# Patient Record
Sex: FEMALE | Race: BLACK OR AFRICAN AMERICAN | NOT HISPANIC OR LATINO | Employment: OTHER | URBAN - METROPOLITAN AREA
[De-identification: names, ages, dates, MRNs, and addresses within clinical notes are randomized per-mention and may not be internally consistent; named-entity substitution may affect disease eponyms.]

---

## 2017-03-15 ENCOUNTER — PATIENT MESSAGE (OUTPATIENT)
Dept: INTERNAL MEDICINE | Facility: CLINIC | Age: 67
End: 2017-03-15

## 2017-03-15 DIAGNOSIS — N39.0 URINARY TRACT INFECTION WITHOUT HEMATURIA, SITE UNSPECIFIED: ICD-10-CM

## 2017-03-15 RX ORDER — CIPROFLOXACIN 500 MG/1
500 TABLET ORAL 2 TIMES DAILY
Qty: 10 TABLET | Refills: 0 | Status: SHIPPED | OUTPATIENT
Start: 2017-03-15 | End: 2017-03-20

## 2017-07-18 ENCOUNTER — OFFICE VISIT (OUTPATIENT)
Dept: INTERNAL MEDICINE | Facility: CLINIC | Age: 67
End: 2017-07-18
Attending: INTERNAL MEDICINE
Payer: COMMERCIAL

## 2017-07-18 VITALS
OXYGEN SATURATION: 99 % | HEART RATE: 96 BPM | SYSTOLIC BLOOD PRESSURE: 143 MMHG | WEIGHT: 203 LBS | DIASTOLIC BLOOD PRESSURE: 78 MMHG | BODY MASS INDEX: 31.86 KG/M2 | HEIGHT: 67 IN

## 2017-07-18 DIAGNOSIS — Z13.39 SCREENING FOR ALCOHOLISM: ICD-10-CM

## 2017-07-18 DIAGNOSIS — Z13.31 SCREENING FOR DEPRESSION: ICD-10-CM

## 2017-07-18 DIAGNOSIS — N39.0 URINARY TRACT INFECTION WITHOUT HEMATURIA, SITE UNSPECIFIED: Primary | ICD-10-CM

## 2017-07-18 DIAGNOSIS — Z00.00 ROUTINE ADULT HEALTH MAINTENANCE: ICD-10-CM

## 2017-07-18 DIAGNOSIS — I10 ESSENTIAL HYPERTENSION: Primary | ICD-10-CM

## 2017-07-18 DIAGNOSIS — M17.11 PRIMARY OSTEOARTHRITIS OF RIGHT KNEE: ICD-10-CM

## 2017-07-18 PROCEDURE — G0442 ANNUAL ALCOHOL SCREEN 15 MIN: HCPCS | Mod: S$GLB,,, | Performed by: INTERNAL MEDICINE

## 2017-07-18 PROCEDURE — G0444 DEPRESSION SCREEN ANNUAL: HCPCS | Mod: S$GLB,,, | Performed by: INTERNAL MEDICINE

## 2017-07-18 PROCEDURE — 99396 PREV VISIT EST AGE 40-64: CPT | Mod: 25,S$GLB,, | Performed by: INTERNAL MEDICINE

## 2017-07-18 RX ORDER — DEXTROMETHORPHAN HYDROBROMIDE, GUAIFENESIN 5; 100 MG/5ML; MG/5ML
650 LIQUID ORAL 3 TIMES DAILY PRN
Refills: 0
Start: 2017-07-18

## 2017-07-18 RX ORDER — INDOMETHACIN 25 MG/1
25 CAPSULE ORAL
Qty: 90 CAPSULE | Refills: 2 | Status: SHIPPED | OUTPATIENT
Start: 2017-07-18 | End: 2018-07-10

## 2017-07-18 RX ORDER — SULFAMETHOXAZOLE AND TRIMETHOPRIM 800; 160 MG/1; MG/1
1 TABLET ORAL 2 TIMES DAILY
Qty: 10 TABLET | Refills: 0 | Status: SHIPPED | OUTPATIENT
Start: 2017-07-18 | End: 2018-07-10

## 2017-07-18 NOTE — PROGRESS NOTES
Subjective:       Patient ID: Trista Gr is a 66 y.o. female.    Chief Complaint: Follow-up (6 month) and Ankle Pain (backs of both ankles)    Wants to try Indocin for arthritis. Has a friend that takes it for gout and it works great.  May see Dr. Purvis for knee injection.  Her sister gave her a rolling walker 4 days ago.      Review of Systems   Constitutional: Negative.    Respiratory: Negative for shortness of breath.    Cardiovascular: Negative for chest pain.   Musculoskeletal: Positive for arthralgias.       Objective:      Physical Exam   Constitutional: She appears well-developed and well-nourished.   HENT:   Head: Normocephalic.   Eyes: Pupils are equal, round, and reactive to light.   Cardiovascular: Normal rate, regular rhythm and normal heart sounds.  Exam reveals no friction rub.    Pulmonary/Chest: Effort normal.   Musculoskeletal:        Right knee: She exhibits decreased range of motion and LCL laxity.   Neurological: She is alert.       Assessment:       1. Essential hypertension    2. Primary osteoarthritis of right knee    3. Routine adult health maintenance        Plan:       Per orders and D/C instructions.  Continue meds/diet for HTN, which is stable.  OK to try Indocin for arthritis.  She defers PT, c-scope, and labs until she has [employer] in 8/17.       Screening: The patient was screened for depression with the PHQ2 questionnaire and possible health consequences were discussed with the patient, who understands (15 minutes spent). The patient was screened for the misuse of alcohol, by asking the number of drinks per average week, and if pt has had more than 4 drinks (more than 3 for women and elderly) in 1 day within the past year. The health and legal consequences of misuse were discussed (15 minutes spent). The patient was screened for obesity (BMI>30), If the current BMI > 30, then the possible consequences of obesity, as well as the benefits of diet, exercise,  and weight loss were discussed (15 minutes spent).

## 2017-10-30 RX ORDER — MELOXICAM 15 MG/1
1 TABLET ORAL DAILY
Refills: 0 | COMMUNITY
Start: 2017-09-28 | End: 2018-01-08

## 2018-01-08 ENCOUNTER — OFFICE VISIT (OUTPATIENT)
Dept: INTERNAL MEDICINE | Facility: CLINIC | Age: 68
End: 2018-01-08
Attending: INTERNAL MEDICINE
Payer: MEDICARE

## 2018-01-08 VITALS
BODY MASS INDEX: 31.71 KG/M2 | HEART RATE: 89 BPM | HEIGHT: 67 IN | DIASTOLIC BLOOD PRESSURE: 86 MMHG | SYSTOLIC BLOOD PRESSURE: 132 MMHG | OXYGEN SATURATION: 97 % | WEIGHT: 202 LBS

## 2018-01-08 DIAGNOSIS — R35.0 URINARY FREQUENCY: ICD-10-CM

## 2018-01-08 DIAGNOSIS — I10 ESSENTIAL HYPERTENSION: ICD-10-CM

## 2018-01-08 DIAGNOSIS — Z00.00 ROUTINE HISTORY AND PHYSICAL EXAMINATION OF ADULT: ICD-10-CM

## 2018-01-08 DIAGNOSIS — R93.7 ABNORMAL BONE DENSITY SCREENING: ICD-10-CM

## 2018-01-08 DIAGNOSIS — M54.5 CHRONIC LOW BACK PAIN, UNSPECIFIED BACK PAIN LATERALITY, WITH SCIATICA PRESENCE UNSPECIFIED: Primary | ICD-10-CM

## 2018-01-08 DIAGNOSIS — G89.29 CHRONIC LOW BACK PAIN, UNSPECIFIED BACK PAIN LATERALITY, WITH SCIATICA PRESENCE UNSPECIFIED: Primary | ICD-10-CM

## 2018-01-08 DIAGNOSIS — Z13.89 SCREENING FOR OBESITY: ICD-10-CM

## 2018-01-08 DIAGNOSIS — Z13.39 SCREENING FOR ALCOHOLISM: ICD-10-CM

## 2018-01-08 DIAGNOSIS — Z12.39 BREAST CANCER SCREENING: ICD-10-CM

## 2018-01-08 DIAGNOSIS — Z13.31 SCREENING FOR DEPRESSION: ICD-10-CM

## 2018-01-08 DIAGNOSIS — M17.11 PRIMARY OSTEOARTHRITIS OF RIGHT KNEE: ICD-10-CM

## 2018-01-08 DIAGNOSIS — R42 DIZZY SPELLS: ICD-10-CM

## 2018-01-08 PROCEDURE — 99214 OFFICE O/P EST MOD 30 MIN: CPT | Mod: 25,S$GLB,, | Performed by: INTERNAL MEDICINE

## 2018-01-08 PROCEDURE — G0442 ANNUAL ALCOHOL SCREEN 15 MIN: HCPCS | Mod: 59,S$GLB,, | Performed by: INTERNAL MEDICINE

## 2018-01-08 PROCEDURE — G0444 DEPRESSION SCREEN ANNUAL: HCPCS | Mod: 59,S$GLB,, | Performed by: INTERNAL MEDICINE

## 2018-01-08 RX ORDER — DICLOFENAC SODIUM 10 MG/G
4 GEL TOPICAL 4 TIMES DAILY PRN
Qty: 1000 G | Refills: 1 | Status: SHIPPED | OUTPATIENT
Start: 2018-01-08 | End: 2019-01-08

## 2018-01-08 RX ORDER — MELOXICAM 7.5 MG/1
7.5 TABLET ORAL 2 TIMES DAILY PRN
Qty: 60 TABLET | Refills: 3 | Status: SHIPPED | OUTPATIENT
Start: 2018-01-08 | End: 2019-01-08

## 2018-01-08 RX ORDER — TRAMADOL HYDROCHLORIDE 50 MG/1
50 TABLET ORAL DAILY PRN
Qty: 30 TABLET | Refills: 1 | Status: SHIPPED | OUTPATIENT
Start: 2018-01-08 | End: 2018-12-26 | Stop reason: SDUPTHER

## 2018-01-08 NOTE — PROGRESS NOTES
Subjective:       Patient ID: Trista Gr is a 67 y.o. female.    Chief Complaint: Follow-up (6 month); Dizziness (thinks its the anti inflam meds); Urinary Frequency; Pain (knee, back, right hip / seeing  Dr. Kwan); and Headache    NSAIDS make her dizzy. Wants to try Mobic 7.5 mg BID prn.      Dizziness:   Chronicity:  Recurrent  Onset:  More than 1 month agono chest pain.  Urinary Frequency    This is a chronic problem. The current episode started more than 1 month ago. The problem has been unchanged. Associated symptoms include frequency. Her past medical history is significant for hypertension.   Pain   This is a chronic problem. The current episode started more than 1 year ago. Associated symptoms include arthralgias. Pertinent negatives include no chest pain.   Hypertension   This is a chronic problem. The current episode started more than 1 year ago. The problem is controlled. Pertinent negatives include no chest pain or shortness of breath.     Review of Systems   Constitutional: Negative.    Respiratory: Negative for shortness of breath.    Cardiovascular: Negative for chest pain.   Genitourinary: Positive for frequency.   Musculoskeletal: Positive for arthralgias, back pain and gait problem.   Psychiatric/Behavioral: Negative for dysphoric mood.       Objective:      Physical Exam   Constitutional: She appears well-developed and well-nourished.   HENT:   Head: Normocephalic.   Eyes: Pupils are equal, round, and reactive to light.   Cardiovascular: Normal rate, regular rhythm and normal heart sounds.  Exam reveals no friction rub.    Pulmonary/Chest: Effort normal.   Musculoskeletal:        Right knee: She exhibits decreased range of motion and LCL laxity.   Neurological: She is alert.       Assessment:       1. Chronic low back pain, unspecified back pain laterality, with sciatica presence unspecified    2. Essential hypertension    3. Routine history and physical examination of adult    4.  Abnormal bone density screening    5. Breast cancer screening    6. Screening for depression    7. Screening for alcoholism    8. Screening for obesity    9. Urinary frequency    10. Dizzy spells    11. Primary osteoarthritis of right knee        Plan:       Per orders and D/C instructions.  Continue meds/diet for HTN, which is stable.  Starting PT through Ortho for Back and knee pain.  Try Myrbetriq for urinary frequency.  Check labs.    Screening: The patient was screened for depression with the PHQ2 questionnaire and possible health consequences were discussed with the patient, who understands (15 minutes spent). The patient was screened for the misuse of alcohol, by asking the number of drinks per average week, and if pt has had more than 4 drinks (more than 3 for women and elderly) in 1 day within the past year. The health and legal consequences of misuse were discussed (15 minutes spent). The patient was screened for obesity (BMI>30), If the current BMI > 30, then the possible consequences of obesity, as well as the benefits of diet, exercise, and weight loss were discussed (15 minutes spent).

## 2018-01-09 RX ORDER — OXYBUTYNIN CHLORIDE 10 MG/1
10 TABLET, EXTENDED RELEASE ORAL DAILY
Qty: 30 TABLET | Refills: 11 | Status: SHIPPED | OUTPATIENT
Start: 2018-01-09 | End: 2020-10-13

## 2018-02-22 PROBLEM — M85.859 OSTEOPENIA OF THIGH: Status: ACTIVE | Noted: 2018-02-22

## 2018-04-24 DIAGNOSIS — J11.1 FLU: Primary | ICD-10-CM

## 2018-04-24 RX ORDER — OSELTAMIVIR PHOSPHATE 75 MG/1
75 CAPSULE ORAL 2 TIMES DAILY
Qty: 10 CAPSULE | Refills: 0 | Status: SHIPPED | OUTPATIENT
Start: 2018-04-24 | End: 2018-04-29

## 2018-04-30 ENCOUNTER — OFFICE VISIT (OUTPATIENT)
Dept: URGENT CARE | Facility: CLINIC | Age: 68
End: 2018-04-30
Payer: MEDICARE

## 2018-04-30 VITALS
DIASTOLIC BLOOD PRESSURE: 73 MMHG | WEIGHT: 202 LBS | TEMPERATURE: 98 F | SYSTOLIC BLOOD PRESSURE: 142 MMHG | HEIGHT: 67 IN | BODY MASS INDEX: 31.71 KG/M2 | RESPIRATION RATE: 18 BRPM | HEART RATE: 92 BPM | OXYGEN SATURATION: 99 %

## 2018-04-30 DIAGNOSIS — J01.00 ACUTE NON-RECURRENT MAXILLARY SINUSITIS: ICD-10-CM

## 2018-04-30 DIAGNOSIS — R42 DIZZINESS: ICD-10-CM

## 2018-04-30 DIAGNOSIS — H65.03 BILATERAL ACUTE SEROUS OTITIS MEDIA, RECURRENCE NOT SPECIFIED: ICD-10-CM

## 2018-04-30 DIAGNOSIS — J02.9 SORE THROAT: Primary | ICD-10-CM

## 2018-04-30 PROCEDURE — 96372 THER/PROPH/DIAG INJ SC/IM: CPT | Mod: S$GLB,,, | Performed by: EMERGENCY MEDICINE

## 2018-04-30 PROCEDURE — 99214 OFFICE O/P EST MOD 30 MIN: CPT | Mod: 25,S$GLB,, | Performed by: EMERGENCY MEDICINE

## 2018-04-30 PROCEDURE — 3077F SYST BP >= 140 MM HG: CPT | Mod: CPTII,S$GLB,, | Performed by: EMERGENCY MEDICINE

## 2018-04-30 PROCEDURE — 3078F DIAST BP <80 MM HG: CPT | Mod: CPTII,S$GLB,, | Performed by: EMERGENCY MEDICINE

## 2018-04-30 RX ORDER — TIZANIDINE 2 MG/1
TABLET ORAL
COMMUNITY
Start: 2018-02-15 | End: 2020-10-13

## 2018-04-30 RX ORDER — TRAMADOL HYDROCHLORIDE 50 MG/1
TABLET ORAL
COMMUNITY
Start: 2018-04-21 | End: 2018-09-27 | Stop reason: SDUPTHER

## 2018-04-30 RX ORDER — MECLIZINE HYDROCHLORIDE 25 MG/1
25 TABLET ORAL 3 TIMES DAILY PRN
Qty: 21 TABLET | Refills: 0 | Status: SHIPPED | OUTPATIENT
Start: 2018-04-30 | End: 2018-10-07

## 2018-04-30 RX ORDER — DICLOFENAC SODIUM 75 MG/1
TABLET, DELAYED RELEASE ORAL
COMMUNITY
Start: 2018-03-23 | End: 2018-07-10

## 2018-04-30 RX ORDER — FLUTICASONE PROPIONATE 50 MCG
1 SPRAY, SUSPENSION (ML) NASAL DAILY
Qty: 1 BOTTLE | Refills: 2 | Status: SHIPPED | OUTPATIENT
Start: 2018-04-30 | End: 2018-11-26 | Stop reason: SDUPTHER

## 2018-04-30 RX ORDER — CEFDINIR 300 MG/1
300 CAPSULE ORAL EVERY 12 HOURS
Qty: 14 CAPSULE | Refills: 0 | Status: SHIPPED | OUTPATIENT
Start: 2018-04-30 | End: 2018-05-07

## 2018-04-30 RX ORDER — BETAMETHASONE SODIUM PHOSPHATE AND BETAMETHASONE ACETATE 3; 3 MG/ML; MG/ML
6 INJECTION, SUSPENSION INTRA-ARTICULAR; INTRALESIONAL; INTRAMUSCULAR; SOFT TISSUE
Status: COMPLETED | OUTPATIENT
Start: 2018-04-30 | End: 2018-04-30

## 2018-04-30 RX ORDER — CETIRIZINE HYDROCHLORIDE 10 MG/1
10 TABLET ORAL DAILY
Qty: 30 TABLET | Refills: 2 | Status: SHIPPED | OUTPATIENT
Start: 2018-04-30 | End: 2019-01-08

## 2018-04-30 RX ADMIN — BETAMETHASONE SODIUM PHOSPHATE AND BETAMETHASONE ACETATE 6 MG: 3; 3 INJECTION, SUSPENSION INTRA-ARTICULAR; INTRALESIONAL; INTRAMUSCULAR; SOFT TISSUE at 07:04

## 2018-04-30 NOTE — PROGRESS NOTES
"Subjective:       Patient ID: Trista Gr is a 67 y.o. female.    Vitals:    04/30/18 1829   BP: (!) 142/73   Pulse: 92   Resp: 18   Temp: 98.1 °F (36.7 °C)   TempSrc: Oral   SpO2: 99%   Weight: 91.6 kg (202 lb)   Height: 5' 7" (1.702 m)       Chief Complaint: URI    Pt c/o having sinus congestion, ear pain, dizziness, and lightheadedness for a couple of weeks. REPORTS FLUID IN THE EAR AND WAX AS WELL, SINUS PRRESSURE AND CONGESTION, SORE THROAT, TMJ ON THE LEFT. SHE STATES NO FEVERS, NO CHILLS. SHE REPORT SHE OCCASIONALLY TAKE ANTIHISTAMINES BUT NOT REGULARLY BECAUSE OF HTN, AND SHE IS RUNNING OUT OF FLONASE SO REQUESTING REFILL ON THAT RX.      URI    This is a recurrent problem. The current episode started 1 to 4 weeks ago. The problem has been unchanged. There has been no fever. The fever has been present for less than 1 day. Associated symptoms include congestion, ear pain and headaches. Pertinent negatives include no abdominal pain, chest pain, coughing, nausea, sore throat or wheezing. Treatments tried: tamiflu, flonase, goodies,  The treatment provided mild relief.     Review of Systems   Constitution: Negative for chills, fever and malaise/fatigue.   HENT: Positive for congestion and ear pain. Negative for hoarse voice and sore throat.         Both ears feel full   Eyes: Negative for discharge and redness.   Cardiovascular: Negative for chest pain, dyspnea on exertion and leg swelling.   Respiratory: Negative for cough, shortness of breath, sputum production and wheezing.    Musculoskeletal: Negative for myalgias.   Gastrointestinal: Negative for abdominal pain and nausea.   Neurological: Positive for dizziness, headaches and light-headedness.       Objective:      Physical Exam   Constitutional: She is oriented to person, place, and time. She appears well-developed and well-nourished. She is cooperative.  Non-toxic appearance. She does not appear ill. No distress.   IN WHEELCHAIR CHRONICALLY, " PLEASANT COOPERATIVE, WITH  WHO IS HELPFUL   HENT:   Head: Normocephalic and atraumatic.   Right Ear: Hearing, tympanic membrane, external ear and ear canal normal.   Left Ear: Hearing, tympanic membrane, external ear and ear canal normal.   Nose: No mucosal edema, rhinorrhea or nasal deformity. No epistaxis. Right sinus exhibits no maxillary sinus tenderness and no frontal sinus tenderness. Left sinus exhibits no maxillary sinus tenderness and no frontal sinus tenderness.   Mouth/Throat: Uvula is midline and mucous membranes are normal. No trismus in the jaw. Normal dentition. No uvula swelling. No posterior oropharyngeal erythema.   TOP DENTURES  MILD POSTERIOR OP ERYTHEMA  MILD LEFT TMJ  TTP MAXILLARY SINUSES  BOGGY NASAL MUCOSA  CERUMINOSIS LEFT EAC, REMOVED WITH CURRETTE AND FLUID AT THE LEFT TM  RIGHT TM CLEAR  TENDER LEFT ANTERIOR CERVICAL LA   Eyes: Conjunctivae and lids are normal. No scleral icterus.   Sclera clear bilat   Neck: Trachea normal, full passive range of motion without pain and phonation normal. Neck supple.   Cardiovascular: Normal rate, regular rhythm, normal heart sounds, intact distal pulses and normal pulses.    Pulmonary/Chest: Effort normal and breath sounds normal. No respiratory distress.   Abdominal: Soft. Normal appearance and bowel sounds are normal. There is no tenderness.   Musculoskeletal: Normal range of motion. She exhibits no edema or deformity.   Neurological: She is alert and oriented to person, place, and time. She exhibits normal muscle tone. Coordination normal.   NORMAL FINGER TO NOSE  NO DYSDIADOKINESIA AND FLAT HAND TO THIGH RAPID ALTERNATING MOVEMENT  NO NYSTAGMUS   Skin: Skin is warm, dry and intact. She is not diaphoretic. No pallor.   Psychiatric: She has a normal mood and affect. Her speech is normal and behavior is normal. Cognition and memory are normal.   Nursing note and vitals reviewed.      Assessment:       1. Sore throat    2. Acute non-recurrent  maxillary sinusitis    3. Dizziness    4. Bilateral acute serous otitis media, recurrence not specified        Plan:       Trista was seen today for uri.    Diagnoses and all orders for this visit:    Sore throat    Acute non-recurrent maxillary sinusitis    Dizziness    Bilateral acute serous otitis media, recurrence not specified    Other orders  -     betamethasone acetate-betamethasone sodium phosphate injection 6 mg; Inject 1 mL (6 mg total) into the muscle one time.  -     meclizine (ANTIVERT) 25 mg tablet; Take 1 tablet (25 mg total) by mouth 3 (three) times daily as needed for Dizziness (vertigo).  -     cefdinir (OMNICEF) 300 MG capsule; Take 1 capsule (300 mg total) by mouth every 12 (twelve) hours.  -     cetirizine (ZYRTEC) 10 MG tablet; Take 1 tablet (10 mg total) by mouth once daily.  -     fluticasone (FLONASE) 50 mcg/actuation nasal spray; 1 spray (50 mcg total) by Each Nare route once daily.          Patient Instructions       REST AND HYDRATE WITH PLENTY OF FLUIDS    1 CC CELESTONE GIVEN IN CLINIC  CEFDINIR RX (NOT A PENICILLIN)  FLONASE RX  ZYRTEC (CEFTIRIZINE RX)  MECLIZINE RX FOR DIZZINESS    SEE SINUSITIS, SORE THROAT, FLUID IN THE MIDDLE EAR, AND DIZZINESS SHEET    BE SURE TO FOLLOW UP WITH YOUR PRIMARY CARE MD AND TO GO TO THE ER IF WORSE IN ANY WAY.    Sinusitis (Antibiotic Treatment)    The sinuses are air-filled spaces within the bones of the face. They connect to the inside of the nose. Sinusitis is an inflammation of the tissue lining the sinus cavity. Sinus inflammation can occur during a cold. It can also be due to allergies to pollens and other particles in the air. Sinusitis can cause symptoms of sinus congestion and fullness. A sinus infection causes fever, headache and facial pain. There is often green or yellow drainage from the nose or into the back of the throat (post-nasal drip). You have been given antibiotics to treat this condition.  Home care:  · Take the full course of  antibiotics as instructed. Do not stop taking them, even if you feel better.  · Drink plenty of water, hot tea, and other liquids. This may help thin mucus. It also may promote sinus drainage.  · Heat may help soothe painful areas of the face. Use a towel soaked in hot water. Or,  the shower and direct the hot spray onto your face. Using a vaporizer along with a menthol rub at night may also help.   · An expectorant containing guaifenesin may help thin the mucus and promote drainage from the sinuses.  · Over-the-counter decongestants may be used unless a similar medicine was prescribed. Nasal sprays work the fastest. Use one that contains phenylephrine or oxymetazoline. First blow the nose gently. Then use the spray. Do not use these medicines more often than directed on the label or symptoms may get worse. You may also use tablets containing pseudoephedrine. Avoid products that combine ingredients, because side effects may be increased. Read labels. You can also ask the pharmacist for help. (NOTE: Persons with high blood pressure should not use decongestants. They can raise blood pressure.)  · Over-the-counter antihistamines may help if allergies contributed to your sinusitis.    · Do not use nasal rinses or irrigation during an acute sinus infection, unless told to by your health care provider. Rinsing may spread the infection to other sinuses.  · Use acetaminophen or ibuprofen to control pain, unless another pain medicine was prescribed. (If you have chronic liver or kidney disease or ever had a stomach ulcer, talk with your doctor before using these medicines. Aspirin should never be used in anyone under 18 years of age who is ill with a fever. It may cause severe liver damage.)  · Don't smoke. This can worsen symptoms.  Follow-up care  Follow up with your healthcare provider or our staff if you are not improving within the next week.  When to seek medical advice  Call your healthcare provider if any of  these occur:  · Facial pain or headache becoming more severe  · Stiff neck  · Unusual drowsiness or confusion  · Swelling of the forehead or eyelids  · Vision problems, including blurred or double vision  · Fever of 100.4ºF (38ºC) or higher, or as directed by your healthcare provider  · Seizure  · Breathing problems  · Symptoms not resolving within 10 days  Date Last Reviewed: 4/13/2015  © 0047-3130 [employer]. [address] [place] [address], [place] [address]. All rights reserved. This information is not intended as a substitute for professional medical care. Always follow your healthcare professional's instructions.        When You Have a Sore Throat    A sore throat can be painful. There are many reasons why you may have a sore throat. Your healthcare provider will work with you to find the cause of your sore throat. He or she will also find the best treatment for you.  What causes a sore throat?  Sore throats can be caused or worsened by:  · Cold or flu viruses  · Bacteria  · Irritants such as tobacco smoke or air pollution  · Acid reflux  A healthy throat  The tonsils are on the sides of the throat near the base of the tongue. They collect viruses and bacteria and help fight infection. The throat (pharynx) is the passage for air. Mucus from the nasal cavity also moves down the passage.  An inflamed throat  The tonsils and pharynx can become inflamed due to a cold or flu virus. Postnasal drip (excess mucus draining from the nasal cavity) can irritate the throat. It can also make the throat or tonsils more likely to be infected by bacteria. Severe, untreated tonsillitis in children or adults can cause a pocket of pus (abscess) to form near the tonsil.  Your evaluation  A medical evaluation can help find the cause of your sore throat. It can also help your healthcare provider choose the best treatment for you. The evaluation may include a health history, physical exam, and diagnostic tests.  Health  history  Your healthcare provider may ask you the following:  · How long has the sore throat lasted and how have you been treating it?  · Do you have any other symptoms, such as body aches, fever, or cough?  · Does your sore throat recur? If so, how often? How many days of school or work have you missed because of a sore throat?  · Do you have trouble eating or swallowing?  · Have you been told that you snore or have other sleep problems?  · Do you have bad breath?  · Do you cough up bad-tasting mucus?  Physical exam  During the exam, your healthcare provider checks your ears, nose, and throat for problems. He or she also checks for swelling in the neck, and may listen to your chest.  Possible tests  Other tests your healthcare provider may perform include:  · A throat swab to check for bacteria such as streptococcus (the bacteria that causes strep throat)  · A blood test to check for mononucleosis (a viral infection)  · A chest X-ray to rule out pneumonia, especially if you have a cough  Treating a sore throat  Treatment depends on many factors. What is the likely cause? Is the problem recent? Does it keep coming back? In many cases, the best thing to do is to treat the symptoms, rest, and let the problem heal itself. Antibiotics may help clear up some bacterial infections. For cases of severe or recurring tonsillitis, the tonsils may need to be removed.  Relieving your symptoms  · Dont smoke, and avoid secondhand smoke.  · For children, try throat sprays or Popsicles. Adults and older children may try lozenges.  · Drink warm liquids to soothe the throat and help thin mucus. Avoid alcohol, spicy foods, and acidic drinks such as orange juice. These can irritate the throat.  · Gargle with warm saltwater (1 teaspoon of salt to 8 ounces of warm water).  · Use a humidifier to keep air moist and relieve throat dryness.  · Try over-the-counter pain relievers such as acetaminophen or ibuprofen. Use as directed, and dont  "exceed the recommended dose. Dont give aspirin to children.   Are antibiotics needed?  If your sore throat is due to a bacterial infection, antibiotics may speed healing and prevent complications. Although group A streptococcus ("strep throat" or GAS) is the major treatable infection for a sore throat, GAS causes only 5% to 15% of sore throats in adults who seek medical care. Most sore throats are caused by cold or flu viruses. And antibiotics dont treat viral illness. In fact, using antibiotics when theyre not needed may produce bacteria that are harder to kill. Your healthcare provider will prescribe antibiotics only if he or she thinks they are likely to help.  If antibiotics are prescribed  Take the medicine exactly as directed. Be sure to finish your prescription even if youre feeling better. And be sure to ask your healthcare provider or pharmacist what side effects are common and what to do about them.  Is surgery needed?  In some cases, tonsils need to be removed. This is often done as outpatient (same-day) surgery. Your healthcare provider may advise removing the tonsils in cases of:  · Several severe bouts of tonsillitis in a year. Severe episodes include those that lead to missed days of school or work, or that need to be treated with antibiotics.  · Tonsillitis that causes breathing problems during sleep  · Tonsillitis caused by food particles collecting in pouches in the tonsils (cryptic tonsillitis)  Call your healthcare provider if any of the following occur:  · Symptoms worsen, or new symptoms develop.  · Swollen tonsils make breathing difficult.  · The pain is severe enough to keep you from drinking liquids.  · A skin rash, hives, or wheezing develops. Any of these could signal an allergic reaction to antibiotics.  · Symptoms dont improve within a week.  · Symptoms dont improve within 2 to 3 days of starting antibiotics.   Date Last Reviewed: 10/1/2016  © 7725-3579 [employer] " [address] [place] [address]. All rights reserved. This information is not intended as a substitute for professional medical care. Always follow your healthcare professional's instructions.        Dizziness (Uncertain Cause)  Dizziness is a common symptom. It may be described as lightheadedness, spinning, or feeling like you are going to faint. Dizziness can have many causes.  Be sure to tell the healthcare provider about:  · All medicines you take, including prescription, over-the-counter, herbs, and supplements  · Any other symptoms you have  · Any health problems you are being treated for  · Anything that causes the dizziness to get worse or better  Today's exam did not show an exact cause for your dizziness. Other tests may be needed. Follow up with your healthcare provider.  Home care  · Dizziness that occurs with sudden standing may be a sign of mild dehydration. Drink extra fluids for the next few days.  · If you recently started a new medicine, stopped a medicine, or had the dose of a current medicine changed, talk with the prescribing healthcare provider. Your medicine plan may need adjustment.  · If dizziness lasts more than a few seconds, sit or lie down until it passes. This may help prevent injury in case you pass out.  · Do not drive or use power tools or dangerous equipment until you have had no dizziness for at least 48 hours.  Follow-up care  Follow up with your healthcare provider for further evaluation within the next 7 days or as advised.  When to seek medical advice  Call your healthcare provider for any of the following:  · Worsening of symptoms or new symptoms  · Passing out or seizure  · Repeated vomiting  · Headache  · Palpitations (the sense that your heart is fluttering or beating fast or hard)  · Shortness of breath  · Blood in vomit or stool (black or red color)  · Weakness of an arm or leg or one side of the face  · Vision or hearing changes  · Trouble walking or  speaking  · Chest, arm, neck, back, or jaw pain  Date Last Reviewed: 8/23/2015  © 8835-9005 [employer]. [address] [place] [address], [place] [address]. All rights reserved. This information is not intended as a substitute for professional medical care. Always follow your healthcare professional's instructions.        Earache, No Infection (Adult)  Earaches can happen without an infection. This occurs when air and fluid build up behind the eardrum causing a feeling of fullness and discomfort and reduced hearing. This is called otitis media with effusion (OME) or serous otitis media. It means there is fluid in the middle ear. It is not the same as acute otitis media, which is typically from infection.  OME can happen when you have a cold if congestion blocks the passage that drains the middle ear. This passage is called the eustachian tube. OME may also occur with nasal allergies or after a bacterial middle ear infection.    The pain or discomfort may come and go. You may hear clicking or popping sounds when you chew or swallow. You may feel that your balance is off. Or you may hear ringing in the ear.  It often takes from several weeks up to 3 months for the fluid to clear on its own. Oral pain relievers and ear drops help if there is pain. Decongestants and antihistamines sometimes help. Antibiotics don't help since there is no infection. Your doctor may prescribe a nasal spray to help reduce swelling in the nose and eustachian tube. This can allow the ear to drain.  If your OME doesn't improve after 3 months, surgery may be used to drain the fluid and insert a small tube in the eardrum to allow continued drainage.  Because the middle ear fluid can become infected, it is important to watch for signs of an ear infection which may develop later. These signs include increased ear pain, fever, or drainage from the ear.  Home care  The following guidelines will help you care for yourself at home:  · You may use  over-the-counter medicine as directed to control pain, unless another medicine was prescribed. If you have chronic liver or kidney disease or ever had a stomach ulcer or GI bleeding, talk with your doctor before using these medicines. Aspirin should never be used in anyone under 18 years of age who is ill with a fever. It may cause severe liver damage.  · You may use over-the-counter decongestants such as phenylephrine or pseudoephedrine. But they are not always helpful. Don't use nasal spray decongestants more than 3 days. Longer use can make congestion worse. Prescription nasal sprays from your doctor don't typically have those restrictions.  · Antihistamines may help if you are also having allergy symptoms.  · You may use medicines such as guaifenesin to thin mucus and promote drainage.  Follow-up care  Follow up with your healthcare provider or as advised if you are not feeling better after 3 days.  When to seek medical advice  Call your healthcare provider right away if any of the following occur:  · Your ear pain gets worse or does not start to improve   · Fever of 100.4°F (38°C) or higher, or as directed by your healthcare provider  · Fluid or blood draining from the ear  · Headache or sinus pain  · Stiff neck  · Unusual drowsiness or confusion  Date Last Reviewed: 10/1/2016  © 9379-1713 [employer]. [address] [place] [address], [place] [address]. All rights reserved. This information is not intended as a substitute for professional medical care. Always follow your healthcare professional's instructions.

## 2018-05-01 NOTE — PATIENT INSTRUCTIONS
REST AND HYDRATE WITH PLENTY OF FLUIDS    1 CC CELESTONE GIVEN IN CLINIC  CEFDINIR RX (NOT A PENICILLIN)  FLONASE RX  ZYRTEC (CEFTIRIZINE RX)  MECLIZINE RX FOR DIZZINESS    SEE SINUSITIS, SORE THROAT, FLUID IN THE MIDDLE EAR, AND DIZZINESS SHEET    BE SURE TO FOLLOW UP WITH YOUR PRIMARY CARE MD AND TO GO TO THE ER IF WORSE IN ANY WAY.    Sinusitis (Antibiotic Treatment)    The sinuses are air-filled spaces within the bones of the face. They connect to the inside of the nose. Sinusitis is an inflammation of the tissue lining the sinus cavity. Sinus inflammation can occur during a cold. It can also be due to allergies to pollens and other particles in the air. Sinusitis can cause symptoms of sinus congestion and fullness. A sinus infection causes fever, headache and facial pain. There is often green or yellow drainage from the nose or into the back of the throat (post-nasal drip). You have been given antibiotics to treat this condition.  Home care:  · Take the full course of antibiotics as instructed. Do not stop taking them, even if you feel better.  · Drink plenty of water, hot tea, and other liquids. This may help thin mucus. It also may promote sinus drainage.  · Heat may help soothe painful areas of the face. Use a towel soaked in hot water. Or,  the shower and direct the hot spray onto your face. Using a vaporizer along with a menthol rub at night may also help.   · An expectorant containing guaifenesin may help thin the mucus and promote drainage from the sinuses.  · Over-the-counter decongestants may be used unless a similar medicine was prescribed. Nasal sprays work the fastest. Use one that contains phenylephrine or oxymetazoline. First blow the nose gently. Then use the spray. Do not use these medicines more often than directed on the label or symptoms may get worse. You may also use tablets containing pseudoephedrine. Avoid products that combine ingredients, because side effects may be increased.  Read labels. You can also ask the pharmacist for help. (NOTE: Persons with high blood pressure should not use decongestants. They can raise blood pressure.)  · Over-the-counter antihistamines may help if allergies contributed to your sinusitis.    · Do not use nasal rinses or irrigation during an acute sinus infection, unless told to by your health care provider. Rinsing may spread the infection to other sinuses.  · Use acetaminophen or ibuprofen to control pain, unless another pain medicine was prescribed. (If you have chronic liver or kidney disease or ever had a stomach ulcer, talk with your doctor before using these medicines. Aspirin should never be used in anyone under 18 years of age who is ill with a fever. It may cause severe liver damage.)  · Don't smoke. This can worsen symptoms.  Follow-up care  Follow up with your healthcare provider or our staff if you are not improving within the next week.  When to seek medical advice  Call your healthcare provider if any of these occur:  · Facial pain or headache becoming more severe  · Stiff neck  · Unusual drowsiness or confusion  · Swelling of the forehead or eyelids  · Vision problems, including blurred or double vision  · Fever of 100.4ºF (38ºC) or higher, or as directed by your healthcare provider  · Seizure  · Breathing problems  · Symptoms not resolving within 10 days  Date Last Reviewed: 4/13/2015  © 4904-8795 [employer]. [address] [place] [address], [place] [id number]. All rights reserved. This information is not intended as a substitute for professional medical care. Always follow your healthcare professional's instructions.        When You Have a Sore Throat    A sore throat can be painful. There are many reasons why you may have a sore throat. Your healthcare provider will work with you to find the cause of your sore throat. He or she will also find the best treatment for you.  What causes a sore throat?  Sore throats can be caused or worsened  by:  · Cold or flu viruses  · Bacteria  · Irritants such as tobacco smoke or air pollution  · Acid reflux  A healthy throat  The tonsils are on the sides of the throat near the base of the tongue. They collect viruses and bacteria and help fight infection. The throat (pharynx) is the passage for air. Mucus from the nasal cavity also moves down the passage.  An inflamed throat  The tonsils and pharynx can become inflamed due to a cold or flu virus. Postnasal drip (excess mucus draining from the nasal cavity) can irritate the throat. It can also make the throat or tonsils more likely to be infected by bacteria. Severe, untreated tonsillitis in children or adults can cause a pocket of pus (abscess) to form near the tonsil.  Your evaluation  A medical evaluation can help find the cause of your sore throat. It can also help your healthcare provider choose the best treatment for you. The evaluation may include a health history, physical exam, and diagnostic tests.  Health history  Your healthcare provider may ask you the following:  · How long has the sore throat lasted and how have you been treating it?  · Do you have any other symptoms, such as body aches, fever, or cough?  · Does your sore throat recur? If so, how often? How many days of school or work have you missed because of a sore throat?  · Do you have trouble eating or swallowing?  · Have you been told that you snore or have other sleep problems?  · Do you have bad breath?  · Do you cough up bad-tasting mucus?  Physical exam  During the exam, your healthcare provider checks your ears, nose, and throat for problems. He or she also checks for swelling in the neck, and may listen to your chest.  Possible tests  Other tests your healthcare provider may perform include:  · A throat swab to check for bacteria such as streptococcus (the bacteria that causes strep throat)  · A blood test to check for mononucleosis (a viral infection)  · A chest X-ray to rule out  "pneumonia, especially if you have a cough  Treating a sore throat  Treatment depends on many factors. What is the likely cause? Is the problem recent? Does it keep coming back? In many cases, the best thing to do is to treat the symptoms, rest, and let the problem heal itself. Antibiotics may help clear up some bacterial infections. For cases of severe or recurring tonsillitis, the tonsils may need to be removed.  Relieving your symptoms  · Dont smoke, and avoid secondhand smoke.  · For children, try throat sprays or Popsicles. Adults and older children may try lozenges.  · Drink warm liquids to soothe the throat and help thin mucus. Avoid alcohol, spicy foods, and acidic drinks such as orange juice. These can irritate the throat.  · Gargle with warm saltwater (1 teaspoon of salt to 8 ounces of warm water).  · Use a humidifier to keep air moist and relieve throat dryness.  · Try over-the-counter pain relievers such as acetaminophen or ibuprofen. Use as directed, and dont exceed the recommended dose. Dont give aspirin to children.   Are antibiotics needed?  If your sore throat is due to a bacterial infection, antibiotics may speed healing and prevent complications. Although group A streptococcus ("strep throat" or GAS) is the major treatable infection for a sore throat, GAS causes only 5% to 15% of sore throats in adults who seek medical care. Most sore throats are caused by cold or flu viruses. And antibiotics dont treat viral illness. In fact, using antibiotics when theyre not needed may produce bacteria that are harder to kill. Your healthcare provider will prescribe antibiotics only if he or she thinks they are likely to help.  If antibiotics are prescribed  Take the medicine exactly as directed. Be sure to finish your prescription even if youre feeling better. And be sure to ask your healthcare provider or pharmacist what side effects are common and what to do about them.  Is surgery needed?  In some " cases, tonsils need to be removed. This is often done as outpatient (same-day) surgery. Your healthcare provider may advise removing the tonsils in cases of:  · Several severe bouts of tonsillitis in a year. Severe episodes include those that lead to missed days of school or work, or that need to be treated with antibiotics.  · Tonsillitis that causes breathing problems during sleep  · Tonsillitis caused by food particles collecting in pouches in the tonsils (cryptic tonsillitis)  Call your healthcare provider if any of the following occur:  · Symptoms worsen, or new symptoms develop.  · Swollen tonsils make breathing difficult.  · The pain is severe enough to keep you from drinking liquids.  · A skin rash, hives, or wheezing develops. Any of these could signal an allergic reaction to antibiotics.  · Symptoms dont improve within a week.  · Symptoms dont improve within 2 to 3 days of starting antibiotics.   Date Last Reviewed: 10/1/2016  © 4444-1140 [employer]. [address] [place] [address]. All rights reserved. This information is not intended as a substitute for professional medical care. Always follow your healthcare professional's instructions.        Dizziness (Uncertain Cause)  Dizziness is a common symptom. It may be described as lightheadedness, spinning, or feeling like you are going to faint. Dizziness can have many causes.  Be sure to tell the healthcare provider about:  · All medicines you take, including prescription, over-the-counter, herbs, and supplements  · Any other symptoms you have  · Any health problems you are being treated for  · Anything that causes the dizziness to get worse or better  Today's exam did not show an exact cause for your dizziness. Other tests may be needed. Follow up with your healthcare provider.  Home care  · Dizziness that occurs with sudden standing may be a sign of mild dehydration. Drink extra fluids for the next few days.  · If you recently  started a new medicine, stopped a medicine, or had the dose of a current medicine changed, talk with the prescribing healthcare provider. Your medicine plan may need adjustment.  · If dizziness lasts more than a few seconds, sit or lie down until it passes. This may help prevent injury in case you pass out.  · Do not drive or use power tools or dangerous equipment until you have had no dizziness for at least 48 hours.  Follow-up care  Follow up with your healthcare provider for further evaluation within the next 7 days or as advised.  When to seek medical advice  Call your healthcare provider for any of the following:  · Worsening of symptoms or new symptoms  · Passing out or seizure  · Repeated vomiting  · Headache  · Palpitations (the sense that your heart is fluttering or beating fast or hard)  · Shortness of breath  · Blood in vomit or stool (black or red color)  · Weakness of an arm or leg or one side of the face  · Vision or hearing changes  · Trouble walking or speaking  · Chest, arm, neck, back, or jaw pain  Date Last Reviewed: 8/23/2015 © 2000-2017 [employer]. [address] [place] [address]. All rights reserved. This information is not intended as a substitute for professional medical care. Always follow your healthcare professional's instructions.        Earache, No Infection (Adult)  Earaches can happen without an infection. This occurs when air and fluid build up behind the eardrum causing a feeling of fullness and discomfort and reduced hearing. This is called otitis media with effusion (OME) or serous otitis media. It means there is fluid in the middle ear. It is not the same as acute otitis media, which is typically from infection.  OME can happen when you have a cold if congestion blocks the passage that drains the middle ear. This passage is called the eustachian tube. OME may also occur with nasal allergies or after a bacterial middle ear infection.    The pain or  discomfort may come and go. You may hear clicking or popping sounds when you chew or swallow. You may feel that your balance is off. Or you may hear ringing in the ear.  It often takes from several weeks up to 3 months for the fluid to clear on its own. Oral pain relievers and ear drops help if there is pain. Decongestants and antihistamines sometimes help. Antibiotics don't help since there is no infection. Your doctor may prescribe a nasal spray to help reduce swelling in the nose and eustachian tube. This can allow the ear to drain.  If your OME doesn't improve after 3 months, surgery may be used to drain the fluid and insert a small tube in the eardrum to allow continued drainage.  Because the middle ear fluid can become infected, it is important to watch for signs of an ear infection which may develop later. These signs include increased ear pain, fever, or drainage from the ear.  Home care  The following guidelines will help you care for yourself at home:  · You may use over-the-counter medicine as directed to control pain, unless another medicine was prescribed. If you have chronic liver or kidney disease or ever had a stomach ulcer or GI bleeding, talk with your doctor before using these medicines. Aspirin should never be used in anyone under 18 years of age who is ill with a fever. It may cause severe liver damage.  · You may use over-the-counter decongestants such as phenylephrine or pseudoephedrine. But they are not always helpful. Don't use nasal spray decongestants more than 3 days. Longer use can make congestion worse. Prescription nasal sprays from your doctor don't typically have those restrictions.  · Antihistamines may help if you are also having allergy symptoms.  · You may use medicines such as guaifenesin to thin mucus and promote drainage.  Follow-up care  Follow up with your healthcare provider or as advised if you are not feeling better after 3 days.  When to seek medical advice  Call your  healthcare provider right away if any of the following occur:  · Your ear pain gets worse or does not start to improve   · Fever of 100.4°F (38°C) or higher, or as directed by your healthcare provider  · Fluid or blood draining from the ear  · Headache or sinus pain  · Stiff neck  · Unusual drowsiness or confusion  Date Last Reviewed: 10/1/2016  © 6181-8764 [employer]. [address] [place] [address], [place] [address]. All rights reserved. This information is not intended as a substitute for professional medical care. Always follow your healthcare professional's instructions.

## 2018-05-07 ENCOUNTER — DOCUMENTATION ONLY (OUTPATIENT)
Dept: INTERNAL MEDICINE | Facility: CLINIC | Age: 68
End: 2018-05-07

## 2018-05-07 DIAGNOSIS — E03.9 HYPOTHYROIDISM, UNSPECIFIED TYPE: ICD-10-CM

## 2018-05-07 DIAGNOSIS — Z00.00 ROUTINE ADULT HEALTH MAINTENANCE: Primary | ICD-10-CM

## 2018-05-07 DIAGNOSIS — D51.0 PERNICIOUS ANEMIA: ICD-10-CM

## 2018-05-07 DIAGNOSIS — E55.9 VITAMIN D DEFICIENCY: ICD-10-CM

## 2018-05-07 DIAGNOSIS — R79.89 OTHER SPECIFIED ABNORMAL FINDINGS OF BLOOD CHEMISTRY: ICD-10-CM

## 2018-05-07 DIAGNOSIS — D50.8 OTHER IRON DEFICIENCY ANEMIA: ICD-10-CM

## 2018-05-07 DIAGNOSIS — E78.9 DISORDER OF LIPID METABOLISM: ICD-10-CM

## 2018-05-07 NOTE — PROGRESS NOTES
Spoke to [facility] twice because they were unable to see lab orders in their computer.   I also spoke to the  and tried to explain the orders were placed in the system by Dr. Yeung twice once in January and again this morning.  I offered to attempt to hand fax the orders to [facility].  The decision to mail the orders to the patient was made because the  stated he was not going to wait for the fax to come through.   I left a voicemail for the patient letting her know that I was going to mail her lab orders for [employer] and to call if she had any questions.

## 2018-05-10 DIAGNOSIS — N39.0 URINARY TRACT INFECTION WITHOUT HEMATURIA, SITE UNSPECIFIED: Primary | ICD-10-CM

## 2018-05-10 RX ORDER — CIPROFLOXACIN 500 MG/1
500 TABLET ORAL 2 TIMES DAILY
Qty: 10 TABLET | Refills: 0 | Status: SHIPPED | OUTPATIENT
Start: 2018-05-10 | End: 2019-06-28 | Stop reason: SDUPTHER

## 2018-05-14 LAB
1,25(OH)2D SERPL-MCNC: 49 PG/ML (ref 18–72)
1,25(OH)2D2 SERPL-MCNC: <8 PG/ML
1,25(OH)2D3 SERPL-MCNC: 49 PG/ML
ALBUMIN SERPL-MCNC: 4.3 G/DL (ref 3.6–5.1)
ALBUMIN/GLOB SERPL: 1.6 (CALC) (ref 1–2.5)
ALP SERPL-CCNC: 82 U/L (ref 33–130)
ALT SERPL-CCNC: 10 U/L (ref 6–29)
AST SERPL-CCNC: 11 U/L (ref 10–35)
BASOPHILS # BLD AUTO: 34 CELLS/UL (ref 0–200)
BASOPHILS NFR BLD AUTO: 0.3 %
BILIRUB SERPL-MCNC: 0.4 MG/DL (ref 0.2–1.2)
BUN SERPL-MCNC: 9 MG/DL (ref 7–25)
BUN/CREAT SERPL: NORMAL (CALC) (ref 6–22)
CALCIUM SERPL-MCNC: 9.7 MG/DL (ref 8.6–10.4)
CHLORIDE SERPL-SCNC: 101 MMOL/L (ref 98–110)
CHOLEST SERPL-MCNC: 144 MG/DL
CHOLEST/HDLC SERPL: 2.4 (CALC)
CO2 SERPL-SCNC: 29 MMOL/L (ref 20–31)
CREAT SERPL-MCNC: 0.81 MG/DL (ref 0.5–0.99)
EOSINOPHIL # BLD AUTO: 56 CELLS/UL (ref 15–500)
EOSINOPHIL NFR BLD AUTO: 0.5 %
ERYTHROCYTE [DISTWIDTH] IN BLOOD BY AUTOMATED COUNT: 13.7 % (ref 11–15)
GFR SERPL CREATININE-BSD FRML MDRD: 76 ML/MIN/1.73M2
GLOBULIN SER CALC-MCNC: 2.7 G/DL (CALC) (ref 1.9–3.7)
GLUCOSE SERPL-MCNC: 88 MG/DL (ref 65–99)
HBA1C MFR BLD: 5.5 % OF TOTAL HGB
HCT VFR BLD AUTO: 38.9 % (ref 35–45)
HDLC SERPL-MCNC: 59 MG/DL
HGB BLD-MCNC: 12.8 G/DL (ref 11.7–15.5)
LDLC SERPL CALC-MCNC: 66 MG/DL (CALC)
LYMPHOCYTES # BLD AUTO: 2162 CELLS/UL (ref 850–3900)
LYMPHOCYTES NFR BLD AUTO: 19.3 %
MCH RBC QN AUTO: 26.6 PG (ref 27–33)
MCHC RBC AUTO-ENTMCNC: 32.9 G/DL (ref 32–36)
MCV RBC AUTO: 80.9 FL (ref 80–100)
MONOCYTES # BLD AUTO: 795 CELLS/UL (ref 200–950)
MONOCYTES NFR BLD AUTO: 7.1 %
NEUTROPHILS # BLD AUTO: 8154 CELLS/UL (ref 1500–7800)
NEUTROPHILS NFR BLD AUTO: 72.8 %
NONHDLC SERPL-MCNC: 85 MG/DL (CALC)
PLATELET # BLD AUTO: 306 THOUSAND/UL (ref 140–400)
PMV BLD REES-ECKER: 11.1 FL (ref 7.5–12.5)
POTASSIUM SERPL-SCNC: 4.2 MMOL/L (ref 3.5–5.3)
PROT SERPL-MCNC: 7 G/DL (ref 6.1–8.1)
RBC # BLD AUTO: 4.81 MILLION/UL (ref 3.8–5.1)
SODIUM SERPL-SCNC: 140 MMOL/L (ref 135–146)
TRIGL SERPL-MCNC: 111 MG/DL
TSH SERPL-ACNC: 1.13 MIU/L (ref 0.4–4.5)
VIT B12 SERPL-MCNC: 1454 PG/ML (ref 200–1100)
WBC # BLD AUTO: 11.2 THOUSAND/UL (ref 3.8–10.8)

## 2018-05-18 LAB
APPEARANCE UR: CLEAR
BACTERIA #/AREA URNS HPF: ABNORMAL /HPF
BACTERIA UR CULT: NORMAL
BILIRUB UR QL STRIP: NEGATIVE
COLOR UR: YELLOW
GLUCOSE UR QL STRIP: NEGATIVE
HGB UR QL STRIP: NEGATIVE
HYALINE CASTS #/AREA URNS LPF: ABNORMAL /LPF
KETONES UR QL STRIP: NEGATIVE
LEUKOCYTE ESTERASE UR QL STRIP: NEGATIVE
NITRITE UR QL STRIP: NEGATIVE
PH UR STRIP: 5.5 [PH] (ref 5–8)
PROT UR QL STRIP: NEGATIVE
RBC #/AREA URNS HPF: ABNORMAL /HPF
SP GR UR STRIP: ABNORMAL (ref 1–1.03)
SQUAMOUS #/AREA URNS HPF: ABNORMAL /HPF
TRI-PHOS CRY #/AREA URNS HPF: ABNORMAL /HPF
WBC #/AREA URNS HPF: ABNORMAL /HPF

## 2018-05-23 RX ORDER — HYDROCHLOROTHIAZIDE 25 MG/1
TABLET ORAL
Qty: 90 TABLET | Refills: 2 | Status: SHIPPED | OUTPATIENT
Start: 2018-05-23 | End: 2019-01-08

## 2018-06-06 DIAGNOSIS — J32.9 SINUSITIS, UNSPECIFIED CHRONICITY, UNSPECIFIED LOCATION: Primary | ICD-10-CM

## 2018-06-06 RX ORDER — DOXYCYCLINE HYCLATE 100 MG
100 TABLET ORAL 2 TIMES DAILY
Qty: 14 TABLET | Refills: 0 | Status: SHIPPED | OUTPATIENT
Start: 2018-06-06 | End: 2018-07-10

## 2018-07-10 ENCOUNTER — OFFICE VISIT (OUTPATIENT)
Dept: INTERNAL MEDICINE | Facility: CLINIC | Age: 68
End: 2018-07-10
Attending: INTERNAL MEDICINE
Payer: MEDICARE

## 2018-07-10 VITALS
WEIGHT: 202 LBS | OXYGEN SATURATION: 99 % | SYSTOLIC BLOOD PRESSURE: 140 MMHG | BODY MASS INDEX: 31.71 KG/M2 | HEIGHT: 67 IN | DIASTOLIC BLOOD PRESSURE: 80 MMHG | HEART RATE: 77 BPM

## 2018-07-10 DIAGNOSIS — H61.23 BILATERAL IMPACTED CERUMEN: ICD-10-CM

## 2018-07-10 DIAGNOSIS — R51.9 NONINTRACTABLE HEADACHE, UNSPECIFIED CHRONICITY PATTERN, UNSPECIFIED HEADACHE TYPE: ICD-10-CM

## 2018-07-10 DIAGNOSIS — M54.5 CHRONIC LOW BACK PAIN, UNSPECIFIED BACK PAIN LATERALITY, WITH SCIATICA PRESENCE UNSPECIFIED: ICD-10-CM

## 2018-07-10 DIAGNOSIS — R42 DIZZY: ICD-10-CM

## 2018-07-10 DIAGNOSIS — I10 ESSENTIAL HYPERTENSION: Primary | ICD-10-CM

## 2018-07-10 DIAGNOSIS — M54.2 NECK PAIN: ICD-10-CM

## 2018-07-10 DIAGNOSIS — G89.29 CHRONIC LOW BACK PAIN, UNSPECIFIED BACK PAIN LATERALITY, WITH SCIATICA PRESENCE UNSPECIFIED: ICD-10-CM

## 2018-07-10 PROCEDURE — 99214 OFFICE O/P EST MOD 30 MIN: CPT | Mod: 25,S$GLB,, | Performed by: INTERNAL MEDICINE

## 2018-07-10 PROCEDURE — 1160F RVW MEDS BY RX/DR IN RCRD: CPT | Mod: S$GLB,,, | Performed by: INTERNAL MEDICINE

## 2018-07-10 PROCEDURE — 3079F DIAST BP 80-89 MM HG: CPT | Mod: CPTII,S$GLB,, | Performed by: INTERNAL MEDICINE

## 2018-07-10 PROCEDURE — 3077F SYST BP >= 140 MM HG: CPT | Mod: CPTII,S$GLB,, | Performed by: INTERNAL MEDICINE

## 2018-07-10 PROCEDURE — 3017F COLORECTAL CA SCREEN DOC REV: CPT | Mod: S$GLB,,, | Performed by: INTERNAL MEDICINE

## 2018-07-10 PROCEDURE — 90670 PCV13 VACCINE IM: CPT | Mod: S$GLB,,, | Performed by: INTERNAL MEDICINE

## 2018-07-10 PROCEDURE — 3066F NEPHROPATHY DOC TX: CPT | Mod: S$GLB,,, | Performed by: INTERNAL MEDICINE

## 2018-07-10 PROCEDURE — G0009 ADMIN PNEUMOCOCCAL VACCINE: HCPCS | Mod: S$GLB,,, | Performed by: INTERNAL MEDICINE

## 2018-07-10 PROCEDURE — 3008F BODY MASS INDEX DOCD: CPT | Mod: CPTII,S$GLB,, | Performed by: INTERNAL MEDICINE

## 2018-07-10 NOTE — PROGRESS NOTES
Subjective:       Patient ID: Trista Gr is a 67 y.o. female.    Chief Complaint: Follow-up (6 month / requesting home health care / discuss disability); Fall (last week / left forhead injury / did not go to ER); Dizziness; Ear Fullness (right); Sinus Problem (nasal congestion); and Leg Swelling    Tripped on an ice chest 1 week ago and hit left front of head. No LOC.  Still has an mild HA and gets dizzy spells.  Has some neck pain.      Fall   The accident occurred 5 to 7 days ago. Associated symptoms include headaches.   Dizziness:   Chronicity:  New  Onset:  In the past 7 days   Associated symptoms: headaches.no chest pain.  Hypertension   This is a chronic problem. The current episode started more than 1 year ago. The problem is controlled. Associated symptoms include headaches and neck pain. Pertinent negatives include no chest pain or shortness of breath.     Review of Systems   Constitutional: Negative.    Respiratory: Negative for shortness of breath.    Cardiovascular: Negative for chest pain.   Musculoskeletal: Positive for back pain and neck pain.   Neurological: Positive for dizziness and headaches.       Objective:      Physical Exam   Constitutional: She appears well-developed and well-nourished.   HENT:   Head: Normocephalic.   Significant wax in both ears.   Eyes: Pupils are equal, round, and reactive to light.   Cardiovascular: Normal rate, regular rhythm and normal heart sounds.  Exam reveals no friction rub.    Pulmonary/Chest: Effort normal.   Musculoskeletal: She exhibits edema.        Right knee: She exhibits decreased range of motion and LCL laxity.   1+ edema bilateral.   Neurological: She is alert.   Mild cogwheel rigidity.       Assessment:       1. Essential hypertension    2. Chronic low back pain, unspecified back pain laterality, with sciatica presence unspecified    3. Neck pain    4. Dizzy    5. Nonintractable headache, unspecified chronicity pattern, unspecified headache  type        Plan:       Per orders and D/C instructions.  Continue meds/diet for HTN, and LBP, which are stable.     She defers a head CT at this time due to cost, but will call if the HA and dizziness don't go away in a few days.  Debrox for ear wax.  HHC/PT/OT for neck and back pain.

## 2018-07-12 DIAGNOSIS — H66.90 EAR INFECTION: Primary | ICD-10-CM

## 2018-07-12 RX ORDER — NEOMYCIN SULFATE, POLYMYXIN B SULFATE AND HYDROCORTISONE 10; 3.5; 1 MG/ML; MG/ML; [USP'U]/ML
3 SUSPENSION/ DROPS AURICULAR (OTIC) 3 TIMES DAILY
Qty: 10 ML | Refills: 0 | Status: SHIPPED | OUTPATIENT
Start: 2018-07-12 | End: 2020-10-13

## 2018-08-31 DIAGNOSIS — M10.9 ACUTE GOUT INVOLVING TOE, UNSPECIFIED CAUSE, UNSPECIFIED LATERALITY: Primary | ICD-10-CM

## 2018-08-31 RX ORDER — COLCHICINE 0.6 MG/1
0.6 TABLET ORAL 2 TIMES DAILY
Qty: 30 TABLET | Refills: 0 | Status: SHIPPED | OUTPATIENT
Start: 2018-08-31 | End: 2019-01-08

## 2018-09-21 DIAGNOSIS — N39.0 URINARY TRACT INFECTION WITHOUT HEMATURIA, SITE UNSPECIFIED: Primary | ICD-10-CM

## 2018-09-21 RX ORDER — CIPROFLOXACIN 500 MG/1
500 TABLET ORAL 2 TIMES DAILY
Qty: 10 TABLET | Refills: 0 | Status: SHIPPED | OUTPATIENT
Start: 2018-09-21 | End: 2018-10-01

## 2018-09-27 DIAGNOSIS — I10 HYPERTENSION, UNSPECIFIED TYPE: Primary | ICD-10-CM

## 2018-09-27 RX ORDER — TRAMADOL HYDROCHLORIDE 50 MG/1
50 TABLET ORAL 2 TIMES DAILY
Qty: 30 TABLET | Refills: 0 | Status: SHIPPED | OUTPATIENT
Start: 2018-09-27 | End: 2018-11-16 | Stop reason: SDUPTHER

## 2018-09-27 RX ORDER — IRBESARTAN 300 MG/1
300 TABLET ORAL NIGHTLY
Qty: 90 TABLET | Refills: 3 | Status: SHIPPED | OUTPATIENT
Start: 2018-09-27 | End: 2018-10-07

## 2018-10-07 ENCOUNTER — HOSPITAL ENCOUNTER (EMERGENCY)
Facility: OTHER | Age: 68
Discharge: HOME | End: 2018-10-07
Attending: EMERGENCY MEDICINE
Payer: MEDICARE

## 2018-10-07 VITALS
HEART RATE: 91 BPM | DIASTOLIC BLOOD PRESSURE: 81 MMHG | HEIGHT: 67 IN | WEIGHT: 202 LBS | TEMPERATURE: 99 F | BODY MASS INDEX: 31.71 KG/M2 | RESPIRATION RATE: 17 BRPM | OXYGEN SATURATION: 99 % | SYSTOLIC BLOOD PRESSURE: 179 MMHG

## 2018-10-07 DIAGNOSIS — R42 DIZZINESS: Primary | ICD-10-CM

## 2018-10-07 DIAGNOSIS — R60.0 BILATERAL EDEMA OF LOWER EXTREMITY: ICD-10-CM

## 2018-10-07 DIAGNOSIS — R06.02 SHORTNESS OF BREATH: ICD-10-CM

## 2018-10-07 LAB
ALBUMIN SERPL BCP-MCNC: 4.4 G/DL
ALP SERPL-CCNC: 79 U/L
ALT SERPL W/O P-5'-P-CCNC: 12 U/L
ANION GAP SERPL CALC-SCNC: 11 MMOL/L
AST SERPL-CCNC: 12 U/L
BASOPHILS # BLD AUTO: 0.02 K/UL
BASOPHILS NFR BLD: 0.2 %
BILIRUB SERPL-MCNC: 0.3 MG/DL
BILIRUB UR QL STRIP: NEGATIVE
BNP SERPL-MCNC: 30 PG/ML
BUN SERPL-MCNC: 12 MG/DL
CALCIUM SERPL-MCNC: 10 MG/DL
CHLORIDE SERPL-SCNC: 105 MMOL/L
CLARITY UR: CLEAR
CO2 SERPL-SCNC: 26 MMOL/L
COLOR UR: YELLOW
CREAT SERPL-MCNC: 0.8 MG/DL
DIFFERENTIAL METHOD: ABNORMAL
EOSINOPHIL # BLD AUTO: 0.1 K/UL
EOSINOPHIL NFR BLD: 1 %
ERYTHROCYTE [DISTWIDTH] IN BLOOD BY AUTOMATED COUNT: 13.8 %
EST. GFR  (AFRICAN AMERICAN): >60 ML/MIN/1.73 M^2
EST. GFR  (NON AFRICAN AMERICAN): >60 ML/MIN/1.73 M^2
GLUCOSE SERPL-MCNC: 105 MG/DL
GLUCOSE UR QL STRIP: NEGATIVE
HCT VFR BLD AUTO: 34.7 %
HGB BLD-MCNC: 12.1 G/DL
HGB UR QL STRIP: NEGATIVE
KETONES UR QL STRIP: NEGATIVE
LEUKOCYTE ESTERASE UR QL STRIP: NEGATIVE
LYMPHOCYTES # BLD AUTO: 2.8 K/UL
LYMPHOCYTES NFR BLD: 25.2 %
MCH RBC QN AUTO: 26.9 PG
MCHC RBC AUTO-ENTMCNC: 34.9 G/DL
MCV RBC AUTO: 77 FL
MONOCYTES # BLD AUTO: 0.9 K/UL
MONOCYTES NFR BLD: 8.2 %
NEUTROPHILS # BLD AUTO: 7.2 K/UL
NEUTROPHILS NFR BLD: 65.4 %
NITRITE UR QL STRIP: NEGATIVE
PH UR STRIP: 7 [PH] (ref 5–8)
PLATELET # BLD AUTO: 313 K/UL
PMV BLD AUTO: 10.4 FL
POTASSIUM SERPL-SCNC: 3.9 MMOL/L
PROT SERPL-MCNC: 7.6 G/DL
PROT UR QL STRIP: NEGATIVE
RBC # BLD AUTO: 4.5 M/UL
SODIUM SERPL-SCNC: 142 MMOL/L
SP GR UR STRIP: 1.01 (ref 1–1.03)
URN SPEC COLLECT METH UR: NORMAL
UROBILINOGEN UR STRIP-ACNC: NEGATIVE EU/DL
WBC # BLD AUTO: 11.01 K/UL

## 2018-10-07 PROCEDURE — 93005 ELECTROCARDIOGRAM TRACING: CPT

## 2018-10-07 PROCEDURE — 83880 ASSAY OF NATRIURETIC PEPTIDE: CPT

## 2018-10-07 PROCEDURE — 80053 COMPREHEN METABOLIC PANEL: CPT

## 2018-10-07 PROCEDURE — 82962 GLUCOSE BLOOD TEST: CPT

## 2018-10-07 PROCEDURE — 81003 URINALYSIS AUTO W/O SCOPE: CPT

## 2018-10-07 PROCEDURE — 25000003 PHARM REV CODE 250: Performed by: PHYSICIAN ASSISTANT

## 2018-10-07 PROCEDURE — 99284 EMERGENCY DEPT VISIT MOD MDM: CPT | Mod: 25

## 2018-10-07 PROCEDURE — 85025 COMPLETE CBC W/AUTO DIFF WBC: CPT

## 2018-10-07 PROCEDURE — 93010 ELECTROCARDIOGRAM REPORT: CPT | Mod: ,,, | Performed by: INTERNAL MEDICINE

## 2018-10-07 RX ORDER — ONDANSETRON 4 MG/1
4 TABLET, ORALLY DISINTEGRATING ORAL
Status: COMPLETED | OUTPATIENT
Start: 2018-10-07 | End: 2018-10-07

## 2018-10-07 RX ORDER — ONDANSETRON 4 MG/1
4 TABLET, ORALLY DISINTEGRATING ORAL EVERY 8 HOURS PRN
Qty: 20 TABLET | Refills: 0 | Status: SHIPPED | OUTPATIENT
Start: 2018-10-07 | End: 2020-10-13

## 2018-10-07 RX ADMIN — ONDANSETRON 4 MG: 4 TABLET, ORALLY DISINTEGRATING ORAL at 10:10

## 2018-10-08 LAB — POCT GLUCOSE: 92 MG/DL (ref 70–110)

## 2018-10-08 NOTE — ED PROVIDER NOTES
"Encounter Date: 10/7/2018       History     Chief Complaint   Patient presents with    Dizziness     X over a month with sinus fullness and feels like there is fluid in her ears, mulitple other complaints     68-year-old female with history of GERD, hypertension, normal stress echocardiogram, vitamin D deficiency presents emergency department with her  with multiple complaints. She complains of being dizzy for over a month.  She reports sinus fullness and feels like there is fluid in her ears.  She reports swelling to legs bilaterally. She also reports feeling "wheezy." She complains of overall pain as a 7/10.  Patient admits that she has been seen for these complaints in the past per primary care physician.  Patient's  states that she fell and hit her head 2 months ago was never seen for this.  On previous visit with her primary care physician in July, patient refused head CT for same symptoms.  Patient states that she was told to continue taking her HCTZ for the fluid in her legs.  She admits that she has not taken it in the last week, stating that it was not helping.  She also reports that majority of her medications worsen her dizziness.  She states that she is wheelchair-bound most the day, sitting in her chair.  She does have a walker and she does get up and ambulate at home from time to time.  She denies fever, chills, cough, congestion, rhinorrhea, numbness. She states that she was prescribed meclizine which also made her dizzy      The history is provided by the patient and the spouse.     Review of patient's allergies indicates:   Allergen Reactions    Aspirin      Sore stomach    Codeine Itching    Pcn [penicillins] Itching     Past Medical History:   Diagnosis Date    GERD (gastroesophageal reflux disease) 9/22/2012    HTN (hypertension) 9/22/2012    Normal stress echocardiogram 9/22/2012    Vitamin D deficiency disease 9/22/2012     Past Surgical History:   Procedure Laterality " Date    APPENDECTOMY      BILATERAL SALPINGOOPHORECTOMY      HYSTERECTOMY       Family History   Problem Relation Age of Onset    Hypertension Mother     Hypertension Father     Stroke Father     Diabetes Brother     Diabetes Maternal Grandmother      Social History     Tobacco Use    Smoking status: Never Smoker    Smokeless tobacco: Never Used   Substance Use Topics    Alcohol use: No     Alcohol/week: 0.0 oz    Drug use: No     Review of Systems   Constitutional: Negative for chills and fever.   HENT: Positive for ear pain and sinus pressure. Negative for congestion and sore throat.    Respiratory: Positive for wheezing. Negative for cough and shortness of breath.    Cardiovascular: Positive for leg swelling. Negative for chest pain.   Gastrointestinal: Negative for nausea and vomiting.   Genitourinary: Negative for difficulty urinating and dysuria.   Musculoskeletal: Negative for back pain, neck pain and neck stiffness.   Skin: Negative for rash.   Neurological: Positive for dizziness. Negative for syncope, weakness, light-headedness, numbness and headaches.   Hematological: Does not bruise/bleed easily.   Psychiatric/Behavioral: Negative for confusion.       Physical Exam     Initial Vitals [10/07/18 1957]   BP Pulse Resp Temp SpO2   (!) 189/83 90 18 98.8 °F (37.1 °C) 100 %      MAP       --         Physical Exam    Nursing note and vitals reviewed.  Constitutional: She appears well-developed and well-nourished. She is not diaphoretic.  Non-toxic appearance. No distress.   HENT:   Head: Normocephalic and atraumatic.   Right Ear: Tympanic membrane, external ear and ear canal normal. No middle ear effusion.   Left Ear: Tympanic membrane, external ear and ear canal normal.  No middle ear effusion.   Nose: Nose normal. No mucosal edema or rhinorrhea.   Mouth/Throat: Uvula is midline, oropharynx is clear and moist and mucous membranes are normal. Mucous membranes are not dry. No trismus in the jaw. No  uvula swelling. No oropharyngeal exudate, posterior oropharyngeal edema, posterior oropharyngeal erythema or tonsillar abscesses.   Eyes: Conjunctivae, EOM and lids are normal. Pupils are equal, round, and reactive to light. Right conjunctiva is not injected. Right conjunctiva has no hemorrhage. Left conjunctiva is not injected. Left conjunctiva has no hemorrhage. No scleral icterus. Right eye exhibits normal extraocular motion and no nystagmus. Left eye exhibits normal extraocular motion and no nystagmus. Right pupil is round and reactive. Left pupil is round and reactive. Pupils are equal.   Bilateral cataracts   Neck: Normal range of motion and phonation normal. Neck supple.   Cardiovascular: Normal rate, regular rhythm and normal heart sounds. Exam reveals no gallop and no friction rub.    No murmur heard.  3+ edema of the LEs   Pulmonary/Chest: Effort normal. No respiratory distress. She has no decreased breath sounds. She has no wheezes. She has no rhonchi. She has no rales.   Abdominal: Normal appearance.   Musculoskeletal: Normal range of motion.   No obvious deformities, moving all extremities, normal gait   Neurological: She is alert and oriented to person, place, and time. She has normal strength. She displays no atrophy. No sensory deficit. She exhibits normal muscle tone.   Skin: Skin is warm, dry and intact. Capillary refill takes less than 2 seconds. No lesion and no rash noted. No erythema.   Psychiatric: She has a normal mood and affect. Her speech is normal and behavior is normal. Judgment normal. Cognition and memory are normal.         ED Course   Procedures  Labs Reviewed   CBC W/ AUTO DIFFERENTIAL - Abnormal; Notable for the following components:       Result Value    Hematocrit 34.7 (*)     MCV 77 (*)     MCH 26.9 (*)     All other components within normal limits   B-TYPE NATRIURETIC PEPTIDE   COMPREHENSIVE METABOLIC PANEL    Narrative:     Recoll. [id number] by DEMIAN at 10/07/2018 21:00,  reason: Hemolyzed,   spoke to Brigette in ED   URINALYSIS, REFLEX TO URINE CULTURE    Narrative:     Preferred Collection Type->Urine, Clean Catch     EKG Readings: (Independently Interpreted)   Initial Reading: No STEMI. Rhythm: Normal Sinus Rhythm. Heart Rate: 86. Ectopy: No Ectopy. Conduction: Normal. ST Segments: Normal ST Segments. T Waves: Normal. Clinical Impression: Normal Sinus Rhythm       Imaging Results          X-Ray Chest 1 View (Final result)  Result time 10/07/18 22:37:04   Procedure changed from X-Ray Chest PA And Lateral     Final result by Suresh Vazquez MD (10/07/18 22:37:04)                 Impression:      1. No acute radiographic findings in the chest on this single view.      Electronically signed by: Suresh Vazquez MD  Date:    10/07/2018  Time:    22:37             Narrative:    EXAMINATION:  XR CHEST 1 VIEW    CLINICAL HISTORY:  sob; Shortness of breath    TECHNIQUE:  Single frontal view of the chest was performed.    COMPARISON:  Radiograph 03/11/2011.    FINDINGS:  Mediastinal structures are midline.  Hilar contours are unremarkable.  Cardiac silhouette is normal in size.  Lung volumes are symmetric.  No focal consolidation.  No pneumothorax or pleural effusions.  No free air beneath the diaphragm.  There is persistent scoliosis.  No acute osseous abnormalities.                               CT Head Without Contrast (Final result)  Result time 10/07/18 21:56:38    Final result by Jung Godfrey MD (10/07/18 21:56:38)                 Impression:      No acute intracranial process.    Changes of chronic small vessel ischemic disease and cerebral volume loss.    No evident of mastoid effusion or paranasal sinus disease.      Electronically signed by: Jung Godfrey MD  Date:    10/07/2018  Time:    21:56             Narrative:    EXAMINATION:  CT HEAD WITHOUT CONTRAST    CLINICAL HISTORY:  Dizziness;    TECHNIQUE:  Low dose axial images were obtained through the head.  Coronal and sagittal  reformations were also performed. Contrast was not administered.    COMPARISON:  None.    FINDINGS:  The subcutaneous tissues are within normal limits.  The bony calvarium is intact.  The paranasal sinuses and mastoid air cells are clear.  The orbits and intraorbital contents are within normal limits.    The craniocervical junction is within normal limits.  The midline structures are unremarkable.  There are no extra-axial fluid collections.  There is no evidence of intracranial hemorrhage.  The ventricles and sulci are prominent, consistent with cerebral volume loss.  There are minimal scattered hypodense foci within the periventricular and subcortical white matter.  The gray-white differentiation is maintained.  There is no dense vessel sign.  There is no evidence of mass effect.                                 Medical Decision Making:   History:   I obtained history from: someone other than patient.       <> Summary of History: spouse  Old Medical Records: I decided to obtain old medical records.  Initial Assessment:   68-year-old female with complaints consistent with chronic dizziness and bilateral leg swelling. Afebrile neurovascularly intact.  She is alert and healthy and nontoxic appearing.  She is not distressed.  No focal neurological deficits.  No signs of trauma or injury to the head.  She does have significant edema to both lower extremities.  Patient does admit to sitting most of the day with little to no ambulation.  No signs of serious bacterial infection, cellulitis.  I do not suspect DVT.  On exam she has clear lungs.  She reports wheezing but denies shortness of breath or chest tightening.  Independently Interpreted Test(s):   I have ordered and independently interpreted EKG Reading(s) - see prior notes  Clinical Tests:   Lab Tests: Ordered and Reviewed  Radiological Study: Ordered and Reviewed  Medical Tests: Ordered and Reviewed  ED Management:  Workup obtained including CMP, BNP, CBC, chest  x-ray, head CT and EKG.  No acute findings to explain patient's symptoms. Discussed with patient that these appear to be chronic in nature.  She is urged elevation of her legs while resting at home.  She is urged compression stockings.  She is urged ambulation and exercise.  She is also urged to begin physical therapy.  The patient states that she has access to all of these things.  She was administered Zofran ODT he with hopes that would help with her dizziness.  She states that it did not worsen any of her symptoms and her  states that she has not complained about being dizzy since she has been here.  Will discharge home with a prescription for Zofran ODT and care instructions.  She is urged close follow-up with her primary care physician or return for any worsening signs or symptoms. She states understanding and agrees with this plan.  This is the extent of patient's complaints today.  This patient was discussed with the attending physician who agrees with treatment plan.  Other:   I have discussed this case with another health care provider.       <> Summary of the Discussion: Rodrigo  This note was created using [employer] dictation.  There may be typographical errors secondary to dictation.                        Clinical Impression:     1. Dizziness    2. Shortness of breath    3. Bilateral edema of lower extremity            Disposition:   Disposition: Discharged  Condition: Stable                        Kennedi Augustine PA-C  10/07/18 2302

## 2018-10-08 NOTE — ED TRIAGE NOTES
"Pt reports dizziness x 6 weeks. Denies CP. Reports swelling to BLE x months.3 + pitting edema noted. "It's hard for me to move my legs." Reports feeling SOB when waking up this am but denies now. AAO X 3 answers questions appropriately.  "

## 2018-10-08 NOTE — ED TRIAGE NOTES
"Pt states dizziness X over a month, saw ENT and told she had 100% wax in her ear and it was cleaned out "they did not give me no medicine for it"  "

## 2018-10-24 DIAGNOSIS — R19.7 DIARRHEA, UNSPECIFIED TYPE: Primary | ICD-10-CM

## 2018-10-24 RX ORDER — DIPHENOXYLATE HYDROCHLORIDE AND ATROPINE SULFATE 2.5; .025 MG/1; MG/1
1 TABLET ORAL EVERY 8 HOURS PRN
Qty: 30 TABLET | Refills: 0 | Status: SHIPPED | OUTPATIENT
Start: 2018-10-24 | End: 2018-11-03

## 2018-11-17 RX ORDER — TRAMADOL HYDROCHLORIDE 50 MG/1
TABLET ORAL
Qty: 30 TABLET | Refills: 1 | Status: SHIPPED | OUTPATIENT
Start: 2018-11-17 | End: 2018-12-26

## 2018-11-26 DIAGNOSIS — J32.9 SINUSITIS, UNSPECIFIED CHRONICITY, UNSPECIFIED LOCATION: Primary | ICD-10-CM

## 2018-11-26 RX ORDER — FLUTICASONE PROPIONATE 50 MCG
1 SPRAY, SUSPENSION (ML) NASAL DAILY
Qty: 1 BOTTLE | Refills: 2 | Status: SHIPPED | OUTPATIENT
Start: 2018-11-26 | End: 2019-04-18

## 2018-11-26 RX ORDER — DOXYCYCLINE HYCLATE 100 MG
100 TABLET ORAL 2 TIMES DAILY
Qty: 14 TABLET | Refills: 0 | Status: SHIPPED | OUTPATIENT
Start: 2018-11-26 | End: 2019-01-08

## 2018-12-26 RX ORDER — TRAMADOL HYDROCHLORIDE 50 MG/1
TABLET ORAL
Qty: 30 TABLET | Refills: 0 | Status: SHIPPED | OUTPATIENT
Start: 2018-12-26 | End: 2019-01-25 | Stop reason: SDUPTHER

## 2019-01-08 ENCOUNTER — OFFICE VISIT (OUTPATIENT)
Dept: INTERNAL MEDICINE | Facility: CLINIC | Age: 69
End: 2019-01-08
Attending: INTERNAL MEDICINE
Payer: MEDICARE

## 2019-01-08 VITALS
SYSTOLIC BLOOD PRESSURE: 112 MMHG | BODY MASS INDEX: 31.64 KG/M2 | HEART RATE: 84 BPM | HEIGHT: 67 IN | DIASTOLIC BLOOD PRESSURE: 72 MMHG | OXYGEN SATURATION: 98 %

## 2019-01-08 DIAGNOSIS — E66.9 OBESITY (BMI 30.0-34.9): ICD-10-CM

## 2019-01-08 DIAGNOSIS — M54.5 CHRONIC LOW BACK PAIN, UNSPECIFIED BACK PAIN LATERALITY, WITH SCIATICA PRESENCE UNSPECIFIED: ICD-10-CM

## 2019-01-08 DIAGNOSIS — Z13.39 SCREENING FOR ALCOHOLISM: ICD-10-CM

## 2019-01-08 DIAGNOSIS — Z23 FLU VACCINE NEED: ICD-10-CM

## 2019-01-08 DIAGNOSIS — R60.9 EDEMA, UNSPECIFIED TYPE: ICD-10-CM

## 2019-01-08 DIAGNOSIS — Z13.31 SCREENING FOR DEPRESSION: ICD-10-CM

## 2019-01-08 DIAGNOSIS — Z00.00 ROUTINE ADULT HEALTH MAINTENANCE: ICD-10-CM

## 2019-01-08 DIAGNOSIS — I10 ESSENTIAL HYPERTENSION: Primary | ICD-10-CM

## 2019-01-08 DIAGNOSIS — R30.0 DYSURIA: ICD-10-CM

## 2019-01-08 DIAGNOSIS — M25.552 PAIN OF BOTH HIP JOINTS: ICD-10-CM

## 2019-01-08 DIAGNOSIS — M17.11 PRIMARY OSTEOARTHRITIS OF RIGHT KNEE: ICD-10-CM

## 2019-01-08 DIAGNOSIS — M25.551 PAIN OF BOTH HIP JOINTS: ICD-10-CM

## 2019-01-08 DIAGNOSIS — G89.29 CHRONIC LOW BACK PAIN, UNSPECIFIED BACK PAIN LATERALITY, WITH SCIATICA PRESENCE UNSPECIFIED: ICD-10-CM

## 2019-01-08 DIAGNOSIS — Z13.89 SCREENING FOR OBESITY: ICD-10-CM

## 2019-01-08 PROBLEM — E66.811 OBESITY (BMI 30.0-34.9): Status: ACTIVE | Noted: 2019-01-08

## 2019-01-08 PROCEDURE — G0442 ANNUAL ALCOHOL SCREEN 15 MIN: HCPCS | Mod: 59,S$GLB,, | Performed by: INTERNAL MEDICINE

## 2019-01-08 PROCEDURE — 3066F NEPHROPATHY DOC TX: CPT | Mod: S$GLB,,, | Performed by: INTERNAL MEDICINE

## 2019-01-08 PROCEDURE — G0444 PR DEPRESSION SCREENING: ICD-10-PCS | Mod: 59,S$GLB,, | Performed by: INTERNAL MEDICINE

## 2019-01-08 PROCEDURE — 3074F SYST BP LT 130 MM HG: CPT | Mod: CPTII,S$GLB,, | Performed by: INTERNAL MEDICINE

## 2019-01-08 PROCEDURE — 1160F PR REVIEW ALL MEDS BY PRESCRIBER/CLIN PHARMACIST DOCUMENTED: ICD-10-PCS | Mod: S$GLB,,, | Performed by: INTERNAL MEDICINE

## 2019-01-08 PROCEDURE — 99499 UNLISTED E&M SERVICE: CPT | Mod: S$GLB,,, | Performed by: INTERNAL MEDICINE

## 2019-01-08 PROCEDURE — 3078F PR MOST RECENT DIASTOLIC BLOOD PRESSURE < 80 MM HG: ICD-10-PCS | Mod: CPTII,S$GLB,, | Performed by: INTERNAL MEDICINE

## 2019-01-08 PROCEDURE — 3017F COLORECTAL CA SCREEN DOC REV: CPT | Mod: S$GLB,,, | Performed by: INTERNAL MEDICINE

## 2019-01-08 PROCEDURE — G0442 PR  ALCOHOL SCREENING: ICD-10-PCS | Mod: 59,S$GLB,, | Performed by: INTERNAL MEDICINE

## 2019-01-08 PROCEDURE — 3017F PR COLORECTAL CANCER SCREEN RESULTS DOCUMENT/REVIEW: ICD-10-PCS | Mod: S$GLB,,, | Performed by: INTERNAL MEDICINE

## 2019-01-08 PROCEDURE — G0008 ADMIN INFLUENZA VIRUS VAC: HCPCS | Mod: S$GLB,,, | Performed by: INTERNAL MEDICINE

## 2019-01-08 PROCEDURE — 99215 PR OFFICE/OUTPT VISIT, EST, LEVL V, 40-54 MIN: ICD-10-PCS | Mod: 25,S$GLB,, | Performed by: INTERNAL MEDICINE

## 2019-01-08 PROCEDURE — 90662 IIV NO PRSV INCREASED AG IM: CPT | Mod: S$GLB,,, | Performed by: INTERNAL MEDICINE

## 2019-01-08 PROCEDURE — 99499 RISK ADDL DX/OHS AUDIT: ICD-10-PCS | Mod: S$GLB,,, | Performed by: INTERNAL MEDICINE

## 2019-01-08 PROCEDURE — 1159F MED LIST DOCD IN RCRD: CPT | Mod: S$GLB,,, | Performed by: INTERNAL MEDICINE

## 2019-01-08 PROCEDURE — 90662 FLU VACCINE - HIGH DOSE (65+) PRESERVATIVE FREE IM: ICD-10-PCS | Mod: S$GLB,,, | Performed by: INTERNAL MEDICINE

## 2019-01-08 PROCEDURE — 99215 OFFICE O/P EST HI 40 MIN: CPT | Mod: 25,S$GLB,, | Performed by: INTERNAL MEDICINE

## 2019-01-08 PROCEDURE — G0008 FLU VACCINE - HIGH DOSE (65+) PRESERVATIVE FREE IM: ICD-10-PCS | Mod: S$GLB,,, | Performed by: INTERNAL MEDICINE

## 2019-01-08 PROCEDURE — 3066F PR DOCUMENTATION OF TREATMENT FOR NEPHROPATHY: ICD-10-PCS | Mod: S$GLB,,, | Performed by: INTERNAL MEDICINE

## 2019-01-08 PROCEDURE — 3074F PR MOST RECENT SYSTOLIC BLOOD PRESSURE < 130 MM HG: ICD-10-PCS | Mod: CPTII,S$GLB,, | Performed by: INTERNAL MEDICINE

## 2019-01-08 PROCEDURE — G0447 PR OBESITY COUNSELING: ICD-10-PCS | Mod: 59,S$GLB,, | Performed by: INTERNAL MEDICINE

## 2019-01-08 PROCEDURE — 1160F RVW MEDS BY RX/DR IN RCRD: CPT | Mod: S$GLB,,, | Performed by: INTERNAL MEDICINE

## 2019-01-08 PROCEDURE — 3078F DIAST BP <80 MM HG: CPT | Mod: CPTII,S$GLB,, | Performed by: INTERNAL MEDICINE

## 2019-01-08 PROCEDURE — G0447 BEHAVIOR COUNSEL OBESITY 15M: HCPCS | Mod: 59,S$GLB,, | Performed by: INTERNAL MEDICINE

## 2019-01-08 PROCEDURE — 1159F PR MEDICATION LIST DOCUMENTED IN MEDICAL RECORD: ICD-10-PCS | Mod: S$GLB,,, | Performed by: INTERNAL MEDICINE

## 2019-01-08 PROCEDURE — G0444 DEPRESSION SCREEN ANNUAL: HCPCS | Mod: 59,S$GLB,, | Performed by: INTERNAL MEDICINE

## 2019-01-08 RX ORDER — ETODOLAC 400 MG/1
TABLET, FILM COATED ORAL
Qty: 180 TABLET | Refills: 0 | Status: SHIPPED | OUTPATIENT
Start: 2019-01-08 | End: 2020-01-08 | Stop reason: SDUPTHER

## 2019-01-08 RX ORDER — TELMISARTAN 40 MG/1
40 TABLET ORAL DAILY
Qty: 90 TABLET | Refills: 3 | Status: SHIPPED | OUTPATIENT
Start: 2019-01-08 | End: 2020-01-04

## 2019-01-08 RX ORDER — SULFAMETHOXAZOLE AND TRIMETHOPRIM 800; 160 MG/1; MG/1
1 TABLET ORAL 2 TIMES DAILY
Qty: 14 TABLET | Refills: 0 | Status: SHIPPED | OUTPATIENT
Start: 2019-01-08 | End: 2020-10-13

## 2019-01-08 RX ORDER — FUROSEMIDE 20 MG/1
TABLET ORAL
Qty: 90 TABLET | Refills: 0 | Status: SHIPPED | OUTPATIENT
Start: 2019-01-08 | End: 2020-10-13 | Stop reason: SDUPTHER

## 2019-01-08 RX ORDER — ETODOLAC 400 MG/1
400 TABLET, FILM COATED ORAL 2 TIMES DAILY PRN
Qty: 60 TABLET | Refills: 1 | Status: SHIPPED | OUTPATIENT
Start: 2019-01-08 | End: 2019-01-08 | Stop reason: SDUPTHER

## 2019-01-08 RX ORDER — FUROSEMIDE 20 MG/1
20 TABLET ORAL DAILY
Qty: 30 TABLET | Refills: 1 | Status: SHIPPED | OUTPATIENT
Start: 2019-01-08 | End: 2019-01-08 | Stop reason: SDUPTHER

## 2019-01-08 NOTE — PROGRESS NOTES
Subjective:       Patient ID: Trista Gr is a 68 y.o. female.    Chief Complaint: Follow-up (6 month / 2 ER visits since last office visit (see notes in Epic)); Torticollis (2 + weeks / painful to move); Otalgia (right / for several weeks / problem comes and goes); Edema (given Furosemide 20mg at ER ( feels they work better)); Sinus Problem (runny nose, watery eyes); and Medication Problem (discuss BP meds )    Use to see Dr. Capellan for hip and Knee DJD. Told end stage DJD of knee.  Sees Dr. Duran now.    Developed edema of legs in 10/18. Went to ED twice. Better with Lasix 20 mg daily, but out now.  Pulled left neck muscle a few days ago.  In WC due to pain and edema.      Torticollis   This is a new problem. The current episode started in the past 7 days. Associated symptoms include arthralgias, congestion, fatigue and weakness. Pertinent negatives include no chest pain.   Edema   This is a new problem. The current episode started 1 to 4 weeks ago. Associated symptoms include arthralgias, congestion, fatigue and weakness. Pertinent negatives include no chest pain.   Sinus Problem   This is a chronic problem. The current episode started more than 1 month ago. Associated symptoms include congestion. Pertinent negatives include no shortness of breath.     Review of Systems   Constitutional: Positive for activity change and fatigue.   HENT: Positive for congestion.    Respiratory: Negative for shortness of breath.    Cardiovascular: Negative for chest pain.   Musculoskeletal: Positive for arthralgias, back pain and neck stiffness.   Neurological: Positive for weakness.       Objective:      Physical Exam   Constitutional: She appears well-developed and well-nourished.   HENT:   Head: Normocephalic.   Significant wax in both ears.   Eyes: Pupils are equal, round, and reactive to light.   Cardiovascular: Normal rate, regular rhythm and normal heart sounds. Exam reveals no friction rub.   Pulmonary/Chest:  Effort normal.   Musculoskeletal: She exhibits edema.        Right knee: She exhibits decreased range of motion and LCL laxity.   1+ edema bilateral.   Neurological: She is alert.   Mild cogwheel rigidity.       Assessment:       1. Essential hypertension    2. Chronic low back pain, unspecified back pain laterality, with sciatica presence unspecified    3. Flu vaccine need    4. Primary osteoarthritis of right knee    5. Pain of both hip joints    6. Edema, unspecified type    7. Dysuria    8. Routine adult health maintenance    9. Obesity (BMI 30.0-34.9)    10. Screening for depression    11. Screening for alcoholism    12. Screening for obesity        Plan:       Per orders and D/C instructions.  Bactrim for dysuria.  Get old records, start Lodine, and HHC/PT/OT for LBP, neck pain and DJD.  Sched Amlod to [facility] for HTN.  Furosemide and Echo for edema.    Screening: The patient was screened for depression with the PHQ2 questionnaire and possible health consequences were discussed with the patient, who understands (15 minutes spent). The patient was screened for the misuse of alcohol, by asking the number of drinks per average week, and if pt has had more than 4 drinks (more than 3 for women and elderly) in 1 day within the past year. The health and legal consequences of misuse were discussed (15 minutes spent). The patient was screened for obesity (BMI>30), If the current BMI > 30, then the possible consequences of obesity, as well as the benefits of diet, exercise, and weight loss were discussed. Any behavioral risks were identified, and methods to achieve appropriate treatment goals were discussed (15 minutes spent).

## 2019-01-25 RX ORDER — TRAMADOL HYDROCHLORIDE 50 MG/1
TABLET ORAL
Qty: 30 TABLET | Refills: 2 | Status: SHIPPED | OUTPATIENT
Start: 2019-01-25 | End: 2019-05-06 | Stop reason: SDUPTHER

## 2019-01-28 PROBLEM — M53.3 SI (SACROILIAC) PAIN: Status: ACTIVE | Noted: 2019-01-28

## 2019-04-18 DIAGNOSIS — J32.9 SINUSITIS, UNSPECIFIED CHRONICITY, UNSPECIFIED LOCATION: ICD-10-CM

## 2019-04-18 DIAGNOSIS — J32.9 SINUSITIS, UNSPECIFIED CHRONICITY, UNSPECIFIED LOCATION: Primary | ICD-10-CM

## 2019-04-18 RX ORDER — FLUTICASONE PROPIONATE 50 MCG
SPRAY, SUSPENSION (ML) NASAL
Qty: 48 ML | Refills: 3 | Status: SHIPPED | OUTPATIENT
Start: 2019-04-18 | End: 2019-09-26

## 2019-04-18 RX ORDER — FLUTICASONE PROPIONATE 50 MCG
2 SPRAY, SUSPENSION (ML) NASAL 2 TIMES DAILY
Qty: 16 G | Refills: 0 | Status: SHIPPED | OUTPATIENT
Start: 2019-04-18 | End: 2019-04-18 | Stop reason: SDUPTHER

## 2019-04-18 RX ORDER — DOXYCYCLINE HYCLATE 100 MG
100 TABLET ORAL 2 TIMES DAILY
Qty: 14 TABLET | Refills: 0 | Status: SHIPPED | OUTPATIENT
Start: 2019-04-18 | End: 2019-12-13

## 2019-05-06 RX ORDER — TRAMADOL HYDROCHLORIDE 50 MG/1
TABLET ORAL
Qty: 30 TABLET | Refills: 0 | Status: SHIPPED | OUTPATIENT
Start: 2019-05-06 | End: 2019-06-04 | Stop reason: SDUPTHER

## 2019-05-24 DIAGNOSIS — H10.029 PINK EYE, UNSPECIFIED LATERALITY: ICD-10-CM

## 2019-05-24 RX ORDER — CIPROFLOXACIN HYDROCHLORIDE 3 MG/ML
1 SOLUTION/ DROPS OPHTHALMIC 4 TIMES DAILY
Qty: 2.5 ML | Refills: 0 | Status: SHIPPED | OUTPATIENT
Start: 2019-05-24 | End: 2020-12-21 | Stop reason: SDUPTHER

## 2019-06-05 RX ORDER — TRAMADOL HYDROCHLORIDE 50 MG/1
TABLET ORAL
Qty: 30 TABLET | Refills: 0 | Status: SHIPPED | OUTPATIENT
Start: 2019-06-05 | End: 2019-07-08 | Stop reason: SDUPTHER

## 2019-06-28 DIAGNOSIS — N39.0 URINARY TRACT INFECTION WITHOUT HEMATURIA, SITE UNSPECIFIED: ICD-10-CM

## 2019-06-28 RX ORDER — CIPROFLOXACIN 500 MG/1
500 TABLET ORAL 2 TIMES DAILY
Qty: 10 TABLET | Refills: 0 | Status: SHIPPED | OUTPATIENT
Start: 2019-06-28 | End: 2020-04-15 | Stop reason: SDUPTHER

## 2019-07-08 RX ORDER — TRAMADOL HYDROCHLORIDE 50 MG/1
TABLET ORAL
Qty: 30 TABLET | Refills: 0 | Status: SHIPPED | OUTPATIENT
Start: 2019-07-08 | End: 2019-08-06 | Stop reason: SDUPTHER

## 2019-08-06 RX ORDER — TRAMADOL HYDROCHLORIDE 50 MG/1
TABLET ORAL
Qty: 30 TABLET | Refills: 0 | Status: SHIPPED | OUTPATIENT
Start: 2019-08-06 | End: 2019-09-10 | Stop reason: SDUPTHER

## 2019-09-06 DIAGNOSIS — N39.0 URINARY TRACT INFECTION WITHOUT HEMATURIA, SITE UNSPECIFIED: Primary | ICD-10-CM

## 2019-09-06 RX ORDER — CIPROFLOXACIN 500 MG/1
500 TABLET ORAL 2 TIMES DAILY
Qty: 10 TABLET | Refills: 0 | Status: SHIPPED | OUTPATIENT
Start: 2019-09-06 | End: 2019-09-11

## 2019-09-11 RX ORDER — TRAMADOL HYDROCHLORIDE 50 MG/1
TABLET ORAL
Qty: 30 TABLET | Refills: 0 | Status: SHIPPED | OUTPATIENT
Start: 2019-09-11 | End: 2019-11-11 | Stop reason: SDUPTHER

## 2019-09-25 DIAGNOSIS — J32.9 SINUSITIS, UNSPECIFIED CHRONICITY, UNSPECIFIED LOCATION: ICD-10-CM

## 2019-09-26 RX ORDER — FLUTICASONE PROPIONATE 50 MCG
SPRAY, SUSPENSION (ML) NASAL
Qty: 16 ML | Refills: 1 | Status: SHIPPED | OUTPATIENT
Start: 2019-09-26 | End: 2020-10-13 | Stop reason: SDUPTHER

## 2019-11-12 RX ORDER — TRAMADOL HYDROCHLORIDE 50 MG/1
TABLET ORAL
Qty: 30 TABLET | Refills: 0 | Status: SHIPPED | OUTPATIENT
Start: 2019-11-12 | End: 2020-01-04

## 2019-12-13 DIAGNOSIS — J06.9 UPPER RESPIRATORY TRACT INFECTION, UNSPECIFIED TYPE: Primary | ICD-10-CM

## 2019-12-13 RX ORDER — DOXYCYCLINE HYCLATE 100 MG
100 TABLET ORAL 2 TIMES DAILY
Qty: 14 TABLET | Refills: 0 | Status: SHIPPED | OUTPATIENT
Start: 2019-12-13 | End: 2020-10-13

## 2020-01-04 RX ORDER — TRAMADOL HYDROCHLORIDE 50 MG/1
TABLET ORAL
Qty: 30 TABLET | Refills: 0 | Status: SHIPPED | OUTPATIENT
Start: 2020-01-04 | End: 2020-02-21

## 2020-01-04 RX ORDER — TELMISARTAN 40 MG/1
TABLET ORAL
Qty: 90 TABLET | Refills: 0 | Status: SHIPPED | OUTPATIENT
Start: 2020-01-04 | End: 2020-03-31

## 2020-01-08 DIAGNOSIS — K21.9 GASTROESOPHAGEAL REFLUX DISEASE, ESOPHAGITIS PRESENCE NOT SPECIFIED: Primary | ICD-10-CM

## 2020-01-08 RX ORDER — ETODOLAC 400 MG/1
TABLET, FILM COATED ORAL
Qty: 180 TABLET | Refills: 1 | Status: SHIPPED | OUTPATIENT
Start: 2020-01-08 | End: 2020-10-13

## 2020-02-21 RX ORDER — TRAMADOL HYDROCHLORIDE 50 MG/1
TABLET ORAL
Qty: 30 TABLET | Refills: 0 | Status: SHIPPED | OUTPATIENT
Start: 2020-02-21 | End: 2020-10-13 | Stop reason: SDUPTHER

## 2020-03-31 RX ORDER — TELMISARTAN 40 MG/1
TABLET ORAL
Qty: 90 TABLET | Refills: 0 | Status: SHIPPED | OUTPATIENT
Start: 2020-03-31 | End: 2020-07-02

## 2020-04-15 DIAGNOSIS — N39.0 URINARY TRACT INFECTION WITHOUT HEMATURIA, SITE UNSPECIFIED: ICD-10-CM

## 2020-04-15 RX ORDER — CIPROFLOXACIN 500 MG/1
500 TABLET ORAL 2 TIMES DAILY
Qty: 10 TABLET | Refills: 0 | Status: SHIPPED | OUTPATIENT
Start: 2020-04-15 | End: 2020-04-25

## 2020-10-13 ENCOUNTER — OFFICE VISIT (OUTPATIENT)
Dept: INTERNAL MEDICINE | Facility: CLINIC | Age: 70
End: 2020-10-13
Attending: INTERNAL MEDICINE
Payer: MEDICARE

## 2020-10-13 VITALS
SYSTOLIC BLOOD PRESSURE: 127 MMHG | BODY MASS INDEX: 32.96 KG/M2 | HEART RATE: 73 BPM | WEIGHT: 210 LBS | TEMPERATURE: 99 F | HEIGHT: 67 IN | DIASTOLIC BLOOD PRESSURE: 67 MMHG

## 2020-10-13 DIAGNOSIS — Z00.00 ROUTINE ADULT HEALTH MAINTENANCE: ICD-10-CM

## 2020-10-13 DIAGNOSIS — R79.89 OTHER SPECIFIED ABNORMAL FINDINGS OF BLOOD CHEMISTRY: ICD-10-CM

## 2020-10-13 DIAGNOSIS — M54.50 CHRONIC LOW BACK PAIN, UNSPECIFIED BACK PAIN LATERALITY, UNSPECIFIED WHETHER SCIATICA PRESENT: ICD-10-CM

## 2020-10-13 DIAGNOSIS — K21.9 GASTROESOPHAGEAL REFLUX DISEASE, UNSPECIFIED WHETHER ESOPHAGITIS PRESENT: ICD-10-CM

## 2020-10-13 DIAGNOSIS — M17.11 PRIMARY OSTEOARTHRITIS OF RIGHT KNEE: ICD-10-CM

## 2020-10-13 DIAGNOSIS — G89.29 CHRONIC LOW BACK PAIN, UNSPECIFIED BACK PAIN LATERALITY, UNSPECIFIED WHETHER SCIATICA PRESENT: ICD-10-CM

## 2020-10-13 DIAGNOSIS — E78.9 DISORDER OF LIPID METABOLISM: ICD-10-CM

## 2020-10-13 DIAGNOSIS — E03.9 HYPOTHYROIDISM, UNSPECIFIED TYPE: ICD-10-CM

## 2020-10-13 DIAGNOSIS — Z13.89 SCREENING FOR OBESITY: ICD-10-CM

## 2020-10-13 DIAGNOSIS — E66.9 OBESITY (BMI 30.0-34.9): ICD-10-CM

## 2020-10-13 DIAGNOSIS — I10 ESSENTIAL HYPERTENSION: Primary | ICD-10-CM

## 2020-10-13 DIAGNOSIS — D50.8 OTHER IRON DEFICIENCY ANEMIA: ICD-10-CM

## 2020-10-13 DIAGNOSIS — E55.9 VITAMIN D DEFICIENCY: ICD-10-CM

## 2020-10-13 DIAGNOSIS — N30.00 ACUTE CYSTITIS WITHOUT HEMATURIA: ICD-10-CM

## 2020-10-13 DIAGNOSIS — D51.0 PERNICIOUS ANEMIA: ICD-10-CM

## 2020-10-13 DIAGNOSIS — J32.9 SINUSITIS, UNSPECIFIED CHRONICITY, UNSPECIFIED LOCATION: ICD-10-CM

## 2020-10-13 DIAGNOSIS — Z12.31 ENCOUNTER FOR SCREENING MAMMOGRAM FOR MALIGNANT NEOPLASM OF BREAST: ICD-10-CM

## 2020-10-13 DIAGNOSIS — Z13.39 SCREENING FOR ALCOHOLISM: ICD-10-CM

## 2020-10-13 PROCEDURE — 1159F MED LIST DOCD IN RCRD: CPT | Mod: S$GLB,,, | Performed by: INTERNAL MEDICINE

## 2020-10-13 PROCEDURE — 99214 OFFICE O/P EST MOD 30 MIN: CPT | Mod: 95,25,S$GLB, | Performed by: INTERNAL MEDICINE

## 2020-10-13 PROCEDURE — G0447 PR OBESITY COUNSELING: ICD-10-PCS | Mod: 95,S$GLB,59, | Performed by: INTERNAL MEDICINE

## 2020-10-13 PROCEDURE — 3078F DIAST BP <80 MM HG: CPT | Mod: CPTII,S$GLB,, | Performed by: INTERNAL MEDICINE

## 2020-10-13 PROCEDURE — G0442 PR  ALCOHOL SCREENING: ICD-10-PCS | Mod: 95,S$GLB,59, | Performed by: INTERNAL MEDICINE

## 2020-10-13 PROCEDURE — 3078F PR MOST RECENT DIASTOLIC BLOOD PRESSURE < 80 MM HG: ICD-10-PCS | Mod: CPTII,S$GLB,, | Performed by: INTERNAL MEDICINE

## 2020-10-13 PROCEDURE — 3008F PR BODY MASS INDEX (BMI) DOCUMENTED: ICD-10-PCS | Mod: CPTII,S$GLB,, | Performed by: INTERNAL MEDICINE

## 2020-10-13 PROCEDURE — 3008F BODY MASS INDEX DOCD: CPT | Mod: CPTII,S$GLB,, | Performed by: INTERNAL MEDICINE

## 2020-10-13 PROCEDURE — 3074F PR MOST RECENT SYSTOLIC BLOOD PRESSURE < 130 MM HG: ICD-10-PCS | Mod: CPTII,S$GLB,, | Performed by: INTERNAL MEDICINE

## 2020-10-13 PROCEDURE — 99214 PR OFFICE/OUTPT VISIT, EST, LEVL IV, 30-39 MIN: ICD-10-PCS | Mod: 95,25,S$GLB, | Performed by: INTERNAL MEDICINE

## 2020-10-13 PROCEDURE — G0447 BEHAVIOR COUNSEL OBESITY 15M: HCPCS | Mod: 95,S$GLB,59, | Performed by: INTERNAL MEDICINE

## 2020-10-13 PROCEDURE — 1159F PR MEDICATION LIST DOCUMENTED IN MEDICAL RECORD: ICD-10-PCS | Mod: S$GLB,,, | Performed by: INTERNAL MEDICINE

## 2020-10-13 PROCEDURE — G0442 ANNUAL ALCOHOL SCREEN 15 MIN: HCPCS | Mod: 95,S$GLB,59, | Performed by: INTERNAL MEDICINE

## 2020-10-13 PROCEDURE — 3074F SYST BP LT 130 MM HG: CPT | Mod: CPTII,S$GLB,, | Performed by: INTERNAL MEDICINE

## 2020-10-13 RX ORDER — MELOXICAM 15 MG/1
15 TABLET ORAL DAILY PRN
Qty: 30 TABLET | Refills: 5 | Status: SHIPPED | OUTPATIENT
Start: 2020-10-13 | End: 2021-10-26

## 2020-10-13 RX ORDER — TRAMADOL HYDROCHLORIDE 50 MG/1
TABLET ORAL
Qty: 30 TABLET | Refills: 1 | Status: SHIPPED | OUTPATIENT
Start: 2020-10-13 | End: 2021-10-26 | Stop reason: SDUPTHER

## 2020-10-13 RX ORDER — TELMISARTAN 40 MG/1
40 TABLET ORAL DAILY
Qty: 90 TABLET | Refills: 3 | Status: SHIPPED | OUTPATIENT
Start: 2020-10-13 | End: 2021-10-07

## 2020-10-13 RX ORDER — LEVOFLOXACIN 500 MG/1
500 TABLET, FILM COATED ORAL DAILY
Qty: 7 TABLET | Refills: 0 | Status: SHIPPED | OUTPATIENT
Start: 2020-10-13 | End: 2021-04-06

## 2020-10-13 RX ORDER — FUROSEMIDE 20 MG/1
20 TABLET ORAL DAILY
Qty: 90 TABLET | Refills: 3 | Status: SHIPPED | OUTPATIENT
Start: 2020-10-13 | End: 2021-11-01

## 2020-10-13 RX ORDER — FLUTICASONE PROPIONATE 50 MCG
SPRAY, SUSPENSION (ML) NASAL
Qty: 16 ML | Refills: 5 | Status: SHIPPED | OUTPATIENT
Start: 2020-10-13 | End: 2021-10-26

## 2020-10-13 NOTE — PROGRESS NOTES
Telemedicine Visit    The patient verbally consented to proceed with a telemedicine visit, following discussion of the options of face-to-face or telemedicine visit. The telemedicine visit was completed with real-time interactive audio and video using [email or web address] without complication.    The visit was conducted with a limited physical exam (visual exam), and was medically appropriate to meet the patient's needs.    The patient is at their home.  The patient appeared to be in the usual physical state, with normal responses and affect.  I did not notice any abnormal skin tone or abnormal breathing patterns.    I requested that the patient contact my office at [phone] to make an appointment for routine follow-up care, unless more specific instructions were given under A/P.    Chief Complaint: Routine/Annual    HPI:  She was last seen in my office on January 8, 2019.  She needs refills on her medications.  She walks with a walker due to lower back and knee pain.  She would like to change from Lodine 2 meloxicam.  She has had sinus congestion and green nasal discharge for over 1 week.  She has urinary frequency and burning.        A/P:  Per orders and D/C instructions.  Continue meds/diet for HTN, which is stable.  Levaquin for sinusitis and possible UTI.  I have recommended physical therapy for her back and knee pain.  She is not ready to leave the house yet due to the COVID-19 pandemic.  She will contact my office when she is ready to start physical therapy.  Labs ordered at [facility].    Screening: The patient was screened for depression with the PHQ2 questionnaire and possible health consequences were discussed with the patient, who understands (15 minutes spent). The patient was screened for the misuse of alcohol, by asking the number of drinks per average week, and if pt has had more than 4 drinks (more than 3 for women and elderly) in 1 day within the past year. The health and legal consequences of misuse were  discussed (15 minutes spent). The patient was screened for obesity (BMI>30), If the current BMI > 30, then the possible consequences of obesity, as well as the benefits of diet, exercise, and weight loss were discussed. Any behavioral risks were identified, and methods to achieve appropriate treatment goals were discussed (15 minutes spent).

## 2020-12-21 DIAGNOSIS — H10.029 PINK EYE, UNSPECIFIED LATERALITY: ICD-10-CM

## 2020-12-21 RX ORDER — CIPROFLOXACIN HYDROCHLORIDE 3 MG/ML
1 SOLUTION/ DROPS OPHTHALMIC 4 TIMES DAILY
Qty: 2.5 ML | Refills: 0 | Status: SHIPPED | OUTPATIENT
Start: 2020-12-21 | End: 2020-12-27

## 2021-01-04 DIAGNOSIS — H10.029 PINK EYE, UNSPECIFIED LATERALITY: ICD-10-CM

## 2021-02-24 ENCOUNTER — HOSPITAL ENCOUNTER (OUTPATIENT)
Dept: RADIOLOGY | Facility: HOSPITAL | Age: 71
Discharge: HOME | End: 2021-02-24
Attending: ORTHOPAEDIC SURGERY
Payer: MEDICARE

## 2021-02-24 ENCOUNTER — OFFICE VISIT (OUTPATIENT)
Dept: ORTHOPEDICS | Facility: CLINIC | Age: 71
End: 2021-02-24
Payer: MEDICARE

## 2021-02-24 DIAGNOSIS — M51.36 DDD (DEGENERATIVE DISC DISEASE), LUMBAR: ICD-10-CM

## 2021-02-24 DIAGNOSIS — M43.10 SPONDYLOLISTHESIS, UNSPECIFIED SPINAL REGION: ICD-10-CM

## 2021-02-24 DIAGNOSIS — G95.9 CERVICAL MYELOPATHY: Primary | ICD-10-CM

## 2021-02-24 DIAGNOSIS — M54.12 RADICULOPATHY, CERVICAL REGION: ICD-10-CM

## 2021-02-24 DIAGNOSIS — M50.30 DDD (DEGENERATIVE DISC DISEASE), CERVICAL: ICD-10-CM

## 2021-02-24 DIAGNOSIS — M48.062 SPINAL STENOSIS OF LUMBAR REGION WITH NEUROGENIC CLAUDICATION: ICD-10-CM

## 2021-02-24 PROCEDURE — 72100 XR LUMBAR SPINE AP AND LAT WITH FLEX/EXT: ICD-10-PCS | Mod: 26,,, | Performed by: RADIOLOGY

## 2021-02-24 PROCEDURE — 99204 PR OFFICE/OUTPT VISIT, NEW, LEVL IV, 45-59 MIN: ICD-10-PCS | Mod: S$GLB,,, | Performed by: ORTHOPAEDIC SURGERY

## 2021-02-24 PROCEDURE — 1159F MED LIST DOCD IN RCRD: CPT | Mod: S$GLB,,, | Performed by: ORTHOPAEDIC SURGERY

## 2021-02-24 PROCEDURE — 99999 PR PBB SHADOW E&M-EST. PATIENT-LVL III: ICD-10-PCS | Mod: PBBFAC,,, | Performed by: ORTHOPAEDIC SURGERY

## 2021-02-24 PROCEDURE — 99499 RISK ADDL DX/OHS AUDIT: ICD-10-PCS | Mod: S$GLB,,, | Performed by: ORTHOPAEDIC SURGERY

## 2021-02-24 PROCEDURE — 72050 X-RAY EXAM NECK SPINE 4/5VWS: CPT | Mod: 26,,, | Performed by: RADIOLOGY

## 2021-02-24 PROCEDURE — 1159F PR MEDICATION LIST DOCUMENTED IN MEDICAL RECORD: ICD-10-PCS | Mod: S$GLB,,, | Performed by: ORTHOPAEDIC SURGERY

## 2021-02-24 PROCEDURE — 99999 PR PBB SHADOW E&M-EST. PATIENT-LVL III: CPT | Mod: PBBFAC,,, | Performed by: ORTHOPAEDIC SURGERY

## 2021-02-24 PROCEDURE — 72100 X-RAY EXAM L-S SPINE 2/3 VWS: CPT | Mod: 26,,, | Performed by: RADIOLOGY

## 2021-02-24 PROCEDURE — 72050 X-RAY EXAM NECK SPINE 4/5VWS: CPT | Mod: TC

## 2021-02-24 PROCEDURE — 99204 OFFICE O/P NEW MOD 45 MIN: CPT | Mod: S$GLB,,, | Performed by: ORTHOPAEDIC SURGERY

## 2021-02-24 PROCEDURE — 3288F PR FALLS RISK ASSESSMENT DOCUMENTED: ICD-10-PCS | Mod: CPTII,S$GLB,, | Performed by: ORTHOPAEDIC SURGERY

## 2021-02-24 PROCEDURE — 99499 UNLISTED E&M SERVICE: CPT | Mod: S$GLB,,, | Performed by: ORTHOPAEDIC SURGERY

## 2021-02-24 PROCEDURE — 72050 XR CERVICAL SPINE AP LAT WITH FLEX EXTEN: ICD-10-PCS | Mod: 26,,, | Performed by: RADIOLOGY

## 2021-02-24 PROCEDURE — 3288F FALL RISK ASSESSMENT DOCD: CPT | Mod: CPTII,S$GLB,, | Performed by: ORTHOPAEDIC SURGERY

## 2021-02-24 PROCEDURE — 1101F PT FALLS ASSESS-DOCD LE1/YR: CPT | Mod: CPTII,S$GLB,, | Performed by: ORTHOPAEDIC SURGERY

## 2021-02-24 PROCEDURE — 72100 X-RAY EXAM L-S SPINE 2/3 VWS: CPT | Mod: TC

## 2021-02-24 PROCEDURE — 1125F AMNT PAIN NOTED PAIN PRSNT: CPT | Mod: S$GLB,,, | Performed by: ORTHOPAEDIC SURGERY

## 2021-02-24 PROCEDURE — 1125F PR PAIN SEVERITY QUANTIFIED, PAIN PRESENT: ICD-10-PCS | Mod: S$GLB,,, | Performed by: ORTHOPAEDIC SURGERY

## 2021-02-24 PROCEDURE — 1101F PR PT FALLS ASSESS DOC 0-1 FALLS W/OUT INJ PAST YR: ICD-10-PCS | Mod: CPTII,S$GLB,, | Performed by: ORTHOPAEDIC SURGERY

## 2021-02-24 PROCEDURE — 72120 X-RAY BEND ONLY L-S SPINE: CPT | Mod: 26,,, | Performed by: RADIOLOGY

## 2021-02-24 PROCEDURE — 72120 XR LUMBAR SPINE AP AND LAT WITH FLEX/EXT: ICD-10-PCS | Mod: 26,,, | Performed by: RADIOLOGY

## 2021-02-24 RX ORDER — TIZANIDINE 2 MG/1
4 TABLET ORAL EVERY 6 HOURS PRN
Qty: 40 TABLET | Refills: 0 | Status: SHIPPED | OUTPATIENT
Start: 2021-02-24 | End: 2021-03-06

## 2021-04-06 DIAGNOSIS — N39.0 URINARY TRACT INFECTION WITHOUT HEMATURIA, SITE UNSPECIFIED: Primary | ICD-10-CM

## 2021-04-06 RX ORDER — CIPROFLOXACIN 500 MG/1
500 TABLET ORAL 2 TIMES DAILY
Qty: 10 TABLET | Refills: 0 | Status: SHIPPED | OUTPATIENT
Start: 2021-04-06 | End: 2021-10-26

## 2021-04-16 ENCOUNTER — PATIENT MESSAGE (OUTPATIENT)
Dept: RESEARCH | Facility: HOSPITAL | Age: 71
End: 2021-04-16

## 2021-05-17 DIAGNOSIS — Z12.12 SCREENING FOR COLORECTAL CANCER: Primary | ICD-10-CM

## 2021-05-17 DIAGNOSIS — Z12.11 SCREENING FOR COLORECTAL CANCER: Primary | ICD-10-CM

## 2021-06-02 ENCOUNTER — HOSPITAL ENCOUNTER (OUTPATIENT)
Dept: RADIOLOGY | Facility: HOSPITAL | Age: 71
Discharge: HOME | End: 2021-06-02
Attending: ORTHOPAEDIC SURGERY
Payer: MEDICARE

## 2021-06-02 ENCOUNTER — OFFICE VISIT (OUTPATIENT)
Dept: ORTHOPEDICS | Facility: CLINIC | Age: 71
End: 2021-06-02
Payer: MEDICARE

## 2021-06-02 VITALS — WEIGHT: 202 LBS | BODY MASS INDEX: 31.71 KG/M2 | HEIGHT: 67 IN

## 2021-06-02 DIAGNOSIS — M48.062 SPINAL STENOSIS OF LUMBAR REGION WITH NEUROGENIC CLAUDICATION: ICD-10-CM

## 2021-06-02 DIAGNOSIS — M54.12 RADICULOPATHY, CERVICAL REGION: ICD-10-CM

## 2021-06-02 DIAGNOSIS — M50.30 DDD (DEGENERATIVE DISC DISEASE), CERVICAL: Primary | ICD-10-CM

## 2021-06-02 DIAGNOSIS — G95.9 CERVICAL MYELOPATHY: ICD-10-CM

## 2021-06-02 DIAGNOSIS — M43.10 SPONDYLOLISTHESIS, UNSPECIFIED SPINAL REGION: ICD-10-CM

## 2021-06-02 PROCEDURE — 72148 MRI LUMBAR SPINE WITHOUT CONTRAST: ICD-10-PCS | Mod: 26,,, | Performed by: RADIOLOGY

## 2021-06-02 PROCEDURE — 3288F FALL RISK ASSESSMENT DOCD: CPT | Mod: CPTII,S$GLB,, | Performed by: ORTHOPAEDIC SURGERY

## 2021-06-02 PROCEDURE — 1101F PT FALLS ASSESS-DOCD LE1/YR: CPT | Mod: CPTII,S$GLB,, | Performed by: ORTHOPAEDIC SURGERY

## 2021-06-02 PROCEDURE — 1159F MED LIST DOCD IN RCRD: CPT | Mod: S$GLB,,, | Performed by: ORTHOPAEDIC SURGERY

## 2021-06-02 PROCEDURE — 1101F PR PT FALLS ASSESS DOC 0-1 FALLS W/OUT INJ PAST YR: ICD-10-PCS | Mod: CPTII,S$GLB,, | Performed by: ORTHOPAEDIC SURGERY

## 2021-06-02 PROCEDURE — 99214 PR OFFICE/OUTPT VISIT, EST, LEVL IV, 30-39 MIN: ICD-10-PCS | Mod: S$GLB,,, | Performed by: ORTHOPAEDIC SURGERY

## 2021-06-02 PROCEDURE — 72141 MRI NECK SPINE W/O DYE: CPT | Mod: 26,,, | Performed by: RADIOLOGY

## 2021-06-02 PROCEDURE — 99214 OFFICE O/P EST MOD 30 MIN: CPT | Mod: S$GLB,,, | Performed by: ORTHOPAEDIC SURGERY

## 2021-06-02 PROCEDURE — 72148 MRI LUMBAR SPINE W/O DYE: CPT | Mod: 26,,, | Performed by: RADIOLOGY

## 2021-06-02 PROCEDURE — 72125 CT NECK SPINE W/O DYE: CPT | Mod: 26,,, | Performed by: RADIOLOGY

## 2021-06-02 PROCEDURE — 72148 MRI LUMBAR SPINE W/O DYE: CPT | Mod: TC

## 2021-06-02 PROCEDURE — 1125F AMNT PAIN NOTED PAIN PRSNT: CPT | Mod: S$GLB,,, | Performed by: ORTHOPAEDIC SURGERY

## 2021-06-02 PROCEDURE — 72141 MRI NECK SPINE W/O DYE: CPT | Mod: TC

## 2021-06-02 PROCEDURE — 3008F PR BODY MASS INDEX (BMI) DOCUMENTED: ICD-10-PCS | Mod: CPTII,S$GLB,, | Performed by: ORTHOPAEDIC SURGERY

## 2021-06-02 PROCEDURE — 1125F PR PAIN SEVERITY QUANTIFIED, PAIN PRESENT: ICD-10-PCS | Mod: S$GLB,,, | Performed by: ORTHOPAEDIC SURGERY

## 2021-06-02 PROCEDURE — 72125 CT NECK SPINE W/O DYE: CPT | Mod: TC

## 2021-06-02 PROCEDURE — 1159F PR MEDICATION LIST DOCUMENTED IN MEDICAL RECORD: ICD-10-PCS | Mod: S$GLB,,, | Performed by: ORTHOPAEDIC SURGERY

## 2021-06-02 PROCEDURE — 99999 PR PBB SHADOW E&M-EST. PATIENT-LVL III: CPT | Mod: PBBFAC,,, | Performed by: ORTHOPAEDIC SURGERY

## 2021-06-02 PROCEDURE — 72141 MRI CERVICAL SPINE WITHOUT CONTRAST: ICD-10-PCS | Mod: 26,,, | Performed by: RADIOLOGY

## 2021-06-02 PROCEDURE — 3288F PR FALLS RISK ASSESSMENT DOCUMENTED: ICD-10-PCS | Mod: CPTII,S$GLB,, | Performed by: ORTHOPAEDIC SURGERY

## 2021-06-02 PROCEDURE — 3008F BODY MASS INDEX DOCD: CPT | Mod: CPTII,S$GLB,, | Performed by: ORTHOPAEDIC SURGERY

## 2021-06-02 PROCEDURE — 72125 CT CERVICAL SPINE WITHOUT CONTRAST: ICD-10-PCS | Mod: 26,,, | Performed by: RADIOLOGY

## 2021-06-02 PROCEDURE — 99999 PR PBB SHADOW E&M-EST. PATIENT-LVL III: ICD-10-PCS | Mod: PBBFAC,,, | Performed by: ORTHOPAEDIC SURGERY

## 2021-06-09 ENCOUNTER — TELEPHONE (OUTPATIENT)
Dept: SURGERY | Facility: CLINIC | Age: 71
End: 2021-06-09

## 2021-06-09 ENCOUNTER — TELEPHONE (OUTPATIENT)
Dept: ORTHOPEDICS | Facility: CLINIC | Age: 71
End: 2021-06-09

## 2021-06-09 DIAGNOSIS — K86.89 DILATED PANCREATIC DUCT: Primary | ICD-10-CM

## 2021-06-09 DIAGNOSIS — E07.89 THYROID MASS OF UNCLEAR ETIOLOGY: Primary | ICD-10-CM

## 2021-06-28 ENCOUNTER — TELEPHONE (OUTPATIENT)
Dept: SURGERY | Facility: CLINIC | Age: 71
End: 2021-06-28

## 2021-06-29 ENCOUNTER — TELEPHONE (OUTPATIENT)
Dept: SURGERY | Facility: CLINIC | Age: 71
End: 2021-06-29

## 2021-07-29 ENCOUNTER — TELEPHONE (OUTPATIENT)
Dept: SURGERY | Facility: CLINIC | Age: 71
End: 2021-07-29

## 2021-08-04 ENCOUNTER — TELEPHONE (OUTPATIENT)
Dept: SURGERY | Facility: CLINIC | Age: 71
End: 2021-08-04

## 2021-08-10 ENCOUNTER — TELEPHONE (OUTPATIENT)
Dept: SURGERY | Facility: CLINIC | Age: 71
End: 2021-08-10

## 2021-08-25 ENCOUNTER — TELEPHONE (OUTPATIENT)
Dept: SURGERY | Facility: CLINIC | Age: 71
End: 2021-08-25

## 2021-08-26 ENCOUNTER — OFFICE VISIT (OUTPATIENT)
Dept: SURGERY | Facility: CLINIC | Age: 71
End: 2021-08-26
Payer: MEDICARE

## 2021-08-26 ENCOUNTER — HOSPITAL ENCOUNTER (OUTPATIENT)
Dept: RADIOLOGY | Facility: HOSPITAL | Age: 71
Discharge: HOME | End: 2021-08-26
Attending: NURSE PRACTITIONER
Payer: MEDICARE

## 2021-08-26 ENCOUNTER — HOSPITAL ENCOUNTER (OUTPATIENT)
Dept: RADIOLOGY | Facility: HOSPITAL | Age: 71
Discharge: HOME | End: 2021-08-26
Attending: ORTHOPAEDIC SURGERY
Payer: MEDICARE

## 2021-08-26 VITALS
SYSTOLIC BLOOD PRESSURE: 204 MMHG | BODY MASS INDEX: 31.71 KG/M2 | HEIGHT: 67 IN | WEIGHT: 202 LBS | HEART RATE: 88 BPM | DIASTOLIC BLOOD PRESSURE: 86 MMHG

## 2021-08-26 DIAGNOSIS — K86.89 DILATED PANCREATIC DUCT: Primary | ICD-10-CM

## 2021-08-26 DIAGNOSIS — E07.89 THYROID MASS OF UNCLEAR ETIOLOGY: ICD-10-CM

## 2021-08-26 DIAGNOSIS — K86.89 DILATED PANCREATIC DUCT: ICD-10-CM

## 2021-08-26 LAB
CREAT SERPL-MCNC: 0.9 MG/DL (ref 0.5–1.4)
SAMPLE: NORMAL

## 2021-08-26 PROCEDURE — 3008F PR BODY MASS INDEX (BMI) DOCUMENTED: ICD-10-PCS | Mod: CPTII,S$GLB,, | Performed by: SURGERY

## 2021-08-26 PROCEDURE — 76536 US SOFT TISSUE HEAD NECK THYROID: ICD-10-PCS | Mod: 26,,, | Performed by: RADIOLOGY

## 2021-08-26 PROCEDURE — 1101F PT FALLS ASSESS-DOCD LE1/YR: CPT | Mod: CPTII,S$GLB,, | Performed by: SURGERY

## 2021-08-26 PROCEDURE — 1159F MED LIST DOCD IN RCRD: CPT | Mod: CPTII,S$GLB,, | Performed by: SURGERY

## 2021-08-26 PROCEDURE — 3079F DIAST BP 80-89 MM HG: CPT | Mod: CPTII,S$GLB,, | Performed by: SURGERY

## 2021-08-26 PROCEDURE — 76536 US EXAM OF HEAD AND NECK: CPT | Mod: 26,,, | Performed by: RADIOLOGY

## 2021-08-26 PROCEDURE — 3044F PR MOST RECENT HEMOGLOBIN A1C LEVEL <7.0%: ICD-10-PCS | Mod: CPTII,S$GLB,, | Performed by: SURGERY

## 2021-08-26 PROCEDURE — 4010F PR ACE/ARB THEARPY RXD/TAKEN: ICD-10-PCS | Mod: CPTII,S$GLB,, | Performed by: SURGERY

## 2021-08-26 PROCEDURE — 3008F BODY MASS INDEX DOCD: CPT | Mod: CPTII,S$GLB,, | Performed by: SURGERY

## 2021-08-26 PROCEDURE — 1101F PR PT FALLS ASSESS DOC 0-1 FALLS W/OUT INJ PAST YR: ICD-10-PCS | Mod: CPTII,S$GLB,, | Performed by: SURGERY

## 2021-08-26 PROCEDURE — 3077F SYST BP >= 140 MM HG: CPT | Mod: CPTII,S$GLB,, | Performed by: SURGERY

## 2021-08-26 PROCEDURE — 3077F PR MOST RECENT SYSTOLIC BLOOD PRESSURE >= 140 MM HG: ICD-10-PCS | Mod: CPTII,S$GLB,, | Performed by: SURGERY

## 2021-08-26 PROCEDURE — 99999 PR PBB SHADOW E&M-EST. PATIENT-LVL III: CPT | Mod: PBBFAC,,, | Performed by: SURGERY

## 2021-08-26 PROCEDURE — 25500020 PHARM REV CODE 255: Performed by: NURSE PRACTITIONER

## 2021-08-26 PROCEDURE — 74177 CT ABDOMEN PELVIS WITH CONTRAST: ICD-10-PCS | Mod: 26,,, | Performed by: RADIOLOGY

## 2021-08-26 PROCEDURE — 99999 PR PBB SHADOW E&M-EST. PATIENT-LVL III: ICD-10-PCS | Mod: PBBFAC,,, | Performed by: SURGERY

## 2021-08-26 PROCEDURE — 1160F RVW MEDS BY RX/DR IN RCRD: CPT | Mod: CPTII,S$GLB,, | Performed by: SURGERY

## 2021-08-26 PROCEDURE — 74177 CT ABD & PELVIS W/CONTRAST: CPT | Mod: TC

## 2021-08-26 PROCEDURE — 1160F PR REVIEW ALL MEDS BY PRESCRIBER/CLIN PHARMACIST DOCUMENTED: ICD-10-PCS | Mod: CPTII,S$GLB,, | Performed by: SURGERY

## 2021-08-26 PROCEDURE — 3288F PR FALLS RISK ASSESSMENT DOCUMENTED: ICD-10-PCS | Mod: CPTII,S$GLB,, | Performed by: SURGERY

## 2021-08-26 PROCEDURE — 3288F FALL RISK ASSESSMENT DOCD: CPT | Mod: CPTII,S$GLB,, | Performed by: SURGERY

## 2021-08-26 PROCEDURE — 3079F PR MOST RECENT DIASTOLIC BLOOD PRESSURE 80-89 MM HG: ICD-10-PCS | Mod: CPTII,S$GLB,, | Performed by: SURGERY

## 2021-08-26 PROCEDURE — 4010F ACE/ARB THERAPY RXD/TAKEN: CPT | Mod: CPTII,S$GLB,, | Performed by: SURGERY

## 2021-08-26 PROCEDURE — 99203 PR OFFICE/OUTPT VISIT, NEW, LEVL III, 30-44 MIN: ICD-10-PCS | Mod: S$GLB,,, | Performed by: SURGERY

## 2021-08-26 PROCEDURE — 1159F PR MEDICATION LIST DOCUMENTED IN MEDICAL RECORD: ICD-10-PCS | Mod: CPTII,S$GLB,, | Performed by: SURGERY

## 2021-08-26 PROCEDURE — 3044F HG A1C LEVEL LT 7.0%: CPT | Mod: CPTII,S$GLB,, | Performed by: SURGERY

## 2021-08-26 PROCEDURE — 1126F PR PAIN SEVERITY QUANTIFIED, NO PAIN PRESENT: ICD-10-PCS | Mod: CPTII,S$GLB,, | Performed by: SURGERY

## 2021-08-26 PROCEDURE — 74177 CT ABD & PELVIS W/CONTRAST: CPT | Mod: 26,,, | Performed by: RADIOLOGY

## 2021-08-26 PROCEDURE — 76536 US EXAM OF HEAD AND NECK: CPT | Mod: TC

## 2021-08-26 PROCEDURE — 1126F AMNT PAIN NOTED NONE PRSNT: CPT | Mod: CPTII,S$GLB,, | Performed by: SURGERY

## 2021-08-26 PROCEDURE — 99203 OFFICE O/P NEW LOW 30 MIN: CPT | Mod: S$GLB,,, | Performed by: SURGERY

## 2021-08-26 RX ADMIN — IOHEXOL 100 ML: 350 INJECTION, SOLUTION INTRAVENOUS at 01:08

## 2021-10-26 ENCOUNTER — OFFICE VISIT (OUTPATIENT)
Dept: INTERNAL MEDICINE | Facility: CLINIC | Age: 71
End: 2021-10-26
Attending: INTERNAL MEDICINE
Payer: MEDICARE

## 2021-10-26 VITALS
OXYGEN SATURATION: 99 % | DIASTOLIC BLOOD PRESSURE: 80 MMHG | BODY MASS INDEX: 31.64 KG/M2 | SYSTOLIC BLOOD PRESSURE: 130 MMHG | HEART RATE: 81 BPM | HEIGHT: 67 IN

## 2021-10-26 DIAGNOSIS — R53.1 WEAKNESS: ICD-10-CM

## 2021-10-26 DIAGNOSIS — R60.9 EDEMA, UNSPECIFIED TYPE: ICD-10-CM

## 2021-10-26 DIAGNOSIS — G89.29 CHRONIC LOW BACK PAIN, UNSPECIFIED BACK PAIN LATERALITY, UNSPECIFIED WHETHER SCIATICA PRESENT: ICD-10-CM

## 2021-10-26 DIAGNOSIS — M54.50 CHRONIC LOW BACK PAIN, UNSPECIFIED BACK PAIN LATERALITY, UNSPECIFIED WHETHER SCIATICA PRESENT: ICD-10-CM

## 2021-10-26 DIAGNOSIS — I10 ESSENTIAL HYPERTENSION: Primary | ICD-10-CM

## 2021-10-26 DIAGNOSIS — R53.81 DEBILITY: ICD-10-CM

## 2021-10-26 PROCEDURE — 1159F MED LIST DOCD IN RCRD: CPT | Mod: CPTII,S$GLB,, | Performed by: INTERNAL MEDICINE

## 2021-10-26 PROCEDURE — 1160F PR REVIEW ALL MEDS BY PRESCRIBER/CLIN PHARMACIST DOCUMENTED: ICD-10-PCS | Mod: CPTII,S$GLB,, | Performed by: INTERNAL MEDICINE

## 2021-10-26 PROCEDURE — 3008F BODY MASS INDEX DOCD: CPT | Mod: CPTII,S$GLB,, | Performed by: INTERNAL MEDICINE

## 2021-10-26 PROCEDURE — 1159F PR MEDICATION LIST DOCUMENTED IN MEDICAL RECORD: ICD-10-PCS | Mod: CPTII,S$GLB,, | Performed by: INTERNAL MEDICINE

## 2021-10-26 PROCEDURE — G0008 FLU VACCINE - QUADRIVALENT - ADJUVANTED: ICD-10-PCS | Mod: S$GLB,,, | Performed by: INTERNAL MEDICINE

## 2021-10-26 PROCEDURE — 3044F PR MOST RECENT HEMOGLOBIN A1C LEVEL <7.0%: ICD-10-PCS | Mod: CPTII,S$GLB,, | Performed by: INTERNAL MEDICINE

## 2021-10-26 PROCEDURE — 3075F PR MOST RECENT SYSTOLIC BLOOD PRESS GE 130-139MM HG: ICD-10-PCS | Mod: CPTII,S$GLB,, | Performed by: INTERNAL MEDICINE

## 2021-10-26 PROCEDURE — 99215 OFFICE O/P EST HI 40 MIN: CPT | Mod: S$GLB,,, | Performed by: INTERNAL MEDICINE

## 2021-10-26 PROCEDURE — 3008F PR BODY MASS INDEX (BMI) DOCUMENTED: ICD-10-PCS | Mod: CPTII,S$GLB,, | Performed by: INTERNAL MEDICINE

## 2021-10-26 PROCEDURE — 4010F PR ACE/ARB THEARPY RXD/TAKEN: ICD-10-PCS | Mod: CPTII,S$GLB,, | Performed by: INTERNAL MEDICINE

## 2021-10-26 PROCEDURE — G0008 ADMIN INFLUENZA VIRUS VAC: HCPCS | Mod: S$GLB,,, | Performed by: INTERNAL MEDICINE

## 2021-10-26 PROCEDURE — 99215 PR OFFICE/OUTPT VISIT, EST, LEVL V, 40-54 MIN: ICD-10-PCS | Mod: S$GLB,,, | Performed by: INTERNAL MEDICINE

## 2021-10-26 PROCEDURE — 3044F HG A1C LEVEL LT 7.0%: CPT | Mod: CPTII,S$GLB,, | Performed by: INTERNAL MEDICINE

## 2021-10-26 PROCEDURE — 90694 VACC AIIV4 NO PRSRV 0.5ML IM: CPT | Mod: S$GLB,,, | Performed by: INTERNAL MEDICINE

## 2021-10-26 PROCEDURE — 4010F ACE/ARB THERAPY RXD/TAKEN: CPT | Mod: CPTII,S$GLB,, | Performed by: INTERNAL MEDICINE

## 2021-10-26 PROCEDURE — 3079F DIAST BP 80-89 MM HG: CPT | Mod: CPTII,S$GLB,, | Performed by: INTERNAL MEDICINE

## 2021-10-26 PROCEDURE — 90694 FLU VACCINE - QUADRIVALENT - ADJUVANTED: ICD-10-PCS | Mod: S$GLB,,, | Performed by: INTERNAL MEDICINE

## 2021-10-26 PROCEDURE — 3079F PR MOST RECENT DIASTOLIC BLOOD PRESSURE 80-89 MM HG: ICD-10-PCS | Mod: CPTII,S$GLB,, | Performed by: INTERNAL MEDICINE

## 2021-10-26 PROCEDURE — 3075F SYST BP GE 130 - 139MM HG: CPT | Mod: CPTII,S$GLB,, | Performed by: INTERNAL MEDICINE

## 2021-10-26 PROCEDURE — 1160F RVW MEDS BY RX/DR IN RCRD: CPT | Mod: CPTII,S$GLB,, | Performed by: INTERNAL MEDICINE

## 2021-10-26 RX ORDER — TRAMADOL HYDROCHLORIDE 50 MG/1
TABLET ORAL
Qty: 30 TABLET | Refills: 0 | Status: SHIPPED | OUTPATIENT
Start: 2021-10-26 | End: 2022-01-24

## 2021-10-26 RX ORDER — CELECOXIB 200 MG/1
200 CAPSULE ORAL DAILY PRN
Qty: 30 CAPSULE | Refills: 1 | Status: SHIPPED | OUTPATIENT
Start: 2021-10-26 | End: 2022-12-20

## 2021-11-11 ENCOUNTER — PES CALL (OUTPATIENT)
Dept: ADMINISTRATIVE | Facility: CLINIC | Age: 71
End: 2021-11-11
Payer: MEDICARE

## 2021-12-30 DIAGNOSIS — N39.0 URINARY TRACT INFECTION WITHOUT HEMATURIA, SITE UNSPECIFIED: Primary | ICD-10-CM

## 2021-12-30 RX ORDER — CIPROFLOXACIN 500 MG/1
500 TABLET ORAL 2 TIMES DAILY
Qty: 10 TABLET | Refills: 0 | Status: SHIPPED | OUTPATIENT
Start: 2021-12-30 | End: 2022-01-04

## 2022-02-04 ENCOUNTER — PATIENT OUTREACH (OUTPATIENT)
Dept: ADMINISTRATIVE | Facility: HOSPITAL | Age: 72
End: 2022-02-04
Payer: MEDICARE

## 2022-02-07 ENCOUNTER — TELEPHONE (OUTPATIENT)
Dept: NEUROLOGY | Facility: CLINIC | Age: 72
End: 2022-02-07
Payer: MEDICARE

## 2022-02-07 NOTE — TELEPHONE ENCOUNTER
----- Message from Mary Johnston sent at 2/7/2022  1:03 PM CST -----  Regarding: Appt  Contact: [phone]  Patient Sharon calling to be seen in the [facility]rology clinic. Patient had an appt today with main campus but prefer to be seen at [facility].. Please call patient at [phone]        Thank You

## 2022-03-29 ENCOUNTER — PATIENT OUTREACH (OUTPATIENT)
Dept: ADMINISTRATIVE | Facility: HOSPITAL | Age: 72
End: 2022-03-29
Payer: MEDICARE

## 2022-03-30 ENCOUNTER — PATIENT MESSAGE (OUTPATIENT)
Dept: ADMINISTRATIVE | Facility: HOSPITAL | Age: 72
End: 2022-03-30
Payer: MEDICARE

## 2022-08-03 ENCOUNTER — PATIENT OUTREACH (OUTPATIENT)
Dept: ADMINISTRATIVE | Facility: HOSPITAL | Age: 72
End: 2022-08-03
Payer: MEDICARE

## 2022-08-03 ENCOUNTER — PATIENT MESSAGE (OUTPATIENT)
Dept: ADMINISTRATIVE | Facility: HOSPITAL | Age: 72
End: 2022-08-03
Payer: MEDICARE

## 2022-08-05 DIAGNOSIS — N39.0 URINARY TRACT INFECTION WITHOUT HEMATURIA, SITE UNSPECIFIED: Primary | ICD-10-CM

## 2022-08-05 RX ORDER — CIPROFLOXACIN 500 MG/1
500 TABLET ORAL 2 TIMES DAILY
Qty: 10 TABLET | Refills: 0 | Status: ON HOLD | OUTPATIENT
Start: 2022-08-05 | End: 2022-11-14 | Stop reason: HOSPADM

## 2022-08-10 ENCOUNTER — PATIENT OUTREACH (OUTPATIENT)
Dept: ADMINISTRATIVE | Facility: HOSPITAL | Age: 72
End: 2022-08-10
Payer: MEDICARE

## 2022-09-08 ENCOUNTER — PATIENT OUTREACH (OUTPATIENT)
Dept: ADMINISTRATIVE | Facility: HOSPITAL | Age: 72
End: 2022-09-08
Payer: MEDICARE

## 2022-09-13 ENCOUNTER — PATIENT OUTREACH (OUTPATIENT)
Dept: ADMINISTRATIVE | Facility: HOSPITAL | Age: 72
End: 2022-09-13
Payer: MEDICARE

## 2022-09-19 ENCOUNTER — TELEPHONE (OUTPATIENT)
Dept: SPINE | Facility: CLINIC | Age: 72
End: 2022-09-19
Payer: MEDICARE

## 2022-09-19 NOTE — TELEPHONE ENCOUNTER
----- Message from Violeta Kauffman sent at 9/19/2022 10:32 AM CDT -----  Name of Who is Calling: KERI PEARCE [[id number]]            What is the request in detail: Patient is requesting call back to get an appointment scheduled for neck pain. Did not schedule being [facility] did not give me neck option only back and spine therefore I was unclear she she get referred to pain management instead              Can the clinic reply by [facility]: no              What Number to Call Back if not in [facility]: [phone]

## 2022-09-19 NOTE — TELEPHONE ENCOUNTER
No answer, LVM for pt to contact [facility] to schedule with PCP or speciality. No further questions or concerns at this time.

## 2022-11-04 ENCOUNTER — PATIENT OUTREACH (OUTPATIENT)
Dept: ADMINISTRATIVE | Facility: HOSPITAL | Age: 72
End: 2022-11-04
Payer: MEDICARE

## 2022-11-07 ENCOUNTER — HOSPITAL ENCOUNTER (OUTPATIENT)
Facility: OTHER | Age: 72
Discharge: NURSING, REHAB OR LONG-TERM CARE | End: 2022-11-15
Attending: EMERGENCY MEDICINE | Admitting: EMERGENCY MEDICINE
Payer: MEDICARE

## 2022-11-07 DIAGNOSIS — R07.9 CHEST PAIN: ICD-10-CM

## 2022-11-07 DIAGNOSIS — R62.7 FAILURE TO THRIVE IN ADULT: ICD-10-CM

## 2022-11-07 DIAGNOSIS — R53.81 DEBILITY: ICD-10-CM

## 2022-11-07 DIAGNOSIS — L89.309 PRESSURE INJURY OF SKIN OF BUTTOCK, UNSPECIFIED INJURY STAGE, UNSPECIFIED LATERALITY: ICD-10-CM

## 2022-11-07 DIAGNOSIS — M53.3 SI (SACROILIAC) PAIN: ICD-10-CM

## 2022-11-07 DIAGNOSIS — R26.2 UNABLE TO AMBULATE: ICD-10-CM

## 2022-11-07 DIAGNOSIS — L89.300 PRESSURE INJURY OF BUTTOCK, UNSTAGEABLE, UNSPECIFIED LATERALITY: ICD-10-CM

## 2022-11-07 DIAGNOSIS — R06.02 SHORTNESS OF BREATH: Primary | ICD-10-CM

## 2022-11-07 DIAGNOSIS — I10 HYPERTENSION, UNSPECIFIED TYPE: ICD-10-CM

## 2022-11-07 PROBLEM — R60.0 PERIPHERAL EDEMA: Status: ACTIVE | Noted: 2022-11-07

## 2022-11-07 PROBLEM — N39.0 URINARY TRACT INFECTION WITHOUT HEMATURIA: Status: ACTIVE | Noted: 2022-11-07

## 2022-11-07 LAB
ALBUMIN SERPL BCP-MCNC: 3.7 G/DL (ref 3.5–5.2)
ALP SERPL-CCNC: 105 U/L (ref 55–135)
ALT SERPL W/O P-5'-P-CCNC: 8 U/L (ref 10–44)
AMORPH CRY URNS QL MICRO: ABNORMAL
AMORPH CRY URNS QL MICRO: ABNORMAL
ANION GAP SERPL CALC-SCNC: 9 MMOL/L (ref 8–16)
AST SERPL-CCNC: 10 U/L (ref 10–40)
BACTERIA #/AREA URNS HPF: ABNORMAL /HPF
BACTERIA #/AREA URNS HPF: ABNORMAL /HPF
BASOPHILS # BLD AUTO: 0.04 K/UL (ref 0–0.2)
BASOPHILS NFR BLD: 0.4 % (ref 0–1.9)
BILIRUB SERPL-MCNC: 0.5 MG/DL (ref 0.1–1)
BILIRUB UR QL STRIP: NEGATIVE
BILIRUB UR QL STRIP: NEGATIVE
BNP SERPL-MCNC: <10 PG/ML (ref 0–99)
BUN SERPL-MCNC: 5 MG/DL (ref 8–23)
CALCIUM SERPL-MCNC: 9.4 MG/DL (ref 8.7–10.5)
CHLORIDE SERPL-SCNC: 106 MMOL/L (ref 95–110)
CLARITY UR: CLEAR
CLARITY UR: CLEAR
CO2 SERPL-SCNC: 27 MMOL/L (ref 23–29)
COLOR UR: YELLOW
COLOR UR: YELLOW
CREAT SERPL-MCNC: 0.7 MG/DL (ref 0.5–1.4)
CTP QC/QA: YES
DIFFERENTIAL METHOD: ABNORMAL
EOSINOPHIL # BLD AUTO: 0.2 K/UL (ref 0–0.5)
EOSINOPHIL NFR BLD: 2.2 % (ref 0–8)
ERYTHROCYTE [DISTWIDTH] IN BLOOD BY AUTOMATED COUNT: 14.7 % (ref 11.5–14.5)
EST. GFR  (NO RACE VARIABLE): >60 ML/MIN/1.73 M^2
GLUCOSE SERPL-MCNC: 85 MG/DL (ref 70–110)
GLUCOSE UR QL STRIP: NEGATIVE
GLUCOSE UR QL STRIP: NEGATIVE
HCT VFR BLD AUTO: 38.7 % (ref 37–48.5)
HGB BLD-MCNC: 12.8 G/DL (ref 12–16)
HGB UR QL STRIP: NEGATIVE
HGB UR QL STRIP: NEGATIVE
IMM GRANULOCYTES # BLD AUTO: 0.04 K/UL (ref 0–0.04)
IMM GRANULOCYTES NFR BLD AUTO: 0.4 % (ref 0–0.5)
KETONES UR QL STRIP: NEGATIVE
KETONES UR QL STRIP: NEGATIVE
LEUKOCYTE ESTERASE UR QL STRIP: ABNORMAL
LEUKOCYTE ESTERASE UR QL STRIP: ABNORMAL
LYMPHOCYTES # BLD AUTO: 2.6 K/UL (ref 1–4.8)
LYMPHOCYTES NFR BLD: 25.8 % (ref 18–48)
MCH RBC QN AUTO: 26.9 PG (ref 27–31)
MCHC RBC AUTO-ENTMCNC: 33.1 G/DL (ref 32–36)
MCV RBC AUTO: 82 FL (ref 82–98)
MICROSCOPIC COMMENT: ABNORMAL
MICROSCOPIC COMMENT: ABNORMAL
MONOCYTES # BLD AUTO: 0.6 K/UL (ref 0.3–1)
MONOCYTES NFR BLD: 5.9 % (ref 4–15)
NEUTROPHILS # BLD AUTO: 6.5 K/UL (ref 1.8–7.7)
NEUTROPHILS NFR BLD: 65.3 % (ref 38–73)
NITRITE UR QL STRIP: POSITIVE
NITRITE UR QL STRIP: POSITIVE
NRBC BLD-RTO: 0 /100 WBC
PH UR STRIP: 7 [PH] (ref 5–8)
PH UR STRIP: 7 [PH] (ref 5–8)
PLATELET # BLD AUTO: 257 K/UL (ref 150–450)
PMV BLD AUTO: 10.1 FL (ref 9.2–12.9)
POCT GLUCOSE: 76 MG/DL (ref 70–110)
POTASSIUM SERPL-SCNC: 3.9 MMOL/L (ref 3.5–5.1)
PROT SERPL-MCNC: 7.4 G/DL (ref 6–8.4)
PROT UR QL STRIP: NEGATIVE
PROT UR QL STRIP: NEGATIVE
RBC # BLD AUTO: 4.75 M/UL (ref 4–5.4)
SARS-COV-2 RDRP RESP QL NAA+PROBE: NEGATIVE
SODIUM SERPL-SCNC: 142 MMOL/L (ref 136–145)
SP GR UR STRIP: 1.01 (ref 1–1.03)
SP GR UR STRIP: 1.01 (ref 1–1.03)
URN SPEC COLLECT METH UR: ABNORMAL
URN SPEC COLLECT METH UR: ABNORMAL
UROBILINOGEN UR STRIP-ACNC: NEGATIVE EU/DL
UROBILINOGEN UR STRIP-ACNC: NEGATIVE EU/DL
WBC # BLD AUTO: 9.96 K/UL (ref 3.9–12.7)
WBC #/AREA URNS HPF: 20 /HPF (ref 0–5)
WBC #/AREA URNS HPF: 20 /HPF (ref 0–5)

## 2022-11-07 PROCEDURE — 96376 TX/PRO/DX INJ SAME DRUG ADON: CPT

## 2022-11-07 PROCEDURE — 25000003 PHARM REV CODE 250: Performed by: EMERGENCY MEDICINE

## 2022-11-07 PROCEDURE — 25000003 PHARM REV CODE 250: Performed by: HOSPITALIST

## 2022-11-07 PROCEDURE — 87088 URINE BACTERIA CULTURE: CPT | Performed by: EMERGENCY MEDICINE

## 2022-11-07 PROCEDURE — 96375 TX/PRO/DX INJ NEW DRUG ADDON: CPT | Mod: 59

## 2022-11-07 PROCEDURE — 87086 URINE CULTURE/COLONY COUNT: CPT | Performed by: EMERGENCY MEDICINE

## 2022-11-07 PROCEDURE — 93010 ELECTROCARDIOGRAM REPORT: CPT | Mod: ,,, | Performed by: INTERNAL MEDICINE

## 2022-11-07 PROCEDURE — 99220 PR INITIAL OBSERVATION CARE,LEVL III: CPT | Mod: ,,, | Performed by: HOSPITALIST

## 2022-11-07 PROCEDURE — 63600175 PHARM REV CODE 636 W HCPCS: Performed by: HOSPITALIST

## 2022-11-07 PROCEDURE — 97166 OT EVAL MOD COMPLEX 45 MIN: CPT

## 2022-11-07 PROCEDURE — G0378 HOSPITAL OBSERVATION PER HR: HCPCS

## 2022-11-07 PROCEDURE — 96365 THER/PROPH/DIAG IV INF INIT: CPT

## 2022-11-07 PROCEDURE — 87186 SC STD MICRODIL/AGAR DIL: CPT | Performed by: EMERGENCY MEDICINE

## 2022-11-07 PROCEDURE — 82962 GLUCOSE BLOOD TEST: CPT

## 2022-11-07 PROCEDURE — A4216 STERILE WATER/SALINE, 10 ML: HCPCS | Performed by: HOSPITALIST

## 2022-11-07 PROCEDURE — 80053 COMPREHEN METABOLIC PANEL: CPT | Performed by: EMERGENCY MEDICINE

## 2022-11-07 PROCEDURE — 93005 ELECTROCARDIOGRAM TRACING: CPT

## 2022-11-07 PROCEDURE — 63600175 PHARM REV CODE 636 W HCPCS: Performed by: EMERGENCY MEDICINE

## 2022-11-07 PROCEDURE — 97162 PT EVAL MOD COMPLEX 30 MIN: CPT

## 2022-11-07 PROCEDURE — 87077 CULTURE AEROBIC IDENTIFY: CPT | Performed by: EMERGENCY MEDICINE

## 2022-11-07 PROCEDURE — 99220 PR INITIAL OBSERVATION CARE,LEVL III: ICD-10-PCS | Mod: ,,, | Performed by: HOSPITALIST

## 2022-11-07 PROCEDURE — 83880 ASSAY OF NATRIURETIC PEPTIDE: CPT | Performed by: EMERGENCY MEDICINE

## 2022-11-07 PROCEDURE — 51702 INSERT TEMP BLADDER CATH: CPT

## 2022-11-07 PROCEDURE — 97530 THERAPEUTIC ACTIVITIES: CPT

## 2022-11-07 PROCEDURE — 85025 COMPLETE CBC W/AUTO DIFF WBC: CPT | Performed by: EMERGENCY MEDICINE

## 2022-11-07 PROCEDURE — 96372 THER/PROPH/DIAG INJ SC/IM: CPT | Performed by: HOSPITALIST

## 2022-11-07 PROCEDURE — 93010 EKG 12-LEAD: ICD-10-PCS | Mod: ,,, | Performed by: INTERNAL MEDICINE

## 2022-11-07 PROCEDURE — 87635 SARS-COV-2 COVID-19 AMP PRB: CPT | Performed by: EMERGENCY MEDICINE

## 2022-11-07 PROCEDURE — 81000 URINALYSIS NONAUTO W/SCOPE: CPT | Performed by: EMERGENCY MEDICINE

## 2022-11-07 PROCEDURE — 99285 EMERGENCY DEPT VISIT HI MDM: CPT | Mod: 25

## 2022-11-07 PROCEDURE — 97535 SELF CARE MNGMENT TRAINING: CPT

## 2022-11-07 RX ORDER — TRAMADOL HYDROCHLORIDE 50 MG/1
50 TABLET ORAL EVERY 6 HOURS PRN
Status: DISCONTINUED | OUTPATIENT
Start: 2022-11-07 | End: 2022-11-09

## 2022-11-07 RX ORDER — ACETAMINOPHEN 325 MG/1
650 TABLET ORAL EVERY 8 HOURS PRN
Status: DISCONTINUED | OUTPATIENT
Start: 2022-11-07 | End: 2022-11-08

## 2022-11-07 RX ORDER — FUROSEMIDE 10 MG/ML
40 INJECTION INTRAMUSCULAR; INTRAVENOUS ONCE
Status: DISCONTINUED | OUTPATIENT
Start: 2022-11-07 | End: 2022-11-07

## 2022-11-07 RX ORDER — PANTOPRAZOLE SODIUM 40 MG/1
40 TABLET, DELAYED RELEASE ORAL DAILY
Status: DISCONTINUED | OUTPATIENT
Start: 2022-11-07 | End: 2022-11-15 | Stop reason: HOSPADM

## 2022-11-07 RX ORDER — HYDRALAZINE HYDROCHLORIDE 20 MG/ML
5 INJECTION INTRAMUSCULAR; INTRAVENOUS
Status: COMPLETED | OUTPATIENT
Start: 2022-11-07 | End: 2022-11-07

## 2022-11-07 RX ORDER — POLYETHYLENE GLYCOL 3350 17 G/17G
17 POWDER, FOR SOLUTION ORAL DAILY
Status: DISCONTINUED | OUTPATIENT
Start: 2022-11-07 | End: 2022-11-10

## 2022-11-07 RX ORDER — IBUPROFEN 200 MG
24 TABLET ORAL
Status: DISCONTINUED | OUTPATIENT
Start: 2022-11-07 | End: 2022-11-15 | Stop reason: HOSPADM

## 2022-11-07 RX ORDER — ENOXAPARIN SODIUM 100 MG/ML
40 INJECTION SUBCUTANEOUS EVERY 24 HOURS
Status: DISCONTINUED | OUTPATIENT
Start: 2022-11-07 | End: 2022-11-15 | Stop reason: HOSPADM

## 2022-11-07 RX ORDER — ONDANSETRON 2 MG/ML
4 INJECTION INTRAMUSCULAR; INTRAVENOUS EVERY 8 HOURS PRN
Status: DISCONTINUED | OUTPATIENT
Start: 2022-11-07 | End: 2022-11-15 | Stop reason: HOSPADM

## 2022-11-07 RX ORDER — SODIUM CHLORIDE 0.9 % (FLUSH) 0.9 %
10 SYRINGE (ML) INJECTION
Status: DISCONTINUED | OUTPATIENT
Start: 2022-11-07 | End: 2022-11-15 | Stop reason: HOSPADM

## 2022-11-07 RX ORDER — IBUPROFEN 200 MG
16 TABLET ORAL
Status: DISCONTINUED | OUTPATIENT
Start: 2022-11-07 | End: 2022-11-15 | Stop reason: HOSPADM

## 2022-11-07 RX ORDER — FUROSEMIDE 20 MG/1
20 TABLET ORAL DAILY
Status: DISCONTINUED | OUTPATIENT
Start: 2022-11-08 | End: 2022-11-15 | Stop reason: HOSPADM

## 2022-11-07 RX ORDER — KETOROLAC TROMETHAMINE 30 MG/ML
15 INJECTION, SOLUTION INTRAMUSCULAR; INTRAVENOUS
Status: COMPLETED | OUTPATIENT
Start: 2022-11-07 | End: 2022-11-07

## 2022-11-07 RX ORDER — OMEGA-3/DHA/EPA/FISH OIL 300-1000MG
1 CAPSULE,DELAYED RELEASE (ENTERIC COATED) ORAL DAILY
Status: DISCONTINUED | OUTPATIENT
Start: 2022-11-07 | End: 2022-11-09

## 2022-11-07 RX ORDER — OMEGA-3/DHA/EPA/FISH OIL 300-1000MG
1 CAPSULE,DELAYED RELEASE (ENTERIC COATED) ORAL DAILY
Status: DISCONTINUED | OUTPATIENT
Start: 2022-11-07 | End: 2022-11-07 | Stop reason: RX

## 2022-11-07 RX ORDER — TRAMADOL HYDROCHLORIDE 50 MG/1
50 TABLET ORAL
Status: DISPENSED | OUTPATIENT
Start: 2022-11-07 | End: 2022-11-08

## 2022-11-07 RX ORDER — SODIUM CHLORIDE 0.9 % (FLUSH) 0.9 %
10 SYRINGE (ML) INJECTION EVERY 8 HOURS
Status: DISCONTINUED | OUTPATIENT
Start: 2022-11-07 | End: 2022-11-15 | Stop reason: HOSPADM

## 2022-11-07 RX ORDER — FUROSEMIDE 10 MG/ML
20 INJECTION INTRAMUSCULAR; INTRAVENOUS ONCE
Status: COMPLETED | OUTPATIENT
Start: 2022-11-07 | End: 2022-11-07

## 2022-11-07 RX ORDER — FUROSEMIDE 10 MG/ML
20 INJECTION INTRAMUSCULAR; INTRAVENOUS
Status: COMPLETED | OUTPATIENT
Start: 2022-11-07 | End: 2022-11-07

## 2022-11-07 RX ORDER — NALOXONE HCL 0.4 MG/ML
0.02 VIAL (ML) INJECTION
Status: DISCONTINUED | OUTPATIENT
Start: 2022-11-07 | End: 2022-11-15 | Stop reason: HOSPADM

## 2022-11-07 RX ORDER — GLUCAGON 1 MG
1 KIT INJECTION
Status: DISCONTINUED | OUTPATIENT
Start: 2022-11-07 | End: 2022-11-15 | Stop reason: HOSPADM

## 2022-11-07 RX ORDER — TALC
6 POWDER (GRAM) TOPICAL NIGHTLY PRN
Status: DISCONTINUED | OUTPATIENT
Start: 2022-11-07 | End: 2022-11-15 | Stop reason: HOSPADM

## 2022-11-07 RX ORDER — LOSARTAN POTASSIUM 50 MG/1
50 TABLET ORAL DAILY
Status: DISCONTINUED | OUTPATIENT
Start: 2022-11-07 | End: 2022-11-15 | Stop reason: HOSPADM

## 2022-11-07 RX ORDER — FUROSEMIDE 20 MG/1
20 TABLET ORAL DAILY
Status: DISCONTINUED | OUTPATIENT
Start: 2022-11-07 | End: 2022-11-07

## 2022-11-07 RX ORDER — ACETAMINOPHEN 500 MG
1000 TABLET ORAL
Status: COMPLETED | OUTPATIENT
Start: 2022-11-07 | End: 2022-11-07

## 2022-11-07 RX ADMIN — Medication 10 ML: at 09:11

## 2022-11-07 RX ADMIN — ACETAMINOPHEN 1000 MG: 500 TABLET ORAL at 09:11

## 2022-11-07 RX ADMIN — HYDRALAZINE HYDROCHLORIDE 5 MG: 20 INJECTION INTRAMUSCULAR; INTRAVENOUS at 09:11

## 2022-11-07 RX ADMIN — ENOXAPARIN SODIUM 40 MG: 100 INJECTION SUBCUTANEOUS at 05:11

## 2022-11-07 RX ADMIN — TRAMADOL HYDROCHLORIDE 50 MG: 50 TABLET, COATED ORAL at 09:11

## 2022-11-07 RX ADMIN — FUROSEMIDE 20 MG: 10 INJECTION, SOLUTION INTRAMUSCULAR; INTRAVENOUS at 09:11

## 2022-11-07 RX ADMIN — Medication 10 ML: at 04:11

## 2022-11-07 RX ADMIN — FUROSEMIDE 20 MG: 10 INJECTION, SOLUTION INTRAMUSCULAR; INTRAVENOUS at 02:11

## 2022-11-07 RX ADMIN — KETOROLAC TROMETHAMINE 15 MG: 30 INJECTION, SOLUTION INTRAMUSCULAR; INTRAVENOUS at 02:11

## 2022-11-07 RX ADMIN — LOSARTAN POTASSIUM 50 MG: 50 TABLET, FILM COATED ORAL at 09:11

## 2022-11-07 RX ADMIN — PANTOPRAZOLE SODIUM 40 MG: 40 TABLET, DELAYED RELEASE ORAL at 09:11

## 2022-11-07 RX ADMIN — CEFTRIAXONE 1 G: 1 INJECTION, SOLUTION INTRAVENOUS at 02:11

## 2022-11-07 NOTE — ED NOTES
Pt refusing all medications that is due at this time. Pt refused to be turned. All meds rescheduled for 2100 because pt stated she would take them at bedtime. Educated patient on the rationales of why she need to participate in rehab, move around more, and become more compliant with care. Pt verbalized understanding.

## 2022-11-07 NOTE — PT/OT/SLP EVAL
"Physical Therapy Evaluation and Treatment    Patient Name:  Trista Gr   MRN:  [id number]    Recommendations:     Discharge Recommendations:  nursing facility, skilled   Discharge Equipment Recommendations:  (pressure relieving overlay for HB, commode cut out sling for madelin)   Barriers to discharge: Decreased caregiver support    Assessment:     Trista Gr is a 72 y.o. female admitted with a medical diagnosis of Urinary tract infection without hematuria - .  She presents with the following impairments/functional limitations:  weakness, impaired endurance, impaired self care skills, impaired functional mobility, gait instability, impaired balance, decreased upper extremity function, decreased lower extremity function, decreased safety awareness, decreased coordination, pain, decreased ROM, edema, impaired skin, impaired cardiopulmonary response to activity, impaired joint extensibility, impaired muscle length.    Patient evaluated by PT and goals established. Patient reports increased back and neck pain limiting her ability to complete transfers at this time. Requires max-totalA for bed mobility and sitting EOB brief period of time.     Extensive discussion with pt, spouse, and sister at bedside regarding goals, PLOF, and DC options - patient with recent decline in her level of functional mobility and now unable to transfer or roll with her 's assistance but goal remains to "get better" to be able to remain at home with her  as main caregiver. PT will continue to follow and progress as tolerated. Rec for dc to SNF.    Rehab Prognosis: Fair-; patient would benefit from acute skilled PT services to address these deficits and reach maximum level of function.    Recent Surgery: * No surgery found *      Plan:     During this hospitalization, patient to be seen 3 x/week to address the identified rehab impairments via therapeutic activities, therapeutic exercises, neuromuscular " "re-education, wheelchair management/training and progress toward the following goals:    Plan of Care Expires:  12/07/22    Subjective     Chief Complaint: Pain in neck and back  Patient/Family Comments/goals: Goal to "get better" to return home -  reports he needs no support; Patient agreeable to evaluation.  Pain/Comfort:  Pain Rating 1:  (unrated, reports severe with sitting and supine)  Location 1: neck (and back)  Pain Addressed 1: Reposition, Distraction, Cessation of Activity  Pain Rating Post-Intervention 1:  (more comfortable after hsuband respositioning)    Patients cultural, spiritual, Catholic conflicts given the current situation: no      Living Environment:  Pt lives with her  in a house  Pt has a tub shower with a regular toilet - uses BSC.   Upon discharge, patient will have assistance from her , who works during the day and is not available 24/7.  Prior level of function:  Ambulation:   Has not ambulated since before 2020  Until recently, was transferring with assistance from her  to BSC or   Until recently, was able to stand/squat brief periods of time with SBA during pippa hygiene and LB dressing tasks  ADL's: Requires assistance  Falls: Near fall during transfer in past 2 weeks with subsequent sore ankle from being twisted  Equipment used at home: wheelchair, hospital bed, bedside commode (owns madelin lift but hasn't been used).      Objective:     Communicated with RN prior to session.  Patient found HOB elevated with telemetry, blood pressure cuff, pulse ox (continuous), peripheral IV, gibson catheter  upon PT entry to room.    General Precautions: Standard, fall   Orthopedic Precautions:N/A   Braces: N/A  Respiratory Status: Room air    Patient donned non slip socks and gait belt for OOB mobility.    Exams:  Cognition:   Patient is oriented x4.  Pt follows approximately 75% of one step commands.    Mood: Pleasant, fearful of pain  Safety Awareness: " "Imapired  Musculoskeletal:  BMI: 32.11  Posture:  Forward head with "chin to chest" deformity per ortho, significant R lateral lean in supine and sitting  LE ROM/Strength:   R ROM: Limited knee/hip flexion to 90 deg, DF lacking 5 deg  L ROM: Limited knee/hip flexion to 90 deg, DF lacking 5 deg  R Strength:   Knee extension: 2/5  Dorsiflexion: 2/5   L Strength:   Knee extension: 2/5  Dorsiflexion: 2/5   Neuromuscular:  Tone/Reflexes: No impairments identified with functional mobility. No formal testing performed.  Coordination: Impaired d/t limited ROM and weakness  Balance:   Static sitting: Poor, consistent R lateral lean unable to correct requiring mod-maxA to maintain sitting  Visual-vestibular: No impairments identified with functional mobility. No formal testing performed.  Integument:  Redness to posterior sacrum/lower back with soiled nightgown  Cardiopulmonary:  Vital signs:   Elevated BP (190s/90s), HR max 109 bpm  Edema: Significant to BLE (pitting)    Functional Mobility:  Bed Mobility:     Bridging: total assistance  Supine to Sit: total assistance  Sit to Supine: total assistance  Balance:   Static sitting EOB x5 min with mod-totalA d/t R lateral lean.   Despite ongoing manual and verbal cueing, pt unable to correct lateral lean.    Due to poor balance in sitting and increased pain, deferred transfer attempts at this time      AM-PAC 6 CLICK MOBILITY  Total Score:7       Treatment & Education:  TA:  Cueing for bed mobility and balance while sitting after initial attempts unsuccessful by pt  Extensive discussion at bedside with pt, spouse, and sister regarding DME for increased (I), DC options (pt asking about "rehabilitation facilities"), bed mobility when someone has limited mobility (use of check pads/draw sheets)  PT educated patient and family re:   PT plan of care/role of PT  Safety with OOB mobility  Use of HB features  Discharge disposition    Pt and family verbalized understanding       Patient " left HOB elevated with all lines intact, call button in reach, MD Hyde notified, and family present.    GOALS:   Multidisciplinary Problems       Physical Therapy Goals          Problem: Physical Therapy    Goal Priority Disciplines Outcome Goal Variances Interventions   Physical Therapy Goal     PT, PT/OT Ongoing, Progressing     Description: Goals to be met by: 22    Patient will increase functional independence with mobility by performin. Roll L and R with Abraham with appropriate use of hospital bed features.  2. Supine<>sit with modA with appropriate use of hospital bed features.  3. Transfer bed<>chair with maxA with most appropriate technique.   4. Partial sit<>stand with Abraham with BUE support.  5. Spouse to demo safe transfer technique with patient for bed<>WC or commode transfers.                        History:     Past Medical History:   Diagnosis Date    Debility     Decubitus ulcer of buttock     GERD (gastroesophageal reflux disease) 2012    HTN (hypertension) 2012    Normal stress echocardiogram 2012    Vitamin D deficiency disease 2012       Past Surgical History:   Procedure Laterality Date    APPENDECTOMY      BILATERAL SALPINGOOPHORECTOMY      HYSTERECTOMY         Time Tracking:     PT Received On: 22  PT Start Time: 1217     PT Stop Time: 1309  PT Total Time (min): 52 min     Billable Minutes: Evaluation 25 and Therapeutic Activity 27      2022

## 2022-11-07 NOTE — PT/OT/SLP EVAL
Occupational Therapy   Evaluation & Treatment    Name: Trista Gr  MRN: [id number]  Admitting Diagnosis:  <principal problem not specified>  Recent Surgery: * No surgery found *      Recommendations:     Discharge Recommendations: nursing facility, skilled  Discharge Equipment Recommendations:   (TBD at next level of care; family interested in air mattres overlay and shower lift chair)  Barriers to discharge:  Decreased caregiver support ( uanble to provide level of care needed at this time)    Assessment:     Trista Gr is a 72 y.o. female with a medical diagnosis of <principal problem not specified>.  She presents with pain in neck and back. Performance deficits affecting function: weakness, impaired functional mobility, impaired endurance, impaired self care skills, decreased lower extremity function, decreased upper extremity function, impaired joint extensibility, decreased ROM, impaired balance, pain, impaired coordination.  Pt agreeable to participating in therapy upon arrival to room.  Pt demonstrates strength and ROM in (B) UE needed for ADLs that is WFL.  Pt is right hand dominant, but reports using her left hand 2* more easily accessible (pt with consistent lean to right).  Total A requierd for all bed mobility this date.  While seated at EOB pt required Total A to maintain balance.  Pt able to tolerate sitting upright for ~3-4 minutes, then requested to lie down 2* feeling dizzy.    Overall, pt tolerated therapy fair.  PTA pt reports requiring significant assist for ADLs and functional mobility from .  Pt able to feed herself and perform some grooming activities, but relies on  for dressing, bathing, and toileting.  Pt reports she is wheelchair bound at baseline.  For transfers, pt reports  lifts her into wheelchair and BSC; however, sometimes can stand with assist during transfers while  assists (similar to stand pivot).   states he is unable  "to continue caring for pt at the level she requires, and needs assist.      Pt noted to have had recent functional decline, and is having difficulty rolling and assisting  with transfers.  Pt would benefit from skilled OT services to address problems listed above and increase independence with ADLs (specifically with grooming, feeding, bed mobility, and sitting balance).  SNF is recommended upon d/c from acute care to further address deficits and help pt improve overall functional independence.          Rehab Prognosis: Fair; patient would benefit from acute skilled OT services to address these deficits and reach maximum level of function.       Plan:     Patient to be seen 3 x/week to address the above listed problems via self-care/home management, therapeutic activities, therapeutic exercises, neuromuscular re-education  Plan of Care Expires: 12/07/22  Plan of Care Reviewed with: patient, spouse, sibling    Subjective     Chief Complaint: Difficulty with daily routine  Patient/Family Comments/goals: for pt to "get better" and stronger, obtain help in home situation, determine placement for pt; obtain equipment to help such as wedge, air mattress overlay, and appropriate shower chair/lift shower chair  * prefers pt to return home once she is "better"    Occupational Profile:  Living Environment: Pt lives with  in [facility],  [place].  Bathroom has tub/shower with BSC present.  Previous level of function:   *ADLs: Requires significant assist from .    -Able to feed herself and perform some grooming tasks.   -Reports BSC hard to use  -States during LB dressing she stands with assist so he can pull up garments  *iADLs: Dependent  -Functional Mobility:  wheelchair bound at baseline; dependent for transfers to wheelchair and BSC.    -Per  and pt, sometimes stands with assist to pivot for transfer  -does not get up to wheelchair everyday, but gets to BSC at least 1-2x per day  *Pt reports "  lifts her.   mentioned that during transfer pt has twisted her ankle before.  Seems as though pt occasionally performs stand pivot at times with increased assist.  Pt repeatedly stated  lifts her for transfers.  -does not use madelin lift because she states she cannot roll and does not know how to operate it  Roles and Routines: Wife, sister,  and pt report her appetite is low  Equipment Used at Home:  hospital bed, bedside commode, wheelchair (pt also owns madelin lift, but not currently using it)  Assistance upon Discharge:   * has been off work for one month; currently provides 24/7 assist.    *States he is unable to continue caring for her in same way.  *States NH not an option financially; ultimate goal is to have pt get stronger then return home    Pain/Comfort:  Pain Rating Post-Intervention 1:  pt indicated pain in back and neck at rest; severe with sitting and supine    Patients cultural, spiritual, Restorationist conflicts given the current situation:  none provided    Objective:     Communicated with: RN prior to session.  Patient found HOB elevated with telemetry, blood pressure cuff, pulse ox (continuous), peripheral IV, gibson catheter upon OT entry to room.   and sister present during second portion of session.    General Precautions: Standard, fall   Orthopedic Precautions:N/A   Braces: N/A  Respiratory Status: Room air    Occupational Performance:    Bed Mobility:    Supine <> Sit: Total A  Rolling: Total A  Scooting: Total A seated and supine  Sit <> Supine: Total A  Notes: Increased time and cues required    Functional Mobility/Transfers:  Bed <> wheelchair; not assessed 2* pt not appropriate for activity this date.    Pt with poor sitting balance and reports of feeling dizzy      Activities of Daily Living:  Upper Body Dressing: Total A for adjusting gown and pulling upwards while seated at EOB  Lower Body Dressing: Total A for adjusting socks and pulling upwards  while supine with HOB elevated.    Cognitive/Visual Perceptual:  Cognitive/Psychosocial Skills:    -       Oriented to: Person, Place, Time, and Situation   -       Follows Commands/attention: Follows multistep commands  -       Communication: clear/fluent  -       Memory: No Deficits noted  -       Safety awareness/insight to disability: intact   -       Mood/Affect/Coping skills/emotional control: Cooperative, pleasant, somewhat distressed regarding current situation and next steps    Physical Exam:  Postural examination/scapula alignment:    -Rounded shoulders  -Chin tucked towards chest with head favoring right side.  Pt reports MDs have told her surgery is not an option  Skin integrity: Decubitus ulcers present on bottom; left eye swollen and purpleish/gray color present  Edema:  Severe in (B) LE  Sensation: -       Intact  Motor Planning: WFL  Dominant hand: Right, but uses left frequently 2* more easily accessible  Upper Extremity Range of Motion:    -       Right Upper Extremity: WFL; slightly less than LUE.  Difficulty reaching back of head.  -       Left Upper Extremity: WFL  Upper Extremity Strength:   -       Right Upper Extremity: WFL; grossly 4/5 all muscle groups.  Pt reports right side is weaker  -       Left Upper Extremity: WFL; grossly 4/5 all muscle groups.   Strength: 5/5 both hands  Fine Motor Coordination: -       Intact  Gross motor coordination: Significant impairments  Balance:  Sitting- Total assist; Standing- unable to assess    AMPAC 6 Click ADL:  AMPAC Total Score: 8    Treatment & Education:  *Evaluation and treatment session focused on promoting increased endurance, strength, balance, coordination, and ROM needed for ADLs and functional transfers as part of daily routine.   -pt sat EOB for ~3-4 minutes; cues required for positioning, posture, and balance,  -extensive education provided for pt, , and sister regarding purpose/role of therapy in acute care setting, assistive  equipment to facilitate daily routine at home (including madelin lift), and progression of therapy  -POC reviewed with pt and family      Patient left HOB elevated with all lines intact, call button in reach, and  and sister present; attempted to notify RN via RN at     GOALS:   Multidisciplinary Problems       Occupational Therapy Goals          Problem: Occupational Therapy    Goal Priority Disciplines Outcome Interventions   Occupational Therapy Goal     OT, PT/OT Ongoing, Progressing    Description: Goals to be met by: 11/14/2022     Patient will increase functional independence with ADLs by performing:    Feeding with Contact Guard Assistance.  Grooming while seated with Stand-by Assistance.  Sitting at edge of bed x 5 minutes with Contact Guard Assistance.  Rolling to Bilateral with Moderate Assistance.   Supine to sit with Moderate Assistance.                         History:     Past Medical History:   Diagnosis Date    GERD (gastroesophageal reflux disease) 9/22/2012    HTN (hypertension) 9/22/2012    Normal stress echocardiogram 9/22/2012    Vitamin D deficiency disease 9/22/2012         Past Surgical History:   Procedure Laterality Date    APPENDECTOMY      BILATERAL SALPINGOOPHORECTOMY      HYSTERECTOMY         Time Tracking:     OT Date of Treatment: 11/07/22  OT Start Time: 1217  OT Stop Time: 1310  OT Total Time (min): 53 min    Billable Minutes:Evaluation 30  Self Care/Home Management 23      *co-tx with PT  ELIZABETH Almanza  11/7/2022

## 2022-11-07 NOTE — PLAN OF CARE
Problem: Occupational Therapy  Goal: Occupational Therapy Goal  Description: Goals to be met by: 11/14/2022     Patient will increase functional independence with ADLs by performing:    Feeding in supported position with Contact Guard Assistance.  Grooming while seated in supported position with Stand-by Assistance.  Sitting at edge of bed x 5 minutes with Contact Guard Assistance.  Rolling to Bilateral with Moderate Assistance.   Supine to sit with Moderate Assistance.    Outcome: Ongoing, Progressing     OT evaluation complete and POC established.  SNF is recommended upon d/c from acute care to further address deficits and help pt improve overall functional independence.     ELIZABETH Almanza  11/7/2022

## 2022-11-07 NOTE — ASSESSMENT & PLAN NOTE
- Blood pressure elevated due to missed home antihypertensive medications along with diuretic  - Substitute home blood pressure medication with losartan 50 mg p.o. daily during hospitalization  - P.r.n. hydralazine

## 2022-11-07 NOTE — PLAN OF CARE
SW sent secure chat to doctor and nurse. Recommended OT/PT evaluate patient for recommendation treatment.

## 2022-11-07 NOTE — HPI
72-year-old female with HTN, debility (bed-bound at baseline), decubitus ulcers, peripheral edema who presented with complaint of shortness of breath that started last night.  She became anxious and concerned about her health.  She reports she intermittently takes her Lasix, but most of the time does not due to inability to get to the bathroom or have her diaper changed.  She missed taking all of her medications this morning, and is unclear about when was the last time she took the Lasix.  She does report dysuria, with burning on urination.  She also reports worsening of her bedsores, with development of multiple source despite efforts by her .  She frequently only gets urinary tract infection and last infection was approximately 1 month ago.  Overall she feels weaker, is not able to help with transfers at all.  Denies chest pain, cough, fever or chills.  No reported nausea or vomiting, but does report decreased appetite.   is the primary caregiver, and they do not have home health or any other resources at home.  In the ER, workup remarkable for UA with positive nitrite, 2+ leukocytes, 20 WBC and many bacteria.  Head CT unremarkable, chest x-ray without infiltrate, BNP less than 10, COVID negative.

## 2022-11-07 NOTE — ED NOTES
Discussed patient's plan of care with patient and her  at bedside. Iliana, , called to come back in to speak with patient and  about treatment options.

## 2022-11-07 NOTE — PLAN OF CARE
"  Problem: Physical Therapy  Goal: Physical Therapy Goal  Description: Goals to be met by: 22    Patient will increase functional independence with mobility by performin. Roll L and R with Abraham with appropriate use of hospital bed features.  2. Supine<>sit with modA with appropriate use of hospital bed features.  3. Transfer bed<>chair with maxA with most appropriate technique.   4. Partial sit<>stand with Abraham with BUE support.  5. Spouse to demo safe transfer technique with patient for bed<>WC or commode transfers.   Outcome: Ongoing, Progressing     Patient evaluated by PT and goals established. Patient reports increased back and neck pain limiting her ability to complete transfers at this time. Requires max-totalA for bed mobility and sitting EOB brief period of time. Extensive discussion with pt, spouse, and sister at bedside regarding goals, PLOF, and DC options - patient with recent decline in her level of functional mobility and now unable to transfer or roll with her 's assistance but goal remains to "get better" to be able to remain at home with her  as main caregiver. PT will continue to follow and progress as tolerated. Rec for dc to SNF. Please see progress note for detailed plan of care and recommendations.  "

## 2022-11-07 NOTE — ASSESSMENT & PLAN NOTE
- UA consistent with infection and patient is symptomatic  - Start empiric Rocephin 1 g IV daily  - Will follow up on urine cultures

## 2022-11-07 NOTE — ASSESSMENT & PLAN NOTE
- Chronic peripheral edema  - Reports slightly worse than her usual baseline  - Likely due to noncompliance with p.o. Lasix  - Pulse dose of IV Lasix given x1 in the ER, will resume scheduled Lasix 20 mg p.o. daily tomorrow

## 2022-11-07 NOTE — ED NOTES
Assumed pt care. Pt resting in bed without distress. Pt stated she has not had any SOB but EMS stated  called due to patient's SOB.     LOC: The patient is awake, alert, and oriented to self, place, time, and situation. Pt is calm and cooperative. Affect is appropriate.  Speech is appropriate and clear.     APPEARANCE: Patient resting comfortably in no acute distress.  Patient is dirty and unkept.    SKIN: The skin is warm and dry; color consistent with ethnicity.  Patient has normal skin turgor and moist mucus membranes.  Stage 1 healing and stage 2 pressure ulcers noted to buttocks. Skin raw within creases of bilateral thighs.     MUSCULOSKELETAL: Patient cannot move upper and lower extremities without difficulty; Pt complains of pain to entire body.  stated pt stopped taking Lasix because she does not want yo move due to generalized pain.     RESPIRATORY: Airway is open and patent. Respirations spontaneous, even, easy, and non-labored.  Patient has a normal effort and rate.  No accessory muscle use noted. Denies cough.     CARDIAC:  Normal rate noted.  +4 peripheral edema noted to bilateral legs and feet. No complaints of chest pain.      ABDOMEN: Soft and non tender to palpation.  No distention noted. Pt denies abdominal pain; denies nausea, vomiting, diarrhea, or constipation.    NEUROLOGIC: Eyes open spontaneously.  Behavior appropriate to situation.  Follows commands; facial expression symmetrical.  Purposeful motor response noted; normal sensation in all extremities. Pt denies headache; denies lightheadedness or dizziness; Pt c/o left eye pain. Bruising noted to left eye. Pt stated it was from swelling.

## 2022-11-07 NOTE — ASSESSMENT & PLAN NOTE
- Multiple bedsores located on the buttocks, minimal stage II but overall unstageable  - Turn every 2 hours and maximize nutrition  - Inpatient consult to wound care

## 2022-11-07 NOTE — ASSESSMENT & PLAN NOTE
Spinal canal stenosis  - MRI on her cervical spine in June 2021 revealed mild to moderate spinal canal stenosis at C2-3  - MRI on her lumbar spine in June 2021 revealed mild spinal canal stenosis at L3-4.  - Chronic issue, not an acute issue  - Tylenol and tramadol p.r.n.

## 2022-11-07 NOTE — ED PROVIDER NOTES
Encounter Date: 11/7/2022    SCRIBE #1 NOTE: I, María Stanley, am scribing for, and in the presence of,  Kane Lee MD.     History     Chief Complaint   Patient presents with    Shortness of Breath     Pt BIB EMS from home c c/o of SOB. Per EMS, pt reports she stopped taking her lasix d/t decreased mobility-pt bed bound.      Time seen by provider: 7:18 AM    This is a 72 y.o. female with PMHx of HTN, who presents with complaint of shortness of breath. Per EMS, the patient has recently stopped taking her lasix as she has decreased mobility, and it is difficult for her to get out of bed to use the bathroom. Patient has bed sores to the buttocks due to being bed bound. She also reports dysuria and urinary frequency. She was diagnosed with UTI 1 month ago at [facility] and was prescribed bactrim, but symptoms have not resolved. Patient states that her  is her primary care taker, and she does not receive home health care. She notes that she has not taken any of her daily medications this morning. Her PCP is Dr. Amor Yeung. She denies PMHx of DM.    This is the extent of the patient's complaints at this time.    The history is provided by the patient and the EMS personnel.   Review of patient's allergies indicates:   Allergen Reactions    Aspirin      Sore stomach    Codeine Itching    Pcn [penicillins] Itching     Past Medical History:   Diagnosis Date    Debility     Decubitus ulcer of buttock     GERD (gastroesophageal reflux disease) 09/22/2012    HTN (hypertension) 09/22/2012    Normal stress echocardiogram 09/22/2012    Vitamin D deficiency disease 09/22/2012     Past Surgical History:   Procedure Laterality Date    APPENDECTOMY      BILATERAL SALPINGOOPHORECTOMY      HYSTERECTOMY       Family History   Problem Relation Age of Onset    Hypertension Mother     Hypertension Father     Stroke Father     Diabetes Brother     Diabetes Maternal Grandmother     Uterine cancer Sister       Social History     Tobacco Use    Smoking status: Never    Smokeless tobacco: Never   Substance Use Topics    Alcohol use: No     Alcohol/week: 0.0 standard drinks    Drug use: No     Review of Systems   Constitutional:  Negative for chills and fever.   HENT:  Negative for congestion and sore throat.    Eyes:  Negative for photophobia and redness.   Respiratory:  Positive for shortness of breath. Negative for cough.    Cardiovascular:  Negative for chest pain.   Gastrointestinal:  Negative for abdominal pain, nausea and vomiting.   Genitourinary:  Positive for dysuria and frequency.   Musculoskeletal:  Negative for back pain.   Skin:  Positive for wound (buttocks). Negative for rash.   Neurological:  Negative for weakness, light-headedness and headaches.   Psychiatric/Behavioral:  Negative for confusion.      Physical Exam     Initial Vitals [11/07/22 0646]   BP Pulse Resp Temp SpO2   (!) 160/78 93 16 97 °F (36.1 °C) 98 %      MAP       --         Physical Exam    Nursing note and vitals reviewed.  Constitutional: She appears well-developed and well-nourished. She is not diaphoretic. No distress.   HENT:   Head: Normocephalic and atraumatic.   Mouth/Throat: Oropharynx is clear and moist and mucous membranes are normal.   Eyes: Conjunctivae and EOM are normal. Pupils are equal, round, and reactive to light. No scleral icterus.   Neck: Neck supple.   Normal range of motion.  Cardiovascular:  Normal rate, regular rhythm, S1 normal and S2 normal.           No murmur heard.  Pulmonary/Chest: No respiratory distress.   Bibasilar crackles.   Abdominal: Abdomen is soft. Bowel sounds are normal. There is no abdominal tenderness.   Musculoskeletal:         General: Edema (BLE) present. No tenderness. Normal range of motion.      Cervical back: Normal range of motion and neck supple.     Neurological: She is alert and oriented to person, place, and time.   Skin: Skin is warm and dry. No rash noted. No pallor.   Several  stage 1 and 2 decubitus ulcers on the buttocks measuring between 3-5 cm region. No cellulitis or abscess.    Psychiatric: She has a normal mood and affect. Thought content normal.       ED Course   Procedures  Labs Reviewed   CBC W/ AUTO DIFFERENTIAL - Abnormal; Notable for the following components:       Result Value    MCH 26.9 (*)     RDW 14.7 (*)     All other components within normal limits   COMPREHENSIVE METABOLIC PANEL - Abnormal; Notable for the following components:    BUN 5 (*)     ALT 8 (*)     All other components within normal limits   URINALYSIS - Abnormal; Notable for the following components:    Nitrite, UA Positive (*)     Leukocytes, UA 2+ (*)     All other components within normal limits   URINALYSIS, REFLEX TO URINE CULTURE - Abnormal; Notable for the following components:    Nitrite, UA Positive (*)     Leukocytes, UA 2+ (*)     All other components within normal limits    Narrative:     Specimen Source->Urine   URINALYSIS MICROSCOPIC - Abnormal; Notable for the following components:    WBC, UA 20 (*)     Bacteria Many (*)     All other components within normal limits   URINALYSIS MICROSCOPIC - Abnormal; Notable for the following components:    WBC, UA 20 (*)     Bacteria Many (*)     All other components within normal limits    Narrative:     Specimen Source->Urine   B-TYPE NATRIURETIC PEPTIDE   SARS-COV-2 RDRP GENE   POCT GLUCOSE   POCT GLUCOSE MONITORING CONTINUOUS     EKG Readings: (Independently Interpreted)   Rhythm: Normal Sinus Rhythm. Heart Rate: 87.   No acute ST or T wave changes. No STEMI.   ECG Results              EKG 12-lead (Final result)  Result time 11/08/22 14:10:12      Final result by Interface, Lab [facility] (11/08/22 14:10:12)                   Narrative:    Test Reason : R06.02,    Vent. Rate : 087 BPM     Atrial Rate : 087 BPM     P-R Int : 204 ms          QRS Dur : 078 ms      QT Int : 362 ms       P-R-T Axes : 044 018 091 degrees     QTc Int : 435 ms    Normal sinus  rhythm  Nonspecific T wave abnormality  Artifact  Abnormal ECG    Confirmed by Laura GARAY, Sebastian GUILLORY ([id number]) on 11/8/2022 2:10:05 PM    Referred By: [email or web address]   SELF           Confirmed By:Sebastian Sanchez MD                                  Imaging Results              CT Head Without Contrast (Final result)  Result time 11/07/22 14:15:05      Final result by Good Deal MD (11/07/22 14:15:05)                   Impression:      No evidence of acute intracranial hemorrhage.    Ventriculomegaly out of proportion to the degree of sulcal enlargement. While this could relate to cerebral volume loss, the configuration is suspicious for normal pressure hydrocephalus. Clinical correlation required.      Electronically signed by: Good Deal MD  Date:    11/07/2022  Time:    14:15               Narrative:    EXAMINATION:  CT HEAD WITHOUT CONTRAST    CLINICAL HISTORY:  Head trauma, minor (Age >= 65y);Dizziness, persistent/recurrent, cardiac or vascular cause suspected;    TECHNIQUE:  Low dose axial CT images obtained throughout the head without the use of intravenous contrast.  Axial, sagittal and coronal reconstructions were performed.    COMPARISON:  10/07/2018    FINDINGS:  Intracranial compartment:    Prominence of the ventricles.  Third ventricle diameter measuring up to 1.1 cm.  Ventricular enlargement out of proportion to the degree of sulcal enlargement, with some crowding of the sulci near the vertex.    The brain parenchyma appears within normal limits.  No parenchymal mass, hemorrhage, edema or major vascular distribution infarct.    No extra-axial blood or fluid collections.    Skull/extracranial contents (limited evaluation):    No displaced calvarial fracture.    The mastoid air cells and visualized paranasal sinuses are essentially clear.                                       X-Ray Chest AP Portable (Final result)  Result time 11/07/22 07:42:19      Final result by Zion Badillo MD (11/07/22 07:42:19)                    Impression:      No acute abnormality.      Electronically signed by: Zion Badillo MD  Date:    11/07/2022  Time:    07:42               Narrative:    EXAMINATION:  XR CHEST AP PORTABLE    CLINICAL HISTORY:  Shortness of Breath;    TECHNIQUE:  Single frontal view of the chest was performed.    COMPARISON:  10/07/2018    FINDINGS:  The lungs are clear, with normal appearance of pulmonary vasculature and no pleural effusion or pneumothorax.    The cardiac silhouette is normal in size. The hilar and mediastinal contours are unremarkable.    Bones are intact.                                    X-Rays:   Independently Interpreted Readings:   Chest X-Ray: No enlarged cardiac silhouette. No consolidations. No pulmonary edema.   Medications   sodium chloride 0.9% flush 10 mL (has no administration in time range)   melatonin tablet 6 mg (6 mg Oral Not Given 11/7/22 2158)   cefTRIAXone (ROCEPHIN) 1 g/50 mL D5W IVPB (0 g Intravenous Stopped 11/12/22 1457)   traMADoL tablet 50 mg (50 mg Oral Not Given 11/7/22 1430)   pantoprazole EC tablet 40 mg (40 mg Oral Given 11/12/22 0931)   losartan tablet 50 mg (50 mg Oral Given 11/12/22 0931)   sodium chloride 0.9% flush 10 mL (10 mLs Intravenous Given 11/13/22 0604)   naloxone 0.4 mg/mL injection 0.02 mg (has no administration in time range)   glucose chewable tablet 16 g (has no administration in time range)   glucose chewable tablet 24 g (has no administration in time range)   glucagon (human recombinant) injection 1 mg (has no administration in time range)   dextrose 10% bolus 125 mL (has no administration in time range)   dextrose 10% bolus 250 mL (has no administration in time range)   enoxaparin injection 40 mg (40 mg Subcutaneous Given 11/12/22 1702)   ondansetron injection 4 mg ( Intravenous Canceled Entry 11/8/22 1629)   furosemide tablet 20 mg (20 mg Oral Given 11/12/22 0931)   simethicone chewable tablet 80 mg (80 mg Oral Given 11/8/22 0720)    acetaminophen tablet 650 mg (650 mg Oral Given 11/12/22 1442)   ketorolac tablet 10 mg (10 mg Oral Given 11/12/22 0138)   methocarbamoL tablet 500 mg (500 mg Oral Given 11/11/22 0915)   polyethylene glycol packet 17 g (has no administration in time range)   furosemide injection 20 mg (20 mg Intravenous Given 11/7/22 0920)   acetaminophen tablet 1,000 mg (1,000 mg Oral Given 11/7/22 0921)   hydrALAZINE injection 5 mg (5 mg Intravenous Given 11/7/22 0920)   ketorolac injection 15 mg (15 mg Intravenous Given 11/7/22 1456)   furosemide injection 20 mg (20 mg Intravenous Given 11/7/22 1456)   tuberculin injection 5 Units (5 Units Intradermal Given 11/11/22 1305)   fluconazole tablet 200 mg (200 mg Oral Given 11/12/22 0948)   ketorolac tablet 10 mg (10 mg Oral Given 11/12/22 2305)     Medical Decision Making:   History:   Old Medical Records: I decided to obtain old medical records.  Independently Interpreted Test(s):   I have ordered and independently interpreted X-rays - see prior notes.  I have ordered and independently interpreted EKG Reading(s) - see prior notes  Clinical Tests:   Lab Tests: Ordered and Reviewed  Radiological Study: Ordered and Reviewed  Medical Tests: Reviewed and Ordered        Scribe Attestation:   Scribe #1: I performed the above scribed service and the documentation accurately describes the services I performed. I attest to the accuracy of the note.    Attending Attestation:             Attending ED Notes:   Emergent evaluation a 72-year-old female with complaint of shortness of breath.  Patient states that she stopped her Lasix because she is unable to get out of bed due to inability to ambulate for several years.  Patient also states she developed decubitus ulcers on her buttocks area.  Patient is afebrile, nontoxic-appearing stable vital signs except for elevation of blood pressure.  Patient has no elevation white blood cell count.  H&H within normal limits.  No acute findings on CMP.  BNP  is less than 10.  There is question of possible head trauma.  No acute findings on CT of the head although configuration is suspicious for normal pressure hydrocephalus.  No acute findings on chest x-ray.  No acute findings on EKG.  Urine is obtained an urinalysis with urine culture is pending on admission.  The patient is extensively counseled her diagnosis and treatment including all diagnostic, laboratory and physical exam findings.  The patient is admitted in stable condition.    ED Course as of 11/13/22 0710   Mon Nov 07, 2022 1148 11:48 AM  Calling Dr. Vasquez.  [MG]   1233 12:33 PM  Spoke with Dr. Vasquez. Patient will be admitted to Dr. Hyde.  [MG]      ED Course User Index  [MG] María Stanley          Physician Attestation for Scribe: I, Kane Lee, reviewed documentation as scribed in my presence, which is both accurate and complete.         Clinical Impression:   Final diagnoses:  [R06.02] Shortness of breath (Primary)  [R62.7] Failure to thrive in adult  [L89.309] Pressure injury of skin of buttock, unspecified injury stage, unspecified laterality  [I10] Hypertension, unspecified type  [R26.2] Unable to ambulate      ED Disposition Condition    Observation Good                Kane Lee MD  11/13/22 8222

## 2022-11-07 NOTE — ASSESSMENT & PLAN NOTE
Failure to thrive as an adult  - She had issues with osteoarthritis and chronic back pain worked up with MRIs and followed by Ortho, but pain and debility became progressive to the point she was walking with a walker and then eventually bedbound  - Review of chart reveals she has been bed-bound for approximate 2 years   - She used to be able to help with transfers but now is too weak and unable  - She has developed more bedsores, and  is unable to care for her at home in her current state  - Evaluated by PT and OT already in the ER, with recommendation for SNF placement  - Patient and  are agreeable, but would like to have more information  - Continue PT and OT, and  consult for placement

## 2022-11-07 NOTE — SUBJECTIVE & OBJECTIVE
Past Medical History:   Diagnosis Date    Debility     Decubitus ulcer of buttock     GERD (gastroesophageal reflux disease) 09/22/2012    HTN (hypertension) 09/22/2012    Normal stress echocardiogram 09/22/2012    Vitamin D deficiency disease 09/22/2012       Past Surgical History:   Procedure Laterality Date    APPENDECTOMY      BILATERAL SALPINGOOPHORECTOMY      HYSTERECTOMY         Review of patient's allergies indicates:   Allergen Reactions    Aspirin      Sore stomach    Codeine Itching    Pcn [penicillins] Itching       No current facility-administered medications on file prior to encounter.     Current Outpatient Medications on File Prior to Encounter   Medication Sig    celecoxib (CELEBREX) 200 MG capsule Take 1 capsule (200 mg total) by mouth daily as needed for Pain.    fish oil-omega-3 fatty acids 300-1,000 mg capsule Take 1 g by mouth once daily.    furosemide (LASIX) 20 MG tablet TAKE 1 TABLET(20 MG) BY MOUTH EVERY DAY    telmisartan (MICARDIS) 40 MG Tab TAKE 1 TABLET(40 MG) BY MOUTH EVERY DAY    acetaminophen (TYLENOL) 650 MG TbSR Take 1 tablet (650 mg total) by mouth 3 (three) times daily as needed.    ciprofloxacin HCl (CIPRO) 500 MG tablet Take 1 tablet (500 mg total) by mouth 2 (two) times daily.    esomeprazole (NEXIUM) 20 MG capsule Take 20 mg by mouth daily as needed.    traMADoL (ULTRAM) 50 mg tablet TAKE 1 TABLET BY MOUTH EVERY OTHER DAY AS NEEDED FOR PAIN     Family History       Problem Relation (Age of Onset)    Diabetes Brother, Maternal Grandmother    Hypertension Mother, Father    Stroke Father    Uterine cancer Sister          Tobacco Use    Smoking status: Never    Smokeless tobacco: Never   Substance and Sexual Activity    Alcohol use: No     Alcohol/week: 0.0 standard drinks    Drug use: No    Sexual activity: Not Currently     Review of Systems   Constitutional:  Positive for appetite change. Negative for chills and fever.   HENT:  Negative for sore throat.    Eyes:  Negative for  visual disturbance.   Respiratory:  Positive for shortness of breath. Negative for cough.    Cardiovascular:  Positive for leg swelling. Negative for chest pain.   Gastrointestinal:  Negative for abdominal pain, nausea and vomiting.   Endocrine: Negative for polyuria.   Genitourinary:  Positive for dysuria, frequency and urgency. Negative for hematuria.   Musculoskeletal:  Positive for back pain.   Skin:  Positive for wound.   Neurological:  Positive for weakness. Negative for seizures and syncope.   Hematological:  Does not bruise/bleed easily.   Psychiatric/Behavioral:  Negative for confusion.    Objective:     Vital Signs (Most Recent):  Temp: 97 °F (36.1 °C) (11/07/22 0646)  Pulse: 96 (11/07/22 1215)  Resp: 13 (11/07/22 1215)  BP: (!) 152/86 (11/07/22 1245)  SpO2: 100 % (11/07/22 1245)   Vital Signs (24h Range):  Temp:  [97 °F (36.1 °C)] 97 °F (36.1 °C)  Pulse:  [88-97] 96  Resp:  [13-16] 13  SpO2:  [98 %-100 %] 100 %  BP: (152-221)/(74-95) 152/86     Weight: 93 kg (205 lb)  Body mass index is 32.11 kg/m².    Physical Exam  Vitals and nursing note reviewed.   Constitutional:       General: She is not in acute distress.     Appearance: She is obese. She is not toxic-appearing or diaphoretic.   HENT:      Head: Normocephalic and atraumatic.      Ears:      Comments: Left periorbital area with dark birth arminda with slight edema compared to right eye     Nose: Nose normal.   Eyes:      Extraocular Movements: Extraocular movements intact.      Conjunctiva/sclera: Conjunctivae normal.   Cardiovascular:      Rate and Rhythm: Normal rate and regular rhythm.   Pulmonary:      Effort: Pulmonary effort is normal. No respiratory distress.      Breath sounds: No wheezing.      Comments: Diminished breath sounds throughout, but distant.  No appreciable wheezes or rhonchi  Abdominal:      General: Bowel sounds are normal. There is no distension.      Palpations: Abdomen is soft.      Tenderness: There is no abdominal  tenderness. There is no guarding or rebound.   Musculoskeletal:      Cervical back: Normal range of motion and neck supple.      Right lower leg: Edema present.      Left lower leg: Edema present.      Comments: Muscle atrophy of bilateral lower extremities; contracted; 1+ edema bilaterally   Skin:     Capillary Refill: Capillary refill takes less than 2 seconds.      Comments: Chronic venous stasis changes to bilateral lower extremities with hyperpigmentation; decubitus ulcers located on the buttocks, unstageable   Neurological:      Mental Status: She is alert and oriented to person, place, and time.   Psychiatric:         Mood and Affect: Mood normal.         Behavior: Behavior normal.         Thought Content: Thought content normal.         Judgment: Judgment normal.           Significant Labs: All pertinent labs within the past 24 hours have been reviewed.    Significant Imaging: I have reviewed all pertinent imaging results/findings within the past 24 hours.

## 2022-11-07 NOTE — ASSESSMENT & PLAN NOTE
- Patient reported shortness of breath and feeling anxious, and not herself  - Noncompliant with her Lasix   - BNP <10, sats 100% on room air, chest x-ray is clear, and physical lung exam unremarkable by the time of my evaluation  - She was given IV Lasix in the ER with output of 1 L by the time of my evaluation which likely explains disparity from initial presentation in the ER  - Respiratory status is stable and she is symptomatically resolved, and appears to be more anxious about her inability to care for herself along with difficulty by the  to care for her  - Resume p.o. scheduled Lasix 20 mg daily  - Resolved

## 2022-11-07 NOTE — H&P
[facility] Emergency [facility] Medicine  History & Physical    Patient Name: Trista Gr  MRN: [id number]  Patient Class: OP- Observation  Admission Date: 11/7/2022  Attending Physician: Julee Hyde MD   Primary Care Provider: REGINO Yeung MD         Patient information was obtained from patient, past medical records and ER records.     Subjective:     Principal Problem:Urinary tract infection without hematuria    Chief Complaint:   Chief Complaint   Patient presents with    Shortness of Breath     Pt BIB EMS from home c c/o of SOB. Per EMS, pt reports she stopped taking her lasix d/t decreased mobility-pt bed bound.         HPI: 72-year-old female with HTN, debility (bed-bound at baseline), decubitus ulcers, peripheral edema who presented with complaint of shortness of breath that started last night.  She became anxious and concerned about her health.  She reports she intermittently takes her Lasix, but most of the time does not due to inability to get to the bathroom or have her diaper changed.  She missed taking all of her medications this morning, and is unclear about when was the last time she took the Lasix.  She does report dysuria, with burning on urination.  She also reports worsening of her bedsores, with development of multiple source despite efforts by her .  She frequently only gets urinary tract infection and last infection was approximately 1 month ago.  Overall she feels weaker, is not able to help with transfers at all.  Denies chest pain, cough, fever or chills.  No reported nausea or vomiting, but does report decreased appetite.   is the primary caregiver, and they do not have home health or any other resources at home.  In the ER, workup remarkable for UA with positive nitrite, 2+ leukocytes, 20 WBC and many bacteria.  Head CT unremarkable, chest x-ray without infiltrate, BNP less than 10, COVID negative.      Past Medical History:   Diagnosis Date    Debility      Decubitus ulcer of buttock     GERD (gastroesophageal reflux disease) 09/22/2012    HTN (hypertension) 09/22/2012    Normal stress echocardiogram 09/22/2012    Vitamin D deficiency disease 09/22/2012       Past Surgical History:   Procedure Laterality Date    APPENDECTOMY      BILATERAL SALPINGOOPHORECTOMY      HYSTERECTOMY         Review of patient's allergies indicates:   Allergen Reactions    Aspirin      Sore stomach    Codeine Itching    Pcn [penicillins] Itching       No current facility-administered medications on file prior to encounter.     Current Outpatient Medications on File Prior to Encounter   Medication Sig    celecoxib (CELEBREX) 200 MG capsule Take 1 capsule (200 mg total) by mouth daily as needed for Pain.    fish oil-omega-3 fatty acids 300-1,000 mg capsule Take 1 g by mouth once daily.    furosemide (LASIX) 20 MG tablet TAKE 1 TABLET(20 MG) BY MOUTH EVERY DAY    telmisartan (MICARDIS) 40 MG Tab TAKE 1 TABLET(40 MG) BY MOUTH EVERY DAY    acetaminophen (TYLENOL) 650 MG TbSR Take 1 tablet (650 mg total) by mouth 3 (three) times daily as needed.    ciprofloxacin HCl (CIPRO) 500 MG tablet Take 1 tablet (500 mg total) by mouth 2 (two) times daily.    esomeprazole (NEXIUM) 20 MG capsule Take 20 mg by mouth daily as needed.    traMADoL (ULTRAM) 50 mg tablet TAKE 1 TABLET BY MOUTH EVERY OTHER DAY AS NEEDED FOR PAIN     Family History       Problem Relation (Age of Onset)    Diabetes Brother, Maternal Grandmother    Hypertension Mother, Father    Stroke Father    Uterine cancer Sister          Tobacco Use    Smoking status: Never    Smokeless tobacco: Never   Substance and Sexual Activity    Alcohol use: No     Alcohol/week: 0.0 standard drinks    Drug use: No    Sexual activity: Not Currently     Review of Systems   Constitutional:  Positive for appetite change. Negative for chills and fever.   HENT:  Negative for sore throat.    Eyes:  Negative for visual disturbance.    Respiratory:  Positive for shortness of breath. Negative for cough.    Cardiovascular:  Positive for leg swelling. Negative for chest pain.   Gastrointestinal:  Negative for abdominal pain, nausea and vomiting.   Endocrine: Negative for polyuria.   Genitourinary:  Positive for dysuria, frequency and urgency. Negative for hematuria.   Musculoskeletal:  Positive for back pain.   Skin:  Positive for wound.   Neurological:  Positive for weakness. Negative for seizures and syncope.   Hematological:  Does not bruise/bleed easily.   Psychiatric/Behavioral:  Negative for confusion.    Objective:     Vital Signs (Most Recent):  Temp: 97 °F (36.1 °C) (11/07/22 0646)  Pulse: 96 (11/07/22 1215)  Resp: 13 (11/07/22 1215)  BP: (!) 152/86 (11/07/22 1245)  SpO2: 100 % (11/07/22 1245)   Vital Signs (24h Range):  Temp:  [97 °F (36.1 °C)] 97 °F (36.1 °C)  Pulse:  [88-97] 96  Resp:  [13-16] 13  SpO2:  [98 %-100 %] 100 %  BP: (152-221)/(74-95) 152/86     Weight: 93 kg (205 lb)  Body mass index is 32.11 kg/m².    Physical Exam  Vitals and nursing note reviewed.   Constitutional:       General: She is not in acute distress.     Appearance: She is obese. She is not toxic-appearing or diaphoretic.   HENT:      Head: Normocephalic and atraumatic.      Ears:      Comments: Left periorbital area with dark birth arminda with slight edema compared to right eye     Nose: Nose normal.   Eyes:      Extraocular Movements: Extraocular movements intact.      Conjunctiva/sclera: Conjunctivae normal.   Cardiovascular:      Rate and Rhythm: Normal rate and regular rhythm.   Pulmonary:      Effort: Pulmonary effort is normal. No respiratory distress.      Breath sounds: No wheezing.      Comments: Diminished breath sounds throughout, but distant.  No appreciable wheezes or rhonchi  Abdominal:      General: Bowel sounds are normal. There is no distension.      Palpations: Abdomen is soft.      Tenderness: There is no abdominal tenderness. There is no  guarding or rebound.   Musculoskeletal:      Cervical back: Normal range of motion and neck supple.      Right lower leg: Edema present.      Left lower leg: Edema present.      Comments: Muscle atrophy of bilateral lower extremities; contracted; 1+ edema bilaterally   Skin:     Capillary Refill: Capillary refill takes less than 2 seconds.      Comments: Chronic venous stasis changes to bilateral lower extremities with hyperpigmentation; decubitus ulcers located on the buttocks, unstageable   Neurological:      Mental Status: She is alert and oriented to person, place, and time.   Psychiatric:         Mood and Affect: Mood normal.         Behavior: Behavior normal.         Thought Content: Thought content normal.         Judgment: Judgment normal.           Significant Labs: All pertinent labs within the past 24 hours have been reviewed.    Significant Imaging: I have reviewed all pertinent imaging results/findings within the past 24 hours.    Assessment/Plan:     * Urinary tract infection without hematuria  - UA consistent with infection and patient is symptomatic  - Start empiric Rocephin 1 g IV daily  - Will follow up on urine cultures    Shortness of breath  - Patient reported shortness of breath and feeling anxious, and not herself  - Noncompliant with her Lasix   - BNP <10, sats 100% on room air, chest x-ray is clear, and physical lung exam unremarkable by the time of my evaluation  - She was given IV Lasix in the ER with output of 1 L by the time of my evaluation which likely explains disparity from initial presentation in the ER  - Respiratory status is stable and she is symptomatically resolved, and appears to be more anxious about her inability to care for herself along with difficulty by the  to care for her  - Resume p.o. scheduled Lasix 20 mg daily  - Resolved    Debility  Failure to thrive as an adult  - She had issues with osteoarthritis and chronic back pain worked up with MRIs and followed by  Ortho, but pain and debility became progressive to the point she was walking with a walker and then eventually bedbound  - Review of chart reveals she has been bed-bound for approximate 2 years   - She used to be able to help with transfers but now is too weak and unable  - She has developed more bedsores, and  is unable to care for her at home in her current state  - Evaluated by PT and OT already in the ER, with recommendation for SNF placement  - Patient and  are agreeable, but would like to have more information  - Continue PT and OT, and  consult for placement    Peripheral edema  - Chronic peripheral edema  - Reports slightly worse than her usual baseline  - Likely due to noncompliance with p.o. Lasix  - Pulse dose of IV Lasix given x1 in the ER, will resume scheduled Lasix 20 mg p.o. daily tomorrow    Pressure injury of buttock, unstageable  - Multiple bedsores located on the buttocks, minimal stage II but overall unstageable  - Turn every 2 hours and maximize nutrition  - Inpatient consult to wound care    Obesity (BMI 30.0-34.9)  - Weight reduction as outpatient    Chronic low back pain  Spinal canal stenosis  - MRI on her cervical spine in June 2021 revealed mild to moderate spinal canal stenosis at C2-3  - MRI on her lumbar spine in June 2021 revealed mild spinal canal stenosis at L3-4.  - Chronic issue, not an acute issue  - Tylenol and tramadol p.r.n.    GERD (gastroesophageal reflux disease)  - Substitute Nexium with Protonix 40 mg p.o. daily    Essential hypertension  - Blood pressure elevated due to missed home antihypertensive medications along with diuretic  - Substitute home blood pressure medication with losartan 50 mg p.o. daily during hospitalization  - P.r.n. hydralazine       VTE Risk Mitigation (From admission, onward)         Ordered     enoxaparin injection 40 mg  Daily         11/07/22 1435     IP VTE HIGH RISK PATIENT  Once         11/07/22 1435     [facility]  sequential compression device  Until discontinued         11/07/22 1435     Place sequential compression device  Until discontinued         11/07/22 1435                   Julee Hyde MD  Department of Hospital Medicine   [facility] - Emergency Dept

## 2022-11-08 LAB
ANION GAP SERPL CALC-SCNC: 9 MMOL/L (ref 8–16)
BASOPHILS # BLD AUTO: 0.04 K/UL (ref 0–0.2)
BASOPHILS NFR BLD: 0.4 % (ref 0–1.9)
BUN SERPL-MCNC: 10 MG/DL (ref 8–23)
CALCIUM SERPL-MCNC: 9.1 MG/DL (ref 8.7–10.5)
CHLORIDE SERPL-SCNC: 105 MMOL/L (ref 95–110)
CO2 SERPL-SCNC: 25 MMOL/L (ref 23–29)
CREAT SERPL-MCNC: 0.8 MG/DL (ref 0.5–1.4)
DIFFERENTIAL METHOD: ABNORMAL
EOSINOPHIL # BLD AUTO: 0.3 K/UL (ref 0–0.5)
EOSINOPHIL NFR BLD: 2.3 % (ref 0–8)
ERYTHROCYTE [DISTWIDTH] IN BLOOD BY AUTOMATED COUNT: 14.7 % (ref 11.5–14.5)
EST. GFR  (NO RACE VARIABLE): >60 ML/MIN/1.73 M^2
GLUCOSE SERPL-MCNC: 99 MG/DL (ref 70–110)
HCT VFR BLD AUTO: 37.5 % (ref 37–48.5)
HGB BLD-MCNC: 12.5 G/DL (ref 12–16)
IMM GRANULOCYTES # BLD AUTO: 0.05 K/UL (ref 0–0.04)
IMM GRANULOCYTES NFR BLD AUTO: 0.5 % (ref 0–0.5)
LYMPHOCYTES # BLD AUTO: 2.8 K/UL (ref 1–4.8)
LYMPHOCYTES NFR BLD: 25.1 % (ref 18–48)
MAGNESIUM SERPL-MCNC: 1.9 MG/DL (ref 1.6–2.6)
MCH RBC QN AUTO: 27.4 PG (ref 27–31)
MCHC RBC AUTO-ENTMCNC: 33.3 G/DL (ref 32–36)
MCV RBC AUTO: 82 FL (ref 82–98)
MONOCYTES # BLD AUTO: 1 K/UL (ref 0.3–1)
MONOCYTES NFR BLD: 9.3 % (ref 4–15)
NEUTROPHILS # BLD AUTO: 6.9 K/UL (ref 1.8–7.7)
NEUTROPHILS NFR BLD: 62.4 % (ref 38–73)
NRBC BLD-RTO: 0 /100 WBC
PHOSPHATE SERPL-MCNC: 3.2 MG/DL (ref 2.7–4.5)
PLATELET # BLD AUTO: 238 K/UL (ref 150–450)
PMV BLD AUTO: 10 FL (ref 9.2–12.9)
POTASSIUM SERPL-SCNC: 4 MMOL/L (ref 3.5–5.1)
RBC # BLD AUTO: 4.57 M/UL (ref 4–5.4)
SODIUM SERPL-SCNC: 139 MMOL/L (ref 136–145)
WBC # BLD AUTO: 11.02 K/UL (ref 3.9–12.7)

## 2022-11-08 PROCEDURE — 99226 PR SUBSEQUENT OBSERVATION CARE,LEVEL III: ICD-10-PCS | Mod: ,,, | Performed by: PHYSICIAN ASSISTANT

## 2022-11-08 PROCEDURE — 84100 ASSAY OF PHOSPHORUS: CPT | Performed by: HOSPITALIST

## 2022-11-08 PROCEDURE — 80048 BASIC METABOLIC PNL TOTAL CA: CPT | Performed by: HOSPITALIST

## 2022-11-08 PROCEDURE — 83735 ASSAY OF MAGNESIUM: CPT | Performed by: HOSPITALIST

## 2022-11-08 PROCEDURE — 99226 PR SUBSEQUENT OBSERVATION CARE,LEVEL III: CPT | Mod: ,,, | Performed by: PHYSICIAN ASSISTANT

## 2022-11-08 PROCEDURE — 96366 THER/PROPH/DIAG IV INF ADDON: CPT

## 2022-11-08 PROCEDURE — G0378 HOSPITAL OBSERVATION PER HR: HCPCS

## 2022-11-08 PROCEDURE — 85025 COMPLETE CBC W/AUTO DIFF WBC: CPT | Performed by: HOSPITALIST

## 2022-11-08 PROCEDURE — A4216 STERILE WATER/SALINE, 10 ML: HCPCS | Performed by: HOSPITALIST

## 2022-11-08 PROCEDURE — 96372 THER/PROPH/DIAG INJ SC/IM: CPT | Performed by: HOSPITALIST

## 2022-11-08 PROCEDURE — 25000003 PHARM REV CODE 250: Performed by: PHYSICIAN ASSISTANT

## 2022-11-08 PROCEDURE — 94761 N-INVAS EAR/PLS OXIMETRY MLT: CPT

## 2022-11-08 PROCEDURE — 63600175 PHARM REV CODE 636 W HCPCS: Performed by: HOSPITALIST

## 2022-11-08 PROCEDURE — 97530 THERAPEUTIC ACTIVITIES: CPT | Mod: CQ

## 2022-11-08 PROCEDURE — 97535 SELF CARE MNGMENT TRAINING: CPT

## 2022-11-08 PROCEDURE — 36415 COLL VENOUS BLD VENIPUNCTURE: CPT | Performed by: HOSPITALIST

## 2022-11-08 PROCEDURE — 25000003 PHARM REV CODE 250: Performed by: HOSPITALIST

## 2022-11-08 RX ORDER — SIMETHICONE 80 MG
1 TABLET,CHEWABLE ORAL 3 TIMES DAILY PRN
Status: DISCONTINUED | OUTPATIENT
Start: 2022-11-08 | End: 2022-11-15 | Stop reason: HOSPADM

## 2022-11-08 RX ORDER — ACETAMINOPHEN 325 MG/1
650 TABLET ORAL EVERY 8 HOURS PRN
Status: DISCONTINUED | OUTPATIENT
Start: 2022-11-08 | End: 2022-11-15 | Stop reason: HOSPADM

## 2022-11-08 RX ADMIN — ACETAMINOPHEN 650 MG: 325 TABLET, FILM COATED ORAL at 12:11

## 2022-11-08 RX ADMIN — ACETAMINOPHEN 650 MG: 325 TABLET, FILM COATED ORAL at 11:11

## 2022-11-08 RX ADMIN — POLYETHYLENE GLYCOL 3350 17 G: 17 POWDER, FOR SOLUTION ORAL at 08:11

## 2022-11-08 RX ADMIN — Medication 10 ML: at 06:11

## 2022-11-08 RX ADMIN — ENOXAPARIN SODIUM 40 MG: 100 INJECTION SUBCUTANEOUS at 05:11

## 2022-11-08 RX ADMIN — PANTOPRAZOLE SODIUM 40 MG: 40 TABLET, DELAYED RELEASE ORAL at 08:11

## 2022-11-08 RX ADMIN — CEFTRIAXONE 1 G: 1 INJECTION, SOLUTION INTRAVENOUS at 01:11

## 2022-11-08 RX ADMIN — FUROSEMIDE 20 MG: 20 TABLET ORAL at 08:11

## 2022-11-08 RX ADMIN — SIMETHICONE 80 MG: 80 TABLET, CHEWABLE ORAL at 07:11

## 2022-11-08 RX ADMIN — Medication 10 ML: at 01:11

## 2022-11-08 RX ADMIN — Medication 10 ML: at 09:11

## 2022-11-08 RX ADMIN — LOSARTAN POTASSIUM 50 MG: 50 TABLET, FILM COATED ORAL at 08:11

## 2022-11-08 NOTE — ED NOTES
2nd attempt to call patient report, placed on prolong hold with no return pickup, awaiting return call

## 2022-11-08 NOTE — ED NOTES
Hi Ana Robertson,     Your cholesterol is at goal. Your A1c, average blood sugar over the past 3 months (screening for diabetes), was normal.  Your anemia is improving. Continue to take your daily multivitamin with iron. We can repeat your CBC at your follow-up in 2 months. Remaining labs are stable. Please let me know if you have any additional questions.     Army Lo NP Pt finally agreed to have gown changed. Pt  changed into hospital gown. Personal own placed in patient's belonging bag and given to .

## 2022-11-08 NOTE — PROGRESS NOTES
[facility] - Med Surg ([place])  Wound Care    Patient Name:  Trista Gr   MRN:  [id number]  Date: 11/8/2022  Diagnosis: Urinary tract infection without hematuria    History:     Past Medical History:   Diagnosis Date    Debility     Decubitus ulcer of buttock     GERD (gastroesophageal reflux disease) 09/22/2012    HTN (hypertension) 09/22/2012    Normal stress echocardiogram 09/22/2012    Vitamin D deficiency disease 09/22/2012       Social History     Socioeconomic History    Marital status:     Number of children: 0   Occupational History    Occupation: Human      Employer: [employer]     Comment: [employer]   Tobacco Use    Smoking status: Never    Smokeless tobacco: Never   Substance and Sexual Activity    Alcohol use: No     Alcohol/week: 0.0 standard drinks    Drug use: No    Sexual activity: Not Currently   Social History Narrative    Uses cane sometime    2020 - Walks with a walker       Precautions:     Allergies as of 11/07/2022 - Reviewed 11/07/2022   Allergen Reaction Noted    Aspirin  09/22/2012    Codeine Itching 02/25/2013    Pcn [penicillins] Itching 09/22/2012   71 yo female admitted with a UTI. Reports she has been unable to walk for approximately a year.   Hx of HTN,GERD, Chronic low back pain, obesity,debility,pressure injury,peripheral edema,SOB,Vit D deficiency,right knee DJD,    Consulted for alteration in skin integrity  Cooperative but difficult to position comfortably. Changed position frequently during assessment due to pain. Partial thickness gluteal cleft injury noted with barrier cream in use. Left buttock has full thickness injury and right buttock seems to have resolving partial thickness injury. Due to the presence of scar tissue, believe these wounds to be recurrent.  As patient suffers with incontinence, would recommend use of Triad barrier cream to all injuries and frequent repositioning.PIP orders.   She is refusing heel lift boots &  repositioning wedge.educated on rationale of why she needs to elevate her heels off of the bed.      11/08/22 1200        Peripheral IV - Single Lumen 11/07/22 0742 20 G Left Antecubital   Placement Date/Time: 11/07/22 0742   Inserted by: RN  Size/Length: 20 G  Orientation: Left  Location: Antecubital  Site Prep: Chlorhexidine   Insertion attempts (enter comment if more than 2 attempts): 1  Patient Tolerance: Tolerated well   Site Assessment Clean;Dry;Intact;No redness;No swelling   Line Status Saline locked   Dressing Status Clean;Dry;Intact   Dressing Intervention Integrity maintained        Altered Skin Integrity 11/07/22 0730 Left medial Buttocks #1 Ulceration Full thickness tissue loss. Subcutaneous fat may be visible but bone, tendon or muscle are not exposed   Date First Assessed/Time First Assessed: 11/07/22 0730   Altered Skin Integrity Present on Admission: yes  Side: Left  Orientation: medial  Location: Buttocks  Wound Number: #1  Is this injury device related?: No  Primary Wound Type: Ulceration  Descr...   Wound Image     Description of Altered Skin Integrity Full thickness tissue loss. Subcutaneous fat may be visible but bone, tendon or muscle are not exposed   Dressing Appearance Open to air;No dressing  (Triad in use)   Drainage Amount Scant   Drainage Characteristics/Odor Serosanguineous;No odor   Appearance Red;Moist  (irregular)   Tissue loss description Full thickness   Periwound Area Redness   Wound Edges Open;Irregular   Care Cleansed with:;Wound cleanser;Applied:;Skin Barrier  (Triad)        Altered Skin Integrity 11/07/22 0730 Right medial Gluteal cleft #2 Ulceration Partial thickness tissue loss. Shallow open ulcer with a red or pink wound bed, without slough. Intact or Open/Ruptured Serum-filled blister.   Date First Assessed/Time First Assessed: 11/07/22 0730   Altered Skin Integrity Present on Admission: yes  Side: (c) Right  Orientation: medial  Location: Gluteal cleft  Wound Number: #2   Is this injury device related?: No  Primary Wound Type: Ulcerat...   Wound Image    Description of Altered Skin Integrity Partial thickness tissue loss. Shallow open ulcer with a red or pink wound bed, without slough. Intact or Open/Ruptured Serum-filled blister.   Dressing Appearance Open to air;No dressing  (triad in use)   Drainage Amount None   Drainage Characteristics/Odor No odor   Appearance Red;Moist   Tissue loss description Partial thickness   Wound Length (cm) 6 cm   Wound Width (cm) 0.2 cm   Wound Depth (cm) 0.1 cm   Wound Volume (cm^3) 0.12 cm^3   Wound Surface Area (cm^2) 1.2 cm^2   Care Cleansed with:;Wound cleanser;Applied:;Skin Barrier  (Triad)        Altered Skin Integrity 11/07/22 0730 Right Buttocks #3 Ulceration Partial thickness tissue loss. Shallow open ulcer with a red or pink wound bed, without slough. Intact or Open/Ruptured Serum-filled blister.   Date First Assessed/Time First Assessed: 11/07/22 0730   Altered Skin Integrity Present on Admission: yes  Side: Right  Location: Buttocks  Wound Number: #3  Primary Wound Type: Ulceration  Description of Altered Skin Integrity: Partial thickness tiss...   Wound Image   (see previous photo)   Dressing Appearance Open to air   Drainage Amount None   Drainage Characteristics/Odor No odor   Appearance Pink;Epithelialization   Periwound Area Moist   Wound Edges Irregular;Open   Care Cleansed with:;Wound cleanser;Applied:;Skin Barrier  (Triad)       11/08/2022

## 2022-11-08 NOTE — SUBJECTIVE & OBJECTIVE
Interval History: Seen at bedside with wound care, ahving difficulties getting comfortable. Discussed plan for abx for UTI, PTOT eval    Review of Systems   Constitutional:  Positive for appetite change. Negative for chills and fever.   Respiratory:  Positive for shortness of breath. Negative for cough.    Cardiovascular:  Positive for leg swelling. Negative for chest pain.   Gastrointestinal:  Negative for nausea and vomiting.   Genitourinary:  Positive for dysuria, frequency and urgency. Negative for hematuria.   Musculoskeletal:  Positive for arthralgias, back pain and gait problem.   Skin:  Positive for wound.   Neurological:  Positive for weakness. Negative for seizures and syncope.   Hematological:  Does not bruise/bleed easily.   Psychiatric/Behavioral:  Negative for agitation and confusion.    Objective:     Vital Signs (Most Recent):  Temp: 97.7 °F (36.5 °C) (11/08/22 1144)  Pulse: 86 (11/08/22 1144)  Resp: 20 (11/08/22 1144)  BP: (!) 153/67 (11/08/22 1144)  SpO2: 99 % (11/08/22 1144)   Vital Signs (24h Range):  Temp:  [97.7 °F (36.5 °C)-98.6 °F (37 °C)] 97.7 °F (36.5 °C)  Pulse:  [] 86  Resp:  [11-20] 20  SpO2:  [94 %-100 %] 99 %  BP: (109-195)/(55-92) 153/67     Weight: 89.9 kg (198 lb 3.2 oz)  Body mass index is 31.04 kg/m².    Intake/Output Summary (Last 24 hours) at 11/8/2022 1414  Last data filed at 11/8/2022 1248  Gross per 24 hour   Intake 318 ml   Output 3250 ml   Net -2932 ml      Physical Exam  Vitals and nursing note reviewed.   Constitutional:       General: She is not in acute distress.     Appearance: She is not toxic-appearing or diaphoretic.   HENT:      Head: Normocephalic and atraumatic.      Ears:      Comments: Left periorbital area with dark birth arminda with slight edema compared to right eye  Eyes:      Extraocular Movements: Extraocular movements intact.   Cardiovascular:      Rate and Rhythm: Normal rate.   Pulmonary:      Effort: Pulmonary effort is normal. No respiratory  distress.      Comments: Diminished breath sounds throughout, but distant.  No appreciable wheezes or rhonchi  Abdominal:      Palpations: Abdomen is soft.   Musculoskeletal:      Cervical back: Normal range of motion and neck supple.      Right lower leg: Edema present.      Left lower leg: Edema present.      Comments: Muscle atrophy of bilateral lower extremities; contracted; 1+ edema bilaterally   Skin:     Comments: Chronic venous stasis changes to bilateral lower extremities with hyperpigmentation; decubitus ulcers located on the buttocks, unstageable   Neurological:      Mental Status: She is alert and oriented to person, place, and time.   Psychiatric:         Mood and Affect: Mood normal.         Behavior: Behavior normal.       Significant Labs: All pertinent labs within the past 24 hours have been reviewed.    Significant Imaging: I have reviewed all pertinent imaging results/findings within the past 24 hours.

## 2022-11-08 NOTE — ED NOTES
3rd attempt to call patient report, spoke with Charge nurse regarding unsuccessful patient report attempts, placed on prolonged hold with no return pickup, awaiting return call. ED charge aware

## 2022-11-08 NOTE — PLAN OF CARE
CM met with the patient at the bedside.     Patient is alert and oriented with no communication barriers.     Prior to admission patient is dependent. Patient denies having HH but she do have DME.     Patients PCP is correct on the face sheet. Patient choice pharmacy is bedside.    Patient denies having written advance directives.      Patients family will transport the patient home at discharge.     No CM needs identified at this time.     CM team will continue to follow.              11/08/22 1221   Discharge Assessment   Assessment Type Discharge Planning Assessment   Confirmed/corrected address, phone number and insurance Yes   Confirmed Demographics Correct on Facesheet   Source of Information patient;health record   Does patient/caregiver understand observation status No   Was observation education provided? Yes   Lives With spouse   Do you expect to return to your current living situation? Yes   Do you have help at home or someone to help you manage your care at home? Yes   Who are your caregiver(s) and their phone number(s)? Spouse   Prior to hospitilization cognitive status: Alert/Oriented   Current cognitive status: Alert/Oriented   Walking or Climbing Stairs Difficulty ambulation difficulty, requires equipment;ambulation difficulty, dependent   Dressing/Bathing Difficulty bathing difficulty, requires equipment   Equipment Currently Used at Home bedside commode;walker, rolling;wheelchair;hospital bed   Readmission within 30 days? No   Patient currently being followed by outpatient case management? No   Do you currently have service(s) that help you manage your care at home? No   Do you take prescription medications? Yes   Do you have prescription coverage? Yes   Do you have any problems affording any of your prescribed medications? No   Is the patient taking medications as prescribed? yes   How do you get to doctors appointments? car, drives self;family or friend will provide   Are you on dialysis? No   Do  you take coumadin? No   Discharge Plan A Home Health   Discharge Plan B Skilled Nursing Facility   DME Needed Upon Discharge  none   Discharge Plan discussed with: Patient   Discharge Barriers Identified None

## 2022-11-08 NOTE — PLAN OF CARE
POC reviewed. No significant events this shift. Cardiac monitor maintained.  Complaints of pain treated with PRN pain medication according to MAR. No other complaint. No injuries, falls, or trauma occurred during shift. Bed low an Bed low and locked, side rails up x2, call light within reach. Spouse remains at bedside.      Problem: Infection  Goal: Absence of Infection Signs and Symptoms  Outcome: Ongoing, Progressing     Problem: Skin Injury Risk Increased  Goal: Skin Health and Integrity  Outcome: Ongoing, Progressing     Problem: Adult Inpatient Plan of Care  Goal: Plan of Care Review  Outcome: Ongoing, Progressing  Goal: Absence of Hospital-Acquired Illness or Injury  Outcome: Ongoing, Progressing  Goal: Readiness for Transition of Care  Outcome: Ongoing, Progressing     Problem: Impaired Wound Healing  Goal: Optimal Wound Healing  Outcome: Ongoing, Progressing

## 2022-11-08 NOTE — HOSPITAL COURSE
Trista Gr was admitted for UTI. Started on CTX, follow Ucx.  Deescalate antibiotics once cultures finalized.  Patient remained afebrile and hemodynamically stable.  Head CT was done due to complaints of dizziness which does show concern for normal pressure hydrocephalus.  Neurosurgery consult placed on 11/09 for possible evaluation inpatient however will need to be done as outpatient. Patient continued to improve and was treatment for her UTI. Has been able to work with  PTOT recommending SNF.  Cm/SW following for placement but unable to find placement after > 1 week so patient was discharged home with HH with her  and outpatient neuro follow up.

## 2022-11-08 NOTE — PLAN OF CARE
Problem: Occupational Therapy  Goal: Occupational Therapy Goal  Description: Goals to be met by: 11/14/2022     Patient will increase functional independence with ADLs by performing:    Feeding in supported position with Contact Guard Assistance.  Grooming in supported position while seated with supervision.  Sitting at edge of bed x 10 minutes with Contact Guard Assistance.  Rolling to Bilateral with Moderate Assistance.   Supine to sit with Moderate Assistance.    Completed Goals:   Sitting at edge of bed x 5 minutes with Contact Guard Assistance. MET 11/8/2022  Grooming in supported position while seated with Stand-by Assistance. MET (in unsupported position)11/8/2022      Outcome: Ongoing, Progressing     Pt met two goals this date and is making progress towards others.  SNF is recommended upon d/c from acute care to further address deficits and help pt improve overall functional independence.     ELIZABETH Almanza  11/8/2022

## 2022-11-08 NOTE — PROGRESS NOTES
[facility] Medicine  Progress Note    Patient Name: Trista Gr  MRN: [id number]  Patient Class: OP- Observation   Admission Date: 11/7/2022  Length of Stay: 0 days  Attending Physician: KAHLIL Ying MD  Primary Care Provider: REGINO Yeung MD        Subjective:     Principal Problem:Urinary tract infection without hematuria        HPI:  72-year-old female with HTN, debility (bed-bound at baseline), decubitus ulcers, peripheral edema who presented with complaint of shortness of breath that started last night.  She became anxious and concerned about her health.  She reports she intermittently takes her Lasix, but most of the time does not due to inability to get to the bathroom or have her diaper changed.  She missed taking all of her medications this morning, and is unclear about when was the last time she took the Lasix.  She does report dysuria, with burning on urination.  She also reports worsening of her bedsores, with development of multiple source despite efforts by her .  She frequently only gets urinary tract infection and last infection was approximately 1 month ago.  Overall she feels weaker, is not able to help with transfers at all.  Denies chest pain, cough, fever or chills.  No reported nausea or vomiting, but does report decreased appetite.   is the primary caregiver, and they do not have home health or any other resources at home.  In the ER, workup remarkable for UA with positive nitrite, 2+ leukocytes, 20 WBC and many bacteria.  Head CT unremarkable, chest x-ray without infiltrate, BNP less than 10, COVID negative.      Overview/Hospital Course:  Trista Gr was admitted for UTI. Started on CTX, follow Ucx. PTOT recommending SNF      Interval History: Seen at bedside with wound care, ahving difficulties getting comfortable. Discussed plan for abx for UTI, PTOT eval    Review of Systems   Constitutional:  Positive for appetite change.  Negative for chills and fever.   Respiratory:  Positive for shortness of breath. Negative for cough.    Cardiovascular:  Positive for leg swelling. Negative for chest pain.   Gastrointestinal:  Negative for nausea and vomiting.   Genitourinary:  Positive for dysuria, frequency and urgency. Negative for hematuria.   Musculoskeletal:  Positive for arthralgias, back pain and gait problem.   Skin:  Positive for wound.   Neurological:  Positive for weakness. Negative for seizures and syncope.   Hematological:  Does not bruise/bleed easily.   Psychiatric/Behavioral:  Negative for agitation and confusion.    Objective:     Vital Signs (Most Recent):  Temp: 97.7 °F (36.5 °C) (11/08/22 1144)  Pulse: 86 (11/08/22 1144)  Resp: 20 (11/08/22 1144)  BP: (!) 153/67 (11/08/22 1144)  SpO2: 99 % (11/08/22 1144)   Vital Signs (24h Range):  Temp:  [97.7 °F (36.5 °C)-98.6 °F (37 °C)] 97.7 °F (36.5 °C)  Pulse:  [] 86  Resp:  [11-20] 20  SpO2:  [94 %-100 %] 99 %  BP: (109-195)/(55-92) 153/67     Weight: 89.9 kg (198 lb 3.2 oz)  Body mass index is 31.04 kg/m².    Intake/Output Summary (Last 24 hours) at 11/8/2022 1414  Last data filed at 11/8/2022 1248  Gross per 24 hour   Intake 318 ml   Output 3250 ml   Net -2932 ml      Physical Exam  Vitals and nursing note reviewed.   Constitutional:       General: She is not in acute distress.     Appearance: She is not toxic-appearing or diaphoretic.   HENT:      Head: Normocephalic and atraumatic.      Ears:      Comments: Left periorbital area with dark birth arminda with slight edema compared to right eye  Eyes:      Extraocular Movements: Extraocular movements intact.   Cardiovascular:      Rate and Rhythm: Normal rate.   Pulmonary:      Effort: Pulmonary effort is normal. No respiratory distress.      Comments: Diminished breath sounds throughout, but distant.  No appreciable wheezes or rhonchi  Abdominal:      Palpations: Abdomen is soft.   Musculoskeletal:      Cervical back: Normal range  of motion and neck supple.      Right lower leg: Edema present.      Left lower leg: Edema present.      Comments: Muscle atrophy of bilateral lower extremities; contracted; 1+ edema bilaterally   Skin:     Comments: Chronic venous stasis changes to bilateral lower extremities with hyperpigmentation; decubitus ulcers located on the buttocks, unstageable   Neurological:      Mental Status: She is alert and oriented to person, place, and time.   Psychiatric:         Mood and Affect: Mood normal.         Behavior: Behavior normal.       Significant Labs: All pertinent labs within the past 24 hours have been reviewed.    Significant Imaging: I have reviewed all pertinent imaging results/findings within the past 24 hours.      Assessment/Plan:      * Urinary tract infection without hematuria  - UA consistent with infection and patient is symptomatic  - Start empiric Rocephin 1 g IV daily  - Will follow up on urine cultures    Shortness of breath  - Patient reported shortness of breath and feeling anxious, and not herself  - Noncompliant with her Lasix   - BNP <10, sats 100% on room air, chest x-ray is clear, and physical lung exam unremarkable by the time of my evaluation  - She was given IV Lasix in the ER with output of 1 L by the time of my evaluation which likely explains disparity from initial presentation in the ER  - Respiratory status is stable and she is symptomatically resolved, and appears to be more anxious about her inability to care for herself along with difficulty by the  to care for her  - Resume p.o. scheduled Lasix 20 mg daily  - Resolved    Peripheral edema  - Chronic peripheral edema  - Reports slightly worse than her usual baseline  - Likely due to noncompliance with p.o. Lasix  - Pulse dose of IV Lasix given x1 in the ER, will resume scheduled Lasix 20 mg p.o. daily tomorrow    Pressure injury of buttock, unstageable  - Multiple bedsores located on the buttocks, minimal stage II but overall  unstageable  - Turn every 2 hours and maximize nutrition  - Inpatient consult to wound care    Debility  Failure to thrive as an adult  - She had issues with osteoarthritis and chronic back pain worked up with MRIs and followed by Ortho, but pain and debility became progressive to the point she was walking with a walker and then eventually bedbound  - Review of chart reveals she has been bed-bound for approximate 2 years   - She used to be able to help with transfers but now is too weak and unable  - She has developed more bedsores, and  is unable to care for her at home in her current state  - Evaluated by PT and OT already in the ER, with recommendation for SNF placement  - Patient and  are agreeable, but would like to have more information  - Continue PT and OT, and  consult for placementL: SNF    Obesity (BMI 30.0-34.9)  - Weight reduction as outpatient    Chronic low back pain  Spinal canal stenosis  - MRI on her cervical spine in June 2021 revealed mild to moderate spinal canal stenosis at C2-3  - MRI on her lumbar spine in June 2021 revealed mild spinal canal stenosis at L3-4.  - Chronic issue, not an acute issue  - Tylenol and tramadol p.r.n.    GERD (gastroesophageal reflux disease)  - Substitute Nexium with Protonix 40 mg p.o. daily    Essential hypertension  - Blood pressure elevated due to missed home antihypertensive medications along with diuretic  - Substitute home blood pressure medication with losartan 50 mg p.o. daily during hospitalization  - P.r.n. hydralazine       VTE Risk Mitigation (From admission, onward)         Ordered     enoxaparin injection 40 mg  Daily         11/07/22 1435     IP VTE HIGH RISK PATIENT  Once         11/07/22 1435     Place sequential compression device  Until discontinued         11/07/22 1435     Place sequential compression device  Until discontinued         11/07/22 1435                Discharge Planning   MATTHEW:      Code Status: Full  Code   Is the patient medically ready for discharge?:     Reason for patient still in hospital (select all that apply): Patient trending condition  Discharge Plan A: Home Health                  Mariya Carey PA-C  Department of Hospital Medicine   [facility] ([place])

## 2022-11-08 NOTE — PT/OT/SLP PROGRESS
"Physical Therapy Treatment    Patient Name:  Trista Gr   MRN:  [id number]    Recommendations:     Discharge Recommendations:  nursing facility, skilled   Discharge Equipment Recommendations:  (TBD at next level of care)   Barriers to discharge: Decreased caregiver support    Assessment:     Trista Gr is a 72 y.o. female admitted with a medical diagnosis of Urinary tract infection without hematuria.  She presents with the following impairments/functional limitations:  weakness, impaired endurance, impaired self care skills, impaired functional mobility, impaired balance, decreased coordination, decreased upper extremity function, decreased lower extremity function, decreased safety awareness, pain, decreased ROM, impaired skin, edema ;pt with fair mobility today, able to sit up EOB x 12 min w/ less assistance than previous day, though unable to attempt standing or t/f''ing OOB.    Rehab Prognosis: Fair; patient would benefit from acute skilled PT services to address these deficits and reach maximum level of function.    Recent Surgery: * No surgery found *      Plan:     During this hospitalization, patient to be seen 3 x/week to address the identified rehab impairments via therapeutic activities, therapeutic exercises, neuromuscular re-education, wheelchair management/training and progress toward the following goals:    Plan of Care Expires:  12/07/22    Subjective     Chief Complaint: pt c/o edema in feet/LE's and some knee pain w/ t/f to EOB.   Patient/Family Comments/goals: pt agreeable to session, though reports "I can't" when asked to assist w/ moving legs in bed.   Pain/Comfort:  Pain Rating 1:  (pt reporting some pain in knees, did not rate)  Location - Side 1: Bilateral  Location - Orientation 1: generalized  Location 1: knee  Pain Addressed 1: Reposition, Distraction      Objective:     Communicated with nurse prior to session.  Patient found HOB elevated with telemetry, peripheral IV, " gibson catheter, bed alarm upon PT entry to room.     General Precautions: Standard, fall   Orthopedic Precautions:N/A   Braces: N/A  Respiratory Status: Room air     Functional Mobility:  Bed Mobility:     Rolling Left:  maximal assistance and of 1 persons  Rolling Right: maximal assistance and of 1 persons  Scooting: total assistance and of 2 persons  Supine to Sit: maximal assistance and of 2 persons  Sit to Supine: maximal assistance and of 2 persons      AM-PAC 6 CLICK MOBILITY  Turning over in bed (including adjusting bedclothes, sheets and blankets)?: 2  Sitting down on and standing up from a chair with arms (e.g., wheelchair, bedside commode, etc.): 1  Moving from lying on back to sitting on the side of the bed?: 2  Moving to and from a bed to a chair (including a wheelchair)?: 1  Need to walk in hospital room?: 1  Climbing 3-5 steps with a railing?: 1  Basic Mobility Total Score: 8       Treatment & Education:  Pt sat up EOB ~12 min. W/ CGA/SBA to occ modA for static sitting balance (pt leans to R side a lot). OT present and working on ADL's.   Perf'd AAROM/PROM seated LAQ's x 5 reps ea.    Patient left HOB elevated with all lines intact, call button in reach, bed alarm on, and nurse notified..    GOALS:   Multidisciplinary Problems       Physical Therapy Goals          Problem: Physical Therapy    Goal Priority Disciplines Outcome Goal Variances Interventions   Physical Therapy Goal     PT, PT/OT Ongoing, Progressing     Description: Goals to be met by: 22    Patient will increase functional independence with mobility by performin. Roll L and R with Abraham with appropriate use of hospital bed features.  2. Supine<>sit with modA with appropriate use of hospital bed features.  3. Transfer bed<>chair with maxA with most appropriate technique.   4. Partial sit<>stand with Abraham with BUE support.  5. Spouse to demo safe transfer technique with patient for bed<>WC or commode transfers.                         Time Tracking:     PT Received On: 11/08/22  PT Start Time: 1108     PT Stop Time: 1142  PT Total Time (min): 34 min     Billable Minutes: Therapeutic Activity 34    Treatment Type: Treatment  PT/PTA: PTA     PTA Visit Number: 1     11/08/2022

## 2022-11-08 NOTE — ASSESSMENT & PLAN NOTE
Failure to thrive as an adult  - She had issues with osteoarthritis and chronic back pain worked up with MRIs and followed by Ortho, but pain and debility became progressive to the point she was walking with a walker and then eventually bedbound  - Review of chart reveals she has been bed-bound for approximate 2 years   - She used to be able to help with transfers but now is too weak and unable  - She has developed more bedsores, and  is unable to care for her at home in her current state  - Evaluated by PT and OT already in the ER, with recommendation for SNF placement  - Patient and  are agreeable, but would like to have more information  - Continue PT and OT, and  consult for placementL: SNF

## 2022-11-08 NOTE — PT/OT/SLP PROGRESS
Occupational Therapy   Treatment    Name: Trista Gr  MRN: [id number]  Admitting Diagnosis:  Urinary tract infection without hematuria       Recommendations:     Discharge Recommendations: nursing facility, skilled  Discharge Equipment Recommendations:   (TBD at next level of care)  Barriers to discharge:  Decreased caregiver support ( reports he is unable to provide level of care needed at this time)    Assessment:     Trista Gr is a 72 y.o. female with a medical diagnosis of Urinary tract infection without hematuria.  She presents with pain in right shouler. Performance deficits affecting function are weakness, impaired self care skills, impaired functional mobility, impaired endurance, decreased lower extremity function, decreased upper extremity function, decreased ROM, impaired balance, pain, abnormal tone, impaired coordination. Pt agreeable to participating in therapy upon arrival to room. Pt demonstrated improvement with supine <> sit and rolling this date performing with Max A.  In addition, while seated at EOB pt able to maintain balance with SBA for several minutes while holding onto bed rail with left hand.  While seated at EOB pt able to perform grooming task in unsupported position with SBA-CGA (holding onto L bed rail).  As time progressed right lateral lean noted requiring cues and CGA-Mod A to adjust and maintain posture.  Pt able to sit EOB for ~12 minutes total.    Overall, pt tolerated therapy well.  She met two goals this date and is making progress towards others.  She continues to require increased assist for ADLs and mobility at this time.  She would continue to benefit from skilled OT services to address problems listed above and increase independence with ADLs.  SNF is recommended upon d/c from acute care to further address deficits and help pt improve overall functional independence.         Rehab Prognosis:  Good; patient would benefit from acute skilled OT  services to address these deficits and reach maximum level of function.       Plan:     Patient to be seen 3 x/week to address the above listed problems via self-care/home management, therapeutic activities, therapeutic exercises, neuromuscular re-education  Plan of Care Expires: 12/07/22  Plan of Care Reviewed with: patient, spouse    Subjective     Pain/Comfort:  Pain Rating 1:  (pain in right shoulder)    Objective:     Communicated with: RN prior to session.  Patient found HOB elevated with telemetry, peripheral IV, gibson catheter upon OT entry to room.    General Precautions: Standard, fall   Orthopedic Precautions:N/A   Braces: N/A  Respiratory Status: Room air     Occupational Performance:     Bed Mobility:    Supine <> sit: Max A with assist of 2  Scooting: Max A seated towards EOB; Total A supine towards HOB  Rolling to right: Max A  Rolling to left: Max A  Sit <> Supine: Total A    Functional Mobility/Transfers:  EOB <> wheelchair: not assessed this date 2* session focused on sitting balance    Activities of Daily Living:  Grooming: SBA for washing face with cloth using R hand while seated at EOB.  Pt held onto bed rail with L hand   Pt reported difficulty with maintaining balance, so brief moments of CGA provided; however, pt able to progress and complete task with SBA      AMPA 6 Click ADL: 9    Treatment & Education:  *Session focused on promoting increased endurance, strength, balance, coordination, and ROM needed for ADLs and functional transfers as part of daily routine.   -pt performed multiple trials of scooting while seated at EOB; cues for technique and positioning provided  -pt sat EOB for ~12 minutes with SBA-Mod A holding onto bedrail with L hand.  Pt able to maintain balance with SBA for at least 5 minutes.  As time progressed right lateral lean noted requiring consistent cues and CGA-Mod A to help pt adjust posture and maintain balance.    -pt performed cervical spine stretches with  flexion/extension and lateral rotation  -pt performed grooming task seated at EOB; cues for posture provided  -pt performed multiple trials of rolling to allow for clean pads and wedges/pillows to be placed  -POC reviewed with pt      Patient left HOB elevated with all lines intact, call button in reach, and PCT present    GOALS:   Multidisciplinary Problems       Occupational Therapy Goals          Problem: Occupational Therapy    Goal Priority Disciplines Outcome Interventions   Occupational Therapy Goal     OT, PT/OT Ongoing, Progressing    Description: Goals to be met by: 11/14/2022     Patient will increase functional independence with ADLs by performing:    Feeding in supported position with Contact Guard Assistance.  Grooming in supported position while seated with supervision.  Sitting at edge of bed x 10 minutes with Contact Guard Assistance.  Rolling to Bilateral with Moderate Assistance.   Supine to sit with Moderate Assistance.    Completed Goals:   Sitting at edge of bed x 5 minutes with Contact Guard Assistance. MET 11/8/2022  Grooming in supported position while seated with Stand-by Assistance. MET (in unsupported position)11/8/2022                           Time Tracking:     OT Date of Treatment: 11/08/22  OT Start Time: 1116  OT Stop Time: 1141  OT Total Time (min): 25 min    Billable Minutes:Self Care/Home Management 25    OT/RISHI: OT     *co-tx with PTA     ELIZABETH Almanza  11/8/2022

## 2022-11-09 PROBLEM — G91.2 NORMAL PRESSURE HYDROCEPHALUS: Status: ACTIVE | Noted: 2022-11-09

## 2022-11-09 LAB
ANION GAP SERPL CALC-SCNC: 7 MMOL/L (ref 8–16)
BASOPHILS # BLD AUTO: 0.03 K/UL (ref 0–0.2)
BASOPHILS NFR BLD: 0.3 % (ref 0–1.9)
BUN SERPL-MCNC: 7 MG/DL (ref 8–23)
CALCIUM SERPL-MCNC: 9 MG/DL (ref 8.7–10.5)
CHLORIDE SERPL-SCNC: 105 MMOL/L (ref 95–110)
CO2 SERPL-SCNC: 27 MMOL/L (ref 23–29)
CREAT SERPL-MCNC: 0.7 MG/DL (ref 0.5–1.4)
DIFFERENTIAL METHOD: ABNORMAL
EOSINOPHIL # BLD AUTO: 0.3 K/UL (ref 0–0.5)
EOSINOPHIL NFR BLD: 3 % (ref 0–8)
ERYTHROCYTE [DISTWIDTH] IN BLOOD BY AUTOMATED COUNT: 14.6 % (ref 11.5–14.5)
EST. GFR  (NO RACE VARIABLE): >60 ML/MIN/1.73 M^2
GLUCOSE SERPL-MCNC: 98 MG/DL (ref 70–110)
HCT VFR BLD AUTO: 35.9 % (ref 37–48.5)
HGB BLD-MCNC: 12 G/DL (ref 12–16)
IMM GRANULOCYTES # BLD AUTO: 0.04 K/UL (ref 0–0.04)
IMM GRANULOCYTES NFR BLD AUTO: 0.4 % (ref 0–0.5)
LYMPHOCYTES # BLD AUTO: 2.4 K/UL (ref 1–4.8)
LYMPHOCYTES NFR BLD: 23.5 % (ref 18–48)
MAGNESIUM SERPL-MCNC: 1.8 MG/DL (ref 1.6–2.6)
MCH RBC QN AUTO: 27.3 PG (ref 27–31)
MCHC RBC AUTO-ENTMCNC: 33.4 G/DL (ref 32–36)
MCV RBC AUTO: 82 FL (ref 82–98)
MONOCYTES # BLD AUTO: 0.9 K/UL (ref 0.3–1)
MONOCYTES NFR BLD: 8.3 % (ref 4–15)
NEUTROPHILS # BLD AUTO: 6.7 K/UL (ref 1.8–7.7)
NEUTROPHILS NFR BLD: 64.5 % (ref 38–73)
NRBC BLD-RTO: 0 /100 WBC
PHOSPHATE SERPL-MCNC: 3.2 MG/DL (ref 2.7–4.5)
PLATELET # BLD AUTO: 222 K/UL (ref 150–450)
PMV BLD AUTO: 9.9 FL (ref 9.2–12.9)
POTASSIUM SERPL-SCNC: 3.7 MMOL/L (ref 3.5–5.1)
RBC # BLD AUTO: 4.39 M/UL (ref 4–5.4)
SODIUM SERPL-SCNC: 139 MMOL/L (ref 136–145)
WBC # BLD AUTO: 10.32 K/UL (ref 3.9–12.7)

## 2022-11-09 PROCEDURE — G0378 HOSPITAL OBSERVATION PER HR: HCPCS

## 2022-11-09 PROCEDURE — 96366 THER/PROPH/DIAG IV INF ADDON: CPT

## 2022-11-09 PROCEDURE — 25000003 PHARM REV CODE 250: Performed by: HOSPITALIST

## 2022-11-09 PROCEDURE — 36415 COLL VENOUS BLD VENIPUNCTURE: CPT | Performed by: HOSPITALIST

## 2022-11-09 PROCEDURE — 97535 SELF CARE MNGMENT TRAINING: CPT

## 2022-11-09 PROCEDURE — 99226 PR SUBSEQUENT OBSERVATION CARE,LEVEL III: ICD-10-PCS | Mod: 95,,, | Performed by: HOSPITALIST

## 2022-11-09 PROCEDURE — 80048 BASIC METABOLIC PNL TOTAL CA: CPT | Performed by: HOSPITALIST

## 2022-11-09 PROCEDURE — 99226 PR SUBSEQUENT OBSERVATION CARE,LEVEL III: CPT | Mod: 95,,, | Performed by: HOSPITALIST

## 2022-11-09 PROCEDURE — 63600175 PHARM REV CODE 636 W HCPCS: Performed by: HOSPITALIST

## 2022-11-09 PROCEDURE — 83735 ASSAY OF MAGNESIUM: CPT | Performed by: HOSPITALIST

## 2022-11-09 PROCEDURE — A4216 STERILE WATER/SALINE, 10 ML: HCPCS | Performed by: HOSPITALIST

## 2022-11-09 PROCEDURE — 94761 N-INVAS EAR/PLS OXIMETRY MLT: CPT

## 2022-11-09 PROCEDURE — 85025 COMPLETE CBC W/AUTO DIFF WBC: CPT | Performed by: HOSPITALIST

## 2022-11-09 PROCEDURE — 97110 THERAPEUTIC EXERCISES: CPT | Mod: CQ

## 2022-11-09 PROCEDURE — 84100 ASSAY OF PHOSPHORUS: CPT | Performed by: HOSPITALIST

## 2022-11-09 PROCEDURE — 97530 THERAPEUTIC ACTIVITIES: CPT | Mod: CQ

## 2022-11-09 PROCEDURE — 96372 THER/PROPH/DIAG INJ SC/IM: CPT | Performed by: HOSPITALIST

## 2022-11-09 RX ORDER — KETOROLAC TROMETHAMINE 10 MG/1
10 TABLET, FILM COATED ORAL EVERY 8 HOURS PRN
Status: DISPENSED | OUTPATIENT
Start: 2022-11-09 | End: 2022-11-12

## 2022-11-09 RX ORDER — KETOROLAC TROMETHAMINE 10 MG/1
10 TABLET, FILM COATED ORAL EVERY 8 HOURS PRN
Status: DISCONTINUED | OUTPATIENT
Start: 2022-11-09 | End: 2022-11-09

## 2022-11-09 RX ADMIN — Medication 10 ML: at 06:11

## 2022-11-09 RX ADMIN — Medication 10 ML: at 09:11

## 2022-11-09 RX ADMIN — KETOROLAC TROMETHAMINE 10 MG: 10 TABLET, FILM COATED ORAL at 12:11

## 2022-11-09 RX ADMIN — Medication 10 ML: at 02:11

## 2022-11-09 RX ADMIN — ENOXAPARIN SODIUM 40 MG: 100 INJECTION SUBCUTANEOUS at 05:11

## 2022-11-09 RX ADMIN — LOSARTAN POTASSIUM 50 MG: 50 TABLET, FILM COATED ORAL at 09:11

## 2022-11-09 RX ADMIN — FUROSEMIDE 20 MG: 20 TABLET ORAL at 09:11

## 2022-11-09 RX ADMIN — PANTOPRAZOLE SODIUM 40 MG: 40 TABLET, DELAYED RELEASE ORAL at 09:11

## 2022-11-09 RX ADMIN — CEFTRIAXONE 1 G: 1 INJECTION, SOLUTION INTRAVENOUS at 02:11

## 2022-11-09 RX ADMIN — POLYETHYLENE GLYCOL 3350 17 G: 17 POWDER, FOR SOLUTION ORAL at 09:11

## 2022-11-09 NOTE — CONSULTS
[facility] Medicine  Telemedicine Consult Note       Trista Gr has been accepted for transfer to [facility] and will be followed through telemedicine services beginning  at 7 AM.      Ledy Winters MD  [facility] Medicine Staff

## 2022-11-09 NOTE — PLAN OF CARE
11/09/22 1703   Post-Acute Status   Post-Acute Authorization Placement   Post-Acute Placement Status Referrals Sent   Discharge Delays (!) Post-Acute Set-up   Discharge Plan   Discharge Plan A Skilled Nursing Facility   Discharge Plan B Home Health        complete PASRR & LOCET. CM sent patient referral to several skilled nursing facility.

## 2022-11-09 NOTE — PT/OT/SLP PROGRESS
Physical Therapy Treatment    Patient Name:  Trista rG   MRN:  [id number]    Recommendations:     Discharge Recommendations:  nursing facility, skilled   Discharge Equipment Recommendations:  (TBD at next level of care)   Barriers to discharge: Decreased caregiver support    Assessment:     Trista Gr is a 72 y.o. female admitted with a medical diagnosis of Urinary tract infection without hematuria.  She presents with the following impairments/functional limitations:  weakness, impaired endurance, impaired self care skills, impaired functional mobility, impaired balance, decreased upper extremity function, decreased lower extremity function, decreased coordination, decreased safety awareness, pain, decreased ROM, edema, impaired skin, impaired joint extensibility ;pt with slightly improved mobility today, able to assist more w/ rolling act's and sup to sit t/f's, increased sitting tolerance today (20 min), though limited mostly by cervical pain and poor ROM.    Rehab Prognosis: Fair; patient would benefit from acute skilled PT services to address these deficits and reach maximum level of function.    Recent Surgery: * No surgery found *      Plan:     During this hospitalization, patient to be seen 3 x/week to address the identified rehab impairments via therapeutic activities, therapeutic exercises, neuromuscular re-education, wheelchair management/training and progress toward the following goals:    Plan of Care Expires:  12/07/22    Subjective     Chief Complaint: neck and back pain, also hip and knee pain  Patient/Family Comments/goals: pt agreeable to session,  present today.   Pain/Comfort:  Pain Rating 1:  (pt did not rate)  Location - Side 1: Bilateral  Location - Orientation 1: generalized  Location 1: back (and hips)  Pain Addressed 1: Reposition, Distraction      Objective:     Communicated with nurse prior to session.  Patient found HOB elevated with telemetry, peripheral IV,  gibson catheter, bed alarm, and cervical/neck pillow upon PT entry to room.     General Precautions: Standard, fall   Orthopedic Precautions:N/A   Braces: N/A  Respiratory Status: Room air     Functional Mobility:  Bed Mobility:     Rolling Left:  moderate assistance and of 1 persons  Rolling Right: minimum assistance, moderate assistance, and of 1 persons  Scooting: total assistance, of 2 persons, and in supine w/ bed in trendelenburg  Supine to Sit: moderate assistance and of 2 persons  Sit to Supine: maximal assistance and of 2 persons      AM-PAC 6 CLICK MOBILITY  Turning over in bed (including adjusting bedclothes, sheets and blankets)?: 2  Sitting down on and standing up from a chair with arms (e.g., wheelchair, bedside commode, etc.): 1  Moving from lying on back to sitting on the side of the bed?: 2  Moving to and from a bed to a chair (including a wheelchair)?: 1  Need to walk in hospital room?: 1  Climbing 3-5 steps with a railing?: 1  Basic Mobility Total Score: 8       Treatment & Education:  Pt sat up EOB ~20 min today w/ mostly SBA, occ Abraham for static sitting balance. OT present and working on UE ex's , 2nd person for occ min.A for balance. Cueing for upright posture (pt not leaning posteriorly as much, and  reports she's not leaning to R side as much as she was at home).   Also worked on rolling act's L and R in bed w/ min. To modA, ~4 times each side. Pt needing assistance for leg positioning mostly. Pads changed beneath pt.     Patient left HOB elevated with all lines intact, call button in reach, bed alarm on,  present, and LE's positioned in neutral w/ pillow/wedge support. Did not replace cervical pillow, trying to encourage neck extension in bed ..    GOALS:   Multidisciplinary Problems       Physical Therapy Goals          Problem: Physical Therapy    Goal Priority Disciplines Outcome Goal Variances Interventions   Physical Therapy Goal     PT, PT/OT Ongoing, Progressing      Description: Goals to be met by: 22    Patient will increase functional independence with mobility by performin. Roll L and R with Abraham with appropriate use of hospital bed features.  2. Supine<>sit with modA with appropriate use of hospital bed features.  3. Transfer bed<>chair with maxA with most appropriate technique.   4. Partial sit<>stand with Abraham with BUE support.  5. Spouse to demo safe transfer technique with patient for bed<>WC or commode transfers.                        Time Tracking:     PT Received On: 22  PT Start Time: 1111     PT Stop Time: 1214  PT Total Time (min): 63 min     Billable Minutes: Therapeutic Activity 50 and Therapeutic Exercise 13    Treatment Type: Treatment  PT/PTA: PTA     PTA Visit Number: 2     2022

## 2022-11-09 NOTE — SUBJECTIVE & OBJECTIVE
Interval History:  Patient is sitting up in the chair today, in no acute distress.  Noted to be afebrile and hemodynamically stable.  Urine culture still pending finalization.  Continue ceftriaxone.  Head CT was done last night due to concerns of dizziness.  Shows enlarged ventricles concerning for normal pressure hydrocephalus.  We will consult Neurosurgery today.  Was able to work with OT/PT who recommended placement at SNF.    Review of Systems   Constitutional:  Positive for activity change and fatigue. Negative for fever.   Respiratory:  Negative for shortness of breath.    Cardiovascular:  Negative for chest pain.   Gastrointestinal:  Negative for abdominal pain.   Genitourinary:  Positive for frequency.   Musculoskeletal:  Positive for arthralgias.   Neurological:  Negative for dizziness and headaches.   All other systems reviewed and are negative.  Objective:     Vital Signs (Most Recent):  Temp: 98.7 °F (37.1 °C) (11/09/22 1613)  Pulse: 84 (11/09/22 1613)  Resp: 18 (11/09/22 1613)  BP: (!) 113/55 (11/09/22 1613)  SpO2: 95 % (11/09/22 1613)   Vital Signs (24h Range):  Temp:  [97.6 °F (36.4 °C)-98.9 °F (37.2 °C)] 98.7 °F (37.1 °C)  Pulse:  [] 84  Resp:  [14-18] 18  SpO2:  [94 %-98 %] 95 %  BP: (113-153)/(55-86) 113/55     Weight: 89.9 kg (198 lb 3.2 oz)  Body mass index is 31.04 kg/m².    Intake/Output Summary (Last 24 hours) at 11/9/2022 1755  Last data filed at 11/9/2022 1546  Gross per 24 hour   Intake 552 ml   Output 1150 ml   Net -598 ml      Physical Exam  Vitals and nursing note reviewed.   Constitutional:       Appearance: Normal appearance.      Comments: Elderly female, appears older than stated age   HENT:      Head: Normocephalic and atraumatic.   Eyes:      Extraocular Movements: Extraocular movements intact.      Pupils: Pupils are equal, round, and reactive to light.   Cardiovascular:      Rate and Rhythm: Normal rate and regular rhythm.   Pulmonary:      Effort: Pulmonary effort is  normal. No respiratory distress.   Abdominal:      General: There is no distension.   Musculoskeletal:         General: Normal range of motion.      Cervical back: Normal range of motion.      Comments: Globally weak   Neurological:      General: No focal deficit present.      Mental Status: She is alert and oriented to person, place, and time.   Psychiatric:         Mood and Affect: Mood normal.         Behavior: Behavior normal.       Significant Labs: All pertinent labs within the past 24 hours have been reviewed.  CBC:   Recent Labs   Lab 11/08/22 0444 11/09/22 0429   WBC 11.02 10.32   HGB 12.5 12.0   HCT 37.5 35.9*    222     CMP:   Recent Labs   Lab 11/08/22 0444 11/09/22 0429    139   K 4.0 3.7    105   CO2 25 27   GLU 99 98   BUN 10 7*   CREATININE 0.8 0.7   CALCIUM 9.1 9.0   ANIONGAP 9 7*       Significant Imaging: I have reviewed all pertinent imaging results/findings within the past 24 hours.

## 2022-11-09 NOTE — PLAN OF CARE
POC reviewed. Purposeful rounding completed. Santana cath maintained. Complaints of pain treated with PRN pain medication according to MAR. Cardiac monitor maintained. No injuries, falls, or trauma occurred during shift. Bed low and locked, side rails up x3, call light within reach. Spouse remains at bedside.     Problem: Infection  Goal: Absence of Infection Signs and Symptoms  Outcome: Ongoing, Progressing     Problem: Skin Injury Risk Increased  Goal: Skin Health and Integrity  Outcome: Ongoing, Progressing     Problem: Adult Inpatient Plan of Care  Goal: Plan of Care Review  Outcome: Ongoing, Progressing  Goal: Patient-Specific Goal (Individualized)  Outcome: Ongoing, Progressing  Goal: Absence of Hospital-Acquired Illness or Injury  Outcome: Ongoing, Progressing     Problem: Impaired Wound Healing  Goal: Optimal Wound Healing  Outcome: Ongoing, Progressing

## 2022-11-09 NOTE — PLAN OF CARE
Problem: Occupational Therapy  Goal: Occupational Therapy Goal  Description: Goals to be met by: 11/14/2022     Patient will increase functional independence with ADLs by performing:    Feeding in supported position with Contact Guard Assistance.  Grooming in supported position while seated with supervision.  Sitting at edge of bed x 10 minutes with SBA.  Rolling to Bilateral with Min A.  Supine to sit with Moderate Assistance.    Completed Goals:   Sitting at edge of bed x 5 minutes with Contact Guard Assistance. MET 11/8/2022  Grooming in supported position while seated with Stand-by Assistance. MET (in unsupported position)11/8/2022  Sitting at edge of bed x 10 minutes with Contact Guard Assistance.  MET 11/9/2022  Rolling to Bilateral with Moderate Assistance.  Met 11/9/2022      Outcome: Ongoing, Progressing    Pt met one goal this date and is making progress towards others.  SNF is recommended upon d/c from acute care to further address deficits and help pt improve overall functional independence.     ELIZABETH Almanza  11/9/2022

## 2022-11-09 NOTE — PT/OT/SLP PROGRESS
Occupational Therapy   Treatment    Name: Trista Gr  MRN: [id number]  Admitting Diagnosis:  Urinary tract infection without hematuria       Recommendations:     Discharge Recommendations: nursing facility, skilled  Discharge Equipment Recommendations:   (TBD at next level of care)  Barriers to discharge:  Decreased caregiver support ( reports he is unable to provide level of care needed at this time)    Assessment:     Trista Gr is a 72 y.o. female with a medical diagnosis of Urinary tract infection without hematuria.  She presents with pain in back. Performance deficits affecting function are weakness, impaired endurance, impaired self care skills, impaired functional mobility, impaired balance, decreased lower extremity function, decreased upper extremity function, decreased coordination, decreased safety awareness, pain, decreased ROM, edema, impaired skin, impaired joint extensibility. Pt agreeable to participating in therapy upon arrival to room.  Pt demonstrated improvement with supine <> sit transfer performing with Mod A and assist of 2.  In addition pt able to roll with Min A-Mod A with cues for technique and positioning.  While seated at EOB pt able to maintain upright seated position with SBA holding onto bed rail with L hand.  Adequate ROM needed for feeding and grooming observed in (B) UE.    Overall, pt tolerated therapy well.  She met one goal this date and is making progress towards others.  She would continue to benefit from skilled OT services to address problems listed above and increase independence with ADLs.  SNF is recommended upon d/c from acute care to further address deficits and help pt improve overall functional independence.       Rehab Prognosis:  Good; patient would benefit from acute skilled OT services to address these deficits and reach maximum level of function.       Plan:     Patient to be seen 3 x/week to address the above listed problems via  "self-care/home management, therapeutic activities, therapeutic exercises, neuromuscular re-education  Plan of Care Expires: 12/07/22  Plan of Care Reviewed with: patient, spouse    Subjective     Pain/Comfort:  Pain Rating 1:  (pt did not rate)  Pain Rating Post-Intervention 1:  (pt did not rate)    Objective:     Communicated with: RN prior to session.  Patient found HOB elevated with telemetry, peripheral IV, gibson catheter, bed alarm upon OT entry to room.    General Precautions: Standard, fall   Orthopedic Precautions:N/A   Braces: N/A  Respiratory Status: Room air     Occupational Performance:     Bed Mobility:    Supine <> sit: Mod A with assist of 2  Scooting: Max A seated; Total A supine  Sit <> supine: Max A with assist of 2  Rolling to left: Mod A with assist of 1 (multiple trials)   Rolling to right: Min A- Mod A with assist of 1 (multiple trials)    Functional Mobility/Transfers:  Bed <> wheelchair: Not assessed this date 2* session focused on sitting balance and postural stability     Activities of Daily Living:  Feeding:  stand by assistance for simulation of eating "collecting food with utensil and bringing to mouth"      [facility] 6 Click ADL: 11    Treatment & Education:  *Session focused on promoting increased endurance, strength, balance, coordination, and ROM needed for ADLs and functional transfers as part of daily routine.   *Pt and spouse educated on:  -role of OT in acute care setting  -adaptive equipment and technique for bathing in and out of bed  -specific madelin lift for toileting  -use of madelin lift for safe transfers  -progression of therapy  -UE exercises to promote increased ROM  -cervical spine stretches  -importance of participating in as much activity during daily routine as possible (including bed mobility, feeding, grooming)  -positioning in bed with use of pillows and wedge  -technique for rolling  *Pt performed multiple trials of rolling; education and cues provided for both pt and "  (caregiver)  *Pt sat EOB for ~30 minutes with SBA required to maintain upright position holding onto bed rail with L hand.  Cues for technique and positioning provided.  *Pt performed bed mobility; cues for technique and positioning provided  *Pt performed UB exercises; seated at EOB   -bicep curls: 2 sets x 10 reps on each side   -shoulder flexion: 2 sets x  10 reps on each side  *Pt performed cervical spine stretches seated at EOB  *POC and progression of therapy discussed    Addendeum added 11/10:  *Returned in afternoon (on 11/9) to provide pt with yellow theraband for strengthening UE.  OT demonstrated various exercises for pt and  ,and discussed benefits and importance of performing during the day.  Pt verbalized understanding.    Patient left HOB elevated with all lines intact, call button in reach, and , PTA, and student RN present    GOALS:   Multidisciplinary Problems       Occupational Therapy Goals          Problem: Occupational Therapy    Goal Priority Disciplines Outcome Interventions   Occupational Therapy Goal     OT, PT/OT Ongoing, Progressing    Description: Goals to be met by: 11/14/2022     Patient will increase functional independence with ADLs by performing:    Feeding in supported position with Contact Guard Assistance.  Grooming in supported position while seated with supervision.  Sitting at edge of bed x 10 minutes with SBA.  Rolling to Bilateral with Min A.  Supine to sit with Moderate Assistance.    Completed Goals:   Sitting at edge of bed x 5 minutes with Contact Guard Assistance. MET 11/8/2022  Grooming in supported position while seated with Stand-by Assistance. MET (in unsupported position)11/8/2022  Sitting at edge of bed x 10 minutes with Contact Guard Assistance.  MET 11/9/2022  Rolling to Bilateral with Moderate Assistance.  Met 11/9/2022                           Time Tracking:     OT Date of Treatment: 11/09/22  OT Start Time: 1110  OT Stop Time: 1203  OT  Total Time (min): 53 min    Billable Minutes:Self Care/Home Management 53    OT/RISHI: OT          *co-tx with PTA    ELIZABETH Almanza  11/9/2022

## 2022-11-09 NOTE — PLAN OF CARE
During my shift, pt AAOx4, NAD. VS stable. Pt remains afebrile. SPO2 wnl on room air. Pt tolerating cardiac diet. Assistance provided with all meals. Indwelling gibson catheter in place connected to standard drainage bag collecting clear yellow urine. Pt reported mild nausea but declined Zofran. NSR noted on cardiac telemetry. Pt remains free from falls or injury. Bed is lowered and locked, bed alarm set. Call light is within reach. Pt has no known needs at this time.

## 2022-11-10 LAB
ANION GAP SERPL CALC-SCNC: 6 MMOL/L (ref 8–16)
BACTERIA UR CULT: ABNORMAL
BASOPHILS # BLD AUTO: 0.03 K/UL (ref 0–0.2)
BASOPHILS NFR BLD: 0.3 % (ref 0–1.9)
BUN SERPL-MCNC: 9 MG/DL (ref 8–23)
CALCIUM SERPL-MCNC: 9.4 MG/DL (ref 8.7–10.5)
CHLORIDE SERPL-SCNC: 106 MMOL/L (ref 95–110)
CO2 SERPL-SCNC: 28 MMOL/L (ref 23–29)
CREAT SERPL-MCNC: 0.8 MG/DL (ref 0.5–1.4)
DIFFERENTIAL METHOD: ABNORMAL
EOSINOPHIL # BLD AUTO: 0.6 K/UL (ref 0–0.5)
EOSINOPHIL NFR BLD: 5 % (ref 0–8)
ERYTHROCYTE [DISTWIDTH] IN BLOOD BY AUTOMATED COUNT: 14.9 % (ref 11.5–14.5)
EST. GFR  (NO RACE VARIABLE): >60 ML/MIN/1.73 M^2
GLUCOSE SERPL-MCNC: 92 MG/DL (ref 70–110)
HCT VFR BLD AUTO: 36.8 % (ref 37–48.5)
HGB BLD-MCNC: 12.2 G/DL (ref 12–16)
IMM GRANULOCYTES # BLD AUTO: 0.07 K/UL (ref 0–0.04)
IMM GRANULOCYTES NFR BLD AUTO: 0.6 % (ref 0–0.5)
LYMPHOCYTES # BLD AUTO: 3.3 K/UL (ref 1–4.8)
LYMPHOCYTES NFR BLD: 28.5 % (ref 18–48)
MAGNESIUM SERPL-MCNC: 2 MG/DL (ref 1.6–2.6)
MCH RBC QN AUTO: 27.4 PG (ref 27–31)
MCHC RBC AUTO-ENTMCNC: 33.2 G/DL (ref 32–36)
MCV RBC AUTO: 83 FL (ref 82–98)
MONOCYTES # BLD AUTO: 1 K/UL (ref 0.3–1)
MONOCYTES NFR BLD: 9 % (ref 4–15)
NEUTROPHILS # BLD AUTO: 6.6 K/UL (ref 1.8–7.7)
NEUTROPHILS NFR BLD: 56.6 % (ref 38–73)
NRBC BLD-RTO: 0 /100 WBC
PHOSPHATE SERPL-MCNC: 3.1 MG/DL (ref 2.7–4.5)
PLATELET # BLD AUTO: 229 K/UL (ref 150–450)
PMV BLD AUTO: 10.2 FL (ref 9.2–12.9)
POTASSIUM SERPL-SCNC: 3.8 MMOL/L (ref 3.5–5.1)
RBC # BLD AUTO: 4.46 M/UL (ref 4–5.4)
SODIUM SERPL-SCNC: 140 MMOL/L (ref 136–145)
WBC # BLD AUTO: 11.61 K/UL (ref 3.9–12.7)

## 2022-11-10 PROCEDURE — G0378 HOSPITAL OBSERVATION PER HR: HCPCS

## 2022-11-10 PROCEDURE — 80048 BASIC METABOLIC PNL TOTAL CA: CPT | Performed by: HOSPITALIST

## 2022-11-10 PROCEDURE — 96372 THER/PROPH/DIAG INJ SC/IM: CPT | Performed by: HOSPITALIST

## 2022-11-10 PROCEDURE — 85025 COMPLETE CBC W/AUTO DIFF WBC: CPT | Performed by: HOSPITALIST

## 2022-11-10 PROCEDURE — 99226 PR SUBSEQUENT OBSERVATION CARE,LEVEL III: ICD-10-PCS | Mod: 95,,, | Performed by: STUDENT IN AN ORGANIZED HEALTH CARE EDUCATION/TRAINING PROGRAM

## 2022-11-10 PROCEDURE — A4216 STERILE WATER/SALINE, 10 ML: HCPCS | Performed by: HOSPITALIST

## 2022-11-10 PROCEDURE — 25000003 PHARM REV CODE 250: Performed by: HOSPITALIST

## 2022-11-10 PROCEDURE — 63600175 PHARM REV CODE 636 W HCPCS: Performed by: HOSPITALIST

## 2022-11-10 PROCEDURE — 96366 THER/PROPH/DIAG IV INF ADDON: CPT

## 2022-11-10 PROCEDURE — 99226 PR SUBSEQUENT OBSERVATION CARE,LEVEL III: CPT | Mod: 95,,, | Performed by: STUDENT IN AN ORGANIZED HEALTH CARE EDUCATION/TRAINING PROGRAM

## 2022-11-10 PROCEDURE — 25000003 PHARM REV CODE 250: Performed by: STUDENT IN AN ORGANIZED HEALTH CARE EDUCATION/TRAINING PROGRAM

## 2022-11-10 PROCEDURE — 83735 ASSAY OF MAGNESIUM: CPT | Performed by: HOSPITALIST

## 2022-11-10 PROCEDURE — 36415 COLL VENOUS BLD VENIPUNCTURE: CPT | Performed by: HOSPITALIST

## 2022-11-10 PROCEDURE — 84100 ASSAY OF PHOSPHORUS: CPT | Performed by: HOSPITALIST

## 2022-11-10 RX ORDER — POLYETHYLENE GLYCOL 3350 17 G/17G
17 POWDER, FOR SOLUTION ORAL 2 TIMES DAILY
Status: DISCONTINUED | OUTPATIENT
Start: 2022-11-10 | End: 2022-11-12

## 2022-11-10 RX ADMIN — PANTOPRAZOLE SODIUM 40 MG: 40 TABLET, DELAYED RELEASE ORAL at 09:11

## 2022-11-10 RX ADMIN — ENOXAPARIN SODIUM 40 MG: 100 INJECTION SUBCUTANEOUS at 06:11

## 2022-11-10 RX ADMIN — LOSARTAN POTASSIUM 50 MG: 50 TABLET, FILM COATED ORAL at 09:11

## 2022-11-10 RX ADMIN — Medication 10 ML: at 02:11

## 2022-11-10 RX ADMIN — CEFTRIAXONE 1 G: 1 INJECTION, SOLUTION INTRAVENOUS at 02:11

## 2022-11-10 RX ADMIN — Medication 10 ML: at 07:11

## 2022-11-10 RX ADMIN — FUROSEMIDE 20 MG: 20 TABLET ORAL at 09:11

## 2022-11-10 RX ADMIN — Medication 10 ML: at 09:11

## 2022-11-10 RX ADMIN — KETOROLAC TROMETHAMINE 10 MG: 10 TABLET, FILM COATED ORAL at 03:11

## 2022-11-10 RX ADMIN — POLYETHYLENE GLYCOL 3350 17 G: 17 POWDER, FOR SOLUTION ORAL at 09:11

## 2022-11-10 NOTE — ASSESSMENT & PLAN NOTE
-CT scan was on 11/07 which was concerning for normal-pressure hydrocephalus given enlarged ventricles  -reports she has urinary frequency, urgency, some forgetfulness and has been bed-bound (chronic gait abnormality) for 3 years  -consult ear surgery on 11/09, patient may benefit from large volume lumbar puncture

## 2022-11-10 NOTE — PROGRESS NOTES
ELIZABETH visited with the patient to discuss facilities accepting the patient and those which did not. [facility]. The patient's sister was on the phone on speaker as ELIZABETH visited. She will be here in 1 to 1&1/2  hours and will meet with ELIZABETH to discuss facilities available.   NIA contacted the facilities that the patient chose, but did not respond yet in [facility].     [facility] - No beds (SNF or intermediate)    [facility] - to review this morning.    [facility] - Admisiions in a meeting and will call NIA back.

## 2022-11-10 NOTE — PLAN OF CARE
During my shift, pt AAOx4, NAD. VS stable. Pt remains afebrile. SPO2 wnl on room air. Pt tolerating cardiac diet. Assistance provided with all meals. Indwelling gibson catheter in place connected to standard drainage bag collecting clear yellow urine. NSR noted on cardiac telemetry. Pt remains free from falls or injury. Bed is lowered and locked, bed alarm set. Call light is within reach. Pt has no known needs at this time.

## 2022-11-10 NOTE — PROGRESS NOTES
Pt's spouse walking halls throughout night and waiting outside other pt's rooms for the nurse with repeated requests for ice, heating up food, repositioning, wound care, etc. Explained that we will be happy to assist him when we are done with these tasks but there are other pt's receiving care throughout the night and we would be with them shortly.    Wound care performed to bilateral buttocks, pt c/o pain to lower back. PRN Toradol not loaded in pyxis, pharmacy messaged requesting med be sent up. Explained to pt and spouse as soon as med arrived I would bring to pt. This nurse performing wound care on another pt in 303 and pt spouse waiting out side the room to speak with nurse to remind about her pain medicine. Charge nurse notified who called pharmacy to send up medicine again. Charge nurse spoke with pt's spouse asking to please to minimize loitering the hallways in order to respect privacy of other patients and their families.

## 2022-11-10 NOTE — PROGRESS NOTES
[facility] Medicine  Telemedicine Progress Note    Patient Name: Trista Gr  MRN: [id number]  Patient Class: OP- Observation   Admission Date: 11/7/2022  Length of Stay: 0 days  Attending Physician: Phi Laurent MD  Primary Care Provider: REGINO Yeung MD          Subjective:     Principal Problem:Urinary tract infection without hematuria        HPI:  72-year-old female with HTN, debility (bed-bound at baseline), decubitus ulcers, peripheral edema who presented with complaint of shortness of breath that started last night.  She became anxious and concerned about her health.  She reports she intermittently takes her Lasix, but most of the time does not due to inability to get to the bathroom or have her diaper changed.  She missed taking all of her medications this morning, and is unclear about when was the last time she took the Lasix.  She does report dysuria, with burning on urination.  She also reports worsening of her bedsores, with development of multiple source despite efforts by her .  She frequently only gets urinary tract infection and last infection was approximately 1 month ago.  Overall she feels weaker, is not able to help with transfers at all.  Denies chest pain, cough, fever or chills.  No reported nausea or vomiting, but does report decreased appetite.   is the primary caregiver, and they do not have home health or any other resources at home.  In the ER, workup remarkable for UA with positive nitrite, 2+ leukocytes, 20 WBC and many bacteria.  Head CT unremarkable, chest x-ray without infiltrate, BNP less than 10, COVID negative.      Overview/Hospital Course:  Trista Gr was admitted for UTI. Started on CTX, follow Ucx.  Deescalate antibiotics once cultures finalized.  Patient remained afebrile and hemodynamically stable.  Head CT was done due to complaints of dizziness which does show concern for normal pressure hydrocephalus.   Neurosurgery consult placed on 11/09 for possible evaluation inpatient.  Has been able to work with  PTOT recommending SNF.  Cm/SW following for placement.      Interval History:  Urine culture resulted, will continue IV CTX while inpatient. CT scan showed enlarged ventricles concerning for normal pressure hydrocephalus - will need outpatient NSGY referral. Pending SNF placement however if no accepting facility, plan is for home with HH.     Review of Systems   Constitutional:  Positive for activity change and fatigue. Negative for fever.   Respiratory:  Negative for shortness of breath.    Cardiovascular:  Negative for chest pain.   Gastrointestinal:  Negative for abdominal pain.   Genitourinary:  Positive for frequency.   Musculoskeletal:  Positive for arthralgias.   Neurological:  Negative for dizziness and headaches.   All other systems reviewed and are negative.  Objective:     Vital Signs (Most Recent):  Temp: 98.2 °F (36.8 °C) (11/10/22 1608)  Pulse: 78 (11/10/22 1608)  Resp: 18 (11/10/22 1608)  BP: (!) 142/63 (11/10/22 1608)  SpO2: 99 % (11/10/22 1608)   Vital Signs (24h Range):  Temp:  [97.7 °F (36.5 °C)-98.7 °F (37.1 °C)] 98.2 °F (36.8 °C)  Pulse:  [] 78  Resp:  [16-20] 18  SpO2:  [94 %-99 %] 99 %  BP: (113-144)/(55-77) 142/63     Weight: 89.9 kg (198 lb 3.2 oz)  Body mass index is 31.04 kg/m².    Intake/Output Summary (Last 24 hours) at 11/10/2022 1611  Last data filed at 11/10/2022 1431  Gross per 24 hour   Intake 696 ml   Output 425 ml   Net 271 ml        Physical Exam  Vitals and nursing note reviewed.   Constitutional:       Appearance: Normal appearance.      Comments: Elderly female, appears older than stated age   HENT:      Head: Normocephalic and atraumatic.   Eyes:      Extraocular Movements: Extraocular movements intact.      Pupils: Pupils are equal, round, and reactive to light.   Cardiovascular:      Rate and Rhythm: Normal rate and regular rhythm.   Pulmonary:      Effort: Pulmonary  effort is normal. No respiratory distress.   Abdominal:      General: There is no distension.   Musculoskeletal:         General: Normal range of motion.      Cervical back: Normal range of motion.      Comments: Globally weak   Neurological:      General: No focal deficit present.      Mental Status: She is alert and oriented to person, place, and time.   Psychiatric:         Mood and Affect: Mood normal.         Behavior: Behavior normal.       Significant Labs: All pertinent labs within the past 24 hours have been reviewed.  CBC:   Recent Labs   Lab 11/09/22  0429 11/10/22  0746   WBC 10.32 11.61   HGB 12.0 12.2   HCT 35.9* 36.8*    229       CMP:   Recent Labs   Lab 11/09/22  0429 11/10/22  0746    140   K 3.7 3.8    106   CO2 27 28   GLU 98 92   BUN 7* 9   CREATININE 0.7 0.8   CALCIUM 9.0 9.4   ANIONGAP 7* 6*         Significant Imaging: I have reviewed all pertinent imaging results/findings within the past 24 hours.      Assessment/Plan:      * Urinary tract infection without hematuria  - UA consistent with infection and patient is symptomatic  - Start empiric Rocephin 1 g IV daily  - UCx resulted, will continue the above     Normal pressure hydrocephalus  -CT scan was on 11/07 which was concerning for normal-pressure hydrocephalus given enlarged ventricles  -reports she has urinary frequency, urgency, some forgetfulness and has been bed-bound (chronic gait abnormality) for 3 years  -consulted NSGY however will need to be referred to as outpatient, patient may benefit from large volume lumbar puncture    Shortness of breath  - Patient reported shortness of breath and feeling anxious, and not herself  - Noncompliant with her Lasix   - BNP <10, sats 100% on room air, chest x-ray is clear, and physical lung exam unremarkable by the time of my evaluation  - She was given IV Lasix in the ER with output of 1 L by the time of my evaluation which likely explains disparity from initial presentation  in the ER  - Respiratory status is stable and she is symptomatically resolved, and appears to be more anxious about her inability to care for herself along with difficulty by the  to care for her  - Resume p.o. scheduled Lasix 20 mg daily  - Resolved    Peripheral edema  - Chronic peripheral edema  - Reports slightly worse than her usual baseline  - Likely due to noncompliance with p.o. Lasix  - Pulse dose of IV Lasix given x1 in the ER, will resume scheduled Lasix 20 mg p.o. daily tomorrow    Pressure injury of buttock, unstageable  - Multiple bedsores located on the buttocks, minimal stage II but overall unstageable  - Turn every 2 hours and maximize nutrition  - Inpatient consult to wound care    Debility  Failure to thrive as an adult  - She had issues with osteoarthritis and chronic back pain worked up with MRIs and followed by Ortho, but pain and debility became progressive to the point she was walking with a walker and then eventually bedbound  - Review of chart reveals she has been bed-bound for approximate 2 years   - She used to be able to help with transfers but now is too weak and unable  - She has developed more bedsores, and  is unable to care for her at home in her current state  - Evaluated by PT and OT already in the ER, with recommendation for SNF placement  - Patient and  are agreeable, but would like to have more information  - Continue PT and OT, and  consult for placementL: SNF    Obesity (BMI 30.0-34.9)  - Weight reduction as outpatient    Chronic low back pain  Spinal canal stenosis  - MRI on her cervical spine in June 2021 revealed mild to moderate spinal canal stenosis at C2-3  - MRI on her lumbar spine in June 2021 revealed mild spinal canal stenosis at L3-4.  - Chronic issue, not an acute issue  - Tylenol and tramadol p.r.n.    GERD (gastroesophageal reflux disease)  - Substitute Nexium with Protonix 40 mg p.o. daily    Essential hypertension  - Blood  pressure elevated due to missed home antihypertensive medications along with diuretic  - Substitute home blood pressure medication with losartan 50 mg p.o. daily during hospitalization  - P.r.n. hydralazine       VTE Risk Mitigation (From admission, onward)         Ordered     enoxaparin injection 40 mg  Daily         11/07/22 1435     IP VTE HIGH RISK PATIENT  Once         11/07/22 1435     Place sequential compression device  Until discontinued         11/07/22 1435     Place sequential compression device  Until discontinued         11/07/22 1435                      I have completed this tele-visit with the assistance of a telepresenter.    The attending portion of this evaluation, treatment, and documentation was performed per Phi Laurent MD via Telemedicine AudioVisual using the secure [employer] software platform with 2 way audio/video. The provider was located off-site and the patient is located in the hospital. The aforementioned video software was utilized to document the relevant history and physical exam    Phi Laurent MD  Department of Hospital Medicine   [facility] ([place])

## 2022-11-10 NOTE — ASSESSMENT & PLAN NOTE
-CT scan was on 11/07 which was concerning for normal-pressure hydrocephalus given enlarged ventricles  -reports she has urinary frequency, urgency, some forgetfulness and has been bed-bound (chronic gait abnormality) for 3 years  -consulted NSGY however will need to be referred to as outpatient, patient may benefit from large volume lumbar puncture

## 2022-11-10 NOTE — PLAN OF CARE
11/10/22 1533   Post-Acute Status   Post-Acute Authorization Placement   Post-Acute Placement Status   (Acceptance pending based on wound care needs.)   Discharge Delays   (Finding an accepting facility.)   Discharge Plan   Discharge Plan A Skilled Nursing Facility     Patient's acceptance to [facility] is dependent upon the facility's ability to   meet her wound care needs. CM sent wound care information including pictures to the  facility.

## 2022-11-10 NOTE — SUBJECTIVE & OBJECTIVE
Interval History:  Urine culture resulted, will continue IV CTX while inpatient. CT scan showed enlarged ventricles concerning for normal pressure hydrocephalus - will need outpatient NSGY referral. Pending SNF placement however if no accepting facility, plan is for home with HH.     Review of Systems   Constitutional:  Positive for activity change and fatigue. Negative for fever.   Respiratory:  Negative for shortness of breath.    Cardiovascular:  Negative for chest pain.   Gastrointestinal:  Negative for abdominal pain.   Genitourinary:  Positive for frequency.   Musculoskeletal:  Positive for arthralgias.   Neurological:  Negative for dizziness and headaches.   All other systems reviewed and are negative.  Objective:     Vital Signs (Most Recent):  Temp: 98.2 °F (36.8 °C) (11/10/22 1608)  Pulse: 78 (11/10/22 1608)  Resp: 18 (11/10/22 1608)  BP: (!) 142/63 (11/10/22 1608)  SpO2: 99 % (11/10/22 1608)   Vital Signs (24h Range):  Temp:  [97.7 °F (36.5 °C)-98.7 °F (37.1 °C)] 98.2 °F (36.8 °C)  Pulse:  [] 78  Resp:  [16-20] 18  SpO2:  [94 %-99 %] 99 %  BP: (113-144)/(55-77) 142/63     Weight: 89.9 kg (198 lb 3.2 oz)  Body mass index is 31.04 kg/m².    Intake/Output Summary (Last 24 hours) at 11/10/2022 1611  Last data filed at 11/10/2022 1431  Gross per 24 hour   Intake 696 ml   Output 425 ml   Net 271 ml        Physical Exam  Vitals and nursing note reviewed.   Constitutional:       Appearance: Normal appearance.      Comments: Elderly female, appears older than stated age   HENT:      Head: Normocephalic and atraumatic.   Eyes:      Extraocular Movements: Extraocular movements intact.      Pupils: Pupils are equal, round, and reactive to light.   Cardiovascular:      Rate and Rhythm: Normal rate and regular rhythm.   Pulmonary:      Effort: Pulmonary effort is normal. No respiratory distress.   Abdominal:      General: There is no distension.   Musculoskeletal:         General: Normal range of motion.       Cervical back: Normal range of motion.      Comments: Globally weak   Neurological:      General: No focal deficit present.      Mental Status: She is alert and oriented to person, place, and time.   Psychiatric:         Mood and Affect: Mood normal.         Behavior: Behavior normal.       Significant Labs: All pertinent labs within the past 24 hours have been reviewed.  CBC:   Recent Labs   Lab 11/09/22  0429 11/10/22  0746   WBC 10.32 11.61   HGB 12.0 12.2   HCT 35.9* 36.8*    229       CMP:   Recent Labs   Lab 11/09/22  0429 11/10/22  0746    140   K 3.7 3.8    106   CO2 27 28   GLU 98 92   BUN 7* 9   CREATININE 0.7 0.8   CALCIUM 9.0 9.4   ANIONGAP 7* 6*         Significant Imaging: I have reviewed all pertinent imaging results/findings within the past 24 hours.

## 2022-11-10 NOTE — PLAN OF CARE
Pt remained free of falls and injuries throughout shift. AAOx4. Pt calm and cooperative. Purposeful hourly rounding performed. Pt swallows meds whole. IV flushed and saline locked. Managed c/o lower back pain with PRN Toradol. Pt bedfast, weight shift assistance provided q2h. Santana catheter in place to gravity. Wound care to bilateral buttocks performed per orders. VSS on room air. Pt resting comfortably in bed, denies needs at this time, NADN. Bed low and locked, bed alarm on, call light in reach. Side rails up x3. Report given to ANNA Jones.

## 2022-11-10 NOTE — ASSESSMENT & PLAN NOTE
- UA consistent with infection and patient is symptomatic  - Start empiric Rocephin 1 g IV daily  - UCx resulted, will continue the above

## 2022-11-11 LAB — SARS-COV-2 RDRP RESP QL NAA+PROBE: NEGATIVE

## 2022-11-11 PROCEDURE — 25000003 PHARM REV CODE 250: Performed by: HOSPITALIST

## 2022-11-11 PROCEDURE — 99226 PR SUBSEQUENT OBSERVATION CARE,LEVEL III: CPT | Mod: 95,,, | Performed by: STUDENT IN AN ORGANIZED HEALTH CARE EDUCATION/TRAINING PROGRAM

## 2022-11-11 PROCEDURE — 63600175 PHARM REV CODE 636 W HCPCS: Performed by: HOSPITALIST

## 2022-11-11 PROCEDURE — 86580 TB INTRADERMAL TEST: CPT | Performed by: STUDENT IN AN ORGANIZED HEALTH CARE EDUCATION/TRAINING PROGRAM

## 2022-11-11 PROCEDURE — 97530 THERAPEUTIC ACTIVITIES: CPT | Mod: CQ

## 2022-11-11 PROCEDURE — 96372 THER/PROPH/DIAG INJ SC/IM: CPT | Performed by: HOSPITALIST

## 2022-11-11 PROCEDURE — G0378 HOSPITAL OBSERVATION PER HR: HCPCS

## 2022-11-11 PROCEDURE — A4216 STERILE WATER/SALINE, 10 ML: HCPCS | Performed by: HOSPITALIST

## 2022-11-11 PROCEDURE — 96366 THER/PROPH/DIAG IV INF ADDON: CPT

## 2022-11-11 PROCEDURE — 99226 PR SUBSEQUENT OBSERVATION CARE,LEVEL III: ICD-10-PCS | Mod: 95,,, | Performed by: STUDENT IN AN ORGANIZED HEALTH CARE EDUCATION/TRAINING PROGRAM

## 2022-11-11 PROCEDURE — 30200315 PPD INTRADERMAL TEST REV CODE 302: Performed by: STUDENT IN AN ORGANIZED HEALTH CARE EDUCATION/TRAINING PROGRAM

## 2022-11-11 PROCEDURE — U0002 COVID-19 LAB TEST NON-CDC: HCPCS | Performed by: STUDENT IN AN ORGANIZED HEALTH CARE EDUCATION/TRAINING PROGRAM

## 2022-11-11 PROCEDURE — 25000003 PHARM REV CODE 250: Performed by: STUDENT IN AN ORGANIZED HEALTH CARE EDUCATION/TRAINING PROGRAM

## 2022-11-11 RX ORDER — METHOCARBAMOL 500 MG/1
500 TABLET, FILM COATED ORAL 3 TIMES DAILY PRN
Status: DISCONTINUED | OUTPATIENT
Start: 2022-11-11 | End: 2022-11-13

## 2022-11-11 RX ADMIN — TUBERCULIN PURIFIED PROTEIN DERIVATIVE 5 UNITS: 5 INJECTION, SOLUTION INTRADERMAL at 01:11

## 2022-11-11 RX ADMIN — LOSARTAN POTASSIUM 50 MG: 50 TABLET, FILM COATED ORAL at 08:11

## 2022-11-11 RX ADMIN — Medication 10 ML: at 05:11

## 2022-11-11 RX ADMIN — Medication 10 ML: at 03:11

## 2022-11-11 RX ADMIN — KETOROLAC TROMETHAMINE 10 MG: 10 TABLET, FILM COATED ORAL at 12:11

## 2022-11-11 RX ADMIN — FUROSEMIDE 20 MG: 20 TABLET ORAL at 08:11

## 2022-11-11 RX ADMIN — METHOCARBAMOL 500 MG: 500 TABLET ORAL at 09:11

## 2022-11-11 RX ADMIN — Medication 10 ML: at 09:11

## 2022-11-11 RX ADMIN — PANTOPRAZOLE SODIUM 40 MG: 40 TABLET, DELAYED RELEASE ORAL at 08:11

## 2022-11-11 RX ADMIN — CEFTRIAXONE 1 G: 1 INJECTION, SOLUTION INTRAVENOUS at 03:11

## 2022-11-11 RX ADMIN — KETOROLAC TROMETHAMINE 10 MG: 10 TABLET, FILM COATED ORAL at 04:11

## 2022-11-11 RX ADMIN — ENOXAPARIN SODIUM 40 MG: 100 INJECTION SUBCUTANEOUS at 04:11

## 2022-11-11 NOTE — PLAN OF CARE
POC reviewed with pt and spouse. PRN medication given for c/o generalized pain. Turn q2h. Free from injury/falls this shift. Safety precautions in place. Will continue with plan of care. Awaiting SNF placement.    Problem: Infection  Goal: Absence of Infection Signs and Symptoms  Outcome: Ongoing, Progressing     Problem: Skin Injury Risk Increased  Goal: Skin Health and Integrity  Outcome: Ongoing, Progressing     Problem: Adult Inpatient Plan of Care  Goal: Plan of Care Review  Outcome: Ongoing, Progressing  Goal: Patient-Specific Goal (Individualized)  Outcome: Ongoing, Progressing  Goal: Absence of Hospital-Acquired Illness or Injury  Outcome: Ongoing, Progressing  Goal: Optimal Comfort and Wellbeing  Outcome: Ongoing, Progressing  Goal: Readiness for Transition of Care  Outcome: Ongoing, Progressing     Problem: Impaired Wound Healing  Goal: Optimal Wound Healing  Outcome: Ongoing, Progressing

## 2022-11-11 NOTE — PLAN OF CARE
POC reviewed. AAOx4. Purposeful rounding completed. Stable on room air. Cardiac monitor maintained. Purewick utilized for voiding. Turn Q2/frequent weight shift encouraged by RN. Complaints of pain treated with PRN pain medication according to MAR. No other complaints verbalized during shift.  Bed low and locked, side rails up x3, call light within reach. Spouse remains at bedside.      Problem: Infection  Goal: Absence of Infection Signs and Symptoms  Outcome: Ongoing, Progressing     Problem: Skin Injury Risk Increased  Goal: Skin Health and Integrity  Outcome: Ongoing, Progressing     Problem: Adult Inpatient Plan of Care  Goal: Plan of Care Review  Outcome: Ongoing, Progressing  Goal: Patient-Specific Goal (Individualized)  Outcome: Ongoing, Progressing  Goal: Absence of Hospital-Acquired Illness or Injury  Outcome: Ongoing, Progressing     Problem: Impaired Wound Healing  Goal: Optimal Wound Healing  Outcome: Ongoing, Progressing

## 2022-11-11 NOTE — PLAN OF CARE
Met with patient &  at bedside to discuss discharge dispo - still requesting SNF placement at [facility] if possible but willing to accept first available facility - LVM for Patricia at [facility] - spoke with Jory at [facility] she will pull wound care notes for review - request faxed & called to Marya CUEVA to initiate review for SNF auth

## 2022-11-11 NOTE — PLAN OF CARE
"[facility] has accepted patient - still no response from Dariel - lengthy conversations with  at bedside - he complained everything is moving too fast & he will not complete needed paperwork to transfer to SNF until Monday - explained OBS status & once SNF auth obtained from PHN patient will need to transfer to appropriate level of care -  remains adamant he's not completing paperwork until Monday stating "i have important business to take care of today so I guess put her on the street" - informed  patient will not be discharged to the street & questioned if he would prefer discharge to home with home health & DME - he replied "no she needs the facility" - [facility] offered to visit bedside & complete paperwork which  first refused but is now complying - TIAGO CUEVA regarding pending SNF auth   "

## 2022-11-11 NOTE — PLAN OF CARE
"[facility] has now rescinded their acceptance due to difficulty with  completing packet - Marya PHN reports "not sure SNF auth will be approved due to prior level of functioning = mostly bed bound but will send for medical review" - spoke with Patricia Vieira - [facility] fax is not working - referral emailed   "

## 2022-11-11 NOTE — PT/OT/SLP PROGRESS
Physical Therapy Treatment    Patient Name:  Trista Gr   MRN:  [id number]    Recommendations:     Discharge Recommendations:  nursing facility, skilled   Discharge Equipment Recommendations:  (TBD at next level of care)   Barriers to discharge: Decreased caregiver support    Assessment:     Trista Gr is a 72 y.o. female admitted with a medical diagnosis of Urinary tract infection without hematuria.  She presents with the following impairments/functional limitations:  weakness, pain, edema, impaired balance, impaired endurance, impaired functional mobility, impaired joint extensibility, impaired self care skills, impaired skin, decreased coordination, decreased lower extremity function, decreased ROM, decreased safety awareness, decreased upper extremity function.    Scoot toward HOB with totalA x 2  Pt began to perform sup>sit at EOB but deferred 2/2 pain in LLE  Pt repositioned for comfort and with B hips in neutral (not ext rot)  Education provided on value of cont'd therapy at SNF vs Home with HHPT  Rec SNF    Rehab Prognosis: Fair; patient would benefit from acute skilled PT services to address these deficits and reach maximum level of function.    Recent Surgery: * No surgery found *      Plan:     During this hospitalization, patient to be seen 3 x/week to address the identified rehab impairments via therapeutic activities, therapeutic exercises, neuromuscular re-education, wheelchair management/training and progress toward the following goals:    Plan of Care Expires:  12/07/22    Subjective     Chief Complaint: pain in L lat thigh  Patient/Family Comments/goals: I want to be able to walk again  Pain/Comfort:  Pain Rating 1: other (see comments) (unrated)  Location - Side 1: Left  Location - Orientation 1: lateral  Location 1: thigh  Pain Addressed 1: Reposition, Distraction, Cessation of Activity  Pain Rating Post-Intervention 1: other (see comments) (unrated)      Objective:      Communicated with nursing prior to session.  Patient found HOB elevated with telemetry, peripheral IV, PureWick upon PT entry to room.     General Precautions: Standard, fall   Orthopedic Precautions:N/A   Braces:    Respiratory Status: Room air     Functional Mobility:  Bed Mobility:     Scooting: total assistance and of 2 persons      AM-PAC 6 CLICK MOBILITY  Turning over in bed (including adjusting bedclothes, sheets and blankets)?: 2  Sitting down on and standing up from a chair with arms (e.g., wheelchair, bedside commode, etc.): 1  Moving from lying on back to sitting on the side of the bed?: 2  Moving to and from a bed to a chair (including a wheelchair)?: 1  Need to walk in hospital room?: 1  Climbing 3-5 steps with a railing?: 1  Basic Mobility Total Score: 8       Treatment & Education:  Pt initiated supine>sit but stopped 2/2 pain and deferred for another time.  Therex: AP x 10  Pt repositioned for comfort and with B hips in neutral  Education - discussion with pt and spouse re discharge disposition, pt's goals, and importance of ongoing PT to attain those goals    Patient left HOB elevated with all lines intact, call button in reach, and spouse present..    GOALS:   Multidisciplinary Problems       Physical Therapy Goals          Problem: Physical Therapy    Goal Priority Disciplines Outcome Goal Variances Interventions   Physical Therapy Goal     PT, PT/OT Ongoing, Progressing     Description: Goals to be met by: 22    Patient will increase functional independence with mobility by performin. Roll L and R with Abraham with appropriate use of hospital bed features.  2. Supine<>sit with modA with appropriate use of hospital bed features.  3. Transfer bed<>chair with maxA with most appropriate technique.   4. Partial sit<>stand with Abraham with BUE support.  5. Spouse to demo safe transfer technique with patient for bed<>WC or commode transfers.                        Time Tracking:     PT  Received On: 11/11/22  PT Start Time: 1355     PT Stop Time: 1547  PT Total Time (min): 112 min     Billable Minutes: Therapeutic Activity 45  Session divided in two: 1:55-2:20pm (25 mins) + 3:27-3:47pm (20 mins) - 45 mins total treatment time    Treatment Type: Treatment  PT/PTA: PTA     PTA Visit Number: 3     11/11/2022

## 2022-11-11 NOTE — PLAN OF CARE
During my shift, pt AAOx4, NAD. VS stable. Pt remains afebrile. SPO2 wnl on room air. Pt tolerating cardiac diet. Assistance provided with all meals. Purewick in place collecting clear yellow urine. Indwelling gibson removed as ordered. NSR noted on cardiac telemetry. Bed bath with CHG wipes provided. Wound care provided to sacrum as ordered. Pt remains free from falls or injury. Bed is lowered and locked, bed alarm set. Call light is within reach. Pt has no known needs at this time.

## 2022-11-12 PROCEDURE — A4216 STERILE WATER/SALINE, 10 ML: HCPCS | Performed by: HOSPITALIST

## 2022-11-12 PROCEDURE — 25000003 PHARM REV CODE 250: Performed by: PHYSICIAN ASSISTANT

## 2022-11-12 PROCEDURE — 25000003 PHARM REV CODE 250: Performed by: HOSPITALIST

## 2022-11-12 PROCEDURE — 96372 THER/PROPH/DIAG INJ SC/IM: CPT | Performed by: HOSPITALIST

## 2022-11-12 PROCEDURE — 63600175 PHARM REV CODE 636 W HCPCS: Performed by: HOSPITALIST

## 2022-11-12 PROCEDURE — 96366 THER/PROPH/DIAG IV INF ADDON: CPT

## 2022-11-12 PROCEDURE — G0378 HOSPITAL OBSERVATION PER HR: HCPCS

## 2022-11-12 PROCEDURE — 63700000 PHARM REV CODE 250 ALT 637 W/O HCPCS: Performed by: STUDENT IN AN ORGANIZED HEALTH CARE EDUCATION/TRAINING PROGRAM

## 2022-11-12 RX ORDER — POLYETHYLENE GLYCOL 3350 17 G/17G
17 POWDER, FOR SOLUTION ORAL DAILY
Status: DISCONTINUED | OUTPATIENT
Start: 2022-11-13 | End: 2022-11-13

## 2022-11-12 RX ORDER — FLUCONAZOLE 100 MG/1
200 TABLET ORAL ONCE
Status: COMPLETED | OUTPATIENT
Start: 2022-11-12 | End: 2022-11-12

## 2022-11-12 RX ORDER — KETOROLAC TROMETHAMINE 10 MG/1
10 TABLET, FILM COATED ORAL ONCE
Status: COMPLETED | OUTPATIENT
Start: 2022-11-12 | End: 2022-11-12

## 2022-11-12 RX ADMIN — Medication 10 ML: at 03:11

## 2022-11-12 RX ADMIN — PANTOPRAZOLE SODIUM 40 MG: 40 TABLET, DELAYED RELEASE ORAL at 09:11

## 2022-11-12 RX ADMIN — KETOROLAC TROMETHAMINE 10 MG: 10 TABLET, FILM COATED ORAL at 01:11

## 2022-11-12 RX ADMIN — FUROSEMIDE 20 MG: 20 TABLET ORAL at 09:11

## 2022-11-12 RX ADMIN — CEFTRIAXONE 1 G: 1 INJECTION, SOLUTION INTRAVENOUS at 02:11

## 2022-11-12 RX ADMIN — LOSARTAN POTASSIUM 50 MG: 50 TABLET, FILM COATED ORAL at 09:11

## 2022-11-12 RX ADMIN — Medication 10 ML: at 11:11

## 2022-11-12 RX ADMIN — ENOXAPARIN SODIUM 40 MG: 100 INJECTION SUBCUTANEOUS at 05:11

## 2022-11-12 RX ADMIN — Medication 10 ML: at 06:11

## 2022-11-12 RX ADMIN — ACETAMINOPHEN 650 MG: 325 TABLET, FILM COATED ORAL at 02:11

## 2022-11-12 RX ADMIN — FLUCONAZOLE 200 MG: 100 TABLET ORAL at 09:11

## 2022-11-12 RX ADMIN — KETOROLAC TROMETHAMINE 10 MG: 10 TABLET, FILM COATED ORAL at 11:11

## 2022-11-12 NOTE — PLAN OF CARE
POC reviewed with patient. AAOx4. VS stable on room air. Complaints of pain treated with PRN pain medication according to MAR. No other complaints verbalized during shift. Purewick utilized for voiding. Positions self x 1 assist. Turn Q2 done by RN. Instructed to call for help ambulating. No injuries, falls, or trauma occurred during shift. Purposeful rounding completed. Bed low and locked with side rails up x 3 and call light within reach.  at bedside.

## 2022-11-12 NOTE — PLAN OF CARE
PHN is requesting P2P to consider SNF auth - MD updated & agreed to complete - MD contact info forwarded to Marya CUEVA - attempted to follow up with Patricia Vieira regarding pending acceptance but she's gone for the day

## 2022-11-12 NOTE — SUBJECTIVE & OBJECTIVE
Interval History:  patient and  seen today, patient is medically ready for discharge to SNF however  refused to complete paperwork. Still awaiting insurance authorization for SNF. Patient had a BM yesterday and feels better.     Review of Systems   Constitutional:  Positive for activity change and fatigue. Negative for fever.   Respiratory:  Negative for shortness of breath.    Cardiovascular:  Negative for chest pain.   Gastrointestinal:  Negative for abdominal pain.   Genitourinary:  Positive for frequency.   Musculoskeletal:  Positive for arthralgias.   Neurological:  Negative for dizziness and headaches.   All other systems reviewed and are negative.  Objective:     Vital Signs (Most Recent):  Temp: 97.6 °F (36.4 °C) (11/11/22 1634)  Pulse: 87 (11/11/22 1901)  Resp: 16 (11/11/22 1634)  BP: (!) 161/68 (11/11/22 1634)  SpO2: 97 % (11/11/22 1634)   Vital Signs (24h Range):  Temp:  [97.5 °F (36.4 °C)-98.9 °F (37.2 °C)] 97.6 °F (36.4 °C)  Pulse:  [72-94] 87  Resp:  [16-18] 16  SpO2:  [95 %-100 %] 97 %  BP: (138-180)/(62-84) 161/68     Weight: 89.9 kg (198 lb 3.2 oz)  Body mass index is 31.04 kg/m².    Intake/Output Summary (Last 24 hours) at 11/11/2022 1920  Last data filed at 11/11/2022 1711  Gross per 24 hour   Intake 715.54 ml   Output 250 ml   Net 465.54 ml        Physical Exam  Vitals and nursing note reviewed.   Constitutional:       Appearance: Normal appearance.      Comments: Elderly female, appears older than stated age   HENT:      Head: Normocephalic and atraumatic.   Eyes:      Extraocular Movements: Extraocular movements intact.      Pupils: Pupils are equal, round, and reactive to light.   Cardiovascular:      Rate and Rhythm: Normal rate and regular rhythm.   Pulmonary:      Effort: Pulmonary effort is normal. No respiratory distress.   Abdominal:      General: There is no distension.   Musculoskeletal:         General: Normal range of motion.      Cervical back: Normal range of motion.       Comments: Globally weak   Neurological:      General: No focal deficit present.      Mental Status: She is alert and oriented to person, place, and time.   Psychiatric:         Mood and Affect: Mood normal.         Behavior: Behavior normal.       Significant Labs: All pertinent labs within the past 24 hours have been reviewed.  CBC:   Recent Labs   Lab 11/10/22  0746   WBC 11.61   HGB 12.2   HCT 36.8*          CMP:   Recent Labs   Lab 11/10/22  0746      K 3.8      CO2 28   GLU 92   BUN 9   CREATININE 0.8   CALCIUM 9.4   ANIONGAP 6*         Significant Imaging: I have reviewed all pertinent imaging results/findings within the past 24 hours.

## 2022-11-12 NOTE — PROGRESS NOTES
[facility] Medicine  Telemedicine Progress Note    Patient Name: Trista Gr  MRN: [id number]  Patient Class: OP- Observation   Admission Date: 11/7/2022  Length of Stay: 0 days  Attending Physician: Phi Laurent MD  Primary Care Provider: REGINO Yeung MD          Subjective:     Principal Problem:Urinary tract infection without hematuria        HPI:  72-year-old female with HTN, debility (bed-bound at baseline), decubitus ulcers, peripheral edema who presented with complaint of shortness of breath that started last night.  She became anxious and concerned about her health.  She reports she intermittently takes her Lasix, but most of the time does not due to inability to get to the bathroom or have her diaper changed.  She missed taking all of her medications this morning, and is unclear about when was the last time she took the Lasix.  She does report dysuria, with burning on urination.  She also reports worsening of her bedsores, with development of multiple source despite efforts by her .  She frequently only gets urinary tract infection and last infection was approximately 1 month ago.  Overall she feels weaker, is not able to help with transfers at all.  Denies chest pain, cough, fever or chills.  No reported nausea or vomiting, but does report decreased appetite.   is the primary caregiver, and they do not have home health or any other resources at home.  In the ER, workup remarkable for UA with positive nitrite, 2+ leukocytes, 20 WBC and many bacteria.  Head CT unremarkable, chest x-ray without infiltrate, BNP less than 10, COVID negative.      Overview/Hospital Course:  Trista Gr was admitted for UTI. Started on CTX, follow Ucx.  Deescalate antibiotics once cultures finalized.  Patient remained afebrile and hemodynamically stable.  Head CT was done due to complaints of dizziness which does show concern for normal pressure hydrocephalus.   Neurosurgery consult placed on 11/09 for possible evaluation inpatient.  Has been able to work with  PTOT recommending SNF.  Cm/SW following for placement.      Interval History:  patient and  seen today, patient is medically ready for discharge to SNF however  refused to complete paperwork. Still awaiting insurance authorization for SNF. Patient had a BM yesterday and feels better.     Review of Systems   Constitutional:  Positive for activity change and fatigue. Negative for fever.   Respiratory:  Negative for shortness of breath.    Cardiovascular:  Negative for chest pain.   Gastrointestinal:  Negative for abdominal pain.   Genitourinary:  Positive for frequency.   Musculoskeletal:  Positive for arthralgias.   Neurological:  Negative for dizziness and headaches.   All other systems reviewed and are negative.  Objective:     Vital Signs (Most Recent):  Temp: 97.6 °F (36.4 °C) (11/11/22 1634)  Pulse: 87 (11/11/22 1901)  Resp: 16 (11/11/22 1634)  BP: (!) 161/68 (11/11/22 1634)  SpO2: 97 % (11/11/22 1634)   Vital Signs (24h Range):  Temp:  [97.5 °F (36.4 °C)-98.9 °F (37.2 °C)] 97.6 °F (36.4 °C)  Pulse:  [72-94] 87  Resp:  [16-18] 16  SpO2:  [95 %-100 %] 97 %  BP: (138-180)/(62-84) 161/68     Weight: 89.9 kg (198 lb 3.2 oz)  Body mass index is 31.04 kg/m².    Intake/Output Summary (Last 24 hours) at 11/11/2022 1920  Last data filed at 11/11/2022 1711  Gross per 24 hour   Intake 715.54 ml   Output 250 ml   Net 465.54 ml        Physical Exam  Vitals and nursing note reviewed.   Constitutional:       Appearance: Normal appearance.      Comments: Elderly female, appears older than stated age   HENT:      Head: Normocephalic and atraumatic.   Eyes:      Extraocular Movements: Extraocular movements intact.      Pupils: Pupils are equal, round, and reactive to light.   Cardiovascular:      Rate and Rhythm: Normal rate and regular rhythm.   Pulmonary:      Effort: Pulmonary effort is normal. No respiratory  distress.   Abdominal:      General: There is no distension.   Musculoskeletal:         General: Normal range of motion.      Cervical back: Normal range of motion.      Comments: Globally weak   Neurological:      General: No focal deficit present.      Mental Status: She is alert and oriented to person, place, and time.   Psychiatric:         Mood and Affect: Mood normal.         Behavior: Behavior normal.       Significant Labs: All pertinent labs within the past 24 hours have been reviewed.  CBC:   Recent Labs   Lab 11/10/22  0746   WBC 11.61   HGB 12.2   HCT 36.8*          CMP:   Recent Labs   Lab 11/10/22  0746      K 3.8      CO2 28   GLU 92   BUN 9   CREATININE 0.8   CALCIUM 9.4   ANIONGAP 6*         Significant Imaging: I have reviewed all pertinent imaging results/findings within the past 24 hours.      Assessment/Plan:      * Urinary tract infection without hematuria  - UA consistent with infection and patient is symptomatic  - Start empiric Rocephin 1 g IV daily  - UCx resulted, will continue the above     Normal pressure hydrocephalus  -CT scan was on 11/07 which was concerning for normal-pressure hydrocephalus given enlarged ventricles  -reports she has urinary frequency, urgency, some forgetfulness and has been bed-bound (chronic gait abnormality) for 3 years  -consulted NSGY however will need to be referred to as outpatient, patient may benefit from large volume lumbar puncture    Shortness of breath  - Patient reported shortness of breath and feeling anxious, and not herself  - Noncompliant with her Lasix   - BNP <10, sats 100% on room air, chest x-ray is clear, and physical lung exam unremarkable by the time of my evaluation  - She was given IV Lasix in the ER with output of 1 L by the time of my evaluation which likely explains disparity from initial presentation in the ER  - Respiratory status is stable and she is symptomatically resolved, and appears to be more anxious about  her inability to care for herself along with difficulty by the  to care for her  - Resume p.o. scheduled Lasix 20 mg daily  - Resolved    Peripheral edema  - Chronic peripheral edema  - Reports slightly worse than her usual baseline  - Likely due to noncompliance with p.o. Lasix  - Pulse dose of IV Lasix given x1 in the ER, will resume scheduled Lasix 20 mg p.o. daily tomorrow    Pressure injury of buttock, unstageable  - Multiple bedsores located on the buttocks, minimal stage II but overall unstageable  - Turn every 2 hours and maximize nutrition  - Inpatient consult to wound care    Debility  Failure to thrive as an adult  - She had issues with osteoarthritis and chronic back pain worked up with MRIs and followed by Ortho, but pain and debility became progressive to the point she was walking with a walker and then eventually bedbound  - Review of chart reveals she has been bed-bound for approximate 2 years   - She used to be able to help with transfers but now is too weak and unable  - She has developed more bedsores, and  is unable to care for her at home in her current state  - Evaluated by PT and OT already in the ER, with recommendation for SNF placement  - Patient and  are agreeable, but would like to have more information  - Continue PT and OT, and  consult for placementL: SNF    Obesity (BMI 30.0-34.9)  - Weight reduction as outpatient    Chronic low back pain  Spinal canal stenosis  - MRI on her cervical spine in June 2021 revealed mild to moderate spinal canal stenosis at C2-3  - MRI on her lumbar spine in June 2021 revealed mild spinal canal stenosis at L3-4.  - Chronic issue, not an acute issue  - Tylenol and tramadol p.r.n.    GERD (gastroesophageal reflux disease)  - Substitute Nexium with Protonix 40 mg p.o. daily    Essential hypertension  - Blood pressure elevated due to missed home antihypertensive medications along with diuretic  - Substitute home blood  pressure medication with losartan 50 mg p.o. daily during hospitalization  - P.r.n. hydralazine       VTE Risk Mitigation (From admission, onward)         Ordered     enoxaparin injection 40 mg  Daily         11/07/22 1435     IP VTE HIGH RISK PATIENT  Once         11/07/22 1435     Place sequential compression device  Until discontinued         11/07/22 1435     Place sequential compression device  Until discontinued         11/07/22 1435                      I have completed this tele-visit with the assistance of a telepresenter.    The attending portion of this evaluation, treatment, and documentation was performed per Phi Laurent MD via Telemedicine AudioVisual using the secure Webtab software platform with 2 way audio/video. The provider was located off-site and the patient is located in the hospital. The aforementioned video software was utilized to document the relevant history and physical exam    Phi Laurent MD  Department of Hospital Medicine   [facility] ([place])

## 2022-11-13 LAB — TB INDURATION 48 - 72 HR READ: 0 MM

## 2022-11-13 PROCEDURE — 96372 THER/PROPH/DIAG INJ SC/IM: CPT | Performed by: HOSPITALIST

## 2022-11-13 PROCEDURE — A4216 STERILE WATER/SALINE, 10 ML: HCPCS | Performed by: HOSPITALIST

## 2022-11-13 PROCEDURE — 63600175 PHARM REV CODE 636 W HCPCS: Performed by: HOSPITALIST

## 2022-11-13 PROCEDURE — 99225 PR SUBSEQUENT OBSERVATION CARE,LEVEL II: ICD-10-PCS | Mod: 95,,, | Performed by: STUDENT IN AN ORGANIZED HEALTH CARE EDUCATION/TRAINING PROGRAM

## 2022-11-13 PROCEDURE — 96366 THER/PROPH/DIAG IV INF ADDON: CPT

## 2022-11-13 PROCEDURE — 25000003 PHARM REV CODE 250: Performed by: STUDENT IN AN ORGANIZED HEALTH CARE EDUCATION/TRAINING PROGRAM

## 2022-11-13 PROCEDURE — 25000003 PHARM REV CODE 250: Performed by: HOSPITALIST

## 2022-11-13 PROCEDURE — G0378 HOSPITAL OBSERVATION PER HR: HCPCS

## 2022-11-13 PROCEDURE — 25000003 PHARM REV CODE 250: Performed by: PHYSICIAN ASSISTANT

## 2022-11-13 PROCEDURE — 99225 PR SUBSEQUENT OBSERVATION CARE,LEVEL II: CPT | Mod: 95,,, | Performed by: STUDENT IN AN ORGANIZED HEALTH CARE EDUCATION/TRAINING PROGRAM

## 2022-11-13 RX ORDER — DICLOFENAC SODIUM 10 MG/G
2 GEL TOPICAL 4 TIMES DAILY
Status: DISCONTINUED | OUTPATIENT
Start: 2022-11-13 | End: 2022-11-15 | Stop reason: HOSPADM

## 2022-11-13 RX ADMIN — DICLOFENAC 2 G: 10 GEL TOPICAL at 09:11

## 2022-11-13 RX ADMIN — Medication 10 ML: at 09:11

## 2022-11-13 RX ADMIN — CEFTRIAXONE 1 G: 1 INJECTION, SOLUTION INTRAVENOUS at 03:11

## 2022-11-13 RX ADMIN — LOSARTAN POTASSIUM 50 MG: 50 TABLET, FILM COATED ORAL at 09:11

## 2022-11-13 RX ADMIN — DICLOFENAC 2 G: 10 GEL TOPICAL at 04:11

## 2022-11-13 RX ADMIN — FUROSEMIDE 20 MG: 20 TABLET ORAL at 09:11

## 2022-11-13 RX ADMIN — ACETAMINOPHEN 650 MG: 325 TABLET, FILM COATED ORAL at 12:11

## 2022-11-13 RX ADMIN — Medication 10 ML: at 03:11

## 2022-11-13 RX ADMIN — ENOXAPARIN SODIUM 40 MG: 100 INJECTION SUBCUTANEOUS at 04:11

## 2022-11-13 RX ADMIN — Medication 10 ML: at 06:11

## 2022-11-13 RX ADMIN — PANTOPRAZOLE SODIUM 40 MG: 40 TABLET, DELAYED RELEASE ORAL at 09:11

## 2022-11-13 NOTE — PLAN OF CARE
POC reviewed with patient. AAOx4. VS stable on room air. Complaints of pain treated with PRN pain medication according to MAR. No other complaints verbalized during shift. Purewick utilized for voiding. Turn Q2 done by RN. Instructed to call for help ambulating. No injuries, falls, or trauma occurred during shift. Purposeful rounding completed. Bed low and locked with side rails up x 3 and call light within reach.

## 2022-11-14 PROCEDURE — 25000003 PHARM REV CODE 250: Performed by: HOSPITALIST

## 2022-11-14 PROCEDURE — 97110 THERAPEUTIC EXERCISES: CPT | Mod: CQ

## 2022-11-14 PROCEDURE — G0378 HOSPITAL OBSERVATION PER HR: HCPCS

## 2022-11-14 PROCEDURE — 97535 SELF CARE MNGMENT TRAINING: CPT

## 2022-11-14 PROCEDURE — 25000003 PHARM REV CODE 250: Performed by: PHYSICIAN ASSISTANT

## 2022-11-14 PROCEDURE — 96372 THER/PROPH/DIAG INJ SC/IM: CPT | Performed by: HOSPITALIST

## 2022-11-14 PROCEDURE — 97530 THERAPEUTIC ACTIVITIES: CPT | Mod: CQ

## 2022-11-14 PROCEDURE — 25000003 PHARM REV CODE 250: Performed by: NURSE PRACTITIONER

## 2022-11-14 PROCEDURE — 63600175 PHARM REV CODE 636 W HCPCS: Performed by: HOSPITALIST

## 2022-11-14 PROCEDURE — 25000003 PHARM REV CODE 250: Performed by: STUDENT IN AN ORGANIZED HEALTH CARE EDUCATION/TRAINING PROGRAM

## 2022-11-14 PROCEDURE — 99225 PR SUBSEQUENT OBSERVATION CARE,LEVEL II: CPT | Mod: 95,,, | Performed by: STUDENT IN AN ORGANIZED HEALTH CARE EDUCATION/TRAINING PROGRAM

## 2022-11-14 PROCEDURE — 99225 PR SUBSEQUENT OBSERVATION CARE,LEVEL II: ICD-10-PCS | Mod: 95,,, | Performed by: STUDENT IN AN ORGANIZED HEALTH CARE EDUCATION/TRAINING PROGRAM

## 2022-11-14 PROCEDURE — A4216 STERILE WATER/SALINE, 10 ML: HCPCS | Performed by: HOSPITALIST

## 2022-11-14 RX ORDER — TRAMADOL HYDROCHLORIDE 50 MG/1
50 TABLET ORAL EVERY 6 HOURS PRN
Status: DISCONTINUED | OUTPATIENT
Start: 2022-11-15 | End: 2022-11-15 | Stop reason: HOSPADM

## 2022-11-14 RX ORDER — BUTALBITAL, ACETAMINOPHEN AND CAFFEINE 50; 325; 40 MG/1; MG/1; MG/1
1 TABLET ORAL ONCE
Status: DISCONTINUED | OUTPATIENT
Start: 2022-11-14 | End: 2022-11-14

## 2022-11-14 RX ORDER — KETOROLAC TROMETHAMINE 30 MG/ML
15 INJECTION, SOLUTION INTRAMUSCULAR; INTRAVENOUS ONCE
Status: COMPLETED | OUTPATIENT
Start: 2022-11-15 | End: 2022-11-15

## 2022-11-14 RX ORDER — GUAIFENESIN 600 MG/1
600 TABLET, EXTENDED RELEASE ORAL 2 TIMES DAILY PRN
Status: DISCONTINUED | OUTPATIENT
Start: 2022-11-14 | End: 2022-11-15 | Stop reason: HOSPADM

## 2022-11-14 RX ORDER — BUTALBITAL, ACETAMINOPHEN AND CAFFEINE 50; 325; 40 MG/1; MG/1; MG/1
1 TABLET ORAL ONCE
Status: COMPLETED | OUTPATIENT
Start: 2022-11-14 | End: 2022-11-14

## 2022-11-14 RX ADMIN — DICLOFENAC 2 G: 10 GEL TOPICAL at 09:11

## 2022-11-14 RX ADMIN — FUROSEMIDE 20 MG: 20 TABLET ORAL at 09:11

## 2022-11-14 RX ADMIN — GUAIFENESIN 600 MG: 600 TABLET, EXTENDED RELEASE ORAL at 11:11

## 2022-11-14 RX ADMIN — Medication 10 ML: at 10:11

## 2022-11-14 RX ADMIN — LOSARTAN POTASSIUM 50 MG: 50 TABLET, FILM COATED ORAL at 09:11

## 2022-11-14 RX ADMIN — ENOXAPARIN SODIUM 40 MG: 100 INJECTION SUBCUTANEOUS at 04:11

## 2022-11-14 RX ADMIN — DICLOFENAC 2 G: 10 GEL TOPICAL at 12:11

## 2022-11-14 RX ADMIN — ACETAMINOPHEN 650 MG: 325 TABLET, FILM COATED ORAL at 01:11

## 2022-11-14 RX ADMIN — BUTALBITAL, ACETAMINOPHEN, AND CAFFEINE 1 TABLET: 50; 325; 40 TABLET ORAL at 10:11

## 2022-11-14 RX ADMIN — DICLOFENAC 2 G: 10 GEL TOPICAL at 05:11

## 2022-11-14 RX ADMIN — Medication 10 ML: at 06:11

## 2022-11-14 RX ADMIN — ACETAMINOPHEN 650 MG: 325 TABLET, FILM COATED ORAL at 02:11

## 2022-11-14 RX ADMIN — PANTOPRAZOLE SODIUM 40 MG: 40 TABLET, DELAYED RELEASE ORAL at 09:11

## 2022-11-14 RX ADMIN — ACETAMINOPHEN 650 MG: 325 TABLET, FILM COATED ORAL at 10:11

## 2022-11-14 NOTE — ASSESSMENT & PLAN NOTE
- UA consistent with infection and patient is symptomatic  - Start empiric Rocephin 1 g IV daily  - UCx resulted, will continue the above   - completed 7 day course

## 2022-11-14 NOTE — PLAN OF CARE
11/14/22 1505   Post-Acute Status   Post-Acute Authorization Placement   Hospital Resources/Appts/Education Provided Provided patient/caregiver with written discharge plan information   Discharge Delays (!) Post-Acute Set-up   Discharge Plan   Discharge Plan A Skilled Nursing Facility   Discharge Plan B Home with family;Home Health     NIA called Patricia at [facility], who stated that the patient was not accepted at the facility.  She stated that they could not meet her needs at this time.      4 pm    NIA Omer and NIA Montanez met with the patient  and her  to discuss her  discharge which is scheduled for tomorrow 11/15/22.  They had questions about HH and DME. The patient and her  selected [facility] and she signed the choices form. They were advised to speak to the patient's therapist and MD about equipment, such as a lift for the tub and gel mattress. SW to follow up with them in the am tomorrow.

## 2022-11-14 NOTE — PT/OT/SLP PROGRESS
Physical Therapy Treatment    Patient Name:  Trista Gr   MRN:  [id number]    Recommendations:     Discharge Recommendations:  nursing facility, skilled   Discharge Equipment Recommendations:  (TBD at next level of care)   Barriers to discharge: Decreased caregiver support    Assessment:     Trista Gr is a 72 y.o. female admitted with a medical diagnosis of Urinary tract infection without hematuria.  She presents with the following impairments/functional limitations:  weakness, pain, edema, impaired balance, impaired endurance, impaired functional mobility, impaired joint extensibility, impaired self care skills, impaired skin, decreased coordination, decreased lower extremity function, decreased ROM, decreased safety awareness, decreased upper extremity function.    Supine>sit with maxA using log roll  Sit>stand with maxA x 2 and RW  Static stand x 10-15 secs with RW and maxA x 2, buttocks cleared bed but full extension not reached  Static sitting EOB x 25 mins with modA-SBA, BUE support  Sit>supine with totalA x 2  Roll L and R with modA (x 3 trials)  Scoot toward HOB with totalA x 2  Pt with good effort to stand, much assist required; pain and edematous LE's limiting efforts at mobility  Rec SNF    Rehab Prognosis: Fair; patient would benefit from acute skilled PT services to address these deficits and reach maximum level of function.    Recent Surgery: * No surgery found *      Plan:     During this hospitalization, patient to be seen 3 x/week to address the identified rehab impairments via therapeutic activities, therapeutic exercises, neuromuscular re-education, wheelchair management/training and progress toward the following goals:    Plan of Care Expires:  12/07/22    Subjective     Chief Complaint: pain  Patient/Family Comments/goals: pt fearing that she'll end up staying at NH  Pain/Comfort:  Pain Rating 1: other (see comments) (not rated)  Location - Side 1: Left  Location - Orientation  "1: lateral  Location 1: thigh ("pulled muscle")  Pain Addressed 1: Reposition, Distraction, Cessation of Activity  Pain Rating Post-Intervention 1: other (see comments) (not rated)  Pain Rating 2: other (see comments) (not rated)  Location - Side 2: Bilateral  Location - Orientation 2: lower  Location 2: back (and headache)  Pain Addressed 2: Reposition, Distraction, Cessation of Activity  Pain Rating Post-Intervention 2: other (see comments) (not rated)      Objective:     Communicated with nurse MARGIE prior to session.  Patient found HOB elevated with telemetry, peripheral IV, PureWick upon PT entry to room.     General Precautions: Standard, fall   Orthopedic Precautions:N/A   Braces:    Respiratory Status: Room air     Functional Mobility:  Bed Mobility:     Rolling Left:  moderate assistance  Rolling Right: moderate assistance  Scooting: total assistance and of 2 persons  Supine to Sit: maximal assistance  Sit to Supine: total assistance and of 2 persons  Transfers:     Sit to Stand:  maximal assistance and of 2 persons with rolling walker      AM-PAC 6 CLICK MOBILITY  Turning over in bed (including adjusting bedclothes, sheets and blankets)?: 2  Sitting down on and standing up from a chair with arms (e.g., wheelchair, bedside commode, etc.): 1  Moving from lying on back to sitting on the side of the bed?: 2  Moving to and from a bed to a chair (including a wheelchair)?: 1  Need to walk in hospital room?: 1  Climbing 3-5 steps with a railing?: 1  Basic Mobility Total Score: 8       Treatment & Education:  Static sitting EOB x 25 mins with modA-SBA, BUE support  Static stand x 10-15 secs with maxA x 2 and RW  Seated therex: heel raises, LAQ, hip flexion x 8    Patient left HOB elevated with all lines intact, call button in reach, nurse MARGIE notified, and spouse present..    GOALS:   Multidisciplinary Problems       Physical Therapy Goals          Problem: Physical Therapy    Goal Priority Disciplines Outcome Goal " Variances Interventions   Physical Therapy Goal     PT, PT/OT Ongoing, Progressing     Description: Goals to be met by: 22    Patient will increase functional independence with mobility by performin. Roll L and R with Abraham with appropriate use of hospital bed features.  2. Supine<>sit with modA with appropriate use of hospital bed features.  3. Transfer bed<>chair with maxA with most appropriate technique.   4. Partial sit<>stand with Abraham with BUE support.  5. Spouse to demo safe transfer technique with patient for bed<>WC or commode transfers.                        Time Tracking:     PT Received On: 22  PT Start Time: 1129     PT Stop Time: 1237  PT Total Time (min): 68 min     Billable Minutes: Therapeutic Activity 60 and Therapeutic Exercise 8  Co-treat with OT due to complex nature of patient, to insure patient safety, and to prevent injury to patient and caregivers, requiring intervention of two skilled therapists.    Treatment Type: Treatment  PT/PTA: PTA     PTA Visit Number: 4     2022

## 2022-11-14 NOTE — PLAN OF CARE
[facility] ([place])      HOME HEALTH ORDERS  FACE TO FACE ENCOUNTER    Patient Name: Trista Gr  YOB: 1950    PCP: REGINO Yeung MD   PCP Address: [address] [address] / [place] [address]  PCP Phone Number: [phone]  PCP Fax: [phone]    Encounter Date: 11/7/22    Admit to Home Health    Diagnoses:  Active Hospital Problems    Diagnosis  POA    *Urinary tract infection without hematuria [N39.0]  Yes    Normal pressure hydrocephalus [G91.2]  Yes    Debility [R53.81]  Yes    Pressure injury of buttock, unstageable [L89.300]  Yes    Peripheral edema [R60.9]  Yes    Shortness of breath [R06.02]  Yes    Obesity (BMI 30.0-34.9) [E66.9]  Yes    Chronic low back pain [M54.50, G89.29]  Yes     Dr. Mihir Capellan (Ortho)  X-ray - 10/18      GERD (gastroesophageal reflux disease) [K21.9]  Yes     EGD 2000      Essential hypertension [I10]  Yes     1993        Resolved Hospital Problems   No resolved problems to display.       Follow Up Appointments:  Future Appointments   Date Time Provider Department Center   12/13/2022  3:00 PM Danielle Chen PA-C [facility] NEURO8 [facility]       Allergies:  Review of patient's allergies indicates:   Allergen Reactions    Aspirin      Sore stomach    Codeine Itching    Pcn [penicillins] Itching       Medications: Review discharge medications with patient and family and provide education.    Current Facility-Administered Medications   Medication Dose Route Frequency Provider Last Rate Last Admin    acetaminophen tablet 650 mg  650 mg Oral Q8H PRN Kathleen Ro PA-C   650 mg at 11/14/22 1437    dextrose 10% bolus 125 mL  12.5 g Intravenous PRN Julee Hyde MD        dextrose 10% bolus 250 mL  25 g Intravenous PRN Julee Hyde MD        diclofenac sodium 1 % gel 2 g  2 g Topical (Top) QID Phi Laurent MD   2 g at 11/14/22 1241    enoxaparin injection 40 mg  40 mg Subcutaneous Daily Julee Hyde MD   40 mg at 11/14/22 1606     furosemide tablet 20 mg  20 mg Oral Daily Julee Hyde MD   20 mg at 11/14/22 0920    glucagon (human recombinant) injection 1 mg  1 mg Intramuscular PRN Julee Hyde MD        glucose chewable tablet 16 g  16 g Oral PRN Julee Hyde MD        glucose chewable tablet 24 g  24 g Oral PRN Julee Hyde MD        losartan tablet 50 mg  50 mg Oral Daily Julee Hyde MD   50 mg at 11/14/22 0920    melatonin tablet 6 mg  6 mg Oral Nightly PRN Kane Lee MD        naloxone 0.4 mg/mL injection 0.02 mg  0.02 mg Intravenous PRN Julee Hyde MD        ondansetron injection 4 mg  4 mg Intravenous Q8H PRN Julee Hyde MD        pantoprazole EC tablet 40 mg  40 mg Oral Daily Julee Hyde MD   40 mg at 11/14/22 0920    simethicone chewable tablet 80 mg  1 tablet Oral TID PRN Mariya Carey PA-C   80 mg at 11/08/22 0720    sodium chloride 0.9% flush 10 mL  10 mL Intravenous PRN Kane Lee MD        sodium chloride 0.9% flush 10 mL  10 mL Intravenous Q8H Julee Hyde MD   10 mL at 11/14/22 0619     Current Discharge Medication List        CONTINUE these medications which have NOT CHANGED    Details   celecoxib (CELEBREX) 200 MG capsule Take 1 capsule (200 mg total) by mouth daily as needed for Pain.  Qty: 30 capsule, Refills: 1      fish oil-omega-3 fatty acids 300-1,000 mg capsule Take 1 g by mouth once daily.      furosemide (LASIX) 20 MG tablet TAKE 1 TABLET(20 MG) BY MOUTH EVERY DAY  Qty: 90 tablet, Refills: 3      telmisartan (MICARDIS) 40 MG Tab TAKE 1 TABLET(40 MG) BY MOUTH EVERY DAY  Qty: 90 tablet, Refills: 0    Comments: **Patient requests 90 days supply**      acetaminophen (TYLENOL) 650 MG TbSR Take 1 tablet (650 mg total) by mouth 3 (three) times daily as needed.  Refills: 0      esomeprazole (NEXIUM) 20 MG capsule Take 20 mg by mouth daily as needed.      traMADoL (ULTRAM) 50 mg tablet TAKE 1 TABLET BY MOUTH EVERY OTHER DAY AS NEEDED FOR PAIN  Qty: 15 tablet, Refills: 0           STOP taking  these medications       ciprofloxacin HCl (CIPRO) 500 MG tablet Comments:   Reason for Stopping:                 I have seen and examined this patient within the last 30 days. My clinical findings that support the need for the home health skilled services and home bound status are the following:no   Weakness/numbness causing balance and gait disturbance due to Weakness/Debility making it taxing to leave home.     Diet:   cardiac diet    Labs:  N/A    Referrals/ Consults  Physical Therapy to evaluate and treat. Evaluate for home safety and equipment needs; Establish/upgrade home exercise program. Perform / instruct on therapeutic exercises, gait training, transfer training, and Range of Motion.  Occupational Therapy to evaluate and treat. Evaluate home environment for safety and equipment needs. Perform/Instruct on transfers, ADL training, ROM, and therapeutic exercises.   to evaluate for community resources/long-range planning.    Activities:   activity as tolerated    Nursing:   Agency to admit patient within 24 hours of hospital discharge unless specified on physician order or at patient request    SN to complete comprehensive assessment including routine vital signs. Instruct on disease process and s/s of complications to report to MD. Review/verify medication list sent home with the patient at time of discharge  and instruct patient/caregiver as needed. Frequency may be adjusted depending on start of care date.     Skilled nurse to perform up to 3 visits PRN for symptoms related to diagnosis    Notify MD if SBP > 160 or < 90; DBP > 90 or < 50; HR > 120 or < 50; Temp > 101; O2 < 88%;     Ok to schedule additional visits based on staff availability and patient request on consecutive days within the home health episode.    When multiple disciplines ordered:    Start of Care occurs on Sunday - Wednesday schedule remaining discipline evaluations as ordered on separate consecutive days following the start  of care.    Thursday SOC -schedule subsequent evaluations Friday and Monday the following week.     Friday - Saturday SOC - schedule subsequent discipline evaluations on consecutive days starting Monday of the following week.    For all post-discharge communication and subsequent orders please contact patient's primary care physician.     Miscellaneous   Routine Skin for Bedridden Patients: Instruct patient/caregiver to apply moisture barrier cream to all skin folds and wet areas in perineal area daily and after baths and all bowel movements.    Home Health Aide:  Physical Therapy Three times weekly, Occupational Therapy Three times weekly, and Medical Social Work Twice weekly  Wound Care Orders  no    I certify that this patient is confined to her home and needs physical therapy and occupational therapy.

## 2022-11-14 NOTE — PLAN OF CARE
During my shift, pt AAOx4, NAD. VS stable. Pt remains afebrile. SPO2 wnl on room air. Pt tolerating cardiac diet. Assistance provided with all meals. Purewick in place collecting clear yellow urine. NSR noted on cardiac telemetry. Voltaren gel applied to right knee for knee pain. Wound care provided to sacrum as ordered. Pt remains free from falls or injury. Bed is lowered and locked, bed alarm set. Call light is within reach. Pt has no known needs at this time.

## 2022-11-14 NOTE — SUBJECTIVE & OBJECTIVE
Interval History:  patient with headache this AM - fioricet ordered. Miralax also d/c yesterday due to several bowel movements. Patient received insurance auth for SNF - under review at [facility].     Review of Systems   Constitutional:  Positive for activity change and fatigue. Negative for fever.   Respiratory:  Negative for shortness of breath.    Cardiovascular:  Negative for chest pain.   Gastrointestinal:  Negative for abdominal pain.   Genitourinary:  Negative for frequency.   Musculoskeletal:  Positive for arthralgias.   Neurological:  Negative for dizziness and headaches.   All other systems reviewed and are negative.  Objective:     Vital Signs (Most Recent):  Temp: 97.9 °F (36.6 °C) (11/14/22 1118)  Pulse: 95 (11/14/22 1209)  Resp: 16 (11/14/22 1118)  BP: (!) 149/66 (11/14/22 1118)  SpO2: 100 % (11/14/22 1118)   Vital Signs (24h Range):  Temp:  [97.5 °F (36.4 °C)-98.2 °F (36.8 °C)] 97.9 °F (36.6 °C)  Pulse:  [62-97] 95  Resp:  [14-18] 16  SpO2:  [95 %-100 %] 100 %  BP: (132-168)/(60-75) 149/66     Weight: 89.9 kg (198 lb 3.2 oz)  Body mass index is 31.04 kg/m².    Intake/Output Summary (Last 24 hours) at 11/14/2022 1340  Last data filed at 11/14/2022 0623  Gross per 24 hour   Intake 247 ml   Output 950 ml   Net -703 ml        Physical Exam  Vitals and nursing note reviewed.   Constitutional:       Appearance: Normal appearance.      Comments: Elderly female, appears older than stated age   HENT:      Head: Normocephalic and atraumatic.   Eyes:      Extraocular Movements: Extraocular movements intact.      Pupils: Pupils are equal, round, and reactive to light.   Cardiovascular:      Rate and Rhythm: Normal rate and regular rhythm.   Pulmonary:      Effort: Pulmonary effort is normal. No respiratory distress.   Abdominal:      General: There is no distension.   Musculoskeletal:         General: Normal range of motion.      Cervical back: Normal range of motion.      Comments: Globally weak   Neurological:       General: No focal deficit present.      Mental Status: She is alert and oriented to person, place, and time.   Psychiatric:         Mood and Affect: Mood normal.         Behavior: Behavior normal.       Significant Labs: All pertinent labs within the past 24 hours have been reviewed.  CBC:   No results for input(s): WBC, HGB, HCT, PLT in the last 48 hours.    CMP:   No results for input(s): NA, K, CL, CO2, GLU, BUN, CREATININE, CALCIUM, PROT, ALBUMIN, BILITOT, ALKPHOS, AST, ALT, ANIONGAP, EGFRNONAA in the last 48 hours.    Invalid input(s): ESTGFAFR[place]      Significant Imaging: I have reviewed all pertinent imaging results/findings within the past 24 hours.

## 2022-11-14 NOTE — SUBJECTIVE & OBJECTIVE
Interval History:  doing well, miralax d/c due to diarrhea. Pending placement.    Review of Systems   Constitutional:  Positive for activity change and fatigue. Negative for fever.   Respiratory:  Negative for shortness of breath.    Cardiovascular:  Negative for chest pain.   Gastrointestinal:  Negative for abdominal pain.   Genitourinary:  Positive for frequency.   Musculoskeletal:  Positive for arthralgias.   Neurological:  Negative for dizziness and headaches.   All other systems reviewed and are negative.  Objective:     Vital Signs (Most Recent):  Temp: 97.8 °F (36.6 °C) (11/13/22 2300)  Pulse: 78 (11/13/22 2300)  Resp: 18 (11/13/22 2300)  BP: 134/66 (11/13/22 2300)  SpO2: 99 % (11/13/22 2300)   Vital Signs (24h Range):  Temp:  [97.5 °F (36.4 °C)-98.3 °F (36.8 °C)] 97.8 °F (36.6 °C)  Pulse:  [72-97] 78  Resp:  [16-18] 18  SpO2:  [95 %-99 %] 99 %  BP: (132-150)/(60-75) 134/66     Weight: 89.9 kg (198 lb 3.2 oz)  Body mass index is 31.04 kg/m².    Intake/Output Summary (Last 24 hours) at 11/13/2022 2343  Last data filed at 11/13/2022 1539  Gross per 24 hour   Intake 476 ml   Output 800 ml   Net -324 ml        Physical Exam  Vitals and nursing note reviewed.   Constitutional:       Appearance: Normal appearance.      Comments: Elderly female, appears older than stated age   HENT:      Head: Normocephalic and atraumatic.   Eyes:      Extraocular Movements: Extraocular movements intact.      Pupils: Pupils are equal, round, and reactive to light.   Cardiovascular:      Rate and Rhythm: Normal rate and regular rhythm.   Pulmonary:      Effort: Pulmonary effort is normal. No respiratory distress.   Abdominal:      General: There is no distension.   Musculoskeletal:         General: Normal range of motion.      Cervical back: Normal range of motion.      Comments: Globally weak   Neurological:      General: No focal deficit present.      Mental Status: She is alert and oriented to person, place, and time.    Psychiatric:         Mood and Affect: Mood normal.         Behavior: Behavior normal.       Significant Labs: All pertinent labs within the past 24 hours have been reviewed.  CBC:   No results for input(s): WBC, HGB, HCT, PLT in the last 48 hours.    CMP:   No results for input(s): NA, K, CL, CO2, GLU, BUN, CREATININE, CALCIUM, PROT, ALBUMIN, BILITOT, ALKPHOS, AST, ALT, ANIONGAP, EGFRNONAA in the last 48 hours.    Invalid input(s): ESTGF[place]      Significant Imaging: I have reviewed all pertinent imaging results/findings within the past 24 hours.

## 2022-11-14 NOTE — PLAN OF CARE
Problem: Occupational Therapy  Goal: Occupational Therapy Goal  Description: Goals to be met by: 11/21/2022     Patient will increase functional independence with ADLs by performing:    Feeding in supported position with Contact Guard Assistance.  Grooming in supported position while seated with supervision.  Sitting at edge of bed x 10 minutes with supervision.  Rolling to Bilateral with Min A.  Supine to sit with Moderate Assistance.    Completed Goals:   Sitting at edge of bed x 5 minutes with Contact Guard Assistance. MET 11/8/2022  Grooming in supported position while seated with Stand-by Assistance. MET (in unsupported position)11/8/2022  Sitting at edge of bed x 10 minutes with Contact Guard Assistance.  MET 11/9/2022  Rolling to Bilateral with Moderate Assistance.  Met 11/9/2022  Sitting at edge of bed x 10 minutes with SBA. MET 11/14/2022      Outcome: Ongoing, Progressing     Pt met one goal this date and is making progress towards others.  SNF is recommended upon d/c from acute care to further address deficits and help pt improve overall functional independence.     ELIZABETH Amlanza  11/14/2022

## 2022-11-14 NOTE — PROGRESS NOTES
[facility]  Telemedicine Progress Note    Patient Name: Trista Gr  MRN: [id number]  Patient Class: OP- Observation   Admission Date: 11/7/2022  Length of Stay: 0 days  Attending Physician: Phi Laurent MD  Primary Care Provider: REGINO Yeung MD          Subjective:     Principal Problem:Urinary tract infection without hematuria        HPI:  72-year-old female with HTN, debility (bed-bound at baseline), decubitus ulcers, peripheral edema who presented with complaint of shortness of breath that started last night.  She became anxious and concerned about her health.  She reports she intermittently takes her Lasix, but most of the time does not due to inability to get to the bathroom or have her diaper changed.  She missed taking all of her medications this morning, and is unclear about when was the last time she took the Lasix.  She does report dysuria, with burning on urination.  She also reports worsening of her bedsores, with development of multiple source despite efforts by her .  She frequently only gets urinary tract infection and last infection was approximately 1 month ago.  Overall she feels weaker, is not able to help with transfers at all.  Denies chest pain, cough, fever or chills.  No reported nausea or vomiting, but does report decreased appetite.   is the primary caregiver, and they do not have home health or any other resources at home.  In the ER, workup remarkable for UA with positive nitrite, 2+ leukocytes, 20 WBC and many bacteria.  Head CT unremarkable, chest x-ray without infiltrate, BNP less than 10, COVID negative.      Overview/Hospital Course:  Trista Gr was admitted for UTI. Started on CTX, follow Ucx.  Deescalate antibiotics once cultures finalized.  Patient remained afebrile and hemodynamically stable.  Head CT was done due to complaints of dizziness which does show concern for normal pressure hydrocephalus.   Neurosurgery consult placed on 11/09 for possible evaluation inpatient.  Has been able to work with  PTOT recommending SNF.  Cm/SW following for placement.      Interval History:  patient with headache this AM - fioricet ordered. Miralax also d/c yesterday due to several bowel movements. Patient received insurance auth for SNF - under review at [facility].     Review of Systems   Constitutional:  Positive for activity change and fatigue. Negative for fever.   Respiratory:  Negative for shortness of breath.    Cardiovascular:  Negative for chest pain.   Gastrointestinal:  Negative for abdominal pain.   Genitourinary:  Negative for frequency.   Musculoskeletal:  Positive for arthralgias.   Neurological:  Negative for dizziness and headaches.   All other systems reviewed and are negative.  Objective:     Vital Signs (Most Recent):  Temp: 97.9 °F (36.6 °C) (11/14/22 1118)  Pulse: 95 (11/14/22 1209)  Resp: 16 (11/14/22 1118)  BP: (!) 149/66 (11/14/22 1118)  SpO2: 100 % (11/14/22 1118)   Vital Signs (24h Range):  Temp:  [97.5 °F (36.4 °C)-98.2 °F (36.8 °C)] 97.9 °F (36.6 °C)  Pulse:  [62-97] 95  Resp:  [14-18] 16  SpO2:  [95 %-100 %] 100 %  BP: (132-168)/(60-75) 149/66     Weight: 89.9 kg (198 lb 3.2 oz)  Body mass index is 31.04 kg/m².    Intake/Output Summary (Last 24 hours) at 11/14/2022 1340  Last data filed at 11/14/2022 0623  Gross per 24 hour   Intake 247 ml   Output 950 ml   Net -703 ml        Physical Exam  Vitals and nursing note reviewed.   Constitutional:       Appearance: Normal appearance.      Comments: Elderly female, appears older than stated age   HENT:      Head: Normocephalic and atraumatic.   Eyes:      Extraocular Movements: Extraocular movements intact.      Pupils: Pupils are equal, round, and reactive to light.   Cardiovascular:      Rate and Rhythm: Normal rate and regular rhythm.   Pulmonary:      Effort: Pulmonary effort is normal. No respiratory distress.   Abdominal:      General: There is no  distension.   Musculoskeletal:         General: Normal range of motion.      Cervical back: Normal range of motion.      Comments: Globally weak   Neurological:      General: No focal deficit present.      Mental Status: She is alert and oriented to person, place, and time.   Psychiatric:         Mood and Affect: Mood normal.         Behavior: Behavior normal.       Significant Labs: All pertinent labs within the past 24 hours have been reviewed.  CBC:   No results for input(s): WBC, HGB, HCT, PLT in the last 48 hours.    CMP:   No results for input(s): NA, K, CL, CO2, GLU, BUN, CREATININE, CALCIUM, PROT, ALBUMIN, BILITOT, ALKPHOS, AST, ALT, ANIONGAP, EGFRNONAA in the last 48 hours.    Invalid input(s): ESTGFAFR[place]      Significant Imaging: I have reviewed all pertinent imaging results/findings within the past 24 hours.      Assessment/Plan:      * Urinary tract infection without hematuria  - UA consistent with infection and patient is symptomatic  - Start empiric Rocephin 1 g IV daily  - UCx resulted, will continue the above   - completed 7 day course    Normal pressure hydrocephalus  -CT scan was on 11/07 which was concerning for normal-pressure hydrocephalus given enlarged ventricles  -reports she has urinary frequency, urgency, some forgetfulness and has been bed-bound (chronic gait abnormality) for 3 years  -consulted NSGY however will need to be referred to as outpatient, patient may benefit from large volume lumbar puncture    Shortness of breath  - Patient reported shortness of breath and feeling anxious, and not herself  - Noncompliant with her Lasix   - BNP <10, sats 100% on room air, chest x-ray is clear, and physical lung exam unremarkable by the time of my evaluation  - She was given IV Lasix in the ER with output of 1 L by the time of my evaluation which likely explains disparity from initial presentation in the ER  - Respiratory status is stable and she is symptomatically resolved, and appears to be  more anxious about her inability to care for herself along with difficulty by the  to care for her  - Resume p.o. scheduled Lasix 20 mg daily  - Resolved    Peripheral edema  - Chronic peripheral edema  - Reports slightly worse than her usual baseline  - Likely due to noncompliance with p.o. Lasix  - Pulse dose of IV Lasix given x1 in the ER, now on scheduled Lasix 20 mg p.o. daily    Pressure injury of buttock, unstageable  - Multiple bedsores located on the buttocks, minimal stage II but overall unstageable  - Turn every 2 hours and maximize nutrition  - Inpatient consult to wound care    Debility  Failure to thrive as an adult  - She had issues with osteoarthritis and chronic back pain worked up with MRIs and followed by Ortho, but pain and debility became progressive to the point she was walking with a walker and then eventually bedbound  - Review of chart reveals she has been bed-bound for approximate 2 years   - She used to be able to help with transfers but now is too weak and unable  - She has developed more bedsores, and  is unable to care for her at home in her current state  - Evaluated by PT and OT already in the ER, with recommendation for SNF placement  - Patient and  are agreeable, but would like to have more information  - Continue PT and OT, and  consult for placementL: SNF    Obesity (BMI 30.0-34.9)  - Weight reduction as outpatient    Chronic low back pain  Spinal canal stenosis  - MRI on her cervical spine in June 2021 revealed mild to moderate spinal canal stenosis at C2-3  - MRI on her lumbar spine in June 2021 revealed mild spinal canal stenosis at L3-4.  - Chronic issue, not an acute issue  - Tylenol and tramadol p.r.n.    GERD (gastroesophageal reflux disease)  - Substitute Nexium with Protonix 40 mg p.o. daily    Essential hypertension  - Blood pressure elevated due to missed home antihypertensive medications along with diuretic  - Substitute home blood  pressure medication with losartan 50 mg p.o. daily during hospitalization  - P.r.n. hydralazine       VTE Risk Mitigation (From admission, onward)         Ordered     enoxaparin injection 40 mg  Daily         11/07/22 1435     IP VTE HIGH RISK PATIENT  Once         11/07/22 1435     Place sequential compression device  Until discontinued         11/07/22 1435     Place sequential compression device  Until discontinued         11/07/22 1435                      I have completed this tele-visit with the assistance of a telepresenter.    The attending portion of this evaluation, treatment, and documentation was performed per Phi Laurent MD via Telemedicine AudioVisual using the secure [employer] software platform with 2 way audio/video. The provider was located off-site and the patient is located in the hospital. The aforementioned video software was utilized to document the relevant history and physical exam    Phi Laurent MD  Department of Hospital Medicine   [facility] ([place])

## 2022-11-14 NOTE — PLAN OF CARE
Plan of care reviewed with patient and family.  Patient verbalized understanding and had no further questions.  Patient continues to have generalized pain and receiving tylenol.  Patient worked with PT/OT this shift and being turned every two hours.  Patient now resting comfortably in bed locked in lowest position, side rails up x3, and call bell in reach.  Will continue to monitor.       Problem: Infection  Goal: Absence of Infection Signs and Symptoms  Outcome: Ongoing, Progressing     Problem: Skin Injury Risk Increased  Goal: Skin Health and Integrity  Outcome: Ongoing, Progressing

## 2022-11-14 NOTE — PT/OT/SLP PROGRESS
Occupational Therapy   Treatment    Name: Trista Gr  MRN: [id number]  Admitting Diagnosis:  Urinary tract infection without hematuria       Recommendations:     Discharge Recommendations: nursing facility, skilled  Discharge Equipment Recommendations:   (TBD at next level of care); if pt returns home then madelin lift sling for BSC  (bottom cut out) recommended  -pt and  have also expressed interested in shower lift chair to get in and out of tub  Barriers to discharge:  Decreased caregiver support ( reports he is unable to provide level of care needed at this time)    Assessment:     Trista Gr is a 72 y.o. female with a medical diagnosis of Urinary tract infection without hematuria.  She presents with pain in low back, right knee, and lip hip/thigh. Performance deficits affecting function are weakness, pain, edema, impaired balance, impaired functional mobility, impaired self care skills, decreased coordination, decreased lower extremity function, decreased ROM, decreased safety awareness, impaired endurance, decreased upper extremity function.  Pt agreeable to participating in therapy upon arrival to room.  After moving from supine <> seated position pt reported feeling dizzy; blood pressure taken.  Pt able to maintain upright seated position with SBA at EOB; however, after performing sit <> stand transfer, returning to supine position, then moving back to seated position difficulty maintaining balance and upright position noted requiring increased assist.  Pt demonstrated improvement with sit <> stand transfer performing with Max A, RW, and assist of 2 x 1 trial from EOB.  Total A required for pippa care after bowel movement at bed level with pt able to assist in maintaining side lying position.    Overall, pt tolerated therapy well.  She met one goal this date and is making progress towards others.  She would continue to benefit from skilled OT services to address problems listed  above and increase independence with ADLs.  SNF is recommended upon d/c from acute care to further address deficits and help pt improve overall functional independence.       Rehab Prognosis:  Good; patient would benefit from acute skilled OT services to address these deficits and reach maximum level of function.       Plan:     Patient to be seen 3 x/week to address the above listed problems via self-care/home management, therapeutic activities, therapeutic exercises, neuromuscular re-education  Plan of Care Expires: 12/07/22  Plan of Care Reviewed with: patient, spouse    Subjective     Pain/Comfort:  Pain Rating 1:  (pt indicated pain in low back, right knee, and left hip/thigh)  Pain Addressed 1: Reposition, Distraction, Cessation of Activity, Pre-medicate for activity    Objective:     Communicated with: RN prior to session.  Patient found HOB elevated with telemetry, peripheral IV, PureWick upon OT entry to room.   present majority of session.    General Precautions: Standard, fall   Orthopedic Precautions:N/A   Braces: N/A  Respiratory Status: Room air     Occupational Performance:     Bed Mobility:    Supine <> sit: Max A x 2 trials  Rolling: Mod A; x 3-4 trials on each side  Cues for technique and positioning provided  Difficulty bending knee during task observed  Scooting: Max A seated; Total A supine  Sit <> Supine: Total A x 2 trials    Functional Mobility/Transfers:  Sit <> Stand: Max A, RW, with assist of 2 x 1 trial from EOB  Cues for technique and positioning required  Both feet blocked  Pt stood for ~10-15 seconds  Functional Mobility: pt unable to take steps at this time    Activities of Daily Living:  Toileting: Total A for performing pippa care in side lying and supine position after bowel movement at bed level.  Pt able to assist with maintaining side lying position during activity       [facility] 6 Click ADL: 11    Treatment & Education:  *Session focused on promoting increased endurance,  strength, balance, coordination, and ROM needed for ADLs and functional transfers as part of daily routine.   -pt educated on: role of OT in acute care setting, importance of trying to participate in all daily activity as much as possible (including bed mobility and feeding herself), benefits of sitting more upright throughout the day  -pt sat EOB for ~25 minutes; SBA provided to maintain upright position.  Cues provided for posture needed for ADLs.  -pt performed sit <> stand transfer x 1 trial from EOB; cues for technique and positioning required   -pt performed UB exercise seated at EOB; 1 set x 5 reps chest press  -pt performed multiple trials of rolling to allow for pippa care to be performed after bowel movement at bed level; cues for technique and positioning provided  -POC reviewed with  and pt      Patient left HOB elevated with all lines intact, call button in reach, and  present    GOALS:   Multidisciplinary Problems       Occupational Therapy Goals          Problem: Occupational Therapy    Goal Priority Disciplines Outcome Interventions   Occupational Therapy Goal     OT, PT/OT Ongoing, Progressing    Description: Goals to be met by: 11/21/2022     Patient will increase functional independence with ADLs by performing:    Feeding in supported position with Contact Guard Assistance.  Grooming in supported position while seated with supervision.  Sitting at edge of bed x 10 minutes with supervision.  Rolling to Bilateral with Min A.  Supine to sit with Moderate Assistance.    Completed Goals:   Sitting at edge of bed x 5 minutes with Contact Guard Assistance. MET 11/8/2022  Grooming in supported position while seated with Stand-by Assistance. MET (in unsupported position)11/8/2022  Sitting at edge of bed x 10 minutes with Contact Guard Assistance.  MET 11/9/2022  Rolling to Bilateral with Moderate Assistance.  Met 11/9/2022  Sitting at edge of bed x 10 minutes with SBA. MET 11/14/2022                            Time Tracking:     OT Date of Treatment: 11/14/22  OT Start Time: 1130  OT Stop Time: 1237  OT Total Time (min): 67 min    Billable Minutes:Self Care/Home Management 67    OT/RISHI: OT        *co-tx with PTA    EILZABETH Almanza  11/14/2022

## 2022-11-14 NOTE — PLAN OF CARE
11/14/22 1314   Discharge Reassessment   Assessment Type Discharge Planning Reassessment   Did the patient's condition or plan change since previous assessment? Yes   Discharge Plan discussed with: Spouse/sig other;Patient   Discharge Plan A Skilled Nursing Facility   Discharge Plan B Home Health   DME Needed Upon Discharge  none;3-in-1 commode   Discharge Barriers Identified None   Post-Acute Status   Post-Acute Authorization Placement;Home Health   Post-Acute Placement Status Referrals Sent   Discharge Delays (!) Post-Acute Set-up         CM met with patient & her  at bedside concerning the patient discharge placement.     CM informed the patient  that the patient insurance approved her for skilled nursing.     CM attempted to contact [facility] concerning a review of the patient for skilled nursing.    CM awaits a response from [facility]. CM informed patient  if the patient is denied he will have to take her home with home health & other supportive service.

## 2022-11-14 NOTE — PLAN OF CARE
11/14/22 1257   Post-Acute Status   Post-Acute Authorization Placement   Post-Acute Placement Status Pending payor review/awaiting authorization (if required)   Discharge Delays (!) Post-Acute Set-up   Discharge Plan   Discharge Plan A Skilled Nursing Facility   Discharge Plan B Home Health       CM met with patient & her  at bedside concerning the patient discharge placement.     CM informed the patient  that the patient insurance approved her for skilled nursing.     CM attempted to contact [facility] concerning a review of the patient for skilled nursing.    CM awaits a response from [facility]. CM informed patient  if the patient is denied he will have to take her home with home health & other supportive service.

## 2022-11-14 NOTE — PROGRESS NOTES
[facility] Medicine  Telemedicine Progress Note    Patient Name: Trista Gr  MRN: [id number]  Patient Class: OP- Observation   Admission Date: 11/7/2022  Length of Stay: 0 days  Attending Physician: Phi Laurent MD  Primary Care Provider: REGINO Yeung MD          Subjective:     Principal Problem:Urinary tract infection without hematuria        HPI:  72-year-old female with HTN, debility (bed-bound at baseline), decubitus ulcers, peripheral edema who presented with complaint of shortness of breath that started last night.  She became anxious and concerned about her health.  She reports she intermittently takes her Lasix, but most of the time does not due to inability to get to the bathroom or have her diaper changed.  She missed taking all of her medications this morning, and is unclear about when was the last time she took the Lasix.  She does report dysuria, with burning on urination.  She also reports worsening of her bedsores, with development of multiple source despite efforts by her .  She frequently only gets urinary tract infection and last infection was approximately 1 month ago.  Overall she feels weaker, is not able to help with transfers at all.  Denies chest pain, cough, fever or chills.  No reported nausea or vomiting, but does report decreased appetite.   is the primary caregiver, and they do not have home health or any other resources at home.  In the ER, workup remarkable for UA with positive nitrite, 2+ leukocytes, 20 WBC and many bacteria.  Head CT unremarkable, chest x-ray without infiltrate, BNP less than 10, COVID negative.      Overview/Hospital Course:  Trista Gr was admitted for UTI. Started on CTX, follow Ucx.  Deescalate antibiotics once cultures finalized.  Patient remained afebrile and hemodynamically stable.  Head CT was done due to complaints of dizziness which does show concern for normal pressure hydrocephalus.   Neurosurgery consult placed on 11/09 for possible evaluation inpatient.  Has been able to work with  PTOT recommending SNF.  Cm/SW following for placement.      Interval History:  doing well, miralax d/c due to diarrhea. Pending placement.    Review of Systems   Constitutional:  Positive for activity change and fatigue. Negative for fever.   Respiratory:  Negative for shortness of breath.    Cardiovascular:  Negative for chest pain.   Gastrointestinal:  Negative for abdominal pain.   Genitourinary:  Positive for frequency.   Musculoskeletal:  Positive for arthralgias.   Neurological:  Negative for dizziness and headaches.   All other systems reviewed and are negative.  Objective:     Vital Signs (Most Recent):  Temp: 97.8 °F (36.6 °C) (11/13/22 2300)  Pulse: 78 (11/13/22 2300)  Resp: 18 (11/13/22 2300)  BP: 134/66 (11/13/22 2300)  SpO2: 99 % (11/13/22 2300)   Vital Signs (24h Range):  Temp:  [97.5 °F (36.4 °C)-98.3 °F (36.8 °C)] 97.8 °F (36.6 °C)  Pulse:  [72-97] 78  Resp:  [16-18] 18  SpO2:  [95 %-99 %] 99 %  BP: (132-150)/(60-75) 134/66     Weight: 89.9 kg (198 lb 3.2 oz)  Body mass index is 31.04 kg/m².    Intake/Output Summary (Last 24 hours) at 11/13/2022 2343  Last data filed at 11/13/2022 1539  Gross per 24 hour   Intake 476 ml   Output 800 ml   Net -324 ml        Physical Exam  Vitals and nursing note reviewed.   Constitutional:       Appearance: Normal appearance.      Comments: Elderly female, appears older than stated age   HENT:      Head: Normocephalic and atraumatic.   Eyes:      Extraocular Movements: Extraocular movements intact.      Pupils: Pupils are equal, round, and reactive to light.   Cardiovascular:      Rate and Rhythm: Normal rate and regular rhythm.   Pulmonary:      Effort: Pulmonary effort is normal. No respiratory distress.   Abdominal:      General: There is no distension.   Musculoskeletal:         General: Normal range of motion.      Cervical back: Normal range of motion.       Comments: Globally weak   Neurological:      General: No focal deficit present.      Mental Status: She is alert and oriented to person, place, and time.   Psychiatric:         Mood and Affect: Mood normal.         Behavior: Behavior normal.       Significant Labs: All pertinent labs within the past 24 hours have been reviewed.  CBC:   No results for input(s): WBC, HGB, HCT, PLT in the last 48 hours.    CMP:   No results for input(s): NA, K, CL, CO2, GLU, BUN, CREATININE, CALCIUM, PROT, ALBUMIN, BILITOT, ALKPHOS, AST, ALT, ANIONGAP, EGFRNONAA in the last 48 hours.    Invalid input(s): ESTGFAFRICA      Significant Imaging: I have reviewed all pertinent imaging results/findings within the past 24 hours.      Assessment/Plan:      * Urinary tract infection without hematuria  - UA consistent with infection and patient is symptomatic  - Start empiric Rocephin 1 g IV daily  - UCx resulted, will continue the above     Normal pressure hydrocephalus  -CT scan was on 11/07 which was concerning for normal-pressure hydrocephalus given enlarged ventricles  -reports she has urinary frequency, urgency, some forgetfulness and has been bed-bound (chronic gait abnormality) for 3 years  -consulted NSGY however will need to be referred to as outpatient, patient may benefit from large volume lumbar puncture    Shortness of breath  - Patient reported shortness of breath and feeling anxious, and not herself  - Noncompliant with her Lasix   - BNP <10, sats 100% on room air, chest x-ray is clear, and physical lung exam unremarkable by the time of my evaluation  - She was given IV Lasix in the ER with output of 1 L by the time of my evaluation which likely explains disparity from initial presentation in the ER  - Respiratory status is stable and she is symptomatically resolved, and appears to be more anxious about her inability to care for herself along with difficulty by the  to care for her  - Resume p.o. scheduled Lasix 20 mg  daily  - Resolved    Peripheral edema  - Chronic peripheral edema  - Reports slightly worse than her usual baseline  - Likely due to noncompliance with p.o. Lasix  - Pulse dose of IV Lasix given x1 in the ER, will resume scheduled Lasix 20 mg p.o. daily tomorrow    Pressure injury of buttock, unstageable  - Multiple bedsores located on the buttocks, minimal stage II but overall unstageable  - Turn every 2 hours and maximize nutrition  - Inpatient consult to wound care    Debility  Failure to thrive as an adult  - She had issues with osteoarthritis and chronic back pain worked up with MRIs and followed by Ortho, but pain and debility became progressive to the point she was walking with a walker and then eventually bedbound  - Review of chart reveals she has been bed-bound for approximate 2 years   - She used to be able to help with transfers but now is too weak and unable  - She has developed more bedsores, and  is unable to care for her at home in her current state  - Evaluated by PT and OT already in the ER, with recommendation for SNF placement  - Patient and  are agreeable, but would like to have more information  - Continue PT and OT, and  consult for placementL: SNF    Obesity (BMI 30.0-34.9)  - Weight reduction as outpatient    Chronic low back pain  Spinal canal stenosis  - MRI on her cervical spine in June 2021 revealed mild to moderate spinal canal stenosis at C2-3  - MRI on her lumbar spine in June 2021 revealed mild spinal canal stenosis at L3-4.  - Chronic issue, not an acute issue  - Tylenol and tramadol p.r.n.    GERD (gastroesophageal reflux disease)  - Substitute Nexium with Protonix 40 mg p.o. daily    Essential hypertension  - Blood pressure elevated due to missed home antihypertensive medications along with diuretic  - Substitute home blood pressure medication with losartan 50 mg p.o. daily during hospitalization  - P.r.n. hydralazine       VTE Risk Mitigation (From  admission, onward)         Ordered     enoxaparin injection 40 mg  Daily         11/07/22 1435     IP VTE HIGH RISK PATIENT  Once         11/07/22 1435     Place sequential compression device  Until discontinued         11/07/22 1435     Place sequential compression device  Until discontinued         11/07/22 1435                      I have completed this tele-visit with the assistance of a telepresenter.    The attending portion of this evaluation, treatment, and documentation was performed per Phi Laurent MD via Telemedicine AudioVisual using the secure [employer] software platform with 2 way audio/video. The provider was located off-site and the patient is located in the hospital. The aforementioned video software was utilized to document the relevant history and physical exam    Phi Laurent MD  Department of Hospital Medicine   [facility] ([place])

## 2022-11-14 NOTE — PLAN OF CARE
POC reviewed. Purposeful rounding completed. No significant events this shift. Cardiac monitor maintained. Complaints of pain treated with PRN pain medication according to MAR. No other complaints verbalized during shift. No injuries, falls, or trauma occurred during shift. Bed loBed low and locked, side rails up x3, call light within reach. Spouse remains at bedside.      Problem: Infection  Goal: Absence of Infection Signs and Symptoms  Outcome: Ongoing, Progressing     Problem: Skin Injury Risk Increased  Goal: Skin Health and Integrity  Outcome: Ongoing, Progressing     Problem: Adult Inpatient Plan of Care  Goal: Plan of Care Review  Outcome: Ongoing, Progressing  Goal: Patient-Specific Goal (Individualized)  Outcome: Ongoing, Progressing  Goal: Absence of Hospital-Acquired Illness or Injury  Outcome: Ongoing, Progressing     Problem: Impaired Wound Healing  Goal: Optimal Wound Healing  Outcome: Ongoing, Progressing

## 2022-11-14 NOTE — ASSESSMENT & PLAN NOTE
- Chronic peripheral edema  - Reports slightly worse than her usual baseline  - Likely due to noncompliance with p.o. Lasix  - Pulse dose of IV Lasix given x1 in the ER, now on scheduled Lasix 20 mg p.o. daily

## 2022-11-15 VITALS
DIASTOLIC BLOOD PRESSURE: 70 MMHG | RESPIRATION RATE: 16 BRPM | OXYGEN SATURATION: 100 % | HEART RATE: 83 BPM | BODY MASS INDEX: 31.11 KG/M2 | HEIGHT: 67 IN | SYSTOLIC BLOOD PRESSURE: 166 MMHG | WEIGHT: 198.19 LBS | TEMPERATURE: 98 F

## 2022-11-15 PROCEDURE — 97535 SELF CARE MNGMENT TRAINING: CPT

## 2022-11-15 PROCEDURE — 63600175 PHARM REV CODE 636 W HCPCS: Performed by: NURSE PRACTITIONER

## 2022-11-15 PROCEDURE — 99217 PR OBSERVATION CARE DISCHARGE: CPT | Mod: 95,,, | Performed by: STUDENT IN AN ORGANIZED HEALTH CARE EDUCATION/TRAINING PROGRAM

## 2022-11-15 PROCEDURE — G0378 HOSPITAL OBSERVATION PER HR: HCPCS

## 2022-11-15 PROCEDURE — 25000003 PHARM REV CODE 250: Performed by: HOSPITALIST

## 2022-11-15 PROCEDURE — A4216 STERILE WATER/SALINE, 10 ML: HCPCS | Performed by: HOSPITALIST

## 2022-11-15 PROCEDURE — 96376 TX/PRO/DX INJ SAME DRUG ADON: CPT

## 2022-11-15 PROCEDURE — 91312 PHARM REV CODE 636 W HCPCS: CPT | Performed by: STUDENT IN AN ORGANIZED HEALTH CARE EDUCATION/TRAINING PROGRAM

## 2022-11-15 PROCEDURE — 99217 PR OBSERVATION CARE DISCHARGE: ICD-10-PCS | Mod: 95,,, | Performed by: STUDENT IN AN ORGANIZED HEALTH CARE EDUCATION/TRAINING PROGRAM

## 2022-11-15 PROCEDURE — 0124A: CPT | Performed by: STUDENT IN AN ORGANIZED HEALTH CARE EDUCATION/TRAINING PROGRAM

## 2022-11-15 PROCEDURE — 63600175 PHARM REV CODE 636 W HCPCS: Performed by: STUDENT IN AN ORGANIZED HEALTH CARE EDUCATION/TRAINING PROGRAM

## 2022-11-15 PROCEDURE — 97530 THERAPEUTIC ACTIVITIES: CPT | Mod: CQ

## 2022-11-15 RX ADMIN — PANTOPRAZOLE SODIUM 40 MG: 40 TABLET, DELAYED RELEASE ORAL at 08:11

## 2022-11-15 RX ADMIN — DICLOFENAC 2 G: 10 GEL TOPICAL at 08:11

## 2022-11-15 RX ADMIN — LOSARTAN POTASSIUM 50 MG: 50 TABLET, FILM COATED ORAL at 08:11

## 2022-11-15 RX ADMIN — FUROSEMIDE 20 MG: 20 TABLET ORAL at 08:11

## 2022-11-15 RX ADMIN — KETOROLAC TROMETHAMINE 15 MG: 30 INJECTION, SOLUTION INTRAMUSCULAR; INTRAVENOUS at 12:11

## 2022-11-15 RX ADMIN — BNT162B2 ORIGINAL AND OMICRON BA.4/BA.5 0.3 ML: .1125; .1125 INJECTION, SUSPENSION INTRAMUSCULAR at 03:11

## 2022-11-15 RX ADMIN — Medication 10 ML: at 06:11

## 2022-11-15 RX ADMIN — Medication 10 ML: at 02:11

## 2022-11-15 NOTE — PT/OT/SLP PROGRESS
Physical Therapy Treatment    Patient Name:  Trista Gr   MRN:  [id number]    Recommendations:     Discharge Recommendations:  nursing facility, skilled   Discharge Equipment Recommendations:  (TBD at next level of care)   Barriers to discharge: Decreased caregiver support    Assessment:     Trista Gr is a 72 y.o. female admitted with a medical diagnosis of Urinary tract infection without hematuria.  She presents with the following impairments/functional limitations:  weakness, pain, gait instability, impaired endurance, impaired functional mobility, impaired self care skills, decreased coordination, decreased lower extremity function, decreased ROM, decreased upper extremity function.    Pt is anticipating d/c from hospital today, so session focused on safety and DME needs in home environment. Pt and spouse had many questions relating to transfers and bathing. PTA and OT discussed with them various scenarios and our best advice for continuing to progress mobility while keeping safe at home.  **If pt t/f's to SNF, then decisions about home DME will be deferred to that next level of care.  Rec SNF    Rehab Prognosis: Fair; patient would benefit from acute skilled PT services to address these deficits and reach maximum level of function.    Recent Surgery: * No surgery found *      Plan:     During this hospitalization, patient to be seen 3 x/week to address the identified rehab impairments via therapeutic activities, therapeutic exercises, neuromuscular re-education, wheelchair management/training and progress toward the following goals:    Plan of Care Expires:  12/07/22    Subjective     Chief Complaint: woozy from meds, I can't work with you now  Patient/Family Comments/goals: to go to SNF  Pain/Comfort:  Pain Rating 1: 0/10  Pain Rating Post-Intervention 1: 0/10      Objective:     Communicated with nurse Drake prior to session.  Patient found HOB elevated with telemetry, peripheral IV,  Rebecca upon PT entry to room.     General Precautions: Standard, fall   Orthopedic Precautions:N/A   Braces:    Respiratory Status: Room air     Functional Mobility:  No functional mobility performed this visit      AM-PAC 6 CLICK MOBILITY  Turning over in bed (including adjusting bedclothes, sheets and blankets)?: 2  Sitting down on and standing up from a chair with arms (e.g., wheelchair, bedside commode, etc.): 1  Moving from lying on back to sitting on the side of the bed?: 2  Moving to and from a bed to a chair (including a wheelchair)?: 1  Need to walk in hospital room?: 1  Climbing 3-5 steps with a railing?: 1  Basic Mobility Total Score: 8       Treatment & Education:  Pt is anticipating d/c from hospital today, so session focused on safety and DME needs in home environment. Pt and spouse had many questions relating to transfers and bathing. PTA and OT discussed with them various scenarios and our best advice for continuing to progress mobility while keeping safe at home.  **If pt t/f's to SNF, then decisions about home DME will be deferred to that next level of care.      Patient left HOB elevated with all lines intact, call button in reach, nurse Noelle notified, and pt on phone with  ..    GOALS:   Multidisciplinary Problems       Physical Therapy Goals          Problem: Physical Therapy    Goal Priority Disciplines Outcome Goal Variances Interventions   Physical Therapy Goal     PT, PT/OT Ongoing, Progressing     Description: Goals to be met by: 22    Patient will increase functional independence with mobility by performin. Roll L and R with Abraham with appropriate use of hospital bed features.  2. Supine<>sit with modA with appropriate use of hospital bed features.  3. Transfer bed<>chair with maxA with most appropriate technique.   4. Partial sit<>stand with Abraham with BUE support.  5. Spouse to demo safe transfer technique with patient for bed<>WC or commode transfers.                         Time Tracking:     PT Received On: 11/15/22  PT Start Time: 1051     PT Stop Time: 1218  PT Total Time (min): 87 min     Billable Minutes: Therapeutic Activity 47  Session divided in two: 10:51-11:24 (with OT present, 33 mins) + 12:04-12:18 (14 mins) = 47 mins total treatment time    Co-treat with OT for portion of session due to complex nature of patient, to insure patient safety, and to prevent injury to patient and caregivers, requiring intervention of two skilled therapists.  Treatment Type: Treatment  PT/PTA: PTA     PTA Visit Number: 5     11/15/2022

## 2022-11-15 NOTE — PLAN OF CARE
NURSING HOME ORDERS    11/15/2022  [facility] ([facility])  [facility] ([facility])  [address]  [place] [address]  Dept: [phone]  Loc: [phone]     Admit to Nursing Home:  Home or Self Care    Diagnoses:  Active Hospital Problems    Diagnosis  POA    *Urinary tract infection without hematuria [N39.0]  Yes    Normal pressure hydrocephalus [G91.2]  Yes    Debility [R53.81]  Yes    Pressure injury of buttock, unstageable [L89.300]  Yes    Peripheral edema [R60.9]  Yes    Shortness of breath [R06.02]  Yes    Obesity (BMI 30.0-34.9) [E66.9]  Yes    Chronic low back pain [M54.50, G89.29]  Yes     Dr. Mihir Capellan (Ortho)  X-ray - 10/18      GERD (gastroesophageal reflux disease) [K21.9]  Yes     EGD 2000      Essential hypertension [I10]  Yes     1993        Resolved Hospital Problems   No resolved problems to display.       Patient is homebound due to:  Urinary tract infection without hematuria    Allergies:  Review of patient's allergies indicates:   Allergen Reactions    Aspirin      Sore stomach    Codeine Itching    Pcn [penicillins] Itching       Vitals:  Routine    Diet: cardiac diet    Activities:   Activity as tolerated    Goals of Care Treatment Preferences:  Code Status: Full Code      Labs:  per facility protocol    Nursing Precautions:  Fall and Pressure ulcer prevention    Consults:   PT to evaluate and treat- 5 times a week, OT to evaluate and treat- 5 times a week, Wound Care, and Nutrition to evaluate and recommend diet     Miscellaneous Care:   Wound care: Apply triad barrier cream to affected areas on bilateral buttocks and gluteal cleft twice daily                     Medications: Discontinue all previous medication orders, if any. See new list below.     Medication List        CONTINUE taking these medications      acetaminophen 650 MG Tbsr  Commonly known as: TYLENOL  Take 1 tablet (650 mg total) by mouth 3 (three) times daily as needed.     celecoxib 200 MG  capsule  Commonly known as: CeleBREX  Take 1 capsule (200 mg total) by mouth daily as needed for Pain.     esomeprazole 20 MG capsule  Commonly known as: NEXIUM  Take 20 mg by mouth daily as needed.     fish oil-omega-3 fatty acids 300-1,000 mg capsule  Take 1 g by mouth once daily.     furosemide 20 MG tablet  Commonly known as: LASIX  TAKE 1 TABLET(20 MG) BY MOUTH EVERY DAY     telmisartan 40 MG Tab  Commonly known as: MICARDIS  TAKE 1 TABLET(40 MG) BY MOUTH EVERY DAY     traMADoL 50 mg tablet  Commonly known as: ULTRAM  TAKE 1 TABLET BY MOUTH EVERY OTHER DAY AS NEEDED FOR PAIN            STOP taking these medications      ciprofloxacin HCl 500 MG tablet  Commonly known as: CIPRO                Immunizations Administered as of 11/15/2022  Reviewed on 11/9/2022      Name Date Dose VIS Date Route Exp Date    COVID-19, MRNA, LN-S, PF (Moderna) 4/20/2021 -- -- Intramuscular --    Site: Right arm     Lot: [id number]     COVID-19, MRNA, LN-S, PF (Moderna) 3/23/2021 -- -- Intramuscular --    Site: Right arm     Lot: [id number]             This patient has had both covid vaccinations    Some patients may experience side effects after vaccination.  These may include fever, headache, muscle or joint aches.  Most symptoms resolve with 24-48 hours and do not require urgent medical evaluation unless they persist for more than 72 hours or symptoms are concerning for an unrelated medical condition.          _________________________________  Phi Laurent MD  11/15/2022

## 2022-11-15 NOTE — PT/OT/SLP PROGRESS
Occupational Therapy   Treatment    Name: Trista Gr  MRN: [id number]  Admitting Diagnosis:  Urinary tract infection without hematuria       Recommendations:     Discharge Recommendations: nursing facility, skilled  Discharge Equipment Recommendations:   custom fit wheelchair with elevating leg rests and pressure relief cushion, madelin lift sling with BSC cut out, bath tub lift chair, pressure relief overlay for mattress  Barriers to discharge:  Decreased caregiver support ( reports he is unable to provide level of care needed at this time)    Assessment:     Trista Gr is a 72 y.o. female with a medical diagnosis of Urinary tract infection without hematuria.  She presents with feeling uncomfortable and woozy. Performance deficits affecting function are weakness, gait instability, impaired functional mobility, impaired self care skills, decreased ROM, decreased upper extremity function, decreased lower extremity function, decreased coordination, pain, impaired endurance. Session focused on self-care education regarding DME to help provide safe and functional participation in daily routine including bathing, toileting, and transfers (in and outside of bathroom).  Pt verbalized understanding.  Pt declined sitting EOB this date 2* feeling woozy.    Pt would continue to benefit from skilled OT services to address problems listed above and increase independence with ADLs.  Pt is making progress towards goals.  SNF is recommended upon d/c from acute care to further address deficits and help pt improve overall functional independence.         Rehab Prognosis:  Good; patient would benefit from acute skilled OT services to address these deficits and reach maximum level of function.       Plan:     Patient to be seen 3 x/week to address the above listed problems via self-care/home management, therapeutic activities, therapeutic exercises, neuromuscular re-education  Plan of Care Expires: 12/07/22  Plan  of Care Reviewed with: patient    Subjective     Pain/Comfort:  Pain Rating 1:  (pt reported feeling uncomfortable, but did not rate)    Objective:     Communicated with: RN Mari) prior to session.  Patient found HOB elevated with telemetry, peripheral IV, PureWick upon OT entry to room.    General Precautions: Standard, fall   Orthopedic Precautions:N/A   Braces: N/A  Respiratory Status: Room air     Occupational Performance:     Bed Mobility:    Not assessed this date 2* pt feeling woozy    Functional Mobility/Transfers:  Not assessed this date 2* pt feeling woozy    Activities of Daily Living:  To continue to be assessed in upcoming session      [facility] 6 Click ADL: 12    Treatment & Education:  *Extensive education provided regarding DME recommendations to provide safe and functional participation in daily routine including custom fit wheelchair with pressure relief cushion and elevating leg rests, pressure relief overlay for mattress, bath tub lift chair, madelin lift sling with BSC cut out.  -pt reports she currently using office chair while in bathroom and during bathroom transfers.  Therapists discussed safety risks involved in using.  -safety with transfers and use of madelin lift discussed  -importance of continuing to work on mobility and participate as fully as possible in all activity discussed  -benefits of sitting up in chair during the day discussed  -POC reviewed       Patient left HOB elevated with call button in reach and CM and MD notified    GOALS:   Multidisciplinary Problems       Occupational Therapy Goals          Problem: Occupational Therapy    Goal Priority Disciplines Outcome Interventions   Occupational Therapy Goal     OT, PT/OT Ongoing, Progressing    Description: Goals to be met by: 11/21/2022     Patient will increase functional independence with ADLs by performing:    Feeding in supported position with Contact Guard Assistance.  Grooming in supported position while seated with  supervision.  Sitting at edge of bed x 10 minutes with supervision.  Rolling to Bilateral with Min A.  Supine to sit with Moderate Assistance.    Completed Goals:   Sitting at edge of bed x 5 minutes with Contact Guard Assistance. MET 11/8/2022  Grooming in supported position while seated with Stand-by Assistance. MET (in unsupported position)11/8/2022  Sitting at edge of bed x 10 minutes with Contact Guard Assistance.  MET 11/9/2022  Rolling to Bilateral with Moderate Assistance.  Met 11/9/2022  Sitting at edge of bed x 10 minutes with SBA. MET 11/14/2022                           Time Tracking:     OT Date of Treatment: 11/15/22  OT Start Time: 1052  OT Stop Time: 1123  OT Total Time (min): 31 min    Billable Minutes:Self Care/Home Management 31    OT/RISHI: OT        *co-tx with PTA    ELIZABETH Almanza  11/15/2022

## 2022-11-15 NOTE — PLAN OF CARE
"   11/15/22 0959   Final Note   Assessment Type Final Discharge Note   Anticipated Discharge Disposition Home-Health   Hospital Resources/Appts/Education Provided Provided patient/caregiver with written discharge plan information;Appointments scheduled by Navigator/Coordinator;Appointments scheduled and added to AVS   Post-Acute Status   Post-Acute Authorization Home Health   Home Health Status Set-up Complete/Auth obtained   Discharge Delays None known at this time       Pt states she lives dependently at home with her .     Cm will place an ADT 30 order (Ambulance) provide transportation home.    Patient will discharge home with home health. "[facility] "    Patient was accepted [facility] for skilled nursing.   Patient  (Matthias) refused the accepting facility.     Patient DME orders sent to [facility]. [facility] will deliver patient DME to the home.      [id number]---- transportation auth       All d/c needs addressed from a CM perspective.            "

## 2022-11-15 NOTE — PROGRESS NOTES
[facility], [place], operated by [facility]  Wound Care    Patient Name:  Trista Gr   MRN:  [id number]  Date: 11/15/2022  Diagnosis: Urinary tract infection without hematuria    History:     Past Medical History:   Diagnosis Date    Debility     Decubitus ulcer of buttock     GERD (gastroesophageal reflux disease) 09/22/2012    HTN (hypertension) 09/22/2012    Normal stress echocardiogram 09/22/2012    Vitamin D deficiency disease 09/22/2012       Social History     Socioeconomic History    Marital status:     Number of children: 0   Occupational History    Occupation: Human      Employer: [employer]     Comment: [employer]   Tobacco Use    Smoking status: Never    Smokeless tobacco: Never   Substance and Sexual Activity    Alcohol use: No     Alcohol/week: 0.0 standard drinks    Drug use: No    Sexual activity: Not Currently   Social History Narrative    Uses cane sometime    2020 - Walks with a walker       Precautions:     Allergies as of 11/07/2022 - Reviewed 11/07/2022   Allergen Reaction Noted    Aspirin  09/22/2012    Codeine Itching 02/25/2013    Pcn [penicillins] Itching 09/22/2012       WOC Assessment Details/Treatment     Wound care attempted follow up x2 today. Patient with nursing and PT/OT. Will try to follow up later today if scheduling permits.       11/15/2022

## 2022-11-15 NOTE — PLAN OF CARE
Problem: Occupational Therapy  Goal: Occupational Therapy Goal  Description: Goals to be met by: 11/21/2022     Patient will increase functional independence with ADLs by performing:    Feeding in supported position with Contact Guard Assistance.  Grooming in supported position while seated with supervision.  Sitting at edge of bed x 10 minutes with supervision.  Rolling to Bilateral with Min A.  Supine to sit with Moderate Assistance.    Completed Goals:   Sitting at edge of bed x 5 minutes with Contact Guard Assistance. MET 11/8/2022  Grooming in supported position while seated with Stand-by Assistance. MET (in unsupported position)11/8/2022  Sitting at edge of bed x 10 minutes with Contact Guard Assistance.  MET 11/9/2022  Rolling to Bilateral with Moderate Assistance.  Met 11/9/2022  Sitting at edge of bed x 10 minutes with SBA. MET 11/14/2022      Outcome: Ongoing, Progressing     Pt would continue to benefit from skilled OT services to address problems listed above and increase independence with ADLs.  SNF is recommended upon d/c from acute care to further address deficits and help pt improve overall functional independence.     ELIZABETH Almanza  11/15/2022

## 2022-11-15 NOTE — PLAN OF CARE
Pt remained free of falls and injuries. AAOx4. Pt anxious and restless throughout shift. Purposeful hourly rounding performed. Pt swallows meds whole. IV flushed and saline locked. Managed c/o lower back pain with PRN Tylenol and one time dose IV Toradol. Pt turned q2h using pillows, declining wedge. Pt has MAD, wounds to buttocks, wound care performed per orders. External female catheter in place to suction. VSS on room air. Pt resting comfortably in bed, denies needs at this time, NADN.  at bedside assisting with care. Bed low and locked, bed alarm on, call light in reach. Side rails up x3. Will continue to monitor.

## 2022-11-15 NOTE — PLAN OF CARE
Patient educated on discharge instructions and verbalized understanding.  All questions answered to patient's satisfaction.  IV removed without complication. Patient to be transported off unit via EMS.

## 2022-11-15 NOTE — PLAN OF CARE
"Patient changed her mind, [facility] delivered a 18" WC. Delivery ticket and rental agreement were both signed.  "

## 2022-11-15 NOTE — PLAN OF CARE
Met with patient &  at bedside at their request - both still interested in SNF & questioning why referrals couldn't be sent to additional SNFs - informed both I could continue to work on SNF placement as [facility] has approved SNF auth but hey would have to be open to going to first available facility - many pending referrals still in [facility] - followed up:    Karri Fernandez - updated clinicals sent  Shana Brito - requested wound care notes sent  Magalie Bhatia - message left  Mercedes Segovia's - message left   Rhonda Walsh [facility] - message left   Taylor Sanchez - updated clinicals sent

## 2022-11-21 RX ORDER — TRAMADOL HYDROCHLORIDE 50 MG/1
TABLET ORAL
Qty: 15 TABLET | Refills: 0 | Status: SHIPPED | OUTPATIENT
Start: 2022-11-21 | End: 2022-12-20

## 2022-12-05 NOTE — DISCHARGE SUMMARY
[facility]  Discharge Summary      Patient Name: Trista Gr  MRN: [id number]  Patient Class: OP- Observation  Admission Date: 11/7/2022  Hospital Length of Stay: 0 days  Discharge Date and Time: 11/15/2022  6:10 PM  Attending Physician: No att. providers found   Discharging Provider: Phi Laurent MD  Primary Care Provider: REGINO Yeung MD      HPI:   72-year-old female with HTN, debility (bed-bound at baseline), decubitus ulcers, peripheral edema who presented with complaint of shortness of breath that started last night.  She became anxious and concerned about her health.  She reports she intermittently takes her Lasix, but most of the time does not due to inability to get to the bathroom or have her diaper changed.  She missed taking all of her medications this morning, and is unclear about when was the last time she took the Lasix.  She does report dysuria, with burning on urination.  She also reports worsening of her bedsores, with development of multiple source despite efforts by her .  She frequently only gets urinary tract infection and last infection was approximately 1 month ago.  Overall she feels weaker, is not able to help with transfers at all.  Denies chest pain, cough, fever or chills.  No reported nausea or vomiting, but does report decreased appetite.   is the primary caregiver, and they do not have home health or any other resources at home.  In the ER, workup remarkable for UA with positive nitrite, 2+ leukocytes, 20 WBC and many bacteria.  Head CT unremarkable, chest x-ray without infiltrate, BNP less than 10, COVID negative.      * No surgery found *      Hospital Course:   Trista Gr was admitted for UTI. Started on CTX, follow Ucx.  Deescalate antibiotics once cultures finalized.  Patient remained afebrile and hemodynamically stable.  Head CT was done due to complaints of dizziness which does show concern for normal  pressure hydrocephalus.  Neurosurgery consult placed on 11/09 for possible evaluation inpatient however will need to be done as outpatient. Patient continued to improve and was treatment for her UTI. Has been able to work with  PTOT recommending SNF.  Cm/SW following for placement but unable to find placement after > 1 week so patient was discharged home with HH with her  and outpatient neuro follow up.        Goals of Care Treatment Preferences:  Code Status: Full Code      Consults:   Consults (From admission, onward)        Status Ordering Provider     Inpatient virtual consult to Hospital Medicine  Once        Provider:  Ledy Winters MD    Completed MANJU JUAREZ     Inpatient consult to Social Work  Once        Provider:  (Not yet assigned)    RAY Preston          No new Assessment & Plan notes have been filed under this hospital service since the last note was generated.  Service: Hospital Medicine    Final Active Diagnoses:    Diagnosis Date Noted POA    PRINCIPAL PROBLEM:  Urinary tract infection without hematuria [N39.0] 11/07/2022 Yes    Normal pressure hydrocephalus [G91.2] 11/09/2022 Yes    Debility [R53.81] 11/07/2022 Yes    Pressure injury of buttock, unstageable [L89.300] 11/07/2022 Yes    Peripheral edema [R60.9] 11/07/2022 Yes    Shortness of breath [R06.02] 11/07/2022 Yes    Obesity (BMI 30.0-34.9) [E66.9] 01/08/2019 Yes    Chronic low back pain [M54.50, G89.29] 06/06/2015 Yes    GERD (gastroesophageal reflux disease) [K21.9] 09/22/2012 Yes    Essential hypertension [I10] 09/22/2012 Yes      Problems Resolved During this Admission:       Discharged Condition: stable    Disposition: Home or Self Care    Follow Up:   Follow-up Information     REGINO Yeung MD Follow up.    Specialty: Internal Medicine  Contact information:  [address] [facility] [address] [place] [address] [id number] [phone]             [facility] Follow up on 11/16/2022.    Why:  "Patient will be seen tomorrow with home health.  Contact information:  [address] [place] [address] [phone]                     Patient Instructions:      LIFT DEVICE FOR HOME USE     Order Specific Question Answer Comments   Height: 5' 7" (1.702 m)    Weight: 89.9 kg (198 lb 3.2 oz)    Does patient have medical equipment at home? bedside commode    Does patient have medical equipment at home? walker, rolling    Does patient have medical equipment at home? wheelchair    Does patient have medical equipment at home? hospital bed    Length of need (1-99 months): 99      SLING FOR HOME USE   Order Comments: With cutout for commode use     Order Specific Question Answer Comments   Height: 5' 7" (1.702 m)    Weight: 89.9 kg (198 lb 3.2 oz)    Does patient have medical equipment at home? bedside commode    Does patient have medical equipment at home? walker, rolling    Does patient have medical equipment at home? wheelchair    Does patient have medical equipment at home? hospital bed    Length of need (1-99 months): 99      HME - OTHER     Order Specific Question Answer Comments   Type of Equipment: Pressure relief overlay mattress    Height: 5' 7" (1.702 m)    Weight: 89.9 kg (198 lb 3.2 oz)    Does patient have medical equipment at home? bedside commode    Does patient have medical equipment at home? walker, rolling    Does patient have medical equipment at home? wheelchair    Does patient have medical equipment at home? hospital bed      HME - OTHER     Order Specific Question Answer Comments   Type of Equipment: Bathtub lift chair    Height: 5' 7" (1.702 m)    Weight: 89.9 kg (198 lb 3.2 oz)    Does patient have medical equipment at home? bedside commode    Does patient have medical equipment at home? walker, rolling    Does patient have medical equipment at home? wheelchair    Does patient have medical equipment at home? hospital bed      LIFT DEVICE FOR HOME USE     Order Specific Question Answer " "Comments   Height: 5' 7" (1.702 m)    Weight: 89.9 kg (198 lb 3.2 oz)    Does patient have medical equipment at home? bedside commode    Does patient have medical equipment at home? walker, rolling    Does patient have medical equipment at home? wheelchair    Does patient have medical equipment at home? hospital bed    Length of need (1-99 months): 99      WHEELCHAIR GEL CUSHION FOR HOME USE     Order Specific Question Answer Comments   Height: 5' 7" (1.702 m)    Weight: 89.9 kg (198 lb 3.2 oz)    Length of need (1-99 months): 99      WHEELCHAIR FOR HOME USE     Order Specific Question Answer Comments   Hours in W/C per day: 6    Type of Wheelchair: Custom    Size(Width): 18"(STD adult)    Leg Support: Elevating leg rests    Lap Belt: Buckle    Accessories: Front brakes    Accessories: Brake extensions    Accessories: Anti-tippers    Accessories: Transfer slide board-Wood w/cutout    Accessories: Safety belt    Cushion: Specialty Cushion    Cushion: Gel    Justification for cushion: Pressure Ulcer choice    Height: 5' 7" (1.702 m)    Weight: 89.9 kg (198 lb 3.2 oz)    Does patient have medical equipment at home? bedside commode    Does patient have medical equipment at home? walker, rolling    Does patient have medical equipment at home? wheelchair    Does patient have medical equipment at home? hospital bed    Length of need (1-99 months): 99    Please check all that apply: Caregiver is capable and willing to operate wheelchair safely.        Significant Diagnostic Studies: Labs: All labs within the past 24 hours have been reviewed    Pending Diagnostic Studies:     None         Medications:  Reconciled Home Medications:      Medication List      CONTINUE taking these medications    acetaminophen 650 MG Tbsr  Commonly known as: TYLENOL  Take 1 tablet (650 mg total) by mouth 3 (three) times daily as needed.     celecoxib 200 MG capsule  Commonly known as: CeleBREX  Take 1 capsule (200 mg total) by mouth daily as " needed for Pain.     esomeprazole 20 MG capsule  Commonly known as: NEXIUM  Take 20 mg by mouth daily as needed.     fish oil-omega-3 fatty acids 300-1,000 mg capsule  Take 1 g by mouth once daily.     furosemide 20 MG tablet  Commonly known as: LASIX  TAKE 1 TABLET(20 MG) BY MOUTH EVERY DAY     telmisartan 40 MG Tab  Commonly known as: MICARDIS  TAKE 1 TABLET(40 MG) BY MOUTH EVERY DAY        STOP taking these medications    ciprofloxacin HCl 500 MG tablet  Commonly known as: CIPRO            Indwelling Lines/Drains at time of discharge:   Lines/Drains/Airways     None                 Time spent on the discharge of patient: > 35 minutes         The attending portion of this evaluation, treatment, and documentation was performed per Phi Laurent MD via Telemedicine AudioVisual using the secure [employer] software platform with 2 way audio/video. The provider was located off-site and the patient is located in the hospital. The aforementioned video software was utilized to document the relevant history and physical exam    Phi Laurent MD  Department of Hospital Medicine  [facility]

## 2022-12-13 ENCOUNTER — TELEPHONE (OUTPATIENT)
Dept: NEUROLOGY | Facility: CLINIC | Age: 72
End: 2022-12-13
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
## 2022-12-13 NOTE — TELEPHONE ENCOUNTER
----- Message from Mariya Monaco sent at 12/13/2022 10:30 AM CST -----  Regarding: Sooner Appt  Contact: Patient  Type:  Sooner Apoointment Request    Caller is requesting a sooner appointment.  Caller declined first available appointment listed below.  Caller will not accept being placed on the waitlist and is requesting a message be sent to doctor.  Name of Caller: Patient   When is the first available appointment? 02/2/2023   Symptoms: weakness movement disorder   Would the patient rather a call back or a response via [facility]? Call back   Best Call Back Number: [phone]  Additional Information: Patient was scheduled to see physician on 12/13/2022 pt rescheduled appt for 02/02/2023 Patient then called back to see if appt on today was still available and would like to be seen as soon as possible First available is ot until 02/2023 Please Assist

## 2022-12-20 ENCOUNTER — OFFICE VISIT (OUTPATIENT)
Dept: INTERNAL MEDICINE | Facility: CLINIC | Age: 72
End: 2022-12-20
Attending: INTERNAL MEDICINE
Payer: MEDICARE

## 2022-12-20 VITALS — TEMPERATURE: 97 F | SYSTOLIC BLOOD PRESSURE: 145 MMHG | HEART RATE: 90 BPM | DIASTOLIC BLOOD PRESSURE: 82 MMHG

## 2022-12-20 DIAGNOSIS — R60.0 PERIPHERAL EDEMA: ICD-10-CM

## 2022-12-20 DIAGNOSIS — I10 ESSENTIAL HYPERTENSION: Primary | ICD-10-CM

## 2022-12-20 DIAGNOSIS — L89.303 PRESSURE INJURY OF BUTTOCK, STAGE 3, UNSPECIFIED LATERALITY: ICD-10-CM

## 2022-12-20 DIAGNOSIS — N39.42 URINARY INCONTINENCE WITHOUT SENSORY AWARENESS: ICD-10-CM

## 2022-12-20 DIAGNOSIS — R53.81 DEBILITY: ICD-10-CM

## 2022-12-20 PROCEDURE — 1160F RVW MEDS BY RX/DR IN RCRD: CPT | Mod: CPTII,S$GLB,, | Performed by: INTERNAL MEDICINE

## 2022-12-20 PROCEDURE — 3079F PR MOST RECENT DIASTOLIC BLOOD PRESSURE 80-89 MM HG: ICD-10-PCS | Mod: CPTII,S$GLB,, | Performed by: INTERNAL MEDICINE

## 2022-12-20 PROCEDURE — 4010F ACE/ARB THERAPY RXD/TAKEN: CPT | Mod: CPTII,S$GLB,, | Performed by: INTERNAL MEDICINE

## 2022-12-20 PROCEDURE — 1159F PR MEDICATION LIST DOCUMENTED IN MEDICAL RECORD: ICD-10-PCS | Mod: CPTII,S$GLB,, | Performed by: INTERNAL MEDICINE

## 2022-12-20 PROCEDURE — 99213 OFFICE O/P EST LOW 20 MIN: CPT | Mod: 95,S$GLB,, | Performed by: INTERNAL MEDICINE

## 2022-12-20 PROCEDURE — 3077F PR MOST RECENT SYSTOLIC BLOOD PRESSURE >= 140 MM HG: ICD-10-PCS | Mod: CPTII,S$GLB,, | Performed by: INTERNAL MEDICINE

## 2022-12-20 PROCEDURE — 3079F DIAST BP 80-89 MM HG: CPT | Mod: CPTII,S$GLB,, | Performed by: INTERNAL MEDICINE

## 2022-12-20 PROCEDURE — 99213 PR OFFICE/OUTPT VISIT, EST, LEVL III, 20-29 MIN: ICD-10-PCS | Mod: 95,S$GLB,, | Performed by: INTERNAL MEDICINE

## 2022-12-20 PROCEDURE — 1160F PR REVIEW ALL MEDS BY PRESCRIBER/CLIN PHARMACIST DOCUMENTED: ICD-10-PCS | Mod: CPTII,S$GLB,, | Performed by: INTERNAL MEDICINE

## 2022-12-20 PROCEDURE — 4010F PR ACE/ARB THEARPY RXD/TAKEN: ICD-10-PCS | Mod: CPTII,S$GLB,, | Performed by: INTERNAL MEDICINE

## 2022-12-20 PROCEDURE — 1159F MED LIST DOCD IN RCRD: CPT | Mod: CPTII,S$GLB,, | Performed by: INTERNAL MEDICINE

## 2022-12-20 PROCEDURE — 3077F SYST BP >= 140 MM HG: CPT | Mod: CPTII,S$GLB,, | Performed by: INTERNAL MEDICINE

## 2022-12-20 RX ORDER — ZINC SULFATE 50(220)MG
220 CAPSULE ORAL DAILY
COMMUNITY

## 2022-12-20 RX ORDER — FERROUS SULFATE 325(65) MG
325 TABLET ORAL
COMMUNITY
End: 2023-06-30

## 2022-12-20 RX ORDER — ASCORBIC ACID 500 MG
500 TABLET ORAL 2 TIMES DAILY
COMMUNITY
End: 2023-06-30

## 2022-12-20 RX ORDER — SPIRONOLACTONE 25 MG/1
25 TABLET ORAL DAILY
Qty: 30 TABLET | Refills: 3 | Status: SHIPPED | OUTPATIENT
Start: 2022-12-20 | End: 2023-06-30

## 2022-12-20 RX ORDER — TELMISARTAN 40 MG/1
40 TABLET ORAL DAILY
Qty: 90 TABLET | Refills: 1 | Status: SHIPPED | OUTPATIENT
Start: 2022-12-20 | End: 2023-06-10

## 2022-12-20 RX ORDER — FUROSEMIDE 20 MG/1
20 TABLET ORAL 2 TIMES DAILY
Qty: 60 TABLET | Refills: 1 | Status: SHIPPED | OUTPATIENT
Start: 2022-12-20 | End: 2022-12-21

## 2022-12-20 NOTE — PROGRESS NOTES
Subjective:       Patient ID: Trista Gr is a 72 y.o. female.    Chief Complaint: No chief complaint on file.    HPI  Review of Systems      Objective:      Physical Exam    Assessment:       Problem List Items Addressed This Visit          Unprioritized    Essential hypertension - Primary    Debility    Pressure injury of buttock, stage 3    Peripheral edema    Urinary incontinence without sensory awareness         Plan:       ***

## 2022-12-20 NOTE — PROGRESS NOTES
Telemedicine Visit    The patient verbally consented to proceed with a telemedicine visit, following discussion of the options of face-to-face or telemedicine visit. The telemedicine visit was completed with real-time interactive audio and video using Zoom without complication.    The visit was conducted with a limited physical exam (visual exam), and was medically appropriate to meet the patient's needs.    The patient gives permission for us to bill for this telemedicine visit.  The patient understands that there are limitations including diagnostic, technical, and physical exam.  The patient understands that they have a right to stop the telemedicine visit at any time.    The patient is at their home.  The patient appeared to be in the usual physical state, with normal responses and affect.  I did not notice any abnormal skin tone or abnormal breathing patterns.    I requested that the patient contact my office at [phone] to make an appointment for routine follow-up care, unless more specific instructions were given under A/P.    Chief Complaint:  Sacral decubitus ulcers    HPI:  Hospital Follow-up:    Admit date: 11/15/22  Discharge date: 12/15/22  Hospital D/c for:  UTI, sacral decubitus ulcers, bedbound total debility.    Patient was called within 2 days of hospital discharge and made a follow-up appt with me within 7 calendar days of discharge.  The Discharge Summary was reviewed.       Family and/or Caretaker present at visit?  Yes -  and home health nurse  Diagnostic tests reviewed/disposition:  Yes.  Disease/illness education: Yes.   Home health/community services discussion/referrals:  She is active with [facility]  Establishment or re-establishment of referral orders for community resources:  Consult home health   Discussion with other health care providers: Notes in EPIC for review by all healthcare providers.            A/P:  She has bilateral stage III  decubitus ulcers of the buttock in 3+ peripheral edema.  We will add a wound care nurse twice a week through home health, order a low air loss mattress, and consult a  for nursing home placement.  She has a follow-up appointment scheduled with neurology to evaluate for possible normal pressure hydrocephalus seen on CT of head.

## 2022-12-21 ENCOUNTER — TELEPHONE (OUTPATIENT)
Dept: NEUROLOGY | Facility: CLINIC | Age: 72
End: 2022-12-21
Payer: MEDICARE

## 2022-12-21 NOTE — TELEPHONE ENCOUNTER
----- Message from Marily Reich MA sent at 12/21/2022 12:38 PM CST -----  Contact: [phone]  Pt states she spoke with someone who said that she was referred to another neuro doctor for an appt on 12/23/22 at 10:30am. Please reach out to pt at [phone].

## 2022-12-22 DIAGNOSIS — R52 PAIN: Primary | ICD-10-CM

## 2022-12-22 RX ORDER — TRAMADOL HYDROCHLORIDE 50 MG/1
TABLET ORAL
Qty: 15 TABLET | Refills: 0 | Status: SHIPPED | OUTPATIENT
Start: 2022-12-22 | End: 2023-04-11

## 2022-12-22 NOTE — TELEPHONE ENCOUNTER
Spoke briefly with Trista as she needed to cancel tomorrow's visit with Dr. Cohen. I rescheduled her for 2/10, but will explore options for a sooner visit.

## 2022-12-27 DIAGNOSIS — N39.0 URINARY TRACT INFECTION WITHOUT HEMATURIA, SITE UNSPECIFIED: ICD-10-CM

## 2022-12-27 RX ORDER — CIPROFLOXACIN 500 MG/1
500 TABLET ORAL 2 TIMES DAILY
Qty: 10 TABLET | Refills: 0 | Status: SHIPPED | OUTPATIENT
Start: 2022-12-27 | End: 2023-01-12

## 2023-01-12 ENCOUNTER — OFFICE VISIT (OUTPATIENT)
Dept: INTERNAL MEDICINE | Facility: CLINIC | Age: 73
End: 2023-01-12
Attending: INTERNAL MEDICINE
Payer: MEDICARE

## 2023-01-12 VITALS — SYSTOLIC BLOOD PRESSURE: 136 MMHG | HEART RATE: 76 BPM | DIASTOLIC BLOOD PRESSURE: 81 MMHG | OXYGEN SATURATION: 96 %

## 2023-01-12 DIAGNOSIS — R60.0 PERIPHERAL EDEMA: ICD-10-CM

## 2023-01-12 DIAGNOSIS — R53.81 DEBILITY: ICD-10-CM

## 2023-01-12 DIAGNOSIS — N30.00 ACUTE CYSTITIS WITHOUT HEMATURIA: ICD-10-CM

## 2023-01-12 DIAGNOSIS — I10 ESSENTIAL HYPERTENSION: Primary | ICD-10-CM

## 2023-01-12 DIAGNOSIS — L89.303 PRESSURE INJURY OF BUTTOCK, STAGE 3, UNSPECIFIED LATERALITY: ICD-10-CM

## 2023-01-12 PROCEDURE — 99214 OFFICE O/P EST MOD 30 MIN: CPT | Mod: 95,S$GLB,, | Performed by: INTERNAL MEDICINE

## 2023-01-12 PROCEDURE — 1159F MED LIST DOCD IN RCRD: CPT | Mod: CPTII,S$GLB,, | Performed by: INTERNAL MEDICINE

## 2023-01-12 PROCEDURE — 1160F RVW MEDS BY RX/DR IN RCRD: CPT | Mod: CPTII,S$GLB,, | Performed by: INTERNAL MEDICINE

## 2023-01-12 PROCEDURE — 3075F SYST BP GE 130 - 139MM HG: CPT | Mod: CPTII,S$GLB,, | Performed by: INTERNAL MEDICINE

## 2023-01-12 PROCEDURE — 3075F PR MOST RECENT SYSTOLIC BLOOD PRESS GE 130-139MM HG: ICD-10-PCS | Mod: CPTII,S$GLB,, | Performed by: INTERNAL MEDICINE

## 2023-01-12 PROCEDURE — 1159F PR MEDICATION LIST DOCUMENTED IN MEDICAL RECORD: ICD-10-PCS | Mod: CPTII,S$GLB,, | Performed by: INTERNAL MEDICINE

## 2023-01-12 PROCEDURE — 99214 PR OFFICE/OUTPT VISIT, EST, LEVL IV, 30-39 MIN: ICD-10-PCS | Mod: 95,S$GLB,, | Performed by: INTERNAL MEDICINE

## 2023-01-12 PROCEDURE — 3079F PR MOST RECENT DIASTOLIC BLOOD PRESSURE 80-89 MM HG: ICD-10-PCS | Mod: CPTII,S$GLB,, | Performed by: INTERNAL MEDICINE

## 2023-01-12 PROCEDURE — 3079F DIAST BP 80-89 MM HG: CPT | Mod: CPTII,S$GLB,, | Performed by: INTERNAL MEDICINE

## 2023-01-12 PROCEDURE — 1160F PR REVIEW ALL MEDS BY PRESCRIBER/CLIN PHARMACIST DOCUMENTED: ICD-10-PCS | Mod: CPTII,S$GLB,, | Performed by: INTERNAL MEDICINE

## 2023-01-12 RX ORDER — SULFAMETHOXAZOLE AND TRIMETHOPRIM 800; 160 MG/1; MG/1
1 TABLET ORAL 2 TIMES DAILY
Qty: 14 TABLET | Refills: 0 | Status: SHIPPED | OUTPATIENT
Start: 2023-01-12 | End: 2023-05-10 | Stop reason: SDUPTHER

## 2023-01-12 RX ORDER — DRESSING,ODOR ABSORBENT,H-POLY 4" X 4"
2 BANDAGE MISCELLANEOUS
Qty: 20 EACH | Refills: 5 | Status: SHIPPED | OUTPATIENT
Start: 2023-01-12 | End: 2023-02-11

## 2023-01-12 NOTE — PROGRESS NOTES
Telemedicine Visit    The patient verbally consented to proceed with a telemedicine visit, following discussion of the options of face-to-face or telemedicine visit. The telemedicine visit was completed with real-time interactive audio and video using Zoom.  The video portion worked well.  There were some issues with audio, so it was carried out via telephone.    The visit was conducted with a limited physical exam (visual exam), and was medically appropriate to meet the patient's needs.    The patient gives permission for us to bill for this telemedicine visit.  The patient understands that there are limitations including diagnostic, technical, and physical exam.  The patient understands that they have a right to stop the telemedicine visit at any time.    The patient is at their home.  The patient appeared to be in the usual physical state, with normal responses and affect.  I did not notice any abnormal skin tone or abnormal breathing patterns.    I requested that the patient contact my office at [phone] to make an appointment for routine follow-up care, unless more specific instructions were given under A/P.    Chief Complaint:  Sacral decubitus ulcers    HPI:  She recently received a low air loss mattress and has been getting wound care for bilateral stage III sacral decubitus ulcers.  She does say that they are getting better.  She took Cipro for 5 days for a presumed UTI and it got significantly better, however it is not completely gone.        A/P:  Continue home health nurse with wound care and low air loss mattress for bilateral stage III sacral decubiti  Continue furosemide for peripheral edema.  She will keep her follow-up appointment with Neurology on February 10, 2023.    Between 30 and 39 min of total time for evaluation and management services were spent on the patient today.  The medical problems and treatment options were discussed, and all questions were answered.

## 2023-02-01 DIAGNOSIS — B37.9 YEAST INFECTION: Primary | ICD-10-CM

## 2023-02-01 RX ORDER — FLUCONAZOLE 150 MG/1
150 TABLET ORAL DAILY PRN
Qty: 3 TABLET | Refills: 0 | Status: SHIPPED | OUTPATIENT
Start: 2023-02-01 | End: 2023-02-02

## 2023-02-06 PROBLEM — N39.0 URINARY TRACT INFECTION WITHOUT HEMATURIA: Status: RESOLVED | Noted: 2022-11-07 | Resolved: 2023-02-06

## 2023-02-09 ENCOUNTER — TELEPHONE (OUTPATIENT)
Dept: NEUROLOGY | Facility: CLINIC | Age: 73
End: 2023-02-09
Payer: MEDICARE

## 2023-02-09 NOTE — TELEPHONE ENCOUNTER
Called patient to cancel her 1:30 appt for tomorrow and rescheduled it for Tuesday March 21st at 2:00 pm. Pt claimed to have a stomach virus

## 2023-03-08 DIAGNOSIS — R09.81 NASAL CONGESTION: Primary | ICD-10-CM

## 2023-03-08 DIAGNOSIS — R09.81 CONGESTED NOSE: ICD-10-CM

## 2023-03-08 RX ORDER — METHYLPREDNISOLONE 4 MG/1
TABLET ORAL
Qty: 21 EACH | Refills: 0 | Status: SHIPPED | OUTPATIENT
Start: 2023-03-08 | End: 2023-03-29

## 2023-03-20 ENCOUNTER — TELEPHONE (OUTPATIENT)
Dept: NEUROLOGY | Facility: CLINIC | Age: 73
End: 2023-03-20
Payer: MEDICARE

## 2023-03-20 NOTE — TELEPHONE ENCOUNTER
Called patient to reschedule appointment with Dr. Prince tomorrow, patient stated she caught a head cold and cannot come in this week. We rescheduled her appointment with Dr. Prince for Tuesday May 2nd at 2:00 pm.

## 2023-04-24 DIAGNOSIS — H10.029 PINK EYE DISEASE, UNSPECIFIED LATERALITY: Primary | ICD-10-CM

## 2023-04-24 RX ORDER — CIPROFLOXACIN HYDROCHLORIDE 3 MG/ML
1 SOLUTION/ DROPS OPHTHALMIC 4 TIMES DAILY
Qty: 5 ML | Refills: 0 | Status: ON HOLD | OUTPATIENT
Start: 2023-04-24 | End: 2023-05-24 | Stop reason: HOSPADM

## 2023-05-08 DIAGNOSIS — R52 PAIN: ICD-10-CM

## 2023-05-09 RX ORDER — TRAMADOL HYDROCHLORIDE 50 MG/1
TABLET ORAL
Qty: 15 TABLET | Refills: 1 | Status: ON HOLD | OUTPATIENT
Start: 2023-05-09 | End: 2023-05-24 | Stop reason: SDUPTHER

## 2023-05-10 DIAGNOSIS — N39.0 URINARY TRACT INFECTION WITHOUT HEMATURIA, SITE UNSPECIFIED: Primary | ICD-10-CM

## 2023-05-10 RX ORDER — SULFAMETHOXAZOLE AND TRIMETHOPRIM 800; 160 MG/1; MG/1
1 TABLET ORAL 2 TIMES DAILY
Qty: 14 TABLET | Refills: 0 | Status: ON HOLD | OUTPATIENT
Start: 2023-05-10 | End: 2023-05-24 | Stop reason: HOSPADM

## 2023-05-13 ENCOUNTER — HOSPITAL ENCOUNTER (INPATIENT)
Facility: OTHER | Age: 73
LOS: 8 days | Discharge: NURSING, REHAB OR LONG-TERM CARE | DRG: 580 | End: 2023-05-24
Attending: EMERGENCY MEDICINE | Admitting: INTERNAL MEDICINE
Payer: MEDICARE

## 2023-05-13 DIAGNOSIS — R53.1 WEAKNESS: Primary | ICD-10-CM

## 2023-05-13 DIAGNOSIS — L89.303 PRESSURE INJURY OF BUTTOCK, STAGE 3, UNSPECIFIED LATERALITY: ICD-10-CM

## 2023-05-13 DIAGNOSIS — N12 PYELONEPHRITIS: ICD-10-CM

## 2023-05-13 DIAGNOSIS — R53.83 FATIGUE, UNSPECIFIED TYPE: ICD-10-CM

## 2023-05-13 DIAGNOSIS — R52 PAIN: ICD-10-CM

## 2023-05-13 DIAGNOSIS — R29.898 LOWER EXTREMITY WEAKNESS: ICD-10-CM

## 2023-05-13 DIAGNOSIS — R05.9 COUGH: ICD-10-CM

## 2023-05-13 DIAGNOSIS — R07.9 CHEST PAIN: ICD-10-CM

## 2023-05-13 DIAGNOSIS — R29.898 UPPER EXTREMITY WEAKNESS: ICD-10-CM

## 2023-05-13 PROCEDURE — 83690 ASSAY OF LIPASE: CPT | Performed by: EMERGENCY MEDICINE

## 2023-05-13 PROCEDURE — 80053 COMPREHEN METABOLIC PANEL: CPT | Performed by: EMERGENCY MEDICINE

## 2023-05-13 PROCEDURE — 83735 ASSAY OF MAGNESIUM: CPT | Performed by: EMERGENCY MEDICINE

## 2023-05-13 PROCEDURE — 83880 ASSAY OF NATRIURETIC PEPTIDE: CPT | Performed by: EMERGENCY MEDICINE

## 2023-05-13 PROCEDURE — 85025 COMPLETE CBC W/AUTO DIFF WBC: CPT | Performed by: EMERGENCY MEDICINE

## 2023-05-13 PROCEDURE — 84484 ASSAY OF TROPONIN QUANT: CPT | Performed by: EMERGENCY MEDICINE

## 2023-05-13 NOTE — Clinical Note
Diagnosis: Pyelonephritis [065536]   Future Attending Provider: SUZANNA MOREL [[id number]]   Admitting Provider:: SUZANNA MOREL [[id number]]

## 2023-05-14 PROBLEM — M54.2 NECK PAIN ON RIGHT SIDE: Status: ACTIVE | Noted: 2023-05-14

## 2023-05-14 PROBLEM — H02.846 SWELLING OF LEFT EYELID: Status: ACTIVE | Noted: 2023-05-14

## 2023-05-14 LAB
ALBUMIN SERPL BCP-MCNC: 3 G/DL (ref 3.5–5.2)
ALP SERPL-CCNC: 91 U/L (ref 55–135)
ALT SERPL W/O P-5'-P-CCNC: 17 U/L (ref 10–44)
ANION GAP SERPL CALC-SCNC: 12 MMOL/L (ref 8–16)
ANION GAP SERPL CALC-SCNC: 14 MMOL/L (ref 8–16)
AST SERPL-CCNC: 17 U/L (ref 10–40)
BACTERIA #/AREA URNS HPF: ABNORMAL /HPF
BASOPHILS # BLD AUTO: 0.05 K/UL (ref 0–0.2)
BASOPHILS # BLD AUTO: 0.06 K/UL (ref 0–0.2)
BASOPHILS NFR BLD: 0.3 % (ref 0–1.9)
BASOPHILS NFR BLD: 0.4 % (ref 0–1.9)
BILIRUB SERPL-MCNC: 0.8 MG/DL (ref 0.1–1)
BILIRUB UR QL STRIP: NEGATIVE
BNP SERPL-MCNC: <10 PG/ML (ref 0–99)
BUN SERPL-MCNC: 7 MG/DL (ref 8–23)
BUN SERPL-MCNC: 8 MG/DL (ref 8–23)
CALCIUM SERPL-MCNC: 9.1 MG/DL (ref 8.7–10.5)
CALCIUM SERPL-MCNC: 9.1 MG/DL (ref 8.7–10.5)
CHLORIDE SERPL-SCNC: 103 MMOL/L (ref 95–110)
CHLORIDE SERPL-SCNC: 105 MMOL/L (ref 95–110)
CLARITY UR: CLEAR
CO2 SERPL-SCNC: 21 MMOL/L (ref 23–29)
CO2 SERPL-SCNC: 24 MMOL/L (ref 23–29)
COLOR UR: YELLOW
CREAT SERPL-MCNC: 0.7 MG/DL (ref 0.5–1.4)
CREAT SERPL-MCNC: 0.7 MG/DL (ref 0.5–1.4)
CTP QC/QA: YES
CTP QC/QA: YES
DIFFERENTIAL METHOD: ABNORMAL
DIFFERENTIAL METHOD: ABNORMAL
EOSINOPHIL # BLD AUTO: 0.1 K/UL (ref 0–0.5)
EOSINOPHIL # BLD AUTO: 0.2 K/UL (ref 0–0.5)
EOSINOPHIL NFR BLD: 0.9 % (ref 0–8)
EOSINOPHIL NFR BLD: 1 % (ref 0–8)
ERYTHROCYTE [DISTWIDTH] IN BLOOD BY AUTOMATED COUNT: 13 % (ref 11.5–14.5)
ERYTHROCYTE [DISTWIDTH] IN BLOOD BY AUTOMATED COUNT: 13.1 % (ref 11.5–14.5)
EST. GFR  (NO RACE VARIABLE): >60 ML/MIN/1.73 M^2
EST. GFR  (NO RACE VARIABLE): >60 ML/MIN/1.73 M^2
GLUCOSE SERPL-MCNC: 79 MG/DL (ref 70–110)
GLUCOSE SERPL-MCNC: 82 MG/DL (ref 70–110)
GLUCOSE UR QL STRIP: NEGATIVE
HCT VFR BLD AUTO: 33.1 % (ref 37–48.5)
HCT VFR BLD AUTO: 34.7 % (ref 37–48.5)
HGB BLD-MCNC: 10.8 G/DL (ref 12–16)
HGB BLD-MCNC: 11.6 G/DL (ref 12–16)
HGB UR QL STRIP: NEGATIVE
IMM GRANULOCYTES # BLD AUTO: 0.09 K/UL (ref 0–0.04)
IMM GRANULOCYTES # BLD AUTO: 0.13 K/UL (ref 0–0.04)
IMM GRANULOCYTES NFR BLD AUTO: 0.6 % (ref 0–0.5)
IMM GRANULOCYTES NFR BLD AUTO: 0.8 % (ref 0–0.5)
KETONES UR QL STRIP: ABNORMAL
LEUKOCYTE ESTERASE UR QL STRIP: ABNORMAL
LIPASE SERPL-CCNC: 6 U/L (ref 4–60)
LYMPHOCYTES # BLD AUTO: 2.2 K/UL (ref 1–4.8)
LYMPHOCYTES # BLD AUTO: 2.4 K/UL (ref 1–4.8)
LYMPHOCYTES NFR BLD: 13.5 % (ref 18–48)
LYMPHOCYTES NFR BLD: 14.3 % (ref 18–48)
MAGNESIUM SERPL-MCNC: 1.7 MG/DL (ref 1.6–2.6)
MAGNESIUM SERPL-MCNC: 1.8 MG/DL (ref 1.6–2.6)
MCH RBC QN AUTO: 27 PG (ref 27–31)
MCH RBC QN AUTO: 27.2 PG (ref 27–31)
MCHC RBC AUTO-ENTMCNC: 32.6 G/DL (ref 32–36)
MCHC RBC AUTO-ENTMCNC: 33.4 G/DL (ref 32–36)
MCV RBC AUTO: 81 FL (ref 82–98)
MCV RBC AUTO: 83 FL (ref 82–98)
MICROSCOPIC COMMENT: ABNORMAL
MONOCYTES # BLD AUTO: 1.5 K/UL (ref 0.3–1)
MONOCYTES # BLD AUTO: 1.7 K/UL (ref 0.3–1)
MONOCYTES NFR BLD: 10 % (ref 4–15)
MONOCYTES NFR BLD: 9 % (ref 4–15)
NEUTROPHILS # BLD AUTO: 12.2 K/UL (ref 1.8–7.7)
NEUTROPHILS # BLD AUTO: 12.3 K/UL (ref 1.8–7.7)
NEUTROPHILS NFR BLD: 73.6 % (ref 38–73)
NEUTROPHILS NFR BLD: 75.6 % (ref 38–73)
NITRITE UR QL STRIP: POSITIVE
NRBC BLD-RTO: 0 /100 WBC
NRBC BLD-RTO: 0 /100 WBC
PH UR STRIP: 6 [PH] (ref 5–8)
PLATELET # BLD AUTO: 270 K/UL (ref 150–450)
PLATELET # BLD AUTO: 305 K/UL (ref 150–450)
PMV BLD AUTO: 10.1 FL (ref 9.2–12.9)
PMV BLD AUTO: 10.2 FL (ref 9.2–12.9)
POC MOLECULAR INFLUENZA A AGN: NEGATIVE
POC MOLECULAR INFLUENZA B AGN: NEGATIVE
POCT GLUCOSE: 84 MG/DL (ref 70–110)
POTASSIUM SERPL-SCNC: 3.6 MMOL/L (ref 3.5–5.1)
POTASSIUM SERPL-SCNC: 3.8 MMOL/L (ref 3.5–5.1)
PROT SERPL-MCNC: 7.1 G/DL (ref 6–8.4)
PROT UR QL STRIP: NEGATIVE
RBC # BLD AUTO: 4 M/UL (ref 4–5.4)
RBC # BLD AUTO: 4.27 M/UL (ref 4–5.4)
RBC #/AREA URNS HPF: 0 /HPF (ref 0–4)
SARS-COV-2 RDRP RESP QL NAA+PROBE: NEGATIVE
SODIUM SERPL-SCNC: 139 MMOL/L (ref 136–145)
SODIUM SERPL-SCNC: 140 MMOL/L (ref 136–145)
SP GR UR STRIP: 1.01 (ref 1–1.03)
SQUAMOUS #/AREA URNS HPF: 1 /HPF
TROPONIN I SERPL DL<=0.01 NG/ML-MCNC: <0.006 NG/ML (ref 0–0.03)
URN SPEC COLLECT METH UR: ABNORMAL
UROBILINOGEN UR STRIP-ACNC: NEGATIVE EU/DL
WBC # BLD AUTO: 16.18 K/UL (ref 3.9–12.7)
WBC # BLD AUTO: 16.54 K/UL (ref 3.9–12.7)
WBC #/AREA URNS HPF: 11 /HPF (ref 0–5)
WBC CLUMPS URNS QL MICRO: ABNORMAL

## 2023-05-14 PROCEDURE — 25000003 PHARM REV CODE 250

## 2023-05-14 PROCEDURE — 87088 URINE BACTERIA CULTURE: CPT | Performed by: EMERGENCY MEDICINE

## 2023-05-14 PROCEDURE — 63600175 PHARM REV CODE 636 W HCPCS

## 2023-05-14 PROCEDURE — 99223 1ST HOSP IP/OBS HIGH 75: CPT | Mod: ,,, | Performed by: PHYSICIAN ASSISTANT

## 2023-05-14 PROCEDURE — 99285 EMERGENCY DEPT VISIT HI MDM: CPT

## 2023-05-14 PROCEDURE — 96361 HYDRATE IV INFUSION ADD-ON: CPT

## 2023-05-14 PROCEDURE — 80048 BASIC METABOLIC PNL TOTAL CA: CPT | Performed by: PHYSICIAN ASSISTANT

## 2023-05-14 PROCEDURE — 25000003 PHARM REV CODE 250: Performed by: PHYSICIAN ASSISTANT

## 2023-05-14 PROCEDURE — 96365 THER/PROPH/DIAG IV INF INIT: CPT

## 2023-05-14 PROCEDURE — 25000003 PHARM REV CODE 250: Performed by: EMERGENCY MEDICINE

## 2023-05-14 PROCEDURE — 96375 TX/PRO/DX INJ NEW DRUG ADDON: CPT

## 2023-05-14 PROCEDURE — 97162 PT EVAL MOD COMPLEX 30 MIN: CPT

## 2023-05-14 PROCEDURE — 87077 CULTURE AEROBIC IDENTIFY: CPT | Performed by: EMERGENCY MEDICINE

## 2023-05-14 PROCEDURE — 81000 URINALYSIS NONAUTO W/SCOPE: CPT | Performed by: EMERGENCY MEDICINE

## 2023-05-14 PROCEDURE — G0378 HOSPITAL OBSERVATION PER HR: HCPCS

## 2023-05-14 PROCEDURE — A4216 STERILE WATER/SALINE, 10 ML: HCPCS | Performed by: PHYSICIAN ASSISTANT

## 2023-05-14 PROCEDURE — 94761 N-INVAS EAR/PLS OXIMETRY MLT: CPT

## 2023-05-14 PROCEDURE — 51701 INSERT BLADDER CATHETER: CPT

## 2023-05-14 PROCEDURE — 93005 ELECTROCARDIOGRAM TRACING: CPT

## 2023-05-14 PROCEDURE — 93010 ELECTROCARDIOGRAM REPORT: CPT | Mod: ,,, | Performed by: INTERNAL MEDICINE

## 2023-05-14 PROCEDURE — 87186 SC STD MICRODIL/AGAR DIL: CPT | Performed by: EMERGENCY MEDICINE

## 2023-05-14 PROCEDURE — 93010 EKG 12-LEAD: ICD-10-PCS | Mod: ,,, | Performed by: INTERNAL MEDICINE

## 2023-05-14 PROCEDURE — 36415 COLL VENOUS BLD VENIPUNCTURE: CPT | Performed by: PHYSICIAN ASSISTANT

## 2023-05-14 PROCEDURE — 83735 ASSAY OF MAGNESIUM: CPT | Performed by: PHYSICIAN ASSISTANT

## 2023-05-14 PROCEDURE — 96372 THER/PROPH/DIAG INJ SC/IM: CPT | Performed by: PHYSICIAN ASSISTANT

## 2023-05-14 PROCEDURE — 87086 URINE CULTURE/COLONY COUNT: CPT | Performed by: EMERGENCY MEDICINE

## 2023-05-14 PROCEDURE — 99223 PR INITIAL HOSPITAL CARE,LEVL III: ICD-10-PCS | Mod: ,,, | Performed by: PHYSICIAN ASSISTANT

## 2023-05-14 PROCEDURE — 63600175 PHARM REV CODE 636 W HCPCS: Performed by: PHYSICIAN ASSISTANT

## 2023-05-14 PROCEDURE — 63600175 PHARM REV CODE 636 W HCPCS: Performed by: EMERGENCY MEDICINE

## 2023-05-14 PROCEDURE — 97165 OT EVAL LOW COMPLEX 30 MIN: CPT

## 2023-05-14 PROCEDURE — 85025 COMPLETE CBC W/AUTO DIFF WBC: CPT | Performed by: PHYSICIAN ASSISTANT

## 2023-05-14 RX ORDER — AMOXICILLIN 250 MG
1 CAPSULE ORAL 2 TIMES DAILY PRN
Status: DISCONTINUED | OUTPATIENT
Start: 2023-05-14 | End: 2023-05-16

## 2023-05-14 RX ORDER — TRAMADOL HYDROCHLORIDE 50 MG/1
100 TABLET ORAL EVERY 6 HOURS PRN
Status: DISCONTINUED | OUTPATIENT
Start: 2023-05-14 | End: 2023-05-14

## 2023-05-14 RX ORDER — CIPROFLOXACIN HYDROCHLORIDE 3 MG/ML
1 SOLUTION/ DROPS OPHTHALMIC 4 TIMES DAILY
Status: DISCONTINUED | OUTPATIENT
Start: 2023-05-14 | End: 2023-05-17

## 2023-05-14 RX ORDER — FUROSEMIDE 20 MG/1
20 TABLET ORAL 2 TIMES DAILY
Status: DISCONTINUED | OUTPATIENT
Start: 2023-05-14 | End: 2023-05-20

## 2023-05-14 RX ORDER — HYDRALAZINE HYDROCHLORIDE 20 MG/ML
10 INJECTION INTRAMUSCULAR; INTRAVENOUS EVERY 6 HOURS PRN
Status: DISCONTINUED | OUTPATIENT
Start: 2023-05-14 | End: 2023-05-16

## 2023-05-14 RX ORDER — KETOROLAC TROMETHAMINE 30 MG/ML
10 INJECTION, SOLUTION INTRAMUSCULAR; INTRAVENOUS
Status: COMPLETED | OUTPATIENT
Start: 2023-05-14 | End: 2023-05-14

## 2023-05-14 RX ORDER — LOSARTAN POTASSIUM 50 MG/1
50 TABLET ORAL 2 TIMES DAILY
Status: DISCONTINUED | OUTPATIENT
Start: 2023-05-14 | End: 2023-05-20

## 2023-05-14 RX ORDER — ACETAMINOPHEN 325 MG/1
650 TABLET ORAL EVERY 6 HOURS PRN
Status: DISCONTINUED | OUTPATIENT
Start: 2023-05-14 | End: 2023-05-24 | Stop reason: HOSPADM

## 2023-05-14 RX ORDER — LOSARTAN POTASSIUM 50 MG/1
50 TABLET ORAL DAILY
Status: DISCONTINUED | OUTPATIENT
Start: 2023-05-14 | End: 2023-05-14

## 2023-05-14 RX ORDER — SODIUM CHLORIDE 0.9 % (FLUSH) 0.9 %
10 SYRINGE (ML) INJECTION EVERY 8 HOURS
Status: DISCONTINUED | OUTPATIENT
Start: 2023-05-14 | End: 2023-05-24 | Stop reason: HOSPADM

## 2023-05-14 RX ORDER — ORPHENADRINE CITRATE 100 MG/1
100 TABLET, EXTENDED RELEASE ORAL 2 TIMES DAILY
Status: DISCONTINUED | OUTPATIENT
Start: 2023-05-14 | End: 2023-05-15

## 2023-05-14 RX ORDER — ORPHENADRINE CITRATE 100 MG/1
100 TABLET, EXTENDED RELEASE ORAL 2 TIMES DAILY
Status: DISCONTINUED | OUTPATIENT
Start: 2023-05-14 | End: 2023-05-14

## 2023-05-14 RX ORDER — SPIRONOLACTONE 25 MG/1
25 TABLET ORAL DAILY
Status: DISCONTINUED | OUTPATIENT
Start: 2023-05-14 | End: 2023-05-24 | Stop reason: HOSPADM

## 2023-05-14 RX ORDER — TALC
6 POWDER (GRAM) TOPICAL NIGHTLY PRN
Status: DISCONTINUED | OUTPATIENT
Start: 2023-05-14 | End: 2023-05-24 | Stop reason: HOSPADM

## 2023-05-14 RX ORDER — HEPARIN SODIUM 5000 [USP'U]/ML
5000 INJECTION, SOLUTION INTRAVENOUS; SUBCUTANEOUS EVERY 8 HOURS
Status: DISCONTINUED | OUTPATIENT
Start: 2023-05-14 | End: 2023-05-24 | Stop reason: HOSPADM

## 2023-05-14 RX ORDER — LIDOCAINE 50 MG/G
1 PATCH TOPICAL
Status: DISCONTINUED | OUTPATIENT
Start: 2023-05-14 | End: 2023-05-24 | Stop reason: HOSPADM

## 2023-05-14 RX ORDER — NALOXONE HCL 0.4 MG/ML
0.02 VIAL (ML) INJECTION
Status: DISCONTINUED | OUTPATIENT
Start: 2023-05-14 | End: 2023-05-24 | Stop reason: HOSPADM

## 2023-05-14 RX ADMIN — HEPARIN SODIUM 5000 UNITS: 5000 INJECTION INTRAVENOUS; SUBCUTANEOUS at 10:05

## 2023-05-14 RX ADMIN — LIDOCAINE 1 PATCH: 50 PATCH CUTANEOUS at 06:05

## 2023-05-14 RX ADMIN — SPIRONOLACTONE 25 MG: 25 TABLET, FILM COATED ORAL at 10:05

## 2023-05-14 RX ADMIN — Medication 10 ML: at 10:05

## 2023-05-14 RX ADMIN — LOSARTAN POTASSIUM 50 MG: 50 TABLET, FILM COATED ORAL at 09:05

## 2023-05-14 RX ADMIN — Medication 10 ML: at 06:05

## 2023-05-14 RX ADMIN — Medication 10 ML: at 03:05

## 2023-05-14 RX ADMIN — FUROSEMIDE 20 MG: 20 TABLET ORAL at 10:05

## 2023-05-14 RX ADMIN — ACETAMINOPHEN 650 MG: 325 TABLET, FILM COATED ORAL at 11:05

## 2023-05-14 RX ADMIN — HYDRALAZINE HYDROCHLORIDE 10 MG: 20 INJECTION INTRAMUSCULAR; INTRAVENOUS at 10:05

## 2023-05-14 RX ADMIN — CEFTRIAXONE SODIUM 2 G: 2 INJECTION, POWDER, FOR SOLUTION INTRAMUSCULAR; INTRAVENOUS at 12:05

## 2023-05-14 RX ADMIN — HEPARIN SODIUM 5000 UNITS: 5000 INJECTION INTRAVENOUS; SUBCUTANEOUS at 06:05

## 2023-05-14 RX ADMIN — HEPARIN SODIUM 5000 UNITS: 5000 INJECTION INTRAVENOUS; SUBCUTANEOUS at 03:05

## 2023-05-14 RX ADMIN — SODIUM CHLORIDE 1000 ML: 9 INJECTION, SOLUTION INTRAVENOUS at 01:05

## 2023-05-14 RX ADMIN — LOSARTAN POTASSIUM 50 MG: 50 TABLET, FILM COATED ORAL at 10:05

## 2023-05-14 RX ADMIN — FUROSEMIDE 20 MG: 20 TABLET ORAL at 09:05

## 2023-05-14 RX ADMIN — TRAMADOL HYDROCHLORIDE 25 MG: 50 TABLET, COATED ORAL at 10:05

## 2023-05-14 RX ADMIN — ORPHENADRINE CITRATE 100 MG: 100 TABLET, EXTENDED RELEASE ORAL at 10:05

## 2023-05-14 RX ADMIN — KETOROLAC TROMETHAMINE 10 MG: 30 INJECTION, SOLUTION INTRAMUSCULAR; INTRAVENOUS at 12:05

## 2023-05-14 NOTE — PT/OT/SLP EVAL
Physical Therapy Evaluation and Discharge Note    Patient Name:  Trista Gr   MRN:  [id number]    Recommendations:     Discharge Recommendations: nursing facility, basic  Discharge Equipment Recommendations: none   Barriers to discharge: Decreased caregiver support    Assessment:     Trista Gr is a 72 y.o. female admitted with a medical diagnosis of Urinary tract infection without hematuria. .  At this time, patient is functioning at their prior level of function and does not require further acute PT services.     Recent Surgery: * No surgery found *      Plan:     During this hospitalization, patient does not require further acute PT services.  Please re-consult if situation changes.      Subjective     Chief Complaint: generalized pain with movement and difficulty with movement   Patient/Family Comments/goals: Patient agrees to participate in PT evaluation   Pain/Comfort:  Pain Rating 1:  (patient reports generalized pain with all movement)  Pain Addressed 1: Reposition, Distraction, Cessation of Activity, Pre-medicate for activity    Patients cultural, spiritual, Jew conflicts given the current situation: no    Living Environment:  Patient lives with her  in [facility] with no steps to enter. Her  works during the day and she reports she is home alone for bouts of time greater than four hours. Patient unable to turn herself in bed or weight shift.    Prior to admission, patients level of function was dependent for as long as she can remember with all functional mobility and ADLS.  Patient reports she is unable to transfer into  and spends all of her time in her bed. She reports her  changes her briefs, turns her and bathes her in the bed.  Equipment used at home: hospital bed, lift device, wheelchair.  DME owned (not currently used): rolling walker and wheelchair.  Upon discharge, patient will have assistance from  when  is not working.    Objective:      Communicated with nursing  prior to session.  Patient found supine with PureWick, peripheral IV upon PT entry to room.    General Precautions: Standard, fall    Orthopedic Precautions:N/A   Braces: N/A  Respiratory Status: Room air    Exams:  Cognition:   Patient is oriented to person, place, time and situation.  Pt follows approximately 100% of  verbal  commands.    Mood: Pleasant and cooperative.   Safety Awareness: fair   Musculoskeletal:  BMI: 31  Posture:  significant forward head posture   LE ROM/Strength:   R ROM: WNL  L ROM: WNL  R Strength:   Knee extension: 2/5  Dorsiflexion: 2/5   L Strength:   Knee extension: 2/5  Dorsiflexion: 2/5   Neuromuscular:  Sensation: Intact to light touch bilateral LEs.  Tone/Reflexes: No impairments identified with functional mobility. No formal testing performed.   Balance:   Static sitting: NA  Static standing: NA  Dynamic standing: NA  Visual-vestibular: No impairments identified with functional mobility. No formal testing performed.  Integument: Visible skin intact  Cardiopulmonary:  Edema: none noted      Functional Mobility:  Functional mobility not assessed due to patient requiring total assist for all movement including bed mobility    AM-PAC 6 CLICK MOBILITY  Total Score:6       Treatment and Education:  Patient educated on POC and purpose of PT    AM-PAC 6 CLICK MOBILITY  Total Score:6     Patient left supine with all lines intact, call button in reach, and nurse notified.    GOALS:   Multidisciplinary Problems       Physical Therapy Goals       Not on file              Multidisciplinary Problems (Resolved)          Problem: Physical Therapy    Goal Priority Disciplines Outcome Goal Variances Interventions   Physical Therapy Goal   (Resolved)     PT, PT/OT Met                         History:     Past Medical History:   Diagnosis Date    Debility     Decubitus ulcer of buttock     GERD (gastroesophageal reflux disease) 09/22/2012    HTN (hypertension) 09/22/2012     Normal stress echocardiogram 09/22/2012    Vitamin D deficiency disease 09/22/2012       Past Surgical History:   Procedure Laterality Date    APPENDECTOMY      BILATERAL SALPINGOOPHORECTOMY      HYSTERECTOMY         Time Tracking:     PT Received On: 05/14/23  PT Start Time: 1150     PT Stop Time: 1200  PT Total Time (min): 10 min     Billable Minutes: Evaluation 10      05/14/2023

## 2023-05-14 NOTE — ASSESSMENT & PLAN NOTE
States this is chronic, but causing difficulty eating - there is palpable tightening/stiffness of the right side of the neck.  X-ray of C-spine unremarkable.  Will need to assess further when she is more awake.  -Continue Lidocaine patch  -Warm compress PRN

## 2023-05-14 NOTE — PT/OT/SLP EVAL
Occupational Therapy   Evaluation and Discharge Note    Name: Trista Gr  MRN: [id number]  Admitting Diagnosis: Urinary tract infection without hematuria  Recent Surgery: * No surgery found *      Recommendations:     Discharge Recommendations: nursing facility, basic  Discharge Equipment Recommendations:  (None anticipated.)  Barriers to discharge:  None    Assessment:   Initial OT eval completed.  Pt. Found to be at PLOF.  Previously dependent with bathing, dressing, and toileting at bed level with spouse completely assisting with tasks.  Pt. Also reports that her spouse assists with feeding and grooming at times.  Handed face towel and able to wipe face using RUE.  Pt. Reports that spouse works on call and she is sometimes left at home alone for more than 4 hours.  Has hospital bed, lift device, and W/C.  No DME or acute care OT needs anticipated.  Recommend post acute nursing home placement.  To discontinue OT.     Trista Gr is a 72 y.o. female with a medical diagnosis of Urinary tract infection without hematuria. At this time, patient is functioning at their prior level of function and does not require further acute OT services.     Plan:     During this hospitalization, patient does not require further acute OT services.  Please re-consult if situation changes.    Plan of Care Reviewed with: patient    Subjective     Chief Complaint: With c/o RUE upper arm/shoulder pain.   Patient/Family Comments/goals: No goals stated.     Occupational Profile:  Lives with spouse in [facility].  Previously dependent with bathing, dressing, and toileting at bed level with spouse completely assisting with tasks.    Equipment Used at home: wheelchair, hospital bed, lift device  Assistance upon Discharge: Facility staff.     Pain/Comfort:  Pain Rating 1: 8/10  Location - Side 1: Left  Location - Orientation 1: generalized  Location 1: shoulder  Pain Addressed 1: Reposition, Distraction, Cessation of Activity, Nurse  notified, Pre-medicate for activity  Pain Rating Post-Intervention 1: 8/10    Patients cultural, spiritual, Voodoo conflicts given the current situation:  (None stated.)    Objective:     Communicated with: Val Magdaleno RN prior to session.  Patient found left sidelying with peripheral IV, telemetry, PureWick upon OT entry to room.    General Precautions: Standard, fall (stage 3 sacral wound)  Orthopedic Precautions: N/A  Braces: N/A  Respiratory Status: Room air     Occupational Performance:    Bed Mobility:    RN reporting that Pt. Had recently been turned with no need to perform task during eval at this time  Pt. Reports TOTAL A for all bed mobility PTA    Activities of Daily Living:  Handed wash cloth and able to was forehead and mouth  Pt.  Reports TOTAL A for toileting, bathing, and dressing at bed level PTA    Cognitive/Visual Perceptual:  Cognitive/Psychosocial Skills:  -       Oriented to: Person, Place, Time, and Situation   -       Follows Commands/attention:Follows one-step commands  -       Communication: able to make basic needs known and answer questions appropriately  -       Memory: No Deficits noted  -       Safety awareness/insight to disability: impaired   -       Mood/Affect/Coping skills/emotional control: Cooperative  Visual/Perceptual:  -grossly intact    Physical Exam:  Skin integrity: stage 3 sacral wound well documented in EMR  Dominant hand: -       R  Upper Extremity Range of Motion:  -       Right Upper Extremity: elbow flex and finger flex/ext WFL  -       Left Upper Extremity: 75% of elbow flex and finger flex/ext WFL   Strength: -       Right Upper Extremity: FAIR  -       Left Upper Extremity: FAIR  Fine Motor Coordination: -       Intact  Left hand thumb/finger opposition skills and Right hand thumb/finger opposition skills    AMPAC 6 Click ADL:  AMPAC Total Score: 8    Treatment & Education:  Educated on role of OT, POC, review of call light, and importance of  calling for assist as needed.     Patient left left sidelying with all lines intact, call button in reach, and nursing notified    GOALS:   Multidisciplinary Problems       Occupational Therapy Goals       Not on file              Multidisciplinary Problems (Resolved)          Problem: Occupational Therapy    Goal Priority Disciplines Outcome Interventions   Occupational Therapy Goal   (Resolved)     OT, PT/OT Met                        History:     Past Medical History:   Diagnosis Date    Debility     Decubitus ulcer of buttock     GERD (gastroesophageal reflux disease) 09/22/2012    HTN (hypertension) 09/22/2012    Normal stress echocardiogram 09/22/2012    Vitamin D deficiency disease 09/22/2012         Past Surgical History:   Procedure Laterality Date    APPENDECTOMY      BILATERAL SALPINGOOPHORECTOMY      HYSTERECTOMY         Time Tracking:     OT Date of Treatment: 05/14/23  OT Start Time: 1151  OT Stop Time: 1207  OT Total Time (min): 16 min    Billable Minutes:Evaluation 16    5/14/2023

## 2023-05-14 NOTE — ASSESSMENT & PLAN NOTE
Home meds:  Telmisartan 40 mg q.d., Lasix 20 mg b.i.d., and Spironolactone 25 mg q.d.  -Monitor and adjust as needed

## 2023-05-14 NOTE — ASSESSMENT & PLAN NOTE
States this is the 2nd time this has occurred and she required consultation with an eyelid specialist in the past - no specific treatment provided during prior episode, advised on watchful waiting in the past.   -Ophthalmology consult placed - unclear if they do consults at [facility]  -QID warm compresses followed by gentle cleansing with baby soap

## 2023-05-14 NOTE — PLAN OF CARE
No OT goals established.     Problem: Occupational Therapy  Goal: Occupational Therapy Goal  Outcome: Met     Initial OT eval completed.  Pt. Found to be at PLOF.  Previously dependent with bathing, dressing, and toileting at bed level with spouse completely assisting with tasks.  Pt. Also reports that her spouse assists with feeding and grooming at times.  Handed face towel and able to wipe face using RUE.  Pt. Reports that spouse works on call and she is sometimes left at home for more than 4 hours.  Has hospital bed, lift device, and W/C.  No DME or acute care OT needs anticipated.  Recommend post acute nursing home placement.  To discontinue OT.

## 2023-05-14 NOTE — HPI
"Per Kathleen Ro PA-C:    "Ms. Trista Gr is a 72 y.o. female, with PMH of HTN, chronic low back pain, normal pressure hydrocephalus, urinary incontinence, who presented to [facility] ED on 05/13/2023 due to burning with urination.  She states she was diagnosed with the UTI 3 days ago, and went to see her PCP who prescribed her Bactrim.  She states her symptoms have not improved while on Bactrim.  She notes associated dizziness, lightheadedness, weakness, loss of appetite.  She denied fever, chills, nausea, vomiting.  She states that the pressure injury on her buttocks has been having increased pain in the past few days as well.  She was evaluated in the ED with labs that showed leukocytosis of 16 K, and left shift.  Metabolic panel was overall fairly normal.  A UA showed nitrate positive UTI with 11 wbc's, moderate bacteria, and trace leukocytes.  Urine cultures pending.  She was treated in the ED with Rocephin."  "

## 2023-05-14 NOTE — SUBJECTIVE & OBJECTIVE
Past Medical History:   Diagnosis Date    Debility     Decubitus ulcer of buttock     GERD (gastroesophageal reflux disease) 09/22/2012    HTN (hypertension) 09/22/2012    Normal stress echocardiogram 09/22/2012    Vitamin D deficiency disease 09/22/2012       Past Surgical History:   Procedure Laterality Date    APPENDECTOMY      BILATERAL SALPINGOOPHORECTOMY      HYSTERECTOMY         Review of patient's allergies indicates:   Allergen Reactions    Aspirin      Sore stomach    Codeine Itching    Pcn [penicillins] Itching       No current facility-administered medications on file prior to encounter.     Current Outpatient Medications on File Prior to Encounter   Medication Sig    acetaminophen (TYLENOL) 650 MG TbSR Take 1 tablet (650 mg total) by mouth 3 (three) times daily as needed.    ascorbic acid, vitamin C, (VITAMIN C) 500 MG tablet Take 500 mg by mouth 2 (two) times a day.    ciprofloxacin HCl (CILOXAN) 0.3 % ophthalmic solution Place 1 drop into the left eye 4 (four) times daily.    esomeprazole (NEXIUM) 20 MG capsule Take 20 mg by mouth daily as needed.    ferrous sulfate (FEOSOL) 325 mg (65 mg iron) Tab tablet Take 325 mg by mouth daily with breakfast.    fish oil-omega-3 fatty acids 300-1,000 mg capsule Take 1 g by mouth once daily.    furosemide (LASIX) 20 MG tablet TAKE 1 TABLET(20 MG) BY MOUTH TWICE DAILY    sulfamethoxazole-trimethoprim 800-160mg (BACTRIM DS) 800-160 mg Tab Take 1 tablet by mouth 2 (two) times daily.    telmisartan (MICARDIS) 40 MG Tab Take 1 tablet (40 mg total) by mouth once daily.    traMADoL (ULTRAM) 50 mg tablet TAKE 1 TABLET BY MOUTH EVERY OTHER DAY AS NEEDED FOR PAIN    zinc sulfate (ZINCATE) 50 mg zinc (220 mg) capsule Take 220 mg by mouth once daily.    multivitamin with minerals tablet Take 1 tablet by mouth once daily.    spironolactone (ALDACTONE) 25 MG tablet Take 1 tablet (25 mg total) by mouth once daily.     Family History       Problem Relation (Age of Onset)     Diabetes Brother, Maternal Grandmother    Hypertension Mother, Father    Stroke Father    Uterine cancer Sister          Tobacco Use    Smoking status: Never    Smokeless tobacco: Never   Substance and Sexual Activity    Alcohol use: No     Alcohol/week: 0.0 standard drinks    Drug use: No    Sexual activity: Not Currently     Review of Systems   Constitutional:  Negative for appetite change, chills, diaphoresis, fatigue and fever.   HENT:  Positive for facial swelling (left forehead). Negative for congestion, ear pain, postnasal drip, rhinorrhea, sinus pressure, sore throat and trouble swallowing.    Eyes:  Positive for discharge (white). Negative for photophobia, pain, redness, itching and visual disturbance.   Respiratory:  Negative for cough, shortness of breath and wheezing.    Cardiovascular:  Negative for chest pain and palpitations.   Gastrointestinal:  Positive for abdominal pain. Negative for abdominal distention, constipation, diarrhea, nausea and vomiting.   Genitourinary:  Positive for dysuria. Negative for decreased urine volume, difficulty urinating, flank pain, frequency, hematuria and urgency.   Musculoskeletal:  Positive for myalgias, neck pain and neck stiffness (right sided). Negative for arthralgias, back pain, gait problem and joint swelling.   Skin:  Negative for color change, pallor, rash and wound.   Neurological:  Positive for dizziness and light-headedness. Negative for syncope, weakness and headaches.   Psychiatric/Behavioral:  Negative for agitation and confusion.    Objective:     Vital Signs (Most Recent):  Temp: 98.1 °F (36.7 °C) (05/14/23 0303)  Pulse: 82 (05/14/23 0600)  Resp: 18 (05/14/23 0303)  BP: (!) 184/81 (05/14/23 0303)  SpO2: 98 % (05/14/23 0303) Vital Signs (24h Range):  Temp:  [98.1 °F (36.7 °C)-98.8 °F (37.1 °C)] 98.1 °F (36.7 °C)  Pulse:  [82-94] 82  Resp:  [16-18] 18  SpO2:  [98 %-99 %] 98 %  BP: (140-184)/(67-81) 184/81     Weight: 93.9 kg (207 lb)  Body mass index is  32.42 kg/m².     Physical Exam  Vitals and nursing note reviewed.   Constitutional:       General: She is not in acute distress.     Appearance: She is well-developed and normal weight. She is not ill-appearing, toxic-appearing or diaphoretic.      Comments: Elderly and chronically ill appearing.    HENT:      Head: Normocephalic and atraumatic.   Eyes:      General: No scleral icterus.        Right eye: No discharge.         Left eye: Discharge (white) present.No hordeolum.      Conjunctiva/sclera: Conjunctivae normal.      Right eye: Right conjunctiva is not injected. No chemosis, exudate or hemorrhage.     Left eye: Left conjunctiva is not injected. Exudate (white) present. No chemosis or hemorrhage.     Comments: Left eyelid demonstrates significant swelling extending into the left side of the forehead. There is no redness or associated tenderness.    Neck:      Trachea: No tracheal deviation.   Cardiovascular:      Rate and Rhythm: Normal rate and regular rhythm.      Heart sounds: Normal heart sounds. No murmur heard.    No gallop.   Pulmonary:      Effort: Pulmonary effort is normal. No respiratory distress.      Breath sounds: Normal breath sounds. No stridor. No wheezing or rales.   Abdominal:      General: Bowel sounds are normal. There is no distension.      Palpations: Abdomen is soft. There is no mass.      Tenderness: There is no abdominal tenderness. There is no guarding.   Musculoskeletal:         General: No deformity. Normal range of motion.      Cervical back: Normal range of motion and neck supple.      Comments: Wearing U-shaped neck pillow. Head is tilted forward and to the left side. There is tightening/stiffness of the muscles of the right side of the neck. There is no associated midline tenderness.    Skin:     General: Skin is warm and dry.      Coloration: Skin is not pale.      Findings: No erythema or rash.      Comments: She has a sacral wound with pungent odor.    Neurological:       General: No focal deficit present.      Mental Status: She is alert and oriented to person, place, and time.      Cranial Nerves: No cranial nerve deficit.      Motor: No abnormal muscle tone.   Psychiatric:         Mood and Affect: Mood normal.         Behavior: Behavior normal.         Thought Content: Thought content normal.         Judgment: Judgment normal.              Significant Labs: All pertinent labs within the past 24 hours have been reviewed.  BMP:   Recent Labs   Lab 05/14/23 0426   GLU 79      K 3.6      CO2 21*   BUN 7*   CREATININE 0.7   CALCIUM 9.1   MG 1.7     CBC:   Recent Labs   Lab 05/13/23 2357 05/14/23 0426   WBC 16.18* 16.54*   HGB 11.6* 10.8*   HCT 34.7* 33.1*    270     CMP:   Recent Labs   Lab 05/13/23 2357 05/14/23 0426    140   K 3.8 3.6    105   CO2 24 21*   GLU 82 79   BUN 8 7*   CREATININE 0.7 0.7   CALCIUM 9.1 9.1   PROT 7.1  --    ALBUMIN 3.0*  --    BILITOT 0.8  --    ALKPHOS 91  --    AST 17  --    ALT 17  --    ANIONGAP 12 14     Lactic Acid: No results for input(s): LACTATE in the last 48 hours.  Urine Culture: No results for input(s): LABURIN in the last 48 hours.  Urine Studies:   Recent Labs   Lab 05/14/23  0026   COLORU Yellow   APPEARANCEUA Clear   PHUR 6.0   SPECGRAV 1.015   PROTEINUA Negative   GLUCUA Negative   KETONESU 1+*   BILIRUBINUA Negative   OCCULTUA Negative   NITRITE Positive*   UROBILINOGEN Negative   LEUKOCYTESUR Trace*   RBCUA 0   WBCUA 11*   BACTERIA Moderate*   SQUAMEPITHEL 1       Significant Imaging: I have reviewed all pertinent imaging results/findings within the past 24 hours.  Imaging Results              X-Ray Chest AP Portable (Final result)  Result time 05/13/23 23:54:11      Final result by Alton Noland MD (05/13/23 23:54:11)                   Impression:      Mild diminished depth of inspiration, there is no radiographic evidence for superimposed acute intrathoracic process.      Electronically signed  by: Alton Noland  Date:    05/13/2023  Time:    23:54               Narrative:    EXAMINATION:  XR CHEST AP PORTABLE    CLINICAL HISTORY:  Cough, unspecified    TECHNIQUE:  Single frontal view of the chest was performed.    COMPARISON:  Chest radiograph November 7, 2022    FINDINGS:  Single portable chest view is submitted.  The technologist indicates the patient has extreme kyphosis, likely associated with the aforementioned the position of the patient's head and neck obscures the upper chest.  There is mild diminished depth of inspiration and minimal rotation, when accounting for these factors the cardiomediastinal silhouette appears appropriate and stable.    Mild accentuation of pulmonary bronchovascular markings consistent with diminished depth of inspiration noted.  There is no evidence for superimposed confluent infiltrate or consolidation, significant pleural effusion or pneumothorax.    The osseous structures demonstrate chronic change.

## 2023-05-14 NOTE — PLAN OF CARE
Patient is AAOx4. Bedbound. Wound to sacrum. Foam dressing in place. BLE swelling. Patient reported pain during the night. Medications administered per orders. Telemetry in place. No acute events to note. Patient resting comfortably at present. Bed locked in lowest position with side rails up x 2. Call light within reach of patient. Will continue purposeful rounding.

## 2023-05-14 NOTE — PLAN OF CARE
"  Problem: Physical Therapy  Goal: Physical Therapy Goal  Outcome: Met     At this time, patient is functioning at their prior level of function and does not require further acute PT services. Patient reports being bed bound for "as long as she can remember" and that her  cares for her daily.   "

## 2023-05-14 NOTE — ED TRIAGE NOTES
"Pt brought from home by EMS c/o Stage 3 decubitous ulcer on " crease of buttock", long term wound, UTI diagnosed 3 days ago, on Bactrim, feeling weak, no appetite   "

## 2023-05-14 NOTE — ASSESSMENT & PLAN NOTE
Patient present with dysuria and bladder pain - she was treated with 3 days of Bactrim DS as outpatient without benefit (Urine Culture in 11/2022 with E coli resistant to Bactrim and sensitive to Ceftriaxone).  UA on admission with nitrite + and trace leukocytes (11 WBC and moderate bacteria).  CBC with WBC 16.18 with left shift.  Patient started on Ceftriaxone.    Plan:  Continue with urine culture results  Continue with IV Ceftriaxone  Monitor WBC

## 2023-05-14 NOTE — ASSESSMENT & PLAN NOTE
- Ms. Trista Gr presents with dysuria and bladder pain  - she has a nitrite positive UTI with 11 wbc's, trace leukocytes, and moderate bacteria  - she was originally placed on Bactrim as an outpatient 3 days ago  - status post Rocephin given in ED   - continue Rocephin  - urine cultures pending

## 2023-05-14 NOTE — PLAN OF CARE
05/14/23 1409   PRADHAN Message   Medicare Outpatient and Observation Notification regarding financial responsibility Given to patient/caregiver;Explained to patient/caregiver;Signed/date by patient/caregiver   Date PRADHAN was signed 05/14/23   Time PRADHAN was signed 1400

## 2023-05-14 NOTE — NURSING
Nurses Note -- 4 Eyes      5/14/2023   4:27 AM      Skin assessed during: Admit      [] No Altered Skin Integrity Present    []Prevention Measures Documented      [x] Yes- Altered Skin Integrity Present or Discovered   [x] LDA Added if Not in Epic (Describe Wound)   [x] New Altered Skin Integrity was Present on Admit and Documented in LDA   [x] Wound Image Taken    Wound Care Consulted? Yes    Attending Nurse:  Dinora Arredondo LPN     Second RN/Staff Member:  TAWANA Hernandez       LDA, photo, wound care consult completed by ED nurse.

## 2023-05-14 NOTE — PROGRESS NOTES
[facility] Surg [address] Medicine  Progress Note    Patient Name: Trista Gr  MRN: [id number]  Patient Class: OP- Observation   Admission Date: 5/13/2023  Length of Stay: 0 days  Attending Physician: Joselito Gonzalez MD  Primary Care Provider: REGINO Yeung MD        Subjective:     Principal Problem:Urinary tract infection without hematuria      HPI:  Ms. Trista Gr is a 72 y.o. female, with PMH of HTN, chronic low back pain, normal pressure hydrocephalus, urinary incontinence, who presented to [facility] ED on 05/13/2023 due to burning with urination.  She states she was diagnosed with the UTI 3 days ago, and went to see her PCP who prescribed her Bactrim.  She states her symptoms have not improved while on Bactrim.  She notes associated dizziness, lightheadedness, weakness, loss of appetite.  She denied fever, chills, nausea, vomiting.  She states that the pressure injury on her buttocks has been having increased pain in the past few days as well.  She was evaluated in the ED with labs that showed leukocytosis of 16 K, and left shift.  Metabolic panel was overall fairly normal.  A UA showed nitrate positive UTI with 11 wbc's, moderate bacteria, and trace leukocytes.  Urine cultures pending.  She was treated in the ED with Rocephin.  She was placed on observation.      Overview/Hospital Course:  No notes on file    Interval History: Patient is sleeping and difficult to arouse, but mumbles to questions.  No adverse events noted overnight.    Review of Systems   Reason unable to perform ROS: sleeping and difficult to arouse.   Objective:     Vital Signs (Most Recent):  Temp: 98.9 °F (37.2 °C) (05/14/23 0819)  Pulse: 94 (05/14/23 1058)  Resp: 16 (05/14/23 0819)  BP: (!) 168/72 (05/14/23 0819)  SpO2: 95 % (05/14/23 0819) Vital Signs (24h Range):  Temp:  [98.1 °F (36.7 °C)-98.9 °F (37.2 °C)] 98.9 °F (37.2 °C)  Pulse:  [72-94] 94  Resp:  [16-18] 16  SpO2:  [95 %-99 %] 95 %  BP:  (140-184)/(67-81) 168/72     Weight: 92 kg (202 lb 13.2 oz)  Body mass index is 31.77 kg/m².    Intake/Output Summary (Last 24 hours) at 5/14/2023 1113  Last data filed at 5/14/2023 0030  Gross per 24 hour   Intake --   Output 150 ml   Net -150 ml         Physical Exam  Vitals and nursing note reviewed.   Constitutional:       General: She is not in acute distress.     Appearance: She is well-developed. She is obese. She is ill-appearing (chronically). She is not toxic-appearing.   HENT:      Head: Normocephalic and atraumatic.      Right Ear: External ear normal.      Left Ear: External ear normal.      Nose: Nose normal.      Mouth/Throat:      Mouth: Mucous membranes are moist.      Pharynx: Oropharynx is clear.   Eyes:      General: No scleral icterus.        Right eye: No discharge.         Left eye: No discharge or hordeolum.      Conjunctiva/sclera: Conjunctivae normal.      Right eye: Right conjunctiva is not injected. No chemosis, exudate or hemorrhage.     Left eye: Left conjunctiva is not injected. Exudate (white) present. No chemosis or hemorrhage.     Comments: Left eyelid edema with no visible discharge from eye    Neck:      Trachea: No tracheal deviation.   Cardiovascular:      Rate and Rhythm: Normal rate and regular rhythm.      Heart sounds: Normal heart sounds. No murmur heard.    No gallop.   Pulmonary:      Effort: Pulmonary effort is normal. No respiratory distress.      Breath sounds: Normal breath sounds. No stridor. No wheezing or rales.   Abdominal:      General: Bowel sounds are normal. There is no distension.      Palpations: Abdomen is soft. There is no mass.      Tenderness: There is no abdominal tenderness. There is no guarding.   Musculoskeletal:         General: Normal range of motion.      Cervical back: Normal range of motion and neck supple.      Right lower leg: Edema present.      Left lower leg: Edema present.      Comments: Bilateral LE edema with chronic skin changes - no  erythema   Skin:     General: Skin is warm and dry.      Coloration: Skin is not pale.      Findings: No erythema or rash.      Comments: She has a sacral wound with pungent odor.    Neurological:      Motor: No abnormal muscle tone.      Comments: Difficult to assess   Psychiatric:      Comments: Difficult to assess           Significant Labs: All pertinent labs within the past 24 hours have been reviewed.    Significant Imaging: I have reviewed all pertinent imaging results/findings within the past 24 hours.      Assessment/Plan:      * Urinary tract infection without hematuria  Patient present with dysuria and bladder pain - she was treated with 3 days of Bactrim DS as outpatient without benefit (Urine Culture in 11/2022 with E coli resistant to Bactrim and sensitive to Ceftriaxone).  UA on admission with nitrite + and trace leukocytes (11 WBC and moderate bacteria).  CBC with WBC 16.18 with left shift.  Patient started on Ceftriaxone.    Plan:  Continue with urine culture results  Continue with IV Ceftriaxone  Monitor WBC          Pressure injury of buttock, stage 3  Chronic and was getting Wound Care from .  -Wound care consulted      Essential hypertension  Home meds:  Telmisartan 40 mg q.d., Lasix 20 mg b.i.d., and Spironolactone 25 mg q.d.  -Monitor and adjust as needed      Neck pain on right side  States this is chronic, but causing difficulty eating - there is palpable tightening/stiffness of the right side of the neck.  X-ray of C-spine unremarkable.  Will need to assess further when she is more awake.  -Continue Lidocaine patch  -Warm compress PRN       Swelling of left eyelid  States this is the 2nd time this has occurred and she required consultation with an eyelid specialist in the past - no specific treatment provided during prior episode, advised on watchful waiting in the past.   -[facility] consult placed [facility] by gentle  cleansing with baby soap         Urinary incontinence without sensory awareness  By History  -Continue with Pure wick of now      Debility  Bed-bound with a history of Lumbar DD with neurogenic claudication and CT in 11/2022 with concern for normal pressure hydrocephalus.  Was to follow up with Neurology.  -PT/OT consulted        VTE Risk Mitigation (From admission, onward)         Ordered     heparin (porcine) injection 5,000 Units  Every 8 hours         05/14/23 0210     IP VTE HIGH RISK PATIENT  Once         05/14/23 0210     Place sequential compression device  Until discontinued         05/14/23 0210                Discharge Planning   MATTHEW:      Code Status: Full Code   Is the patient medically ready for discharge?:     Reason for patient still in hospital (select all that apply): Patient trending condition, Treatment and Consult recommendations  Discharge Plan A: Home Health                  Joselito Gonzalez MD  Department of Hospital Medicine   [facility] Med Surg ([address])

## 2023-05-14 NOTE — ASSESSMENT & PLAN NOTE
- chronic   - hypertensive at time of admission   - home meds:  Telmisartan 40 mg q.d.  - also takes Lasix 20 mg b.i.d., and spironolactone 25 mg q.d.  - monitor

## 2023-05-14 NOTE — ASSESSMENT & PLAN NOTE
- states this is chronic but causing difficulty eating   - there is palpable tightening/stiffness of the right side of the neck   - x-ray ordered to evaluate C-spine   - lidocaine patch  - trying dose of norflex now   - warm compress PRN

## 2023-05-14 NOTE — PLAN OF CARE
Lives with  who provides 24/7 total assist with ADLs - active with [facility] - will need ambulance transport home     [facility] Med Surg ([address])  Initial Discharge Assessment       Primary Care Provider: REGINO Yeung MD    Admission Diagnosis: Cough [R05.9]  Weakness [R53.1]  Pyelonephritis [N12]  Fatigue, unspecified type [R53.83]    Admission Date: 5/13/2023  Expected Discharge Date:     Transition of Care Barriers: None    Payor: [employer]ARE / Plan: [employer]est 65 / Product Type: Medicare Advantage /     Extended Emergency Contact Information  Primary Emergency Contact: Matthias Gr  Address: [address] [place] [address] [facility]  Mobile Phone: [phone]  Relation: Spouse   needed? No    Discharge Plan A: [id number] [place], [place] - [address] AT [facility]  [address]  [facility] [address]  Phone: [phone] Fax: [phone]      Initial Assessment (most recent)       Adult Discharge Assessment - 05/14/23 1109          Discharge Assessment    Assessment Type Discharge Planning Assessment     Confirmed/corrected address, phone number and insurance Yes     Confirmed Demographics Correct on Facesheet     Source of Information patient     Does patient/caregiver understand observation status Yes     People in Home spouse     Do you expect to return to your current living situation? Yes     Do you have help at home or someone to help you manage your care at home? Yes     Prior to hospitilization cognitive status: Alert/Oriented     Current cognitive status: Alert/Oriented     Walking or Climbing Stairs ambulation difficulty, dependent;transferring difficulty, dependent     Dressing/Bathing bathing difficulty, dependent;dressing difficulty, dependent     Equipment Currently Used at Home hospital bed;lift device;wheelchair;walker,  rolling;bedside commode;shower chair     Do you currently have service(s) that help you manage your care at home? Yes     Name and Contact number of agency [facility]     Is the pt/caregiver preference to resume services with current agency Yes     Do you take prescription medications? Yes     Do you have prescription coverage? Yes     Do you have any problems affording any of your prescribed medications? No     Is the patient taking medications as prescribed? yes     Who is going to help you get home at discharge? ambulance transport     How do you get to doctors appointments? --   virtual visits    Are you on dialysis? No     Discharge Plan A Home Health     DME Needed Upon Discharge  none     Discharge Plan discussed with: Patient     Transition of Care Barriers None        Physical Activity    On average, how many days per week do you engage in moderate to strenuous exercise (like a brisk walk)? 0 days     On average, how many minutes do you engage in exercise at this level? 0 min        Financial Resource Strain    How hard is it for you to pay for the very basics like food, housing, medical care, and heating? Not hard at all        Housing Stability    In the last 12 months, was there a time when you were not able to pay the mortgage or rent on time? No     In the last 12 months, how many places have you lived? 1     In the last 12 months, was there a time when you did not have a steady place to sleep or slept in a shelter (including now)? No        Transportation Needs    In the past 12 months, has lack of transportation kept you from medical appointments or from getting medications? No     In the past 12 months, has lack of transportation kept you from meetings, work, or from getting things needed for daily living? No        Food Insecurity    Within the past 12 months, you worried that your food would run out before you got the money to buy more. Never true     Within the past 12 months, the  food you bought just didn't last and you didn't have money to get more. Never true        Stress    Do you feel stress - tense, restless, nervous, or anxious, or unable to sleep at night because your mind is troubled all the time - these days? To some extent        Social Connections    In a typical week, how many times do you talk on the phone with family, friends, or neighbors? More than three times a week     How often do you get together with friends or relatives? Never     How often do you attend Sikh or Gnosticism services? Never     Do you belong to any clubs or organizations such as Sikh groups, unions, fraternal or athletic groups, or school groups? No     How often do you attend meetings of the clubs or organizations you belong to? Never     Are you , , , , never , or living with a partner?         Alcohol Use    Q1: How often do you have a drink containing alcohol? Never     Q2: How many drinks containing alcohol do you have on a typical day when you are drinking? Patient does not drink     Q3: How often do you have six or more drinks on one occasion? Never

## 2023-05-14 NOTE — ED PROVIDER NOTES
"Encounter Date: 5/13/2023    SCRIBE #1 NOTE: I, Shy Riley, am scribing for, and in the presence of,  Barney Beasley MD.     History     Chief Complaint   Patient presents with    multiple complaints      Pt c/o burning and pain during urination pt recently d/c with dx of UTI pt currently on bactrim 3 days ago.  Pt also c/o bed sore on buttock that has increasing pain.      Trista Gr is a 72 y.o. female, with a PMHx of HTN, who presents to the ED with burning and pain with urination. She was diagnosed with UTI 3 days ago and has been taking bactrim but states her symptoms have not improved. She reports lower abdominal pain. She also reports dizziness, lightheadedness, weakness, and loss of appetite. She denies fever, nausea, or vomiting.  She has a stage 3 bedsore "on the crease of her buttock" for the past month. She has left eye swelling for the past month. She had similar eye swelling 2 years ago and was seen by ophthalmology and the swelling went away without treatment after 6 weeks. She notes that a muscle in the back of her neck has been swollen for months. She reports production of thick white phlegm for the past few weeks but denies cough. She has a history of pollen allergy.    The history is provided by the patient.   Review of patient's allergies indicates:   Allergen Reactions    Aspirin      Sore stomach    Codeine Itching    Pcn [penicillins] Itching     Past Medical History:   Diagnosis Date    Debility     Decubitus ulcer of buttock     GERD (gastroesophageal reflux disease) 09/22/2012    HTN (hypertension) 09/22/2012    Normal stress echocardiogram 09/22/2012    Vitamin D deficiency disease 09/22/2012     Past Surgical History:   Procedure Laterality Date    APPENDECTOMY      BILATERAL SALPINGOOPHORECTOMY      HYSTERECTOMY       Family History   Problem Relation Age of Onset    Hypertension Mother     Hypertension Father     Stroke Father     Diabetes Brother     Diabetes Maternal " Grandmother     Uterine cancer Sister      Social History     Tobacco Use    Smoking status: Never    Smokeless tobacco: Never   Substance Use Topics    Alcohol use: No     Alcohol/week: 0.0 standard drinks    Drug use: No     Review of Systems  Constitutional-no fever, notes loss of appetite  HEENT-no congestion  Eyes-no redness, notes eye swelling  Respiratory-no shortness of breath, no cough  Cardio-no chest pain  GI-notes abdominal pain  Endocrine-no cold intolerance  -notes difficulty urinating  MSK-no myalgias  Skin-no rashes, notes wound  Allergy-no environmental allergy  Neurologic-no headache, notes dizziness, lightheadedness, weakness  Hematology-no swollen nodes  Behavioral-no confusion    Physical Exam     Initial Vitals [05/13/23 2247]   BP Pulse Resp Temp SpO2   (!) 140/80 90 16 98.5 °F (36.9 °C) 98 %      MAP       --         Physical Exam  Constitutional: chronically ill appearing 71 yo woman in no obvious distress  Eyes: marked swelling in the left superior eyelid  ENT       Head: Normocephalic, atraumatic.       Nose: No congestion.       Mouth/Throat: Mucous membranes are moist.  Neck- head held in mildly deviation to left, fullness and tenderness along the base of the right occiput  Hematological/Lymphatic/Immunilogical: No cervical lymphadenopathy.  Cardiovascular: Normal rate, regular rhythm. Normal and symmetric distal pulses.  Respiratory: Normal respiratory effort. Breath sounds are normal.  Gastrointestinal: rounded, soft, no rebound, no guarding, mild tenderness in the suprapubic aspect,   Musculoskeletal: marked kyphosis, normal ROM in the upper extremities, +2 edema in the lower extremities bilaterally, intact flexion and extension in the ankles bilaterally   Neurologic: Alert, oriented. Normal speech and language. No gross focal neurologic deficits are appreciated.  Skin: Skin is warm, dry. No rash noted.  Psychiatric: Mood and affect are normal.     ED Course   Procedures  Labs  Reviewed   CBC W/ AUTO DIFFERENTIAL - Abnormal; Notable for the following components:       Result Value    WBC 16.18 (*)     Hemoglobin 11.6 (*)     Hematocrit 34.7 (*)     MCV 81 (*)     Immature Granulocytes 0.6 (*)     Gran # (ANC) 12.3 (*)     Immature Grans (Abs) 0.09 (*)     Mono # 1.5 (*)     Gran % 75.6 (*)     Lymph % 13.5 (*)     All other components within normal limits   COMPREHENSIVE METABOLIC PANEL - Abnormal; Notable for the following components:    Albumin 3.0 (*)     All other components within normal limits   URINALYSIS, REFLEX TO URINE CULTURE - Abnormal; Notable for the following components:    Ketones, UA 1+ (*)     Nitrite, UA Positive (*)     Leukocytes, UA Trace (*)     All other components within normal limits    Narrative:     Specimen Source->Urine   URINALYSIS MICROSCOPIC - Abnormal; Notable for the following components:    WBC, UA 11 (*)     WBC Clumps, UA Occasional (*)     Bacteria Moderate (*)     All other components within normal limits    Narrative:     Specimen Source->Urine   CULTURE, URINE   LIPASE   MAGNESIUM   B-TYPE NATRIURETIC PEPTIDE   TROPONIN I   BASIC METABOLIC PANEL   MAGNESIUM   CBC W/ AUTO DIFFERENTIAL   SARS-COV-2 RDRP GENE   POCT INFLUENZA A/B MOLECULAR          Imaging Results              X-Ray Chest AP Portable (Final result)  Result time 05/13/23 23:54:11      Final result by Alton Noland MD (05/13/23 23:54:11)                   Impression:      Mild diminished depth of inspiration, there is no radiographic evidence for superimposed acute intrathoracic process.      Electronically signed by: Alton Noland  Date:    05/13/2023  Time:    23:54               Narrative:    EXAMINATION:  XR CHEST AP PORTABLE    CLINICAL HISTORY:  Cough, unspecified    TECHNIQUE:  Single frontal view of the chest was performed.    COMPARISON:  Chest radiograph November 7, 2022    FINDINGS:  Single portable chest view is submitted.  The technologist indicates the patient has  extreme kyphosis, likely associated with the aforementioned the position of the patient's head and neck obscures the upper chest.  There is mild diminished depth of inspiration and minimal rotation, when accounting for these factors the cardiomediastinal silhouette appears appropriate and stable.    Mild accentuation of pulmonary bronchovascular markings consistent with diminished depth of inspiration noted.  There is no evidence for superimposed confluent infiltrate or consolidation, significant pleural effusion or pneumothorax.    The osseous structures demonstrate chronic change.                                       Medications   sodium chloride 0.9% flush 10 mL (has no administration in time range)   melatonin tablet 6 mg (has no administration in time range)   senna-docusate 8.6-50 mg per tablet 1 tablet (has no administration in time range)   acetaminophen tablet 650 mg (has no administration in time range)   naloxone 0.4 mg/mL injection 0.02 mg (has no administration in time range)   heparin (porcine) injection 5,000 Units (has no administration in time range)   cefTRIAXone (ROCEPHIN) 2 g in dextrose 5 % in water (D5W) 5 % 50 mL IVPB (MB+) (0 g Intravenous Stopped 5/14/23 0039)   ketorolac injection 9.999 mg (9.999 mg Intravenous Given 5/14/23 0058)   sodium chloride 0.9% bolus 1,000 mL 1,000 mL (0 mLs Intravenous Stopped 5/14/23 0220)     Medical Decision Making:   History:   Old Medical Records: I decided to obtain old medical records.  Old Records Summarized: records from clinic visits, records from previous admission(s) and records from another hospital.       <> Summary of Records: Previous urinalysis demonstrated an E coli urinary tract infection on microbiology  Differential Diagnosis:   Cystitis, pyelonephritis, sepsis, dehydration, arrhythmia, hypertension  Independently Interpreted Test(s):   I have ordered and independently interpreted X-rays - see summary below.       <> Summary of X-Ray  Reading(s): No pneumothorax, no effusion, limited exam  Clinical Tests:   Lab Tests: Ordered and Reviewed  Radiological Study: Ordered and Reviewed  ED Management:  72-year-old woman with elevated blood pressure not currently at goal, chronic debilitation with increased urinary urgency, frequency, despite being treated with Bactrim in the outpatient setting.  Will initiate IV abx for failed outpatient treatment of likely cystitis and possible pyelonephritis.   Urine cx will be obtained.  Will plan for observation for chronic deconditioning and weakness.         Scribe Attestation:   Scribe #1: I performed the above scribed service and the documentation accurately describes the services I performed. I attest to the accuracy of the note.  Physician Attestation for Scribe: I, barney beasley, reviewed documentation as scribed in my presence, which is both accurate and complete.                Clinical Impression:   Final diagnoses:  [R05.9] Cough  [N12] Pyelonephritis  [R53.1] Weakness (Primary)  [R53.83] Fatigue, unspecified type        ED Disposition Condition    Observation Stable                Barney Beasley MD  05/14/23 3988

## 2023-05-14 NOTE — H&P
[facility] Medicine  History & Physical    Patient Name: Trista Gr  MRN: [id number]  Patient Class: OP- Observation  Admission Date: 5/13/2023  Attending Physician: Joselito Gonzalez MD   Primary Care Provider: REGINO Yeung MD         Patient information was obtained from patient, past medical records and ER records.     Subjective:     Principal Problem:Urinary tract infection without hematuria    Chief Complaint:   Chief Complaint   Patient presents with    multiple complaints      Pt c/o burning and pain during urination pt recently d/c with dx of UTI pt currently on bactrim 3 days ago.  Pt also c/o bed sore on buttock that has increasing pain.         HPI: Ms. Trista Gr is a 72 y.o. female, with PMH of HTN, chronic low back pain, normal pressure hydrocephalus, urinary incontinence, who presented to [facility] ED on 05/13/2023 due to burning with urination.  She states she was diagnosed with the UTI 3 days ago, and went to see her PCP who prescribed her Bactrim.  She states her symptoms have not improved while on Bactrim.  She notes associated dizziness, lightheadedness, weakness, loss of appetite.  She denied fever, chills, nausea, vomiting.  She states that the pressure injury on her buttocks has been having increased pain in the past few days as well.  She was evaluated in the ED with labs that showed leukocytosis of 16 K, and left shift.  Metabolic panel was overall fairly normal.  A UA showed nitrate positive UTI with 11 wbc's, moderate bacteria, and trace leukocytes.  Urine cultures pending.  She was treated in the ED with Rocephin.  She was placed on observation.      Past Medical History:   Diagnosis Date    Debility     Decubitus ulcer of buttock     GERD (gastroesophageal reflux disease) 09/22/2012    HTN (hypertension) 09/22/2012    Normal stress echocardiogram 09/22/2012    Vitamin D deficiency disease 09/22/2012       Past Surgical History:   Procedure  Laterality Date    APPENDECTOMY      BILATERAL SALPINGOOPHORECTOMY      HYSTERECTOMY         Review of patient's allergies indicates:   Allergen Reactions    Aspirin      Sore stomach    Codeine Itching    Pcn [penicillins] Itching       No current facility-administered medications on file prior to encounter.     Current Outpatient Medications on File Prior to Encounter   Medication Sig    acetaminophen (TYLENOL) 650 MG TbSR Take 1 tablet (650 mg total) by mouth 3 (three) times daily as needed.    ascorbic acid, vitamin C, (VITAMIN C) 500 MG tablet Take 500 mg by mouth 2 (two) times a day.    ciprofloxacin HCl (CILOXAN) 0.3 % ophthalmic solution Place 1 drop into the left eye 4 (four) times daily.    esomeprazole (NEXIUM) 20 MG capsule Take 20 mg by mouth daily as needed.    ferrous sulfate (FEOSOL) 325 mg (65 mg iron) Tab tablet Take 325 mg by mouth daily with breakfast.    fish oil-omega-3 fatty acids 300-1,000 mg capsule Take 1 g by mouth once daily.    furosemide (LASIX) 20 MG tablet TAKE 1 TABLET(20 MG) BY MOUTH TWICE DAILY    sulfamethoxazole-trimethoprim 800-160mg (BACTRIM DS) 800-160 mg Tab Take 1 tablet by mouth 2 (two) times daily.    telmisartan (MICARDIS) 40 MG Tab Take 1 tablet (40 mg total) by mouth once daily.    traMADoL (ULTRAM) 50 mg tablet TAKE 1 TABLET BY MOUTH EVERY OTHER DAY AS NEEDED FOR PAIN    zinc sulfate (ZINCATE) 50 mg zinc (220 mg) capsule Take 220 mg by mouth once daily.    multivitamin with minerals tablet Take 1 tablet by mouth once daily.    spironolactone (ALDACTONE) 25 MG tablet Take 1 tablet (25 mg total) by mouth once daily.     Family History       Problem Relation (Age of Onset)    Diabetes Brother, Maternal Grandmother    Hypertension Mother, Father    Stroke Father    Uterine cancer Sister          Tobacco Use    Smoking status: Never    Smokeless tobacco: Never   Substance and Sexual Activity    Alcohol use: No     Alcohol/week: 0.0 standard drinks     Drug use: No    Sexual activity: Not Currently     Review of Systems   Constitutional:  Negative for appetite change, chills, diaphoresis, fatigue and fever.   HENT:  Positive for facial swelling (left forehead). Negative for congestion, ear pain, postnasal drip, rhinorrhea, sinus pressure, sore throat and trouble swallowing.    Eyes:  Positive for discharge (white). Negative for photophobia, pain, redness, itching and visual disturbance.   Respiratory:  Negative for cough, shortness of breath and wheezing.    Cardiovascular:  Negative for chest pain and palpitations.   Gastrointestinal:  Positive for abdominal pain. Negative for abdominal distention, constipation, diarrhea, nausea and vomiting.   Genitourinary:  Positive for dysuria. Negative for decreased urine volume, difficulty urinating, flank pain, frequency, hematuria and urgency.   Musculoskeletal:  Positive for myalgias, neck pain and neck stiffness (right sided). Negative for arthralgias, back pain, gait problem and joint swelling.   Skin:  Negative for color change, pallor, rash and wound.   Neurological:  Positive for dizziness and light-headedness. Negative for syncope, weakness and headaches.   Psychiatric/Behavioral:  Negative for agitation and confusion.    Objective:     Vital Signs (Most Recent):  Temp: 98.1 °F (36.7 °C) (05/14/23 0303)  Pulse: 82 (05/14/23 0600)  Resp: 18 (05/14/23 0303)  BP: (!) 184/81 (05/14/23 0303)  SpO2: 98 % (05/14/23 0303) Vital Signs (24h Range):  Temp:  [98.1 °F (36.7 °C)-98.8 °F (37.1 °C)] 98.1 °F (36.7 °C)  Pulse:  [82-94] 82  Resp:  [16-18] 18  SpO2:  [98 %-99 %] 98 %  BP: (140-184)/(67-81) 184/81     Weight: 93.9 kg (207 lb)  Body mass index is 32.42 kg/m².     Physical Exam  Vitals and nursing note reviewed.   Constitutional:       General: She is not in acute distress.     Appearance: She is well-developed and normal weight. She is not ill-appearing, toxic-appearing or diaphoretic.      Comments: Elderly and  chronically ill appearing.    HENT:      Head: Normocephalic and atraumatic.   Eyes:      General: No scleral icterus.        Right eye: No discharge.         Left eye: Discharge (white) present.No hordeolum.      Conjunctiva/sclera: Conjunctivae normal.      Right eye: Right conjunctiva is not injected. No chemosis, exudate or hemorrhage.     Left eye: Left conjunctiva is not injected. Exudate (white) present. No chemosis or hemorrhage.     Comments: Left eyelid demonstrates significant swelling extending into the left side of the forehead. There is no redness or associated tenderness.    Neck:      Trachea: No tracheal deviation.   Cardiovascular:      Rate and Rhythm: Normal rate and regular rhythm.      Heart sounds: Normal heart sounds. No murmur heard.    No gallop.   Pulmonary:      Effort: Pulmonary effort is normal. No respiratory distress.      Breath sounds: Normal breath sounds. No stridor. No wheezing or rales.   Abdominal:      General: Bowel sounds are normal. There is no distension.      Palpations: Abdomen is soft. There is no mass.      Tenderness: There is no abdominal tenderness. There is no guarding.   Musculoskeletal:         General: No deformity. Normal range of motion.      Cervical back: Normal range of motion and neck supple.      Comments: Wearing U-shaped neck pillow. Head is tilted forward and to the left side. There is tightening/stiffness of the muscles of the right side of the neck. There is no associated midline tenderness.    Skin:     General: Skin is warm and dry.      Coloration: Skin is not pale.      Findings: No erythema or rash.      Comments: She has a sacral wound with pungent odor.    Neurological:      General: No focal deficit present.      Mental Status: She is alert and oriented to person, place, and time.      Cranial Nerves: No cranial nerve deficit.      Motor: No abnormal muscle tone.   Psychiatric:         Mood and Affect: Mood normal.         Behavior: Behavior  normal.         Thought Content: Thought content normal.         Judgment: Judgment normal.              Significant Labs: All pertinent labs within the past 24 hours have been reviewed.  BMP:   Recent Labs   Lab 05/14/23 0426   GLU 79      K 3.6      CO2 21*   BUN 7*   CREATININE 0.7   CALCIUM 9.1   MG 1.7     CBC:   Recent Labs   Lab 05/13/23 2357 05/14/23 0426   WBC 16.18* 16.54*   HGB 11.6* 10.8*   HCT 34.7* 33.1*    270     CMP:   Recent Labs   Lab 05/13/23 2357 05/14/23 0426    140   K 3.8 3.6    105   CO2 24 21*   GLU 82 79   BUN 8 7*   CREATININE 0.7 0.7   CALCIUM 9.1 9.1   PROT 7.1  --    ALBUMIN 3.0*  --    BILITOT 0.8  --    ALKPHOS 91  --    AST 17  --    ALT 17  --    ANIONGAP 12 14     Lactic Acid: No results for input(s): LACTATE in the last 48 hours.  Urine Culture: No results for input(s): LABURIN in the last 48 hours.  Urine Studies:   Recent Labs   Lab 05/14/23  0026   COLORU Yellow   APPEARANCEUA Clear   PHUR 6.0   SPECGRAV 1.015   PROTEINUA Negative   GLUCUA Negative   KETONESU 1+*   BILIRUBINUA Negative   OCCULTUA Negative   NITRITE Positive*   UROBILINOGEN Negative   LEUKOCYTESUR Trace*   RBCUA 0   WBCUA 11*   BACTERIA Moderate*   SQUAMEPITHEL 1       Significant Imaging: I have reviewed all pertinent imaging results/findings within the past 24 hours.  Imaging Results              X-Ray Chest AP Portable (Final result)  Result time 05/13/23 23:54:11      Final result by Alton Noland MD (05/13/23 23:54:11)                   Impression:      Mild diminished depth of inspiration, there is no radiographic evidence for superimposed acute intrathoracic process.      Electronically signed by: Alton Noland  Date:    05/13/2023  Time:    23:54               Narrative:    EXAMINATION:  XR CHEST AP PORTABLE    CLINICAL HISTORY:  Cough, unspecified    TECHNIQUE:  Single frontal view of the chest was performed.    COMPARISON:  Chest radiograph November 7,  2022    FINDINGS:  Single portable chest view is submitted.  The technologist indicates the patient has extreme kyphosis, likely associated with the aforementioned the position of the patient's head and neck obscures the upper chest.  There is mild diminished depth of inspiration and minimal rotation, when accounting for these factors the cardiomediastinal silhouette appears appropriate and stable.    Mild accentuation of pulmonary bronchovascular markings consistent with diminished depth of inspiration noted.  There is no evidence for superimposed confluent infiltrate or consolidation, significant pleural effusion or pneumothorax.    The osseous structures demonstrate chronic change.                                       Assessment/Plan:     * Urinary tract infection without hematuria  - Ms. Trista Gr presents with dysuria and bladder pain  - she has a nitrite positive UTI with 11 wbc's, trace leukocytes, and moderate bacteria  - she was originally placed on Bactrim as an outpatient 3 days ago  - status post Rocephin given in ED   - continue Rocephin  - urine cultures pending      Swelling of left eyelid  - states this is the 2nd time this has occurred and she required consultation with an eyelid specialist in the past    - no specific treatment provided during prior episode, advised on watchful waiting in the past   - Ophthalmology consult placed   - QID warm compresses followed by gentle cleansing with baby soap   - there appears to be significant dependent edema, it would greatly help drainage if patient could position neck in a manner not causing her head to lean forward and to the left       Neck pain on right side  - states this is chronic but causing difficulty eating   - there is palpable tightening/stiffness of the right side of the neck   - x-ray ordered to evaluate C-spine   - lidocaine patch  - trying dose of norflex now   - warm compress PRN       Urinary incontinence without sensory  awareness  - history noted  - place Purewick verses Santana      Pressure injury of buttock, stage 3  - wound care consulted      Debility  - fall precautions  - PT/OT consulted      Essential hypertension  - chronic   - hypertensive at time of admission   - home meds:  Telmisartan 40 mg q.d.  - also takes Lasix 20 mg b.i.d., and spironolactone 25 mg q.d.  - monitor      VTE Risk Mitigation (From admission, onward)         Ordered     heparin (porcine) injection 5,000 Units  Every 8 hours         05/14/23 0210     IP VTE HIGH RISK PATIENT  Once         05/14/23 0210     Place sequential compression device  Until discontinued         05/14/23 0210                     On 05/14/2023, patient should be placed in hospital observation services under the care of Dr. Joselito Gonzalez MD.      Kathleen Ro PA-C  Department of Hospital Medicine  [facility] - Emergency Dept

## 2023-05-14 NOTE — SUBJECTIVE & OBJECTIVE
Interval History: Patient is sleeping and difficult to arouse, but mumbles to questions.  No adverse events noted overnight.    Review of Systems   Reason unable to perform ROS: sleeping and difficult to arouse.   Objective:     Vital Signs (Most Recent):  Temp: 98.9 °F (37.2 °C) (05/14/23 0819)  Pulse: 94 (05/14/23 1058)  Resp: 16 (05/14/23 0819)  BP: (!) 168/72 (05/14/23 0819)  SpO2: 95 % (05/14/23 0819) Vital Signs (24h Range):  Temp:  [98.1 °F (36.7 °C)-98.9 °F (37.2 °C)] 98.9 °F (37.2 °C)  Pulse:  [72-94] 94  Resp:  [16-18] 16  SpO2:  [95 %-99 %] 95 %  BP: (140-184)/(67-81) 168/72     Weight: 92 kg (202 lb 13.2 oz)  Body mass index is 31.77 kg/m².    Intake/Output Summary (Last 24 hours) at 5/14/2023 1113  Last data filed at 5/14/2023 0030  Gross per 24 hour   Intake --   Output 150 ml   Net -150 ml         Physical Exam  Vitals and nursing note reviewed.   Constitutional:       General: She is not in acute distress.     Appearance: She is well-developed. She is obese. She is ill-appearing (chronically). She is not toxic-appearing.   HENT:      Head: Normocephalic and atraumatic.      Right Ear: External ear normal.      Left Ear: External ear normal.      Nose: Nose normal.      Mouth/Throat:      Mouth: Mucous membranes are moist.      Pharynx: Oropharynx is clear.   Eyes:      General: No scleral icterus.        Right eye: No discharge.         Left eye: No discharge or hordeolum.      Conjunctiva/sclera: Conjunctivae normal.      Right eye: Right conjunctiva is not injected. No chemosis, exudate or hemorrhage.     Left eye: Left conjunctiva is not injected. Exudate (white) present. No chemosis or hemorrhage.     Comments: Left eyelid edema with no visible discharge from eye    Neck:      Trachea: No tracheal deviation.   Cardiovascular:      Rate and Rhythm: Normal rate and regular rhythm.      Heart sounds: Normal heart sounds. No murmur heard.    No gallop.   Pulmonary:      Effort: Pulmonary effort is  normal. No respiratory distress.      Breath sounds: Normal breath sounds. No stridor. No wheezing or rales.   Abdominal:      General: Bowel sounds are normal. There is no distension.      Palpations: Abdomen is soft. There is no mass.      Tenderness: There is no abdominal tenderness. There is no guarding.   Musculoskeletal:         General: Normal range of motion.      Cervical back: Normal range of motion and neck supple.      Right lower leg: Edema present.      Left lower leg: Edema present.      Comments: Bilateral LE edema with chronic skin changes - no erythema   Skin:     General: Skin is warm and dry.      Coloration: Skin is not pale.      Findings: No erythema or rash.      Comments: She has a sacral wound with pungent odor.    Neurological:      Motor: No abnormal muscle tone.      Comments: Difficult to assess   Psychiatric:      Comments: Difficult to assess           Significant Labs: All pertinent labs within the past 24 hours have been reviewed.    Significant Imaging: I have reviewed all pertinent imaging results/findings within the past 24 hours.

## 2023-05-14 NOTE — ASSESSMENT & PLAN NOTE
- states this is the 2nd time this has occurred and she required consultation with an eyelid specialist in the past    - no specific treatment provided during prior episode, advised on watchful waiting in the past   - Ophthalmology consult placed   - QID warm compresses followed by gentle cleansing with baby soap   - there appears to be significant dependent edema, it would greatly help drainage if patient could position neck in a manner not causing her head to lean forward and to the left

## 2023-05-14 NOTE — ASSESSMENT & PLAN NOTE
Bed-bound with a history of Lumbar DD with neurogenic claudication and CT in 11/2022 with concern for normal pressure hydrocephalus.  Was to follow up with Neurology.  -PT/OT consulted     Case Management Discharge Planning    Admission Date: 1/12/2023  GMLOS: 7.3  ALOS: 35    6-Clicks ADL Score: 14  6-Clicks Mobility Score: 7  PT and/or OT Eval ordered: Yes  Post-acute Referrals Ordered: Yes  Post-acute Choice Obtained: Yes  Has referral(s) been sent to post-acute provider:  Yes      Anticipated Discharge Dispo: Discharge Disposition: D/T to SNF with Medicare cert in anticipation of skilled care (03)    DME Needed: No    Action(s) Taken:  Patient accepted for transfer to SNF, [facility] [place], today with [facility] transport scheduled for 1430 Hrs.  CM updated patient & wife, Cori #[phone], on DCP.  Patient reluctant to sign consent for transfer; CM explained to patient that he has been medically cleared for discharge and since [facility] [place] is the only accepting facility if he refuses transfer (which he has the right to), he would be discharged home.   After much consideration, patient signed transfer form.  CM subsequently received phone call from wife, upset about transfer form not being signed in her presence.  CM explained to wife, that patient was deemed capacitated to make his own decisions therefore, her presence was not required for document signature.  Wife stated that she will be coming to hospital around 1 PM to view document signed by patient.    Dr. Neptali Ventura updated on DCP.    Escalations Completed: None    Medically Clear: Yes    Next Steps: CM remains available for assistance with DCP.    Barriers to Discharge:  Patient & Wife reconsidering     Is the patient up for discharge tomorrow:  Medically cleared    **UPDATED @ 1350 Hrs - CM met with patient & wife/Cori at bedside and addressed DCP concerns; Both agreeable to transfer to [facility] [place].  COVID negative.  Transfer envelope handed to bedside nurse.

## 2023-05-15 ENCOUNTER — ANESTHESIA EVENT (OUTPATIENT)
Dept: SURGERY | Facility: OTHER | Age: 73
DRG: 580 | End: 2023-05-15
Payer: MEDICARE

## 2023-05-15 PROBLEM — Z87.898 HISTORY OF PROGRESSIVE WEAKNESS: Status: ACTIVE | Noted: 2022-11-07

## 2023-05-15 PROBLEM — R53.1 PROGRESSIVE FOCAL MOTOR WEAKNESS: Status: ACTIVE | Noted: 2022-11-07

## 2023-05-15 LAB
ANION GAP SERPL CALC-SCNC: 12 MMOL/L (ref 8–16)
BASOPHILS # BLD AUTO: 0.04 K/UL (ref 0–0.2)
BASOPHILS NFR BLD: 0.3 % (ref 0–1.9)
BUN SERPL-MCNC: 7 MG/DL (ref 8–23)
CALCIUM SERPL-MCNC: 9.4 MG/DL (ref 8.7–10.5)
CHLORIDE SERPL-SCNC: 105 MMOL/L (ref 95–110)
CO2 SERPL-SCNC: 23 MMOL/L (ref 23–29)
CREAT SERPL-MCNC: 0.7 MG/DL (ref 0.5–1.4)
DIFFERENTIAL METHOD: ABNORMAL
EOSINOPHIL # BLD AUTO: 0.2 K/UL (ref 0–0.5)
EOSINOPHIL NFR BLD: 1.4 % (ref 0–8)
ERYTHROCYTE [DISTWIDTH] IN BLOOD BY AUTOMATED COUNT: 12.8 % (ref 11.5–14.5)
EST. GFR  (NO RACE VARIABLE): >60 ML/MIN/1.73 M^2
GLUCOSE SERPL-MCNC: 123 MG/DL (ref 70–110)
HCT VFR BLD AUTO: 34.1 % (ref 37–48.5)
HGB BLD-MCNC: 11.5 G/DL (ref 12–16)
IMM GRANULOCYTES # BLD AUTO: 0.11 K/UL (ref 0–0.04)
IMM GRANULOCYTES NFR BLD AUTO: 0.8 % (ref 0–0.5)
LYMPHOCYTES # BLD AUTO: 2.3 K/UL (ref 1–4.8)
LYMPHOCYTES NFR BLD: 16.2 % (ref 18–48)
MAGNESIUM SERPL-MCNC: 1.6 MG/DL (ref 1.6–2.6)
MCH RBC QN AUTO: 26.9 PG (ref 27–31)
MCHC RBC AUTO-ENTMCNC: 33.7 G/DL (ref 32–36)
MCV RBC AUTO: 80 FL (ref 82–98)
MONOCYTES # BLD AUTO: 1.1 K/UL (ref 0.3–1)
MONOCYTES NFR BLD: 7.9 % (ref 4–15)
NEUTROPHILS # BLD AUTO: 10.4 K/UL (ref 1.8–7.7)
NEUTROPHILS NFR BLD: 73.4 % (ref 38–73)
NRBC BLD-RTO: 0 /100 WBC
PLATELET # BLD AUTO: 324 K/UL (ref 150–450)
PMV BLD AUTO: 9.9 FL (ref 9.2–12.9)
POTASSIUM SERPL-SCNC: 3.4 MMOL/L (ref 3.5–5.1)
RBC # BLD AUTO: 4.27 M/UL (ref 4–5.4)
SODIUM SERPL-SCNC: 140 MMOL/L (ref 136–145)
TROPONIN I SERPL DL<=0.01 NG/ML-MCNC: <0.006 NG/ML (ref 0–0.03)
WBC # BLD AUTO: 14.14 K/UL (ref 3.9–12.7)

## 2023-05-15 PROCEDURE — 84484 ASSAY OF TROPONIN QUANT: CPT | Performed by: PHYSICIAN ASSISTANT

## 2023-05-15 PROCEDURE — 85025 COMPLETE CBC W/AUTO DIFF WBC: CPT | Performed by: PHYSICIAN ASSISTANT

## 2023-05-15 PROCEDURE — 99232 PR SUBSEQUENT HOSPITAL CARE,LEVL II: ICD-10-PCS | Mod: ,,, | Performed by: SURGERY

## 2023-05-15 PROCEDURE — 96372 THER/PROPH/DIAG INJ SC/IM: CPT | Performed by: PHYSICIAN ASSISTANT

## 2023-05-15 PROCEDURE — 99232 SBSQ HOSP IP/OBS MODERATE 35: CPT | Mod: ,,, | Performed by: SURGERY

## 2023-05-15 PROCEDURE — G0378 HOSPITAL OBSERVATION PER HR: HCPCS

## 2023-05-15 PROCEDURE — 36415 COLL VENOUS BLD VENIPUNCTURE: CPT | Performed by: PHYSICIAN ASSISTANT

## 2023-05-15 PROCEDURE — 99233 SBSQ HOSP IP/OBS HIGH 50: CPT | Mod: ,,, | Performed by: INTERNAL MEDICINE

## 2023-05-15 PROCEDURE — 83735 ASSAY OF MAGNESIUM: CPT | Performed by: PHYSICIAN ASSISTANT

## 2023-05-15 PROCEDURE — 63600175 PHARM REV CODE 636 W HCPCS: Performed by: PHYSICIAN ASSISTANT

## 2023-05-15 PROCEDURE — 25000003 PHARM REV CODE 250: Performed by: INTERNAL MEDICINE

## 2023-05-15 PROCEDURE — 25000003 PHARM REV CODE 250

## 2023-05-15 PROCEDURE — G0426 INPT/ED TELECONSULT50: HCPCS | Mod: 95,,, | Performed by: STUDENT IN AN ORGANIZED HEALTH CARE EDUCATION/TRAINING PROGRAM

## 2023-05-15 PROCEDURE — 99233 PR SUBSEQUENT HOSPITAL CARE,LEVL III: ICD-10-PCS | Mod: ,,, | Performed by: INTERNAL MEDICINE

## 2023-05-15 PROCEDURE — G0426 PR INPT TELEHEALTH CONSULT 50M: ICD-10-PCS | Mod: 95,,, | Performed by: STUDENT IN AN ORGANIZED HEALTH CARE EDUCATION/TRAINING PROGRAM

## 2023-05-15 PROCEDURE — A4216 STERILE WATER/SALINE, 10 ML: HCPCS | Performed by: PHYSICIAN ASSISTANT

## 2023-05-15 PROCEDURE — 25000003 PHARM REV CODE 250: Performed by: PHYSICIAN ASSISTANT

## 2023-05-15 PROCEDURE — 94761 N-INVAS EAR/PLS OXIMETRY MLT: CPT

## 2023-05-15 PROCEDURE — 80048 BASIC METABOLIC PNL TOTAL CA: CPT | Performed by: PHYSICIAN ASSISTANT

## 2023-05-15 RX ORDER — BACLOFEN 5 MG/1
5 TABLET ORAL 3 TIMES DAILY
Status: DISCONTINUED | OUTPATIENT
Start: 2023-05-15 | End: 2023-05-24 | Stop reason: HOSPADM

## 2023-05-15 RX ORDER — NIFEDIPINE 30 MG/1
30 TABLET, EXTENDED RELEASE ORAL DAILY
Status: DISCONTINUED | OUTPATIENT
Start: 2023-05-15 | End: 2023-05-24 | Stop reason: HOSPADM

## 2023-05-15 RX ORDER — GABAPENTIN 300 MG/1
300 CAPSULE ORAL NIGHTLY
Status: DISCONTINUED | OUTPATIENT
Start: 2023-05-15 | End: 2023-05-24 | Stop reason: HOSPADM

## 2023-05-15 RX ORDER — POTASSIUM CHLORIDE 20 MEQ/1
40 TABLET, EXTENDED RELEASE ORAL ONCE
Status: COMPLETED | OUTPATIENT
Start: 2023-05-15 | End: 2023-05-15

## 2023-05-15 RX ORDER — LANOLIN ALCOHOL/MO/W.PET/CERES
400 CREAM (GRAM) TOPICAL ONCE
Status: COMPLETED | OUTPATIENT
Start: 2023-05-15 | End: 2023-05-15

## 2023-05-15 RX ORDER — DICLOFENAC SODIUM 10 MG/G
2 GEL TOPICAL DAILY
Status: DISCONTINUED | OUTPATIENT
Start: 2023-05-15 | End: 2023-05-24 | Stop reason: HOSPADM

## 2023-05-15 RX ADMIN — ORPHENADRINE CITRATE 100 MG: 100 TABLET, EXTENDED RELEASE ORAL at 12:05

## 2023-05-15 RX ADMIN — HEPARIN SODIUM 5000 UNITS: 5000 INJECTION INTRAVENOUS; SUBCUTANEOUS at 02:05

## 2023-05-15 RX ADMIN — POTASSIUM CHLORIDE 40 MEQ: 1500 TABLET, EXTENDED RELEASE ORAL at 08:05

## 2023-05-15 RX ADMIN — FUROSEMIDE 20 MG: 20 TABLET ORAL at 08:05

## 2023-05-15 RX ADMIN — CEFTRIAXONE 1 G: 1 INJECTION, POWDER, FOR SOLUTION INTRAMUSCULAR; INTRAVENOUS at 11:05

## 2023-05-15 RX ADMIN — DICLOFENAC 2 G: 10 GEL TOPICAL at 08:05

## 2023-05-15 RX ADMIN — ACETAMINOPHEN 650 MG: 325 TABLET, FILM COATED ORAL at 04:05

## 2023-05-15 RX ADMIN — LOSARTAN POTASSIUM 50 MG: 50 TABLET, FILM COATED ORAL at 08:05

## 2023-05-15 RX ADMIN — Medication 10 ML: at 02:05

## 2023-05-15 RX ADMIN — HEPARIN SODIUM 5000 UNITS: 5000 INJECTION INTRAVENOUS; SUBCUTANEOUS at 09:05

## 2023-05-15 RX ADMIN — LIDOCAINE 1 PATCH: 50 PATCH CUTANEOUS at 06:05

## 2023-05-15 RX ADMIN — BACLOFEN 5 MG: 5 TABLET ORAL at 08:05

## 2023-05-15 RX ADMIN — NIFEDIPINE 30 MG: 30 TABLET, FILM COATED, EXTENDED RELEASE ORAL at 08:05

## 2023-05-15 RX ADMIN — Medication 10 ML: at 06:05

## 2023-05-15 RX ADMIN — CEFTRIAXONE 1 G: 1 INJECTION, POWDER, FOR SOLUTION INTRAMUSCULAR; INTRAVENOUS at 12:05

## 2023-05-15 RX ADMIN — Medication 400 MG: at 08:05

## 2023-05-15 RX ADMIN — Medication 10 ML: at 09:05

## 2023-05-15 RX ADMIN — GABAPENTIN 300 MG: 300 CAPSULE ORAL at 08:05

## 2023-05-15 RX ADMIN — SPIRONOLACTONE 25 MG: 25 TABLET, FILM COATED ORAL at 08:05

## 2023-05-15 RX ADMIN — ACETAMINOPHEN 650 MG: 325 TABLET, FILM COATED ORAL at 11:05

## 2023-05-15 RX ADMIN — HEPARIN SODIUM 5000 UNITS: 5000 INJECTION INTRAVENOUS; SUBCUTANEOUS at 06:05

## 2023-05-15 RX ADMIN — BACLOFEN 5 MG: 5 TABLET ORAL at 02:05

## 2023-05-15 NOTE — SUBJECTIVE & OBJECTIVE
No current facility-administered medications on file prior to encounter.     Current Outpatient Medications on File Prior to Encounter   Medication Sig    acetaminophen (TYLENOL) 650 MG TbSR Take 1 tablet (650 mg total) by mouth 3 (three) times daily as needed.    ascorbic acid, vitamin C, (VITAMIN C) 500 MG tablet Take 500 mg by mouth 2 (two) times a day.    ciprofloxacin HCl (CILOXAN) 0.3 % ophthalmic solution Place 1 drop into the left eye 4 (four) times daily.    esomeprazole (NEXIUM) 20 MG capsule Take 20 mg by mouth daily as needed.    ferrous sulfate (FEOSOL) 325 mg (65 mg iron) Tab tablet Take 325 mg by mouth daily with breakfast.    fish oil-omega-3 fatty acids 300-1,000 mg capsule Take 1 g by mouth once daily.    furosemide (LASIX) 20 MG tablet TAKE 1 TABLET(20 MG) BY MOUTH TWICE DAILY    sulfamethoxazole-trimethoprim 800-160mg (BACTRIM DS) 800-160 mg Tab Take 1 tablet by mouth 2 (two) times daily.    telmisartan (MICARDIS) 40 MG Tab Take 1 tablet (40 mg total) by mouth once daily.    traMADoL (ULTRAM) 50 mg tablet TAKE 1 TABLET BY MOUTH EVERY OTHER DAY AS NEEDED FOR PAIN    zinc sulfate (ZINCATE) 50 mg zinc (220 mg) capsule Take 220 mg by mouth once daily.    multivitamin with minerals tablet Take 1 tablet by mouth once daily.    spironolactone (ALDACTONE) 25 MG tablet Take 1 tablet (25 mg total) by mouth once daily.       Review of patient's allergies indicates:   Allergen Reactions    Aspirin      Sore stomach    Codeine Itching    Pcn [penicillins] Itching       Past Medical History:   Diagnosis Date    Debility     Decubitus ulcer of buttock     GERD (gastroesophageal reflux disease) 09/22/2012    HTN (hypertension) 09/22/2012    Normal stress echocardiogram 09/22/2012    Vitamin D deficiency disease 09/22/2012     Past Surgical History:   Procedure Laterality Date    APPENDECTOMY      BILATERAL SALPINGOOPHORECTOMY      HYSTERECTOMY       Family History       Problem Relation (Age of Onset)     Diabetes Brother, Maternal Grandmother    Hypertension Mother, Father    Stroke Father    Uterine cancer Sister          Tobacco Use    Smoking status: Never    Smokeless tobacco: Never   Substance and Sexual Activity    Alcohol use: No     Alcohol/week: 0.0 standard drinks    Drug use: No    Sexual activity: Not Currently     Review of Systems   Constitutional:  Negative for appetite change, fatigue, fever and unexpected weight change.   HENT:  Negative for sore throat and trouble swallowing.    Eyes: Negative.    Respiratory:  Negative for cough, shortness of breath and wheezing.    Cardiovascular:  Negative for chest pain and leg swelling.   Gastrointestinal:  Negative for abdominal distention, abdominal pain, blood in stool, constipation, diarrhea, nausea and vomiting.   Endocrine: Negative.    Genitourinary: Negative.    Musculoskeletal:  Negative for back pain.   Skin: Negative.  Negative for rash.   Allergic/Immunologic: Negative.    Neurological:         Weakness of bilateral lower extremities   Hematological: Negative.    Psychiatric/Behavioral:  Negative for confusion.    Objective:     Vital Signs (Most Recent):  Temp: 97.7 °F (36.5 °C) (05/15/23 1153)  Pulse: 98 (05/15/23 1403)  Resp: 18 (05/15/23 1153)  BP: (!) 177/81 (05/15/23 1153)  SpO2: 97 % (05/15/23 1153) Vital Signs (24h Range):  Temp:  [97.7 °F (36.5 °C)-99.3 °F (37.4 °C)] 97.7 °F (36.5 °C)  Pulse:  [] 98  Resp:  [16-18] 18  SpO2:  [95 %-99 %] 97 %  BP: (167-193)/(72-86) 177/81     Weight: 94.7 kg (208 lb 12.4 oz)  Body mass index is 32.7 kg/m².     Physical Exam  Skin:            Comments: 4 x 4 cm sacral wound.    Necrotic eschar   Tender to palpation             I have reviewed all pertinent lab results within the past 24 hours.  CBC:   Recent Labs   Lab 05/15/23  0358   WBC 14.14*   RBC 4.27   HGB 11.5*   HCT 34.1*      MCV 80*   MCH 26.9*   MCHC 33.7     CMP:   Recent Labs   Lab 05/13/23  8077 05/14/23  0426 05/15/23  8232    GLU 82   < > 123*   CALCIUM 9.1   < > 9.4   ALBUMIN 3.0*  --   --    PROT 7.1  --   --       < > 140   K 3.8   < > 3.4*   CO2 24   < > 23      < > 105   BUN 8   < > 7*   CREATININE 0.7   < > 0.7   ALKPHOS 91  --   --    ALT 17  --   --    AST 17  --   --    BILITOT 0.8  --   --     < > = values in this interval not displayed.       Significant Diagnostics:  I have reviewed all pertinent imaging results/findings within the past 24 hours.

## 2023-05-15 NOTE — CONSULTS
[facility] - Med Surg ([address])  Wound Care    Patient Name:  Trista Gr   MRN:  [id number]  Date: 5/15/2023  Diagnosis: Urinary tract infection without hematuria    History:     Past Medical History:   Diagnosis Date    Debility     Decubitus ulcer of buttock     GERD (gastroesophageal reflux disease) 09/22/2012    HTN (hypertension) 09/22/2012    Normal stress echocardiogram 09/22/2012    Vitamin D deficiency disease 09/22/2012       Social History     Socioeconomic History    Marital status:     Number of children: 0   Occupational History    Occupation: Human      Employer: [employer]     Comment: [employer]   Tobacco Use    Smoking status: Never    Smokeless tobacco: Never   Substance and Sexual Activity    Alcohol use: No     Alcohol/week: 0.0 standard drinks    Drug use: No    Sexual activity: Not Currently   Social History Narrative    Uses cane sometime    2020 - Walks with a walker     Social Determinants of Health     Financial Resource Strain: Low Risk     Difficulty of Paying Living Expenses: Not hard at all   Food Insecurity: No Food Insecurity    Worried About Running Out of Food in the Last Year: Never true    Ran Out of Food in the Last Year: Never true   Transportation Needs: No Transportation Needs    Lack of Transportation (Medical): No    Lack of Transportation (Non-Medical): No   Physical Activity: Inactive    Days of Exercise per Week: 0 days    Minutes of Exercise per Session: 0 min   Stress: Stress Concern Present    Feeling of Stress : To some extent   Social Connections: Moderately Isolated    Frequency of Communication with Friends and Family: More than three times a week    Frequency of Social Gatherings with Friends and Family: Never    Attends Bahai Services: Never    Active Member of Clubs or Organizations: No    Attends Club or Organization Meetings: Never    Marital Status:    Housing Stability: Low Risk     Unable to Pay for  Housing in the Last Year: No    Number of Places Lived in the Last Year: 1    Unstable Housing in the Last Year: No       [facility] Assessment Details:        05/15/23 0905        Altered Skin Integrity 05/13/23 2251 Coccyx #1 Full thickness tissue loss. Base is covered by slough and/or eschar in the wound bed   Date First Assessed/Time First Assessed: 05/13/23 2251   Altered Skin Integrity Present on Admission - Did Patient arrive to the hospital with altered skin?: yes  Location: Coccyx  Wound Number: #1  Description of Altered Skin Integrity: Full thicknes...   Wound Image      Description of Altered Skin Integrity Full thickness tissue loss. Base is covered by slough and/or eschar in the wound bed   Tissue loss description Full thickness   Black (%), Wound Tissue Color 50 %   Red (%), Wound Tissue Color 0 %   Yellow (%), Wound Tissue Color 50 %   Periwound Area Macerated;Moist   Wound Edges Irregular   Wound Length (cm) 6 cm   Wound Width (cm) 5 cm   Wound Depth (cm) 4 cm   Wound Volume (cm^3) 120 cm^3   Wound Surface Area (cm^2) 30 cm^2   Tunneling (depth (cm)/location) 6cm at 12:00   Care Cleansed with:;Antimicrobial agent;Wound cleanser;Applied:;Skin Barrier   Dressing Removed  (x2 peices of gauze)   Packing packing removed   Packing Inserted  1   Packing Removed 2   Periwound Care Absorptive dressing applied;Cleansed with pH balanced cleanser;Dry periwound area maintained;Skin barrier film applied   Dressing Change Due 05/15/23     Wound cleansed with a Vashe compress, 10 min. Soak followed by scrub. Wound edges are necrotic, non-viable tissue found through out the wound bed, surgical consultation recommended and called into Dr Edward. Measures 6x5x4 with tunneling of 6 - 8cm at 12:00 (bone is palpable).  Orders written for Vashe soaked gauze, 4x4, and covered with ABD pad, Every shift - Wound care orders to be discontinued when surgical intervention implemented.  Triad paste applied to the pippa-wound  IAD.   Harry skin barrier to pippa-dressing skin.     05/15/2023

## 2023-05-15 NOTE — HOSPITAL COURSE
Patient is a large sacral decubitus wound at least 4 cm x 4 cm x 7 cm deep.      Some eschar and necrotic tissue seen.    Will bring patient to OR for evaluation and debridement.       [[id number]]

## 2023-05-15 NOTE — ASSESSMENT & PLAN NOTE
Patient present with dysuria and bladder pain - she was treated with 3 days of Bactrim DS as outpatient without benefit (Urine Culture in 11/2022 with E coli resistant to Bactrim and sensitive to Ceftriaxone).  UA on admission with nitrite + and trace leukocytes (11 WBC and moderate bacteria).  Urine culture growing >100k cfu GNR.  CBC with WBC 16.18 with left shift.  Patient started on Ceftriaxone.  Dysuria resolved.  WBC improving.  Would favor treating for at least 7 days given her complicated history.    Plan:  Continue with urine culture results  Continue with IV Ceftriaxone day # 2/7  Monitor WBC

## 2023-05-15 NOTE — SUBJECTIVE & OBJECTIVE
Interval History: Patient is awake and alert this morning.  Left upper lid with less swelling this morning.  No adverse events noted overnight.  No chest pain or SOB.  Eating ok, but needing assistance.      Review of Systems   Constitutional:  Positive for fatigue. Negative for appetite change, chills, diaphoresis and fever.   HENT:  Negative for congestion, ear pain, postnasal drip, rhinorrhea, sinus pressure, sore throat and trouble swallowing.    Eyes:  Negative for photophobia, pain, discharge, redness, itching and visual disturbance.        Left upper lid swelling   Respiratory:  Negative for cough, shortness of breath and wheezing.    Cardiovascular:  Negative for chest pain and palpitations.   Gastrointestinal:  Negative for abdominal distention, abdominal pain, constipation, diarrhea, nausea and vomiting.   Genitourinary:  Positive for dysuria (resolved). Negative for decreased urine volume, difficulty urinating, flank pain, frequency, hematuria and urgency.        Incontinent of urine   Musculoskeletal:  Positive for neck pain (favors the left-side). Negative for arthralgias, back pain, gait problem, joint swelling, myalgias and neck stiffness.   Skin:  Positive for wound (sacral wound). Negative for color change, pallor and rash.   Neurological:  Positive for weakness (generalized). Negative for dizziness, syncope, light-headedness and headaches.   Psychiatric/Behavioral:  Negative for agitation, behavioral problems and confusion.    Objective:     Vital Signs (Most Recent):  Temp: 98.5 °F (36.9 °C) (05/15/23 0723)  Pulse: 91 (05/15/23 0943)  Resp: 18 (05/15/23 0723)  BP: (!) 177/77 (05/15/23 0723)  SpO2: 95 % (05/15/23 0723) Vital Signs (24h Range):  Temp:  [97.8 °F (36.6 °C)-99.3 °F (37.4 °C)] 98.5 °F (36.9 °C)  Pulse:  [] 91  Resp:  [16-18] 18  SpO2:  [95 %-99 %] 95 %  BP: (167-193)/(72-86) 177/77     Weight: 94.7 kg (208 lb 12.4 oz)  Body mass index is 32.7 kg/m².    Intake/Output Summary (Last  24 hours) at 5/15/2023 1151  Last data filed at 5/15/2023 0733  Gross per 24 hour   Intake 169.58 ml   Output 1950 ml   Net -1780.42 ml           Physical Exam  Vitals and nursing note reviewed.   Constitutional:       General: She is not in acute distress.     Appearance: She is well-developed. She is obese. She is ill-appearing (chronically). She is not toxic-appearing.   HENT:      Head: Normocephalic and atraumatic.      Right Ear: External ear normal.      Left Ear: External ear normal.      Nose: Nose normal.      Mouth/Throat:      Mouth: Mucous membranes are moist.      Pharynx: Oropharynx is clear.   Eyes:      General: No scleral icterus.        Right eye: No discharge.         Left eye: No discharge or hordeolum.      Conjunctiva/sclera:      Right eye: Right conjunctiva is not injected. No chemosis, exudate or hemorrhage.     Left eye: Left conjunctiva is not injected. No chemosis, exudate or hemorrhage.     Pupils: Pupils are equal, round, and reactive to light.      Comments: Left eyelid edema with no visible discharge from eye - slightly improved this morning   Neck:      Trachea: No tracheal deviation.      Comments: Head tilted to left side  Cardiovascular:      Rate and Rhythm: Normal rate and regular rhythm.      Pulses: Normal pulses.      Heart sounds: Normal heart sounds. No murmur heard.    No gallop.   Pulmonary:      Effort: Pulmonary effort is normal. No respiratory distress.      Breath sounds: Normal breath sounds. No stridor. No wheezing or rales.   Abdominal:      General: Bowel sounds are normal. There is no distension.      Palpations: Abdomen is soft. There is no mass.      Tenderness: There is no abdominal tenderness. There is no guarding.   Musculoskeletal:      Cervical back: Neck supple.      Right lower leg: Edema present.      Left lower leg: Edema present.      Comments: Bilateral LE edema with chronic skin changes - no erythema   Skin:     General: Skin is warm and dry.       Coloration: Skin is not pale.      Findings: No erythema or rash.      Comments: Stage 3 pressure wound with drainage   Neurological:      Mental Status: She is alert and oriented to person, place, and time.      Motor: Weakness present. No abnormal muscle tone.      Gait: Gait abnormal.   Psychiatric:         Mood and Affect: Mood normal.         Behavior: Behavior normal.           Significant Labs: All pertinent labs within the past 24 hours have been reviewed.    Significant Imaging: I have reviewed all pertinent imaging results/findings within the past 24 hours.

## 2023-05-15 NOTE — ASSESSMENT & PLAN NOTE
Chronic stage III decubitus ulcers secondary to immobility despite with wound care efforts at home with home health.  Probe deep to bone concerning for possible stage IV.  Plan for surgery today to evaluate further and to undergo surgical debridement.

## 2023-05-15 NOTE — PLAN OF CARE
Problem: Infection  Goal: Absence of Infection Signs and Symptoms  Outcome: Ongoing, Progressing     Problem: Adult Inpatient Plan of Care  Goal: Plan of Care Review  Outcome: Ongoing, Progressing  Goal: Patient-Specific Goal (Individualized)  Outcome: Ongoing, Progressing  Goal: Absence of Hospital-Acquired Illness or Injury  Outcome: Ongoing, Progressing  POC reviewed with pt and spouse at bedside. A&Ox4. Room air. No acute changes. Wound care completed by wound care nurse. Pt to go to MRI. Checklist complete. Safety checks performed. Bed in lowest position. Wheels locked. Call light in reach. WCTM.

## 2023-05-15 NOTE — ASSESSMENT & PLAN NOTE
Chronic and was getting Wound Care from .  Wound assessed by Wound Care and recommended for Surgery evaluation  -Continue with Wound Care  -Consult Surgery for evaluation for debridement

## 2023-05-15 NOTE — ASSESSMENT & PLAN NOTE
Failed improvement with oral Bactrim.  Dysuria improving with intravenous ceftriaxone.  Concern for resistance based on prior urine culture results.  Follow-up urine culture results.

## 2023-05-15 NOTE — ASSESSMENT & PLAN NOTE
Home meds:  Telmisartan 40 mg q.d., Lasix 20 mg b.i.d., and Spironolactone 25 mg q.d.   Uncontrolled blood pressures, -190s   -Increase losartan 50mg daily to BID  -IV Hydralazine 10mg PRN for SBP >180 DBP >100  -Monitor and adjust as needed
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
## 2023-05-15 NOTE — ASSESSMENT & PLAN NOTE
"Bed-bound with a history of Lumbar DD with neurogenic claudication and CT in 11/2022 with concern for normal pressure hydrocephalus.  Was to follow up with Neurology.  She states that has been to Rehab in past and was able to walk with walker at time of discharge.  She has become progressively weak again and now mostly bed bound.  Repeat CTH wo on admission with Stable ventriculomegaly out of proportion to the degree of sulcal enlargement, findings which can be seen with normal pressure hydrocephalus as provided in the clinical history.  Tele-Neurology consulted - "Clinically, her presentation is more concerning for progressive cervical and lumbar spinal stenosis with myelopathy.  Moreover, motor deficits are not feature of NPH".  PT/OT consulted and recommended for basic SNF.    Plan:  Order MRI of C/T/L spine  SNF on discharge - patient open to going if benefit  Palliative Care consult  Gabapentin 300mg qhs    "

## 2023-05-15 NOTE — ASSESSMENT & PLAN NOTE
Patient with lower extremity weakness with progressive debility and has been bed-bound for 2 years now.  Patient reports some improvement with physical therapy and skilled nurse facility however since discharge from skilled nursing facility patient has been bed-bound and nonambulatory.  Etiology of lower extremity weakness due to suspected spinal stenosis.  Imaging also concerning for possible normal pressure hydrocephalus.    Neurology consulted on this admission due to concern for possible normal pressure hydrocephalus.  Dr. Ildefonso Navarro Nadimpally suspect debility most likely due to progressive cervical and lumbar spinal stenosis with myelopathy and not normal pressure hydrocephalus.  Recommended repeat spinal imaging including MRI cervical, thoracic and lumbar spine without contrast, along with ongogni thearpy efforts, the addition of gabapentin for pain, and follow-up with spine specialist.  Unable to obtain MRI due to body size.    Requesting transfer to [facility] in order for patient to undergo neuroimaging recommended along with more definitive input by Orthopedic spine surgery/Neurosurgery regarding lower extremity weakness.    Continue with therapy.

## 2023-05-15 NOTE — HOSPITAL COURSE
Patient is 72-year-old woman with past medical history of hypertension, chronic lower back pain > 10 years with degenerative joint disease of the spine with cervical and lumbar spinal stenosis with progressive gait instability who has been bed-bound for last 2 years with stage III sacral decubitus ulcer, urinary incontinence who presented with recent urinary tract infection which did not improve with treatment with oral trimethoprim/sulfamethoxazole.    Prior urine culture collected on 11/7/2022 positive for E coli resistant to trimethoprim/sulfamethoxazole concerning for antibtioic resistance now.  Patient treated with intravenous ceftriaxone with resolution of dysuria.  Urine culture pending.  Patient also with persistently elevated leukocytosis despite antibiotic treatment.  Patient also with chronic decubitus ulcer which stage III and possible stage IV now.  General surgery consult recommended surgical debridement.    In terms of progressive debility, patient has been seen by Dr. Sravan Josue in June 2021.  Imaging at that time multilevel cervical and lumbar stenosis.  Patient deemded a poor surgical canddiated due to debility at that time.      Neurology consulted on this admission due to concern for possible normal pressure hydrocephalus.  Dr. Ildefonso Navarro Nadimpally suspect debility most likely due to progressive cervical and lumbar spinal stenosis with myelopathy and not normal pressure hydrocephalus.  Recommended repeat spinal imaging including MRI cervical, thoracic and lumbar spine without contrast, along with ongoing thearpy efforts, the addition of gabapentin for pain, and follow-up with spine specialist.  Unable to obtain MRI due to body size.    Requesting transfer to [facility] in order for patient to undergo neuroimaging recommended along with more definitive input by Orthopedic spine surgery/Neurosurgery regarding lower extremity weakness.    Patient transferred to [facility].  Underwent MRI  spine with mild cervical stenosis and moderate lumbar stenosis.  Ortho spine consulted.  Awaiting recommendations.  Wound cultures from bone and tissue from surgical debridement on 5/16 with multiple bacteria.  Infectious Disease consulted.  Wound care consulted for wound management recommendations.

## 2023-05-15 NOTE — ASSESSMENT & PLAN NOTE
States this is the 2nd time this has occurred and she required consultation with an eyelid specialist in the past - no specific treatment provided during prior episode, advised on watchful waiting in the past.  Has been ongoing for the past month.  Improving.  Ophthalmology consult placed, but they do not do consults at [facility].  -Continue with QID warm compresses followed by gentle cleansing with baby soap
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
## 2023-05-15 NOTE — CONSULTS
[facility] ([address])  Adult Nutrition  Consult Note    SUMMARY     Recommendations  1) Recc'd vitamin C and Zinc supplement for wound healing  2) Hill BID to promote wound healing   3) Honor patient's food preference within diet order to encourage intake of meals, adequate hydration  4) Assist with feedings if needed  Goals: Patient will meet 50-75% EEN/EPN by RD follow up  Nutrition Goal Status: new  Communication of RD Recs:  (POC)    Assessment and Plan  Nutrition Problem  Increased energy - protein needs    Related to (etiology):   Wound healing    Signs and Symptoms (as evidenced by):   Non healing wound to coccyx    Interventions:  Modular beverage  Commercial beverage  Vitamin/mineral supplement  General healthful diet  Collaboration with other providers    Nutrition Diagnosis Status:   New      Reason for Assessment  Reason For Assessment: consult, sulma Andrade (risk for PI)  Diagnosis:  (Urinary tract infection without hematuria)  Relevant Medical History:   Patient Active Problem List   Diagnosis    Essential hypertension    GERD (gastroesophageal reflux disease)    Vitamin D deficiency disease    Normal stress echocardiogram    Right knee DJD    Chronic low back pain    Urinary frequency    Osteopenia of thigh    Obesity (BMI 30.0-34.9)    SI (sacroiliac) pain    Urinary tract infection without hematuria    History of progressive weakness    Pressure injury of buttock, stage 3    Peripheral edema    Shortness of breath    Normal pressure hydrocephalus    Urinary incontinence without sensory awareness    Neck pain on right side    Swelling of left eyelid     Interdisciplinary Rounds: did not attend  General Information Comments: Patient c/o dysphagia diet order at time of assessment. Patient  states she would not eat the mechanical soft. Receiving a general diet now. Reports good appetite when brought food she actually enjoys. Patient reports constipation since Sat. No other GI  "intolerance reported to RD. Rec'd Hill and ONS to be added to diet order to promote wound healing and optimize nutrition. Raymond 12- coccyx. NFPE patient appears nourished, at risk for malnutrition d/t poor wound healing. RD to follow up  Nutrition Discharge Planning: dc on general healthful diet with increased protein needs to promote wound healing    Nutrition Risk Screen  Nutrition Risk Screen: large or nonhealing wound, burn or pressure injury    Nutrition/Diet History  Spiritual, Cultural Beliefs, Congregational Practices, Values that Affect Care:  (None stated.)  Factors Affecting Nutritional Intake: None identified at this time    Anthropometrics  Temp: 97.7 °F (36.5 °C)  Height Method: Stated  Height: 5' 7" (170.2 cm)  Height (inches): 67 in  Weight Method: Bed Scale  Weight: 94.7 kg (208 lb 12.4 oz)  Weight (lb): 208.78 lb  Ideal Body Weight (IBW), Female: 135 lb  % Ideal Body Weight, Female (lb): 154.65 %  BMI (Calculated): 32.7  BMI Grade: 30 - 34.9- obesity - grade I       Lab/Procedures/Meds  Pertinent Labs Reviewed: reviewed  CBC:  Recent Labs   Lab 05/15/23  0357   WBC 14.14*   HGB 11.5*   HCT 34.1*        CMP:  Recent Labs   Lab 05/15/23  0357   CALCIUM 9.4      K 3.4*   CO2 23      BUN 7*   CREATININE 0.7     Pertinent Medications Reviewed: reviewed  Scheduled Meds:   baclofen  5 mg Oral TID    cefTRIAXone (ROCEPHIN) IVPB  1 g Intravenous Q24H    ciprofloxacin HCl  1 drop Left Eye QID    diclofenac sodium  2 g Topical (Top) Daily    furosemide  20 mg Oral BID    gabapentin  300 mg Oral QHS    heparin (porcine)  5,000 Units Subcutaneous Q8H    LIDOcaine  1 patch Transdermal Q24H    losartan  50 mg Oral BID    NIFEdipine  30 mg Oral Daily    sodium chloride 0.9%  10 mL Intravenous Q8H    spironolactone  25 mg Oral Daily     Continuous Infusions:  PRN Meds:.acetaminophen, hydrALAZINE, melatonin, naloxone, nitroGLYCERIN 2% TD oint, senna-docusate 8.6-50 mg, traMADoL    Estimated/Assessed " Needs  Weight Used For Calorie Calculations: 94.7 kg (208 lb 12.4 oz)  Energy Calorie Requirements (kcal): 1800  Energy Need Method: [place]-St Jeor (x 1.2)  Protein Requirements: 76-95g (0.8-1.0g/kg)  Weight Used For Protein Calculations: 94.7 kg (208 lb 12.4 oz)  Fluid Requirements (mL): 1 mL/kcal  Estimated Fluid Requirement Method:  (or per MD)  RDA Method (mL): 1800  CHO Requirement: 225g    Nutrition Prescription Ordered  Current Diet Order: Regular    Evaluation of Received Nutrient/Fluid Intake  I/O: - 1.9 L since admit  Energy Calories Required: meeting needs  Protein Required: meeting needs  Fluid Required: meeting needs  Comments: LBM 5/12  Tolerance: tolerating  % Intake of Estimated Energy Needs: 50 - 75 %  % Meal Intake: 50 - 75 %  Intake/Output - Last 3 Shifts         05/13 0700  05/14 0659 05/14 0700  05/15 0659 05/15 0700  05/16 0659    P.O.  120     IV Piggyback   49.6    Total Intake(mL/kg)  120 (1.3) 49.6 (0.5)    Urine (mL/kg/hr) 150 1950 (0.9)     Total Output 150 1950     Net -150 -1830 +49.6                 Nutrition Risk  Level of Risk/Frequency of Follow-up:  (1-2 x/week)     Monitor and Evaluation  Food and Nutrient Intake: energy intake, food and beverage intake  Food and Nutrient Adminstration: diet order  Anthropometric Measurements: weight, weight change  Biochemical Data, Medical Tests and Procedures: electrolyte and renal panel, gastrointestinal profile, glucose/endocrine profile, inflammatory profile, lipid profile  Nutrition-Focused Physical Findings: overall appearance     Nutrition Follow-Up  RD Follow-up?: Yes
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
## 2023-05-15 NOTE — PROGRESS NOTES
[facility] [address] Medicine  Progress Note    Patient Name: Trista Gr  MRN: [id number]  Patient Class: OP- Observation   Admission Date: 5/13/2023  Length of Stay: 0 days  Attending Physician: Joselito Gonzalez MD  Primary Care Provider: REGINO Yeung MD        Subjective:     Principal Problem:Urinary tract infection without hematuria      HPI:  Ms. Trista Gr is a 72 y.o. female, with PMH of HTN, chronic low back pain, normal pressure hydrocephalus, urinary incontinence, who presented to [facility] ED on 05/13/2023 due to burning with urination.  She states she was diagnosed with the UTI 3 days ago, and went to see her PCP who prescribed her Bactrim.  She states her symptoms have not improved while on Bactrim.  She notes associated dizziness, lightheadedness, weakness, loss of appetite.  She denied fever, chills, nausea, vomiting.  She states that the pressure injury on her buttocks has been having increased pain in the past few days as well.  She was evaluated in the ED with labs that showed leukocytosis of 16 K, and left shift.  Metabolic panel was overall fairly normal.  A UA showed nitrate positive UTI with 11 wbc's, moderate bacteria, and trace leukocytes.  Urine cultures pending.  She was treated in the ED with Rocephin.  She was placed on observation.      Overview/Hospital Course:  No notes on file    Interval History: Patient is awake and alert this morning.  Left upper lid with less swelling this morning.  No adverse events noted overnight.  No chest pain or SOB.  Eating ok, but needing assistance.      Review of Systems   Constitutional:  Positive for fatigue. Negative for appetite change, chills, diaphoresis and fever.   HENT:  Negative for congestion, ear pain, postnasal drip, rhinorrhea, sinus pressure, sore throat and trouble swallowing.    Eyes:  Negative for photophobia, pain, discharge, redness, itching and visual disturbance.        Left upper lid swelling    Respiratory:  Negative for cough, shortness of breath and wheezing.    Cardiovascular:  Negative for chest pain and palpitations.   Gastrointestinal:  Negative for abdominal distention, abdominal pain, constipation, diarrhea, nausea and vomiting.   Genitourinary:  Positive for dysuria (resolved). Negative for decreased urine volume, difficulty urinating, flank pain, frequency, hematuria and urgency.        Incontinent of urine   Musculoskeletal:  Positive for neck pain (favors the left-side). Negative for arthralgias, back pain, gait problem, joint swelling, myalgias and neck stiffness.   Skin:  Positive for wound (sacral wound). Negative for color change, pallor and rash.   Neurological:  Positive for weakness (generalized). Negative for dizziness, syncope, light-headedness and headaches.   Psychiatric/Behavioral:  Negative for agitation, behavioral problems and confusion.    Objective:     Vital Signs (Most Recent):  Temp: 98.5 °F (36.9 °C) (05/15/23 0723)  Pulse: 91 (05/15/23 0943)  Resp: 18 (05/15/23 0723)  BP: (!) 177/77 (05/15/23 0723)  SpO2: 95 % (05/15/23 0723) Vital Signs (24h Range):  Temp:  [97.8 °F (36.6 °C)-99.3 °F (37.4 °C)] 98.5 °F (36.9 °C)  Pulse:  [] 91  Resp:  [16-18] 18  SpO2:  [95 %-99 %] 95 %  BP: (167-193)/(72-86) 177/77     Weight: 94.7 kg (208 lb 12.4 oz)  Body mass index is 32.7 kg/m².    Intake/Output Summary (Last 24 hours) at 5/15/2023 1151  Last data filed at 5/15/2023 0733  Gross per 24 hour   Intake 169.58 ml   Output 1950 ml   Net -1780.42 ml           Physical Exam  Vitals and nursing note reviewed.   Constitutional:       General: She is not in acute distress.     Appearance: She is well-developed. She is obese. She is ill-appearing (chronically). She is not toxic-appearing.   HENT:      Head: Normocephalic and atraumatic.      Right Ear: External ear normal.      Left Ear: External ear normal.      Nose: Nose normal.      Mouth/Throat:      Mouth: Mucous membranes are  moist.      Pharynx: Oropharynx is clear.   Eyes:      General: No scleral icterus.        Right eye: No discharge.         Left eye: No discharge or hordeolum.      Conjunctiva/sclera:      Right eye: Right conjunctiva is not injected. No chemosis, exudate or hemorrhage.     Left eye: Left conjunctiva is not injected. No chemosis, exudate or hemorrhage.     Pupils: Pupils are equal, round, and reactive to light.      Comments: Left eyelid edema with no visible discharge from eye - slightly improved this morning   Neck:      Trachea: No tracheal deviation.      Comments: Head tilted to left side  Cardiovascular:      Rate and Rhythm: Normal rate and regular rhythm.      Pulses: Normal pulses.      Heart sounds: Normal heart sounds. No murmur heard.    No gallop.   Pulmonary:      Effort: Pulmonary effort is normal. No respiratory distress.      Breath sounds: Normal breath sounds. No stridor. No wheezing or rales.   Abdominal:      General: Bowel sounds are normal. There is no distension.      Palpations: Abdomen is soft. There is no mass.      Tenderness: There is no abdominal tenderness. There is no guarding.   Musculoskeletal:      Cervical back: Neck supple.      Right lower leg: Edema present.      Left lower leg: Edema present.      Comments: Bilateral LE edema with chronic skin changes - no erythema   Skin:     General: Skin is warm and dry.      Coloration: Skin is not pale.      Findings: No erythema or rash.      Comments: Stage 3 pressure wound with drainage   Neurological:      Mental Status: She is alert and oriented to person, place, and time.      Motor: Weakness present. No abnormal muscle tone.      Gait: Gait abnormal.   Psychiatric:         Mood and Affect: Mood normal.         Behavior: Behavior normal.           Significant Labs: All pertinent labs within the past 24 hours have been reviewed.    Significant Imaging: I have reviewed all pertinent imaging results/findings within the past 24  "hours.      Assessment/Plan:      * Urinary tract infection without hematuria  Patient present with dysuria and bladder pain - she was treated with 3 days of Bactrim DS as outpatient without benefit (Urine Culture in 11/2022 with E coli resistant to Bactrim and sensitive to Ceftriaxone).  UA on admission with nitrite + and trace leukocytes (11 WBC and moderate bacteria).  Urine culture growing >100k cfu GNR.  CBC with WBC 16.18 with left shift.  Patient started on Ceftriaxone.  Dysuria resolved.  WBC improving.  Would favor treating for at least 7 days given her complicated history.    Plan:  Continue with urine culture results  Continue with IV Ceftriaxone day # 2/7  Monitor WBC          History of progressive weakness  Bed-bound with a history of Lumbar DD with neurogenic claudication and CT in 11/2022 with concern for normal pressure hydrocephalus.  Was to follow up with Neurology.  She states that has been to Rehab in past and was able to walk with walker at time of discharge.  She has become progressively weak again and now mostly bed bound.  Repeat CTH wo on admission with Stable ventriculomegaly out of proportion to the degree of sulcal enlargement, findings which can be seen with normal pressure hydrocephalus as provided in the clinical history.  Tele-Neurology consulted - "Clinically, her presentation is more concerning for progressive cervical and lumbar spinal stenosis with myelopathy.  Moreover, motor deficits are not feature of NPH".  PT/OT consulted and recommended for basic SNF.    Plan:  Order MRI of C/T/L spine  SNF on discharge - patient open to going if benefit  Palliative Care consult  Gabapentin 300mg qhs      Pressure injury of buttock, stage 3  Chronic and was getting Wound Care from .  Wound assessed by Wound Care and recommended for Surgery evaluation  -Continue with Wound Care  -Consult Surgery for evaluation for debridement    Neck pain on right side  States this is chronic and seems " positional as patient is leaning head to the left - there is palpable tightening/stiffness of the right side of the neck.  X-ray of C-spine unremarkable.  Will need to assess further when she is more awake.   -Continue Lidocaine patch  -Warm compress PRN   -Gabapentin 300mg qhs  -See above      Essential hypertension  Home meds:  Telmisartan 40 mg q.d., Lasix 20 mg b.i.d., and Spironolactone 25 mg q.d.   Uncontrolled blood pressures, -190s   -Increase losartan 50mg daily to BID  -IV Hydralazine 10mg PRN for SBP >180 DBP >100  -Monitor and adjust as needed      Swelling of left eyelid  States this is the 2nd time this has occurred and she required consultation with an eyelid specialist in the past - no specific treatment provided during prior episode, advised on watchful waiting in the past.  Has been ongoing for the past month.  Improving.  Ophthalmology consult placed, but they do not do consults at [facility].  -Continue with QID warm compresses followed by gentle cleansing with baby soap         Urinary incontinence without sensory awareness  By History  -Continue with Pure wick of now      Obesity (BMI 30.0-34.9)  BMI 32.70 on admission - patient would benefit for weight reduction.        VTE Risk Mitigation (From admission, onward)         Ordered     heparin (porcine) injection 5,000 Units  Every 8 hours         05/14/23 0210     IP VTE HIGH RISK PATIENT  Once         05/14/23 0210     Place sequential compression device  Until discontinued         05/14/23 0210                Discharge Planning   MATTHEW:      Code Status: Full Code   Is the patient medically ready for discharge?:     Reason for patient still in hospital (select all that apply): Patient trending condition, Treatment and Consult recommendations  Discharge Plan A: Home Health                  Joselito Gonzalez MD  Department of Hospital Medicine   [facility] ([address])
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
## 2023-05-15 NOTE — ASSESSMENT & PLAN NOTE
States this is chronic and seems positional as patient is leaning head to the left - there is palpable tightening/stiffness of the right side of the neck.  X-ray of C-spine unremarkable.  Will need to assess further when she is more awake.   -Continue Lidocaine patch  -Warm compress PRN   -Gabapentin 300mg qhs  -See above

## 2023-05-15 NOTE — PLAN OF CARE
Recommendations  1) Recc'd vitamin C and Zinc supplement for wound healing  2) Hill BID to promote wound healing   3) Honor patient's food preference within diet order to encourage intake of meals, adequate hydration  4) Assist with feedings if needed  Goals: Patient will meet 50-75% EEN/EPN by RD follow up  Nutrition Goal Status: new  Communication of RD Recs:  (POC)

## 2023-05-16 PROBLEM — R29.898 LOWER EXTREMITY WEAKNESS: Status: ACTIVE | Noted: 2022-11-07

## 2023-05-16 PROBLEM — D72.829 LEUKOCYTOSIS: Status: ACTIVE | Noted: 2023-05-16

## 2023-05-16 PROBLEM — Z87.898 HISTORY OF PROGRESSIVE WEAKNESS: Status: RESOLVED | Noted: 2022-11-07 | Resolved: 2023-05-16

## 2023-05-16 LAB
ANION GAP SERPL CALC-SCNC: 8 MMOL/L (ref 8–16)
BACTERIA UR CULT: ABNORMAL
BASOPHILS # BLD AUTO: 0.03 K/UL (ref 0–0.2)
BASOPHILS NFR BLD: 0.2 % (ref 0–1.9)
BUN SERPL-MCNC: 8 MG/DL (ref 8–23)
CALCIUM SERPL-MCNC: 9.3 MG/DL (ref 8.7–10.5)
CHLORIDE SERPL-SCNC: 104 MMOL/L (ref 95–110)
CO2 SERPL-SCNC: 27 MMOL/L (ref 23–29)
CREAT SERPL-MCNC: 0.6 MG/DL (ref 0.5–1.4)
DIFFERENTIAL METHOD: ABNORMAL
EOSINOPHIL # BLD AUTO: 0.3 K/UL (ref 0–0.5)
EOSINOPHIL NFR BLD: 2.1 % (ref 0–8)
ERYTHROCYTE [DISTWIDTH] IN BLOOD BY AUTOMATED COUNT: 12.8 % (ref 11.5–14.5)
EST. GFR  (NO RACE VARIABLE): >60 ML/MIN/1.73 M^2
GLUCOSE SERPL-MCNC: 121 MG/DL (ref 70–110)
GRAM STN SPEC: NORMAL
HCT VFR BLD AUTO: 34.2 % (ref 37–48.5)
HGB BLD-MCNC: 11.5 G/DL (ref 12–16)
IMM GRANULOCYTES # BLD AUTO: 0.18 K/UL (ref 0–0.04)
IMM GRANULOCYTES NFR BLD AUTO: 1.2 % (ref 0–0.5)
LYMPHOCYTES # BLD AUTO: 2.4 K/UL (ref 1–4.8)
LYMPHOCYTES NFR BLD: 16.4 % (ref 18–48)
MAGNESIUM SERPL-MCNC: 1.7 MG/DL (ref 1.6–2.6)
MCH RBC QN AUTO: 27.1 PG (ref 27–31)
MCHC RBC AUTO-ENTMCNC: 33.6 G/DL (ref 32–36)
MCV RBC AUTO: 81 FL (ref 82–98)
MONOCYTES # BLD AUTO: 1.3 K/UL (ref 0.3–1)
MONOCYTES NFR BLD: 8.7 % (ref 4–15)
NEUTROPHILS # BLD AUTO: 10.4 K/UL (ref 1.8–7.7)
NEUTROPHILS NFR BLD: 71.4 % (ref 38–73)
NRBC BLD-RTO: 0 /100 WBC
PLATELET # BLD AUTO: 358 K/UL (ref 150–450)
PMV BLD AUTO: 10.2 FL (ref 9.2–12.9)
POCT GLUCOSE: 116 MG/DL (ref 70–110)
POTASSIUM SERPL-SCNC: 3.6 MMOL/L (ref 3.5–5.1)
RBC # BLD AUTO: 4.25 M/UL (ref 4–5.4)
SODIUM SERPL-SCNC: 139 MMOL/L (ref 136–145)
WBC # BLD AUTO: 14.56 K/UL (ref 3.9–12.7)

## 2023-05-16 PROCEDURE — 11047: ICD-10-PCS | Mod: ,,, | Performed by: SURGERY

## 2023-05-16 PROCEDURE — 87015 SPECIMEN INFECT AGNT CONCNTJ: CPT | Mod: 59 | Performed by: SURGERY

## 2023-05-16 PROCEDURE — 20600001 HC STEP DOWN PRIVATE ROOM

## 2023-05-16 PROCEDURE — 94761 N-INVAS EAR/PLS OXIMETRY MLT: CPT

## 2023-05-16 PROCEDURE — 63600175 PHARM REV CODE 636 W HCPCS: Performed by: PHYSICIAN ASSISTANT

## 2023-05-16 PROCEDURE — 87075 CULTR BACTERIA EXCEPT BLOOD: CPT | Performed by: SURGERY

## 2023-05-16 PROCEDURE — 25000003 PHARM REV CODE 250: Performed by: INTERNAL MEDICINE

## 2023-05-16 PROCEDURE — 87077 CULTURE AEROBIC IDENTIFY: CPT | Mod: 59 | Performed by: SURGERY

## 2023-05-16 PROCEDURE — 99233 PR SUBSEQUENT HOSPITAL CARE,LEVL III: ICD-10-PCS | Mod: ,,, | Performed by: HOSPITALIST

## 2023-05-16 PROCEDURE — 11044 DBRDMT BONE 1ST 20 SQ CM/<: CPT | Mod: ,,, | Performed by: SURGERY

## 2023-05-16 PROCEDURE — 71000033 HC RECOVERY, INTIAL HOUR: Performed by: SURGERY

## 2023-05-16 PROCEDURE — 36415 COLL VENOUS BLD VENIPUNCTURE: CPT | Performed by: PHYSICIAN ASSISTANT

## 2023-05-16 PROCEDURE — 25000003 PHARM REV CODE 250: Performed by: ANESTHESIOLOGY

## 2023-05-16 PROCEDURE — 63600175 PHARM REV CODE 636 W HCPCS: Performed by: NURSE ANESTHETIST, CERTIFIED REGISTERED

## 2023-05-16 PROCEDURE — 25000003 PHARM REV CODE 250

## 2023-05-16 PROCEDURE — 63600175 PHARM REV CODE 636 W HCPCS: Performed by: ANESTHESIOLOGY

## 2023-05-16 PROCEDURE — 25000003 PHARM REV CODE 250: Performed by: PHYSICIAN ASSISTANT

## 2023-05-16 PROCEDURE — 25000003 PHARM REV CODE 250: Performed by: SURGERY

## 2023-05-16 PROCEDURE — A4216 STERILE WATER/SALINE, 10 ML: HCPCS | Performed by: PHYSICIAN ASSISTANT

## 2023-05-16 PROCEDURE — D9220A PRA ANESTHESIA: Mod: CRNA,,, | Performed by: NURSE ANESTHETIST, CERTIFIED REGISTERED

## 2023-05-16 PROCEDURE — D9220A PRA ANESTHESIA: ICD-10-PCS | Mod: ANES,,, | Performed by: ANESTHESIOLOGY

## 2023-05-16 PROCEDURE — 99233 SBSQ HOSP IP/OBS HIGH 50: CPT | Mod: ,,, | Performed by: HOSPITALIST

## 2023-05-16 PROCEDURE — D9220A PRA ANESTHESIA: ICD-10-PCS | Mod: CRNA,,, | Performed by: NURSE ANESTHETIST, CERTIFIED REGISTERED

## 2023-05-16 PROCEDURE — 11047 DBRDMT BONE EACH ADDL: CPT | Mod: ,,, | Performed by: SURGERY

## 2023-05-16 PROCEDURE — 87076 CULTURE ANAEROBE IDENT EACH: CPT | Performed by: SURGERY

## 2023-05-16 PROCEDURE — 36000707: Performed by: SURGERY

## 2023-05-16 PROCEDURE — 36000706: Performed by: SURGERY

## 2023-05-16 PROCEDURE — 83735 ASSAY OF MAGNESIUM: CPT | Performed by: PHYSICIAN ASSISTANT

## 2023-05-16 PROCEDURE — 37000008 HC ANESTHESIA 1ST 15 MINUTES: Performed by: SURGERY

## 2023-05-16 PROCEDURE — D9220A PRA ANESTHESIA: Mod: ANES,,, | Performed by: ANESTHESIOLOGY

## 2023-05-16 PROCEDURE — 87070 CULTURE OTHR SPECIMN AEROBIC: CPT | Performed by: SURGERY

## 2023-05-16 PROCEDURE — 87116 MYCOBACTERIA CULTURE: CPT | Mod: 59 | Performed by: SURGERY

## 2023-05-16 PROCEDURE — 87102 FUNGUS ISOLATION CULTURE: CPT | Performed by: SURGERY

## 2023-05-16 PROCEDURE — 85025 COMPLETE CBC W/AUTO DIFF WBC: CPT | Performed by: PHYSICIAN ASSISTANT

## 2023-05-16 PROCEDURE — 37000009 HC ANESTHESIA EA ADD 15 MINS: Performed by: SURGERY

## 2023-05-16 PROCEDURE — 80048 BASIC METABOLIC PNL TOTAL CA: CPT | Performed by: PHYSICIAN ASSISTANT

## 2023-05-16 PROCEDURE — 87206 SMEAR FLUORESCENT/ACID STAI: CPT | Performed by: SURGERY

## 2023-05-16 PROCEDURE — 96372 THER/PROPH/DIAG INJ SC/IM: CPT | Performed by: PHYSICIAN ASSISTANT

## 2023-05-16 PROCEDURE — 87205 SMEAR GRAM STAIN: CPT | Mod: 59 | Performed by: SURGERY

## 2023-05-16 PROCEDURE — 87186 SC STD MICRODIL/AGAR DIL: CPT | Mod: 59 | Performed by: SURGERY

## 2023-05-16 PROCEDURE — 11044 PR DEBRIDEMENT, SKIN, SUB-Q TISSUE,MUSCLE,BONE,=<20 SQ CM: ICD-10-PCS | Mod: ,,, | Performed by: SURGERY

## 2023-05-16 PROCEDURE — 25000003 PHARM REV CODE 250: Performed by: STUDENT IN AN ORGANIZED HEALTH CARE EDUCATION/TRAINING PROGRAM

## 2023-05-16 RX ORDER — SODIUM CHLORIDE 0.9 % (FLUSH) 0.9 %
3 SYRINGE (ML) INJECTION
Status: DISCONTINUED | OUTPATIENT
Start: 2023-05-16 | End: 2023-05-24 | Stop reason: HOSPADM

## 2023-05-16 RX ORDER — MEPERIDINE HYDROCHLORIDE 25 MG/ML
12.5 INJECTION INTRAMUSCULAR; INTRAVENOUS; SUBCUTANEOUS ONCE AS NEEDED
Status: DISCONTINUED | OUTPATIENT
Start: 2023-05-16 | End: 2023-05-16

## 2023-05-16 RX ORDER — LIDOCAINE HYDROCHLORIDE AND EPINEPHRINE 10; 10 MG/ML; UG/ML
INJECTION, SOLUTION INFILTRATION; PERINEURAL
Status: DISCONTINUED | OUTPATIENT
Start: 2023-05-16 | End: 2023-05-16 | Stop reason: HOSPADM

## 2023-05-16 RX ORDER — ONDANSETRON 2 MG/ML
4 INJECTION INTRAMUSCULAR; INTRAVENOUS EVERY 6 HOURS PRN
Status: DISCONTINUED | OUTPATIENT
Start: 2023-05-16 | End: 2023-05-24 | Stop reason: HOSPADM

## 2023-05-16 RX ORDER — OXYCODONE HYDROCHLORIDE 5 MG/1
5 TABLET ORAL EVERY 6 HOURS PRN
Status: DISCONTINUED | OUTPATIENT
Start: 2023-05-16 | End: 2023-05-20

## 2023-05-16 RX ORDER — FENTANYL CITRATE 50 UG/ML
INJECTION, SOLUTION INTRAMUSCULAR; INTRAVENOUS
Status: DISCONTINUED | OUTPATIENT
Start: 2023-05-16 | End: 2023-05-16

## 2023-05-16 RX ORDER — PHENYLEPHRINE HYDROCHLORIDE 10 MG/ML
INJECTION INTRAVENOUS
Status: DISCONTINUED | OUTPATIENT
Start: 2023-05-16 | End: 2023-05-16

## 2023-05-16 RX ORDER — POLYETHYLENE GLYCOL 3350 17 G/17G
17 POWDER, FOR SOLUTION ORAL 2 TIMES DAILY PRN
Status: DISCONTINUED | OUTPATIENT
Start: 2023-05-16 | End: 2023-05-24 | Stop reason: HOSPADM

## 2023-05-16 RX ORDER — HYDROMORPHONE HYDROCHLORIDE 2 MG/ML
0.4 INJECTION, SOLUTION INTRAMUSCULAR; INTRAVENOUS; SUBCUTANEOUS EVERY 5 MIN PRN
Status: DISCONTINUED | OUTPATIENT
Start: 2023-05-16 | End: 2023-05-16

## 2023-05-16 RX ORDER — PROCHLORPERAZINE EDISYLATE 5 MG/ML
5 INJECTION INTRAMUSCULAR; INTRAVENOUS EVERY 30 MIN PRN
Status: DISCONTINUED | OUTPATIENT
Start: 2023-05-16 | End: 2023-05-16

## 2023-05-16 RX ORDER — OXYCODONE HYDROCHLORIDE 5 MG/1
5 TABLET ORAL
Status: DISCONTINUED | OUTPATIENT
Start: 2023-05-16 | End: 2023-05-16

## 2023-05-16 RX ORDER — DIPHENHYDRAMINE HYDROCHLORIDE 50 MG/ML
25 INJECTION INTRAMUSCULAR; INTRAVENOUS EVERY 6 HOURS PRN
Status: DISCONTINUED | OUTPATIENT
Start: 2023-05-16 | End: 2023-05-16

## 2023-05-16 RX ORDER — PROPOFOL 10 MG/ML
VIAL (ML) INTRAVENOUS CONTINUOUS PRN
Status: DISCONTINUED | OUTPATIENT
Start: 2023-05-16 | End: 2023-05-16

## 2023-05-16 RX ORDER — SENNOSIDES 8.6 MG/1
8.6 TABLET ORAL DAILY
Status: DISCONTINUED | OUTPATIENT
Start: 2023-05-17 | End: 2023-05-24 | Stop reason: HOSPADM

## 2023-05-16 RX ADMIN — NIFEDIPINE 30 MG: 30 TABLET, FILM COATED, EXTENDED RELEASE ORAL at 12:05

## 2023-05-16 RX ADMIN — HYDROMORPHONE HYDROCHLORIDE 0.4 MG: 2 INJECTION INTRAMUSCULAR; INTRAVENOUS; SUBCUTANEOUS at 10:05

## 2023-05-16 RX ADMIN — FENTANYL CITRATE 25 MCG: 50 INJECTION, SOLUTION INTRAMUSCULAR; INTRAVENOUS at 09:05

## 2023-05-16 RX ADMIN — ACETAMINOPHEN 650 MG: 325 TABLET, FILM COATED ORAL at 09:05

## 2023-05-16 RX ADMIN — NITROGLYCERIN 0.5 INCH: 20 OINTMENT TOPICAL at 05:05

## 2023-05-16 RX ADMIN — DICLOFENAC 2 G: 10 GEL TOPICAL at 12:05

## 2023-05-16 RX ADMIN — SODIUM CHLORIDE, SODIUM LACTATE, POTASSIUM CHLORIDE, AND CALCIUM CHLORIDE: .6; .31; .03; .02 INJECTION, SOLUTION INTRAVENOUS at 08:05

## 2023-05-16 RX ADMIN — FUROSEMIDE 20 MG: 20 TABLET ORAL at 09:05

## 2023-05-16 RX ADMIN — CEFTRIAXONE 1 G: 1 INJECTION, POWDER, FOR SOLUTION INTRAMUSCULAR; INTRAVENOUS at 11:05

## 2023-05-16 RX ADMIN — HEPARIN SODIUM 5000 UNITS: 5000 INJECTION INTRAVENOUS; SUBCUTANEOUS at 09:05

## 2023-05-16 RX ADMIN — GABAPENTIN 300 MG: 300 CAPSULE ORAL at 09:05

## 2023-05-16 RX ADMIN — Medication 10 ML: at 10:05

## 2023-05-16 RX ADMIN — Medication 10 ML: at 01:05

## 2023-05-16 RX ADMIN — LIDOCAINE 1 PATCH: 50 PATCH CUTANEOUS at 06:05

## 2023-05-16 RX ADMIN — LOSARTAN POTASSIUM 50 MG: 50 TABLET, FILM COATED ORAL at 12:05

## 2023-05-16 RX ADMIN — OXYCODONE HYDROCHLORIDE 5 MG: 5 TABLET ORAL at 11:05

## 2023-05-16 RX ADMIN — BACLOFEN 5 MG: 5 TABLET ORAL at 09:05

## 2023-05-16 RX ADMIN — OXYCODONE HYDROCHLORIDE 5 MG: 5 TABLET ORAL at 10:05

## 2023-05-16 RX ADMIN — HEPARIN SODIUM 5000 UNITS: 5000 INJECTION INTRAVENOUS; SUBCUTANEOUS at 01:05

## 2023-05-16 RX ADMIN — OXYCODONE HYDROCHLORIDE 5 MG: 5 TABLET ORAL at 05:05

## 2023-05-16 RX ADMIN — SPIRONOLACTONE 25 MG: 25 TABLET, FILM COATED ORAL at 12:05

## 2023-05-16 RX ADMIN — BACLOFEN 5 MG: 5 TABLET ORAL at 12:05

## 2023-05-16 RX ADMIN — LOSARTAN POTASSIUM 50 MG: 50 TABLET, FILM COATED ORAL at 09:05

## 2023-05-16 RX ADMIN — PHENYLEPHRINE HYDROCHLORIDE 200 MCG: 10 INJECTION INTRAVENOUS at 09:05

## 2023-05-16 RX ADMIN — ACETAMINOPHEN 650 MG: 325 TABLET, FILM COATED ORAL at 12:05

## 2023-05-16 RX ADMIN — Medication 10 ML: at 05:05

## 2023-05-16 RX ADMIN — CIPROFLOXACIN 1 DROP: 3 SOLUTION OPHTHALMIC at 09:05

## 2023-05-16 RX ADMIN — PROPOFOL 20 MCG/KG/MIN: 10 INJECTION, EMULSION INTRAVENOUS at 09:05

## 2023-05-16 RX ADMIN — FUROSEMIDE 20 MG: 20 TABLET ORAL at 12:05

## 2023-05-16 NOTE — PLAN OF CARE
Ms. Gr is a 72-year-old female with a history of decubitus ulceration, hypertension, gastroesophageal reflux disease, ventriculomegaly (on CT), urinary incontinence, and chronic low back pain admitted [facility] for urinary complaints found to have multiple neurologic complaints.  During workup at outside hospital patient transferred to [facility] for MRI of spine and continued workup.    Patient with reported whole-body pain on exam and numbness and tingling in her upper extremities.  Exam notable for reduced upper extremity strength.    PLAN:  - Ordered MRI cervical, thoracic, and lumbar spine  - Continue ceftriaxone for UTI  - Pain control   - Follow-up wound care consult post sacral debridement  - Follow-up cultures from sacral debridement

## 2023-05-16 NOTE — SUBJECTIVE & OBJECTIVE
Interval History: Reports dysuria resolved.  General surgery evaluated sacral decubitus ulcer plan on surgical debridement today.  NPO pending surgery.  Unable to fit in MRI machine to evaluate lower extremity weakness.  Requested transfer to [facility] [place].    Review of Systems   Constitutional:  Positive for fatigue. Negative for appetite change, chills, diaphoresis and fever.   HENT:  Negative for congestion, ear pain, postnasal drip, rhinorrhea, sinus pressure, sore throat and trouble swallowing.    Eyes:  Negative for photophobia, pain, discharge, redness, itching and visual disturbance.        Left upper lid swelling   Respiratory:  Negative for cough, shortness of breath and wheezing.    Cardiovascular:  Negative for chest pain and palpitations.   Gastrointestinal:  Negative for abdominal distention, abdominal pain, constipation, diarrhea, nausea and vomiting.   Genitourinary:  Negative for decreased urine volume, difficulty urinating, dysuria, flank pain, frequency, hematuria and urgency.        Incontinent of urine   Musculoskeletal:  Positive for neck pain (favors the left-side). Negative for arthralgias, back pain, gait problem, joint swelling, myalgias and neck stiffness.   Skin:  Positive for wound (sacral wound). Negative for color change, pallor and rash.   Neurological:  Positive for weakness (generalized). Negative for dizziness, syncope, light-headedness and headaches.   Psychiatric/Behavioral:  Negative for agitation, behavioral problems and confusion.      Objective:     Vital Signs (Most Recent):  Temp: 97.8 °F (36.6 °C) (05/16/23 0700)  Pulse: 86 (05/16/23 0700)  Resp: 17 (05/16/23 0700)  BP: 138/67 (05/16/23 0700)  SpO2: 97 % (05/16/23 0820) Vital Signs (24h Range):  Temp:  [97.7 °F (36.5 °C)-99 °F (37.2 °C)] 97.8 °F (36.6 °C)  Pulse:  [] 86  Resp:  [17-18] 17  SpO2:  [96 %-98 %] 97 %  BP: (133-177)/(64-81) 138/67     Weight: 91.5 kg (201 lb 11.5 oz)  Body mass index is 31.59  kg/m².    Intake/Output Summary (Last 24 hours) at 5/16/2023 0832  Last data filed at 5/16/2023 0706  Gross per 24 hour   Intake 168.94 ml   Output 1300 ml   Net -1131.06 ml         Physical Exam  Vitals and nursing note reviewed.   Constitutional:       General: She is not in acute distress.     Appearance: She is well-developed. She is obese. She is ill-appearing (chronically). She is not toxic-appearing.   HENT:      Head: Normocephalic and atraumatic.      Right Ear: External ear normal.      Left Ear: External ear normal.      Nose: Nose normal.      Mouth/Throat:      Mouth: Mucous membranes are moist.      Pharynx: Oropharynx is clear.   Eyes:      General: No scleral icterus.        Right eye: No discharge.         Left eye: No discharge or hordeolum.      Conjunctiva/sclera:      Right eye: Right conjunctiva is not injected. No chemosis, exudate or hemorrhage.     Left eye: Left conjunctiva is not injected. No chemosis, exudate or hemorrhage.     Pupils: Pupils are equal, round, and reactive to light.      Comments: Left eyelid edema with no visible discharge from eye.  Swelling much improved today per  at bedside.   Neck:      Trachea: No tracheal deviation.      Comments: Head tilted to left side  Cardiovascular:      Rate and Rhythm: Normal rate and regular rhythm.      Pulses: Normal pulses.      Heart sounds: Normal heart sounds. No murmur heard.    No gallop.   Pulmonary:      Effort: Pulmonary effort is normal. No respiratory distress.      Breath sounds: Normal breath sounds. No stridor. No wheezing or rales.   Abdominal:      General: Bowel sounds are normal. There is no distension.      Palpations: Abdomen is soft. There is no mass.      Tenderness: There is no abdominal tenderness. There is no guarding.   Musculoskeletal:      Cervical back: Neck supple.      Right lower leg: Edema present.      Left lower leg: Edema present.      Comments: Bilateral LE edema with chronic skin changes - no  erythema   Skin:     General: Skin is warm and dry.      Coloration: Skin is not pale.      Findings: No erythema or rash.      Comments: Stage 3 pressure wound with drainage   Neurological:      Mental Status: She is alert and oriented to person, place, and time.      Motor: Weakness present. No abnormal muscle tone.      Gait: Gait abnormal.      Comments: Significant bilateral lower extremity weakness   Psychiatric:         Mood and Affect: Mood normal.         Behavior: Behavior normal.           Significant Labs: All pertinent labs within the past 24 hours have been reviewed.    Significant Imaging: I have reviewed all pertinent imaging results/findings within the past 24 hours.

## 2023-05-16 NOTE — INTERVAL H&P NOTE
The patient has been examined and the H&P has been reviewed:    I concur with the findings and no changes have occurred since H&P was written.    Surgery risks, benefits and alternative options discussed and understood by patient/family.          Active Hospital Problems    Diagnosis  POA    *Urinary tract infection without hematuria [N39.0]  Yes    Neck pain on right side [M54.2]  Yes    Swelling of left eyelid [H02.846]  Yes    Urinary incontinence without sensory awareness [N39.42]  Yes    History of progressive weakness [Z87.898]  Not Applicable    Pressure injury of buttock, stage 3 [L89.303]  Yes    Obesity (BMI 30.0-34.9) [E66.9]  Yes    Essential hypertension [I10]  Yes     1993          Resolved Hospital Problems   No resolved problems to display.

## 2023-05-16 NOTE — PROVIDER TRANSFER
(Physician in Lead of Transfers)  Outside Transfer Acceptance Note / Regional Referral Center    Upon patient arrival to floor, please send SecureChat to [facility] or call extension [phone] (if no answer, do NOT leave a callback number after the beep, rather please send a SecureChat to [facility]), for Hospital Medicine admit team assignment and for additional admit orders for the patient.  Do not page the attending physician associated with the patient on arrival (this physician may not be on duty at the time of arrival).  Rather, always send a SecureChat to [facility] or call [phone] to reach the triage physician for orders and team assignment.    Referring facility: [facility] LOCATION ([facility])  Referring provider: DULCE BEJARANO  Accepting facility: [facility]  Accepting provider: SCOOBY RODRIGUEZ  Reason for transfer:  Imaging, Spine evaluation  Transfer diagnosis: Weakness, decubitus, UTI  Transfer specialty requested: Orthopedic Spine  Transfer specialty notified: Yes  Transfer level: NUMBER 1-5: 2  Bed type requested: stepdown  Isolation status: No active isolations       Narrative     72-year-old female with a history of decubitus ulceration, hypertension, gastroesophageal reflux disease, ventriculomegaly (on CT), urinary incontinence, and chronic low back pain admitted to [facility] on May 13 with urinary tract infection/dysuria.  She was also noted to have swelling of her left eyelid, neck pain, urinary incontinence, buttock pressure injury, and debility.  She was treated with antibiotics and wound care. She was seen by General Surgery for her buttock wound, and it was felt that she needed surgical debridement in the OR.  Operative note reported 10 x 8 x 8 cm sacral wound with necrotic tissue debrided as well as exposed coccyx.      She had Neurology evaluation during her hospitalization where it was noted she is been bed-bound for an extended period with urinary  incontinence for the past few months.  She was known to have cervical and lumbar spine pathology on previous evaluation in 2021.  Recommendation was for repeat imaging of the cervical, thoracic, and lumbar spine.    Due to her body positioning/habitus they are unable to perform MRI imaging at [facility].  At present, it is uncertain if her debility is related to spinal issues, NPH, or some other etiology.  Referring team spoke with Orthopedic Spine service at [facility].  Uncertain at present if patient will be a candidate for procedural interventions, but will plan transfer to Hospital Medicine at [facility] for further evaluation.  She will need ongoing treatment of her sacral wound and further evaluation treatment of the bone involvement in her sacral wound.  With the sacral wound operative findings, referring team is determining antibiotic adjustments.    May 16: Sodium 139, potassium 3.6, chloride 104, CO2 27, BUN 8, creatinine 0.6 glucose 121, magnesium 1.7, white blood cells 14.56, hemoglobin 11.5, hematocrit 34.2, platelets 358    May 15: CT head had no acute intracranial abnormality and no detrimental change from prior.  Stable ventriculomegaly out of proportion to the degree of sulcal enhancement.    May 14: Urine culture with Gram-negative rods, COVID negative, influenza negative    May 13: Troponin negative, BNP less than 10, lipase 6    Objective     Vitals: Temp: 97.2 °F (36.2 °C) (05/16/23 1025)  Pulse: 94 (05/16/23 1040)  Resp: 15 (05/16/23 1035)  BP: 132/60 (05/16/23 1035)  SpO2: 98 % (05/16/23 1040)  Recent Labs: CBC:   Recent Labs   Lab 05/15/23  0357 05/16/23  0422   WBC 14.14* 14.56*   HGB 11.5* 11.5*   HCT 34.1* 34.2*    358     CMP:   Recent Labs   Lab 05/15/23  0357 05/16/23  0422    139   K 3.4* 3.6    104   CO2 23 27   * 121*   BUN 7* 8   CREATININE 0.7 0.6   CALCIUM 9.4 9.3   ANIONGAP 12 8         Instructions    Admit to Hospital  Medicine  Consult Orthopedic Spine Service  Consult Neurology      FERNANDO Arreola MD  [facility] Medicine Staff  Cell: [phone]

## 2023-05-16 NOTE — NURSING TRANSFER
Nursing Transfer Note      5/16/2023     Reason patient is being transferred: post op    Transfer To: room 350    Transfer via bed    Transfer with     Transported by transport    Telemetry: Rhythm nsr    Medicines sent: no    Any special needs or follow-up needed: no    Chart send with patient: Yes    Notified: cass    Patient reassessed at:  (date, time)  1  Upon arrival to floor:

## 2023-05-16 NOTE — PROGRESS NOTES
"[facility], operated by [facility] Surg [address] Medicine  Progress Note    Patient Name: Trista Gr  MRN: [id number]  Admission Date: 5/13/2023  Length of Stay: 0 days  Attending Physician: Matthieu Ivan MD  Primary Care Provider: REGINO Yeung MD        Subjective:     Principal Problem:History of progressive weakness        HPI:  Per Kathleen Ro, PA-C:    "Ms. Trista Gr is a 72 y.o. female, with PMH of HTN, chronic low back pain, normal pressure hydrocephalus, urinary incontinence, who presented to [facility] ED on 05/13/2023 due to burning with urination.  She states she was diagnosed with the UTI 3 days ago, and went to see her PCP who prescribed her Bactrim.  She states her symptoms have not improved while on Bactrim.  She notes associated dizziness, lightheadedness, weakness, loss of appetite.  She denied fever, chills, nausea, vomiting.  She states that the pressure injury on her buttocks has been having increased pain in the past few days as well.  She was evaluated in the ED with labs that showed leukocytosis of 16 K, and left shift.  Metabolic panel was overall fairly normal.  A UA showed nitrate positive UTI with 11 wbc's, moderate bacteria, and trace leukocytes.  Urine cultures pending.  She was treated in the ED with Rocephin."      Overview/Hospital Course:  Patient is 72-year-old woman with past medical history of hypertension, chronic lower back pain > 10 years with degenerative joint disease of the spine with cervical and lumbar spinal stenosis with progressive gait instability who has been bed-bound for last 2 years with stage III sacral decubitus ulcer, urinary incontinence who presented with recent urinary tract infection which did not improve with treatment with oral trimethoprim/sulfamethoxazole.    Prior urine culture collected on 11/7/2022 positive for E coli resistant to trimethoprim/sulfamethoxazole concerning for antibtioic resistance now.  Patient treated with " intravenous ceftriaxone with resolution of dysuria.  Urine culture pending.  Patient also with persistently elevated leukocytosis despite antibiotic treatment.  Patient also with chronic decubitus ulcer which stage III and possible stage IV now.  General surgery consult recommended surgical debridement.    In terms of progressive debility, patient has been seen by Dr. Sravan Josue in June 2021.  Imaging at that time multilevel cervical and lumbar stenosis.  Patient deemded a poor surgical canddiated due to debility at that time.      Neurology consulted on this admission due to concern for possible normal pressure hydrocephalus.  Dr. Ildefonso Navarro Nadimpally suspect debility most likely due to progressive cervical and lumbar spinal stenosis with myelopathy and not normal pressure hydrocephalus.  Recommended repeat spinal imaging including MRI cervical, thoracic and lumbar spine without contrast, along with ongoing thearpy efforts, the addition of gabapentin for pain, and follow-up with spine specialist.  Unable to obtain MRI due to body size.    Requesting transfer to [facility] in order for patient to undergo neuroimaging recommended along with more definitive input by Orthopedic spine surgery/Neurosurgery regarding lower extremity weakness.      Interval History: Reports dysuria resolved.  General surgery evaluated sacral decubitus ulcer plan on surgical debridement today.  NPO pending surgery.  Unable to fit in MRI machine to evaluate lower extremity weakness.  Requested transfer to [facility].    Review of Systems   Constitutional:  Positive for fatigue. Negative for appetite change, chills, diaphoresis and fever.   HENT:  Negative for congestion, ear pain, postnasal drip, rhinorrhea, sinus pressure, sore throat and trouble swallowing.    Eyes:  Negative for photophobia, pain, discharge, redness, itching and visual disturbance.        Left upper lid swelling   Respiratory:  Negative for cough,  shortness of breath and wheezing.    Cardiovascular:  Negative for chest pain and palpitations.   Gastrointestinal:  Negative for abdominal distention, abdominal pain, constipation, diarrhea, nausea and vomiting.   Genitourinary:  Negative for decreased urine volume, difficulty urinating, dysuria, flank pain, frequency, hematuria and urgency.        Incontinent of urine   Musculoskeletal:  Positive for neck pain (favors the left-side). Negative for arthralgias, back pain, gait problem, joint swelling, myalgias and neck stiffness.   Skin:  Positive for wound (sacral wound). Negative for color change, pallor and rash.   Neurological:  Positive for weakness (generalized). Negative for dizziness, syncope, light-headedness and headaches.   Psychiatric/Behavioral:  Negative for agitation, behavioral problems and confusion.      Objective:     Vital Signs (Most Recent):  Temp: 97.8 °F (36.6 °C) (05/16/23 0700)  Pulse: 86 (05/16/23 0700)  Resp: 17 (05/16/23 0700)  BP: 138/67 (05/16/23 0700)  SpO2: 97 % (05/16/23 0820) Vital Signs (24h Range):  Temp:  [97.7 °F (36.5 °C)-99 °F (37.2 °C)] 97.8 °F (36.6 °C)  Pulse:  [] 86  Resp:  [17-18] 17  SpO2:  [96 %-98 %] 97 %  BP: (133-177)/(64-81) 138/67     Weight: 91.5 kg (201 lb 11.5 oz)  Body mass index is 31.59 kg/m².    Intake/Output Summary (Last 24 hours) at 5/16/2023 0832  Last data filed at 5/16/2023 0706  Gross per 24 hour   Intake 168.94 ml   Output 1300 ml   Net -1131.06 ml         Physical Exam  Vitals and nursing note reviewed.   Constitutional:       General: She is not in acute distress.     Appearance: She is well-developed. She is obese. She is ill-appearing (chronically). She is not toxic-appearing.   HENT:      Head: Normocephalic and atraumatic.      Right Ear: External ear normal.      Left Ear: External ear normal.      Nose: Nose normal.      Mouth/Throat:      Mouth: Mucous membranes are moist.      Pharynx: Oropharynx is clear.   Eyes:      General: No  scleral icterus.        Right eye: No discharge.         Left eye: No discharge or hordeolum.      Conjunctiva/sclera:      Right eye: Right conjunctiva is not injected. No chemosis, exudate or hemorrhage.     Left eye: Left conjunctiva is not injected. No chemosis, exudate or hemorrhage.     Pupils: Pupils are equal, round, and reactive to light.      Comments: Left eyelid edema with no visible discharge from eye.  Swelling much improved today per  at bedside.   Neck:      Trachea: No tracheal deviation.      Comments: Head tilted to left side  Cardiovascular:      Rate and Rhythm: Normal rate and regular rhythm.      Pulses: Normal pulses.      Heart sounds: Normal heart sounds. No murmur heard.    No gallop.   Pulmonary:      Effort: Pulmonary effort is normal. No respiratory distress.      Breath sounds: Normal breath sounds. No stridor. No wheezing or rales.   Abdominal:      General: Bowel sounds are normal. There is no distension.      Palpations: Abdomen is soft. There is no mass.      Tenderness: There is no abdominal tenderness. There is no guarding.   Musculoskeletal:      Cervical back: Neck supple.      Right lower leg: Edema present.      Left lower leg: Edema present.      Comments: Bilateral LE edema with chronic skin changes - no erythema   Skin:     General: Skin is warm and dry.      Coloration: Skin is not pale.      Findings: No erythema or rash.      Comments: Stage 3 pressure wound with drainage   Neurological:      Mental Status: She is alert and oriented to person, place, and time.      Motor: Weakness present. No abnormal muscle tone.      Gait: Gait abnormal.      Comments: Significant bilateral lower extremity weakness   Psychiatric:         Mood and Affect: Mood normal.         Behavior: Behavior normal.           Significant Labs: All pertinent labs within the past 24 hours have been reviewed.    Significant Imaging: I have reviewed all pertinent imaging results/findings within  the past 24 hours.    Assessment/Plan:      * Lower extremity weakness  Patient with lower extremity weakness with progressive debility and has been bed-bound for 2 years now.  Patient reports some improvement with physical therapy and skilled nurse facility however since discharge from skilled nursing facility patient has been bed-bound and nonambulatory.  Etiology of lower extremity weakness due to suspected spinal stenosis.  Imaging also concerning for possible normal pressure hydrocephalus.    Neurology consulted on this admission due to concern for possible normal pressure hydrocephalus.  Dr. Ildefonso Navarro Nadimpally suspect debility most likely due to progressive cervical and lumbar spinal stenosis with myelopathy and not normal pressure hydrocephalus.  Recommended repeat spinal imaging including MRI cervical, thoracic and lumbar spine without contrast, along with ongogni thearpy efforts, the addition of gabapentin for pain, and follow-up with spine specialist.  Unable to obtain MRI due to body size.    Requesting transfer to [facility] in order for patient to undergo neuroimaging recommended along with more definitive input by Orthopedic spine surgery/Neurosurgery regarding lower extremity weakness.    Continue with therapy.    Neck pain on right side  States this is chronic and seems positional as patient is leaning head to the left - there is palpable tightening/stiffness of the right side of the neck.  X-ray of C-spine unremarkable.  Continue Lidocaine patch .  Warm compress PRN.  Gabapentin 300mg qhs.  Follow-up on spinal imaging results once complete.    Leukocytosis  Persistent elevated white count despite treatment of urinary tract infection.  Concern for possible leukocytosis secondary to also worsening sacral decubitus ulcer.  Continue ceftriaxone to treat urinary tract infection and follow-up on surgical examination and debridement.    Pressure injury of buttock, stage 3  Chronic stage III  decubitus ulcers secondary to immobility despite with wound care efforts at home with home health.  Probe deep to bone concerning for possible stage IV.  Plan for surgery today to evaluate further and to undergo surgical debridement.    Essential hypertension  Reasonably controlled with current regimen.  Will continue with current regimen and continue to monitor.    Obesity (BMI 30.0-34.9)  BMI 32.70 on admission - patient would benefit for weight reduction.    Swelling of left eyelid  States this is the 2nd time this has occurred and she required consultation with an eyelid specialist in the past - no specific treatment provided during prior episode, advised on watchful waiting in the past.  Has been ongoing for the past month.  Improving.  Continue with QID warm compresses followed by gentle cleansing with baby soap.    Urinary incontinence without sensory awareness  Continue with Pure wick of now.    Urinary tract infection without hematuria  Failed improvement with oral Bactrim.  Dysuria improving with intravenous ceftriaxone.  Concern for resistance based on prior urine culture results.  Follow-up urine culture results.          VTE Risk Mitigation (From admission, onward)           Ordered     heparin (porcine) injection 5,000 Units  Every 8 hours         05/14/23 0210     IP VTE HIGH RISK PATIENT  Once         05/14/23 0210     Place sequential compression device  Until discontinued         05/14/23 0210                    Matthieu Ivan MD  Department of Hospital Medicine   [facility] - Med Surg ([address])

## 2023-05-16 NOTE — ASSESSMENT & PLAN NOTE
Persistent elevated white count despite treatment of urinary tract infection.  Concern for possible leukocytosis secondary to also worsening sacral decubitus ulcer.  Continue ceftriaxone to treat urinary tract infection and follow-up on surgical examination and debridement.

## 2023-05-16 NOTE — PROGRESS NOTES
[facility] Surg ([address])  Wound Care    Patient Name:  Trista Gr   MRN:  [id number]  Date: 5/16/2023  Diagnosis: Lower extremity weakness    History:     Past Medical History:   Diagnosis Date    Debility     Decubitus ulcer of buttock     GERD (gastroesophageal reflux disease) 09/22/2012    HTN (hypertension) 09/22/2012    Normal stress echocardiogram 09/22/2012    Vitamin D deficiency disease 09/22/2012       Social History     Socioeconomic History    Marital status:     Number of children: 0   Occupational History    Occupation: Human      Employer: [employer]     Comment: [employer]   Tobacco Use    Smoking status: Never    Smokeless tobacco: Never   Substance and Sexual Activity    Alcohol use: No     Alcohol/week: 0.0 standard drinks    Drug use: No    Sexual activity: Not Currently   Social History Narrative    Uses cane sometime    2020 - Walks with a walker     Social Determinants of Health     Financial Resource Strain: Low Risk     Difficulty of Paying Living Expenses: Not hard at all   Food Insecurity: No Food Insecurity    Worried About Running Out of Food in the Last Year: Never true    Ran Out of Food in the Last Year: Never true   Transportation Needs: No Transportation Needs    Lack of Transportation (Medical): No    Lack of Transportation (Non-Medical): No   Physical Activity: Inactive    Days of Exercise per Week: 0 days    Minutes of Exercise per Session: 0 min   Stress: Stress Concern Present    Feeling of Stress : To some extent   Social Connections: Moderately Isolated    Frequency of Communication with Friends and Family: More than three times a week    Frequency of Social Gatherings with Friends and Family: Never    Attends [employer] Services: Never    Active Member of Clubs or Organizations: No    Attends Club or Organization Meetings: Never    Marital Status:    Housing Stability: Low Risk     Unable to Pay for Housing in the Last  Year: No    Number of Places Lived in the Last Year: 1    Unstable Housing in the Last Year: No       Precautions:     Allergies as of 05/13/2023 - Reviewed 05/13/2023   Allergen Reaction Noted    Aspirin  09/22/2012    Codeine Itching 02/25/2013    Pcn [penicillins] Itching 09/22/2012       WO Assessment Details/Treatment     Wound care consult received for assessment of sacral wound. Patient was assessed by wound care on yesterday. Patient went to OR today with general surgery for surgical debridement of sacral wound and just returned to room. Not appropriate for wound care to remove dressing placed by surgeon post-op. Will follow up with pt prn.     05/16/2023

## 2023-05-16 NOTE — NURSING
Pt transported to surgery. NPO status. Gave report, mentioned wedding ring stuck on finger and physically unable to take off. Held medications until after surgery.

## 2023-05-16 NOTE — NURSING
Patient AOX4, VSS on RA transferred to [facility] via stretcher from [facility]. Report given earlier. Patient skin assessed, linen changed and PRN medication given per order. Bed locked/ lowered. Call light/ bedside table within reach.

## 2023-05-16 NOTE — OP NOTE
[facility] Surg ([address])  General Surgery  Operative Note    SUMMARY     Date of Procedure: 5/16/2023     Procedure: Procedure(s) (LRB):  DEBRIDEMENT, WOUND, SACRUM (N/A)       Surgeon(s) and Role:     * Barney Edward MD - Primary    Assisting Surgeon: None    Pre-Operative Diagnosis: Pressure injury of buttock, stage 3, unspecified laterality [L89.303]    Post-Operative Diagnosis:  Unstageable sacral wound involving the coccyx    Anesthesia: Choice    Operative Findings (including complications, if any):  10 by 8 x 8 cm sacral wound with some necrotic tissue debrided as well as exposed coccyx    Description of Technical Procedures:  After consent was obtained patient was brought to the operating room and placed in the lateral position.    Local anesthetic with 1% lidocaine with epinephrine was then used to inject the entire area.    A 10 x 8 x 8 cm sacral pressure wound went all the way down to the coccyx.    All necrotic tissue was excisionally debrided circumferentially and sent for specimen culture until viable tissue was seen and a bone biopsy was performed on the coccyx.    Copious irrigation was used   Hemostasis was achieved.    Packing was then used with a Kerlix.        Estimated Blood Loss (EBL): * No values recorded between 5/16/2023  9:38 AM and 5/16/2023  9:58 AM *           Implants: * No implants in log *    Specimens:   Wound culture   Bone biopsy              Condition: Stable    Disposition: PACU - hemodynamically stable.    Attestation: I performed the procedure.

## 2023-05-16 NOTE — NURSING
""Nurses Note -- 4 Eyes      5/16/2023   5:20 PM      Skin assessed during: Transfer      [] No Altered Skin Integrity Present    []Prevention Measures Documented      [x] Yes- Altered Skin Integrity Present or Discovered   [x] LDA Added if Not in Epic (Describe Wound)   [] New Altered Skin Integrity was Present on Admit and Documented in LDA   [] Wound Image Taken    Wound Care Consulted? Yes    Attending Nurse:  Irina Borrero RN     Second RN/Staff Member:  TREMAINE Murphy     "

## 2023-05-16 NOTE — ANESTHESIA POSTPROCEDURE EVALUATION
Anesthesia Post Evaluation    Patient: Trista Gr    Procedure(s) Performed: Procedure(s) (LRB):  DEBRIDEMENT, WOUND, SACRUM (N/A)    Final Anesthesia Type: general      Patient location during evaluation: PACU  Patient participation: Yes- Able to Participate  Level of consciousness: awake and alert  Post-procedure vital signs: reviewed and stable  Pain management: adequate  Airway patency: patent    PONV status at discharge: No PONV  Anesthetic complications: no      Cardiovascular status: blood pressure returned to baseline  Respiratory status: unassisted and spontaneous ventilation  Hydration status: euvolemic  Follow-up not needed.          Vitals Value Taken Time   /59 05/16/23 1040   Temp 36.2 °C (97.2 °F) 05/16/23 1025   Pulse 94 05/16/23 1040   Resp 15 05/16/23 1035   SpO2 97 % 05/16/23 1040   Vitals shown include unvalidated device data.      No case tracking events are documented in the log.      Pain/Stephanie Score: Pain Rating Prior to Med Admin: 6 (5/16/2023 10:15 AM)  Pain Rating Post Med Admin: 4 (5/16/2023 10:28 AM)  Stephanie Score: 10 (5/16/2023 10:28 AM)

## 2023-05-16 NOTE — CARE UPDATE
Re: MRI C/T/L not performed     Was informed by nurse that MRI was not performed as patient did not fit into machine. Morning team to note.

## 2023-05-16 NOTE — PLAN OF CARE
AAOx4. VSS held morning meds for surgery. Debridement of sacral pressure wound. Packed with gauze and island dressing. Tolerated medications well, barely ate throughout shift. Family was at bedside. Transported to [facility] for MRI and spinal follow ups. IV 20G Left AC in place. One small smear BM. Changed [place] twice. [facility] transport  around 1530. Called and gave report to nurse to room 76044. Called sister and mother in law about pt being transported. Attempted to call  twice with voice mails. Bed alarm set, no falls/injuries. Safety maintained throughout shift. Transport complete.   Problem: Infection  Goal: Absence of Infection Signs and Symptoms  Outcome: Met     Problem: Adult Inpatient Plan of Care  Goal: Plan of Care Review  Outcome: Met  Goal: Patient-Specific Goal (Individualized)  Outcome: Met  Goal: Absence of Hospital-Acquired Illness or Injury  Outcome: Met  Goal: Optimal Comfort and Wellbeing  Outcome: Met  Goal: Readiness for Transition of Care  Outcome: Met     Problem: Skin Injury Risk Increased  Goal: Skin Health and Integrity  Outcome: Met     Problem: Impaired Wound Healing  Goal: Optimal Wound Healing  Outcome: Met     Problem: Fall Injury Risk  Goal: Absence of Fall and Fall-Related Injury  Outcome: Met     Problem: Pain Acute  Goal: Acceptable Pain Control and Functional Ability  Outcome: Met

## 2023-05-16 NOTE — ASSESSMENT & PLAN NOTE
States this is chronic and seems positional as patient is leaning head to the left - there is palpable tightening/stiffness of the right side of the neck.  X-ray of C-spine unremarkable.  Continue Lidocaine patch .  Warm compress PRN.  Gabapentin 300mg qhs.  Follow-up on spinal imaging results once complete.

## 2023-05-16 NOTE — ASSESSMENT & PLAN NOTE
States this is the 2nd time this has occurred and she required consultation with an eyelid specialist in the past - no specific treatment provided during prior episode, advised on watchful waiting in the past.  Has been ongoing for the past month.  Improving.  Continue with QID warm compresses followed by gentle cleansing with baby soap.

## 2023-05-16 NOTE — UM SECONDARY REVIEW
Physician Advisor External    Level of Care Issue    Approved Observation      [facility]    [facility] Medical Necessity Status Recommendation    Patient Name: Trista Gr Patient Account #: [id number]  YOB: 1950  Attending Physician: Joselito Gonzalez MRN: [id number]  Admit Date: 05/14/2023 Date Received: 05/15/2023  The Order for Review: Not Validated - Observation [employer] Physician Advisor: Lyn Stone  Date/Time Completed: 05/15/2023 08:44 PM CT  This recommendation is based upon the clinical information reviewed by [employer] [facility] [employer] as of the date of this recommendation.    Recommendation Summary    ? 05/14/2023 : Observation  ? 05/15/2023 : Observation    Recommendation for 05/14/2023    Supporting Clinical Factors:  ? UTI    Recommendation for 05/15/2023    Supporting Clinical Factors:  ? UTI  Rationale:  The concerns of the physician are for UTI without hematuria, swelling of the left eyelid, rightsided neck pain, and debility. Observation services are appropriate for this patient due to the need for further evaluation of burning and pain with urination associated with UTI necessitating ceftriaxone therapy in the setting of swelling of the left eyelid and right-sided neck pain with no stated concern for cellulitis, no indication for continuous infusion of narcotics, and no hemodynamic instability.     The plan of care includes urine culture, ceftriaxone, Ophthalmology consult, imaging, Norflex, fall precautions, Physical/Occupational Therapy, and close monitoring.    References:  1. Corin CA, Savage DELGADILLO. Selected Urologic Disorders. In: Atkins's Emergency Medicine:Concepts and Clinical Practice. 9th ed. [place]: Elsevier; 2018:6877-1926.e2. [email or web address].[employer].com//#!/content/book/3-[email or web address]-  [email or web address]. Accessed March 24, 2020.  2. Nikki Rodriguez. The Emergency Department Diagnosis and Management  of Urinary Tract Infection. Emerg Med Clin [place]. 2018;36(4):685-710.  doi:10.1016/j.emc.2018.06.003

## 2023-05-16 NOTE — ANESTHESIA PREPROCEDURE EVALUATION
05/16/2023  Trista Gr is a 72 y.o., female.      Pre-op Assessment    I have reviewed the Patient Summary Reports.     I have reviewed the Nursing Notes.    I have reviewed the Medications.     Review of Systems  Anesthesia Hx:  Denies Family Hx of Anesthesia complications.   Denies Personal Hx of Anesthesia complications.   Social:  Non-Smoker    Hematology/Oncology:  Hematology Normal   Oncology Normal     EENT/Dental:EENT/Dental Normal   Cardiovascular:   Hypertension    Pulmonary:  Pulmonary Normal    Renal/:  Renal/ Normal     Hepatic/GI:   GERD    Musculoskeletal:   Arthritis     Neurological:  Neurology Normal    Endocrine:  Endocrine Normal    Dermatological:  Skin Normal    Psych:  Psychiatric Normal           Physical Exam  General: Well nourished, Cooperative, Alert and Oriented    Airway:  Mallampati: III   Mouth Opening: Normal  TM Distance: Normal  Tongue: Normal  Neck ROM: Extension Decreased    Dental:  Intact        Anesthesia Plan  Type of Anesthesia, risks & benefits discussed:    Anesthesia Type: Gen ETT  Intra-op Monitoring Plan: Standard ASA Monitors  Post Op Pain Control Plan: multimodal analgesia  Induction:  IV  Airway Plan: Video  Informed Consent: Informed consent signed with the Patient and all parties understand the risks and agree with anesthesia plan.  All questions answered.   ASA Score: 2  Anesthesia Plan Notes: Pt reports swelling on left eye is not from an injury.  Discoloration is a birthmark.    Labs ok    Ready For Surgery From Anesthesia Perspective.     .

## 2023-05-16 NOTE — TRANSFER OF CARE
"Anesthesia Transfer of Care Note    Patient: Trista Gr    Procedure(s) Performed: Procedure(s) (LRB):  DEBRIDEMENT, WOUND, SACRUM (N/A)    Patient location: PACU    Anesthesia Type: MAC    Transport from OR: Transported from OR on room air with adequate spontaneous ventilation    Post pain: adequate analgesia    Post assessment: no apparent anesthetic complications and tolerated procedure well    Post vital signs: stable    Level of consciousness: awake, alert and oriented    Nausea/Vomiting: no nausea/vomiting    Complications: none    Transfer of care protocol was followed      Last vitals:   Visit Vitals  /67 (BP Location: Right arm, Patient Position: Lying)   Pulse 86   Temp 36.6 °C (97.8 °F) (Oral)   Resp 17   Ht 5' 7" (1.702 m)   Wt 91.5 kg (201 lb 11.5 oz)   SpO2 97%   BMI 31.59 kg/m²     "

## 2023-05-16 NOTE — PLAN OF CARE
Problem: Adult Inpatient Plan of Care  Goal: Plan of Care Review  Outcome: Ongoing, Progressing  Goal: Absence of Hospital-Acquired Illness or Injury  Outcome: Ongoing, Progressing  Goal: Optimal Comfort and Wellbeing  Outcome: Ongoing, Progressing     Problem: Skin Injury Risk Increased  Goal: Skin Health and Integrity  Outcome: Ongoing, Progressing     Problem: Impaired Wound Healing  Goal: Optimal Wound Healing  Outcome: Ongoing, Progressing     Problem: Fall Injury Risk  Goal: Absence of Fall and Fall-Related Injury  Outcome: Ongoing, Progressing     Problem: Pain Acute  Goal: Acceptable Pain Control and Functional Ability  Outcome: Ongoing, Progressing     Problem: Infection  Goal: Absence of Infection Signs and Symptoms  Outcome: Ongoing, Progressing   POC reviewed with pt who verbalized understanding. AAOx4. VSS on RA. Remains free of falls and injury. Tele monitored. IV ABX given per MAR. Wound care completed. NPO since 0000. Refusing wedge. Call light in reach and rounds made for safety.  at bedside. Will continue to monitor.

## 2023-05-17 PROBLEM — M46.28 OSTEOMYELITIS OF COCCYX: Status: ACTIVE | Noted: 2023-05-17

## 2023-05-17 LAB
ALBUMIN SERPL BCP-MCNC: 2.8 G/DL (ref 3.5–5.2)
ALP SERPL-CCNC: 101 U/L (ref 55–135)
ALT SERPL W/O P-5'-P-CCNC: 31 U/L (ref 10–44)
ANION GAP SERPL CALC-SCNC: 12 MMOL/L (ref 8–16)
AST SERPL-CCNC: 22 U/L (ref 10–40)
BASOPHILS # BLD AUTO: 0.07 K/UL (ref 0–0.2)
BASOPHILS NFR BLD: 0.5 % (ref 0–1.9)
BILIRUB SERPL-MCNC: 0.4 MG/DL (ref 0.1–1)
BUN SERPL-MCNC: 9 MG/DL (ref 8–23)
CALCIUM SERPL-MCNC: 9.4 MG/DL (ref 8.7–10.5)
CHLORIDE SERPL-SCNC: 103 MMOL/L (ref 95–110)
CO2 SERPL-SCNC: 25 MMOL/L (ref 23–29)
CREAT SERPL-MCNC: 0.6 MG/DL (ref 0.5–1.4)
DIFFERENTIAL METHOD: ABNORMAL
EOSINOPHIL # BLD AUTO: 0.3 K/UL (ref 0–0.5)
EOSINOPHIL NFR BLD: 2.1 % (ref 0–8)
ERYTHROCYTE [DISTWIDTH] IN BLOOD BY AUTOMATED COUNT: 13.2 % (ref 11.5–14.5)
EST. GFR  (NO RACE VARIABLE): >60 ML/MIN/1.73 M^2
GLUCOSE SERPL-MCNC: 103 MG/DL (ref 70–110)
HCT VFR BLD AUTO: 35.1 % (ref 37–48.5)
HGB BLD-MCNC: 11.3 G/DL (ref 12–16)
IMM GRANULOCYTES # BLD AUTO: 0.22 K/UL (ref 0–0.04)
IMM GRANULOCYTES NFR BLD AUTO: 1.4 % (ref 0–0.5)
LYMPHOCYTES # BLD AUTO: 3.2 K/UL (ref 1–4.8)
LYMPHOCYTES NFR BLD: 21.1 % (ref 18–48)
MAGNESIUM SERPL-MCNC: 1.6 MG/DL (ref 1.6–2.6)
MCH RBC QN AUTO: 26.5 PG (ref 27–31)
MCHC RBC AUTO-ENTMCNC: 32.2 G/DL (ref 32–36)
MCV RBC AUTO: 82 FL (ref 82–98)
MONOCYTES # BLD AUTO: 1.7 K/UL (ref 0.3–1)
MONOCYTES NFR BLD: 11.3 % (ref 4–15)
NEUTROPHILS # BLD AUTO: 9.7 K/UL (ref 1.8–7.7)
NEUTROPHILS NFR BLD: 63.6 % (ref 38–73)
NRBC BLD-RTO: 0 /100 WBC
PHOSPHATE SERPL-MCNC: 2.7 MG/DL (ref 2.7–4.5)
PLATELET # BLD AUTO: 342 K/UL (ref 150–450)
PMV BLD AUTO: 10.2 FL (ref 9.2–12.9)
POCT GLUCOSE: 112 MG/DL (ref 70–110)
POCT GLUCOSE: 182 MG/DL (ref 70–110)
POTASSIUM SERPL-SCNC: 3.9 MMOL/L (ref 3.5–5.1)
PROT SERPL-MCNC: 6.6 G/DL (ref 6–8.4)
RBC # BLD AUTO: 4.27 M/UL (ref 4–5.4)
SODIUM SERPL-SCNC: 140 MMOL/L (ref 136–145)
WBC # BLD AUTO: 15.28 K/UL (ref 3.9–12.7)

## 2023-05-17 PROCEDURE — 80053 COMPREHEN METABOLIC PANEL: CPT | Performed by: STUDENT IN AN ORGANIZED HEALTH CARE EDUCATION/TRAINING PROGRAM

## 2023-05-17 PROCEDURE — 99223 1ST HOSP IP/OBS HIGH 75: CPT | Mod: ,,, | Performed by: PHYSICIAN ASSISTANT

## 2023-05-17 PROCEDURE — 99223 PR INITIAL HOSPITAL CARE,LEVL III: ICD-10-PCS | Mod: ,,, | Performed by: PHYSICIAN ASSISTANT

## 2023-05-17 PROCEDURE — 25000003 PHARM REV CODE 250: Performed by: INTERNAL MEDICINE

## 2023-05-17 PROCEDURE — 25000003 PHARM REV CODE 250: Performed by: STUDENT IN AN ORGANIZED HEALTH CARE EDUCATION/TRAINING PROGRAM

## 2023-05-17 PROCEDURE — 63600175 PHARM REV CODE 636 W HCPCS: Performed by: STUDENT IN AN ORGANIZED HEALTH CARE EDUCATION/TRAINING PROGRAM

## 2023-05-17 PROCEDURE — 99232 SBSQ HOSP IP/OBS MODERATE 35: CPT | Mod: ,,, | Performed by: STUDENT IN AN ORGANIZED HEALTH CARE EDUCATION/TRAINING PROGRAM

## 2023-05-17 PROCEDURE — 36415 COLL VENOUS BLD VENIPUNCTURE: CPT | Performed by: STUDENT IN AN ORGANIZED HEALTH CARE EDUCATION/TRAINING PROGRAM

## 2023-05-17 PROCEDURE — 63600175 PHARM REV CODE 636 W HCPCS: Performed by: PHYSICIAN ASSISTANT

## 2023-05-17 PROCEDURE — 25000003 PHARM REV CODE 250: Performed by: PHYSICIAN ASSISTANT

## 2023-05-17 PROCEDURE — 85025 COMPLETE CBC W/AUTO DIFF WBC: CPT | Performed by: STUDENT IN AN ORGANIZED HEALTH CARE EDUCATION/TRAINING PROGRAM

## 2023-05-17 PROCEDURE — 25000003 PHARM REV CODE 250

## 2023-05-17 PROCEDURE — 83735 ASSAY OF MAGNESIUM: CPT | Performed by: STUDENT IN AN ORGANIZED HEALTH CARE EDUCATION/TRAINING PROGRAM

## 2023-05-17 PROCEDURE — A4216 STERILE WATER/SALINE, 10 ML: HCPCS | Performed by: PHYSICIAN ASSISTANT

## 2023-05-17 PROCEDURE — 99232 PR SUBSEQUENT HOSPITAL CARE,LEVL II: ICD-10-PCS | Mod: ,,, | Performed by: STUDENT IN AN ORGANIZED HEALTH CARE EDUCATION/TRAINING PROGRAM

## 2023-05-17 PROCEDURE — 84100 ASSAY OF PHOSPHORUS: CPT | Performed by: STUDENT IN AN ORGANIZED HEALTH CARE EDUCATION/TRAINING PROGRAM

## 2023-05-17 PROCEDURE — 20600001 HC STEP DOWN PRIVATE ROOM

## 2023-05-17 RX ORDER — FLUCONAZOLE 150 MG/1
150 TABLET ORAL ONCE
Status: COMPLETED | OUTPATIENT
Start: 2023-05-17 | End: 2023-05-17

## 2023-05-17 RX ORDER — MORPHINE SULFATE 4 MG/ML
4 INJECTION, SOLUTION INTRAMUSCULAR; INTRAVENOUS ONCE
Status: DISCONTINUED | OUTPATIENT
Start: 2023-05-17 | End: 2023-05-17

## 2023-05-17 RX ORDER — METRONIDAZOLE 500 MG/1
500 TABLET ORAL EVERY 12 HOURS
Status: DISCONTINUED | OUTPATIENT
Start: 2023-05-17 | End: 2023-05-24 | Stop reason: HOSPADM

## 2023-05-17 RX ORDER — PHENAZOPYRIDINE HYDROCHLORIDE 100 MG/1
100 TABLET, FILM COATED ORAL
Status: DISPENSED | OUTPATIENT
Start: 2023-05-17 | End: 2023-05-19

## 2023-05-17 RX ADMIN — BACLOFEN 5 MG: 5 TABLET ORAL at 08:05

## 2023-05-17 RX ADMIN — LIDOCAINE 1 PATCH: 50 PATCH CUTANEOUS at 06:05

## 2023-05-17 RX ADMIN — OXYCODONE HYDROCHLORIDE 5 MG: 5 TABLET ORAL at 09:05

## 2023-05-17 RX ADMIN — ACETAMINOPHEN 650 MG: 325 TABLET, FILM COATED ORAL at 12:05

## 2023-05-17 RX ADMIN — LOSARTAN POTASSIUM 50 MG: 50 TABLET, FILM COATED ORAL at 08:05

## 2023-05-17 RX ADMIN — HEPARIN SODIUM 5000 UNITS: 5000 INJECTION INTRAVENOUS; SUBCUTANEOUS at 05:05

## 2023-05-17 RX ADMIN — PHENAZOPYRIDINE HYDROCHLORIDE 100 MG: 100 TABLET ORAL at 05:05

## 2023-05-17 RX ADMIN — CEFTRIAXONE 2 G: 2 INJECTION, POWDER, FOR SOLUTION INTRAMUSCULAR; INTRAVENOUS at 08:05

## 2023-05-17 RX ADMIN — Medication 10 ML: at 02:05

## 2023-05-17 RX ADMIN — METRONIDAZOLE 500 MG: 500 TABLET ORAL at 08:05

## 2023-05-17 RX ADMIN — Medication 10 ML: at 06:05

## 2023-05-17 RX ADMIN — HEPARIN SODIUM 5000 UNITS: 5000 INJECTION INTRAVENOUS; SUBCUTANEOUS at 09:05

## 2023-05-17 RX ADMIN — FLUCONAZOLE 150 MG: 150 TABLET ORAL at 05:05

## 2023-05-17 RX ADMIN — BACLOFEN 5 MG: 5 TABLET ORAL at 02:05

## 2023-05-17 RX ADMIN — GABAPENTIN 300 MG: 300 CAPSULE ORAL at 08:05

## 2023-05-17 RX ADMIN — FUROSEMIDE 20 MG: 20 TABLET ORAL at 08:05

## 2023-05-17 RX ADMIN — NIFEDIPINE 30 MG: 30 TABLET, FILM COATED, EXTENDED RELEASE ORAL at 08:05

## 2023-05-17 RX ADMIN — Medication 10 ML: at 09:05

## 2023-05-17 RX ADMIN — SENNOSIDES 8.6 MG: 8.6 TABLET, FILM COATED ORAL at 08:05

## 2023-05-17 RX ADMIN — SPIRONOLACTONE 25 MG: 25 TABLET, FILM COATED ORAL at 08:05

## 2023-05-17 RX ADMIN — HEPARIN SODIUM 5000 UNITS: 5000 INJECTION INTRAVENOUS; SUBCUTANEOUS at 02:05

## 2023-05-17 RX ADMIN — ONDANSETRON 4 MG: 2 INJECTION INTRAMUSCULAR; INTRAVENOUS at 05:05

## 2023-05-17 RX ADMIN — DICLOFENAC 2 G: 10 GEL TOPICAL at 08:05

## 2023-05-17 RX ADMIN — OXYCODONE HYDROCHLORIDE 5 MG: 5 TABLET ORAL at 03:05

## 2023-05-17 NOTE — CONSULTS
Patient seen for wound care consultation.   Reviewed chart for this encounter.   See Flow Sheet for findings.    Pt sitting up in bed upon arrival, agreed to assessment. RN at bedside to assist in turning pt.   Pt has on diaper, dressing was saturated through ABD pads to gavin pads, ABD, pads, kerlix packing removed, wound cleansed with Vashe, re-applied dakins moistened kerlix packing to wound bed, covered with 4x4 gauze.   Triad applied to MASD areas on right buttock and pippa-wound of incision site, covered with ABD pads (2) secure with tape.   All dressing and bedding changed.     RECOMMENDATIONS:  Immerse bed ordered  Change dressing BID    Discussed POC with patient and primary RN.   See EMR for orders & patient education.      Nursing to continue care.  Nursing to maintain pressure injury prevention interventions.    Camille Patel, ZOE notified     05/17/23 1530   WOCN Assessment   WOCN Total Time (mins) 30   Visit Date 05/17/23   Visit Time 1530   Consult Type New   WOCN Speciality Wound   Wound surgical   Continence Type Urinary;Fecal   Intervention assessed;changed;applied;chart review;coordination of care;consult other service;team conference;orders   Teaching on-going        Altered Skin Integrity 05/13/23 2251 Coccyx #1 Full thickness tissue loss with exposed bone, tendon, or muscle. Often includes undermining and tunneling. May extend into muscle and/or supporting structures.   Date First Assessed/Time First Assessed: 05/13/23 2251   Altered Skin Integrity Present on Admission - Did Patient arrive to the hospital with altered skin?: yes  Location: Coccyx  Wound Number: #1  Description of Altered Skin Integrity: (c) Full thic...   Wound Image    Dressing Appearance Moist drainage;Saturated   Drainage Amount Copious   Drainage Characteristics/Odor Brown;Serosanguineous   Appearance Red;Muscle   Tissue loss description Full thickness   Red (%), Wound Tissue Color 100 %   Periwound Area Moist;Macerated;Scar  tissue   Wound Edges Irregular;Undefined   Wound Length (cm) 4.7 cm   Wound Width (cm) 4.5 cm   Wound Depth (cm) 4 cm   Wound Volume (cm^3) 84.6 cm^3   Wound Surface Area (cm^2) 21.15 cm^2   Tunneling (depth (cm)/location) 5.5@2:00 / 2.3 @ 1:00   Care Cleansed with:;Antimicrobial agent   Dressing Applied;Rolled gauze   Packing Inserted  2   Packing Removed 1   Periwound Care Other (see comments)  (Triad)   Off Loading Other (see comments)  (Immerse)   Dressing Change Due 05/18/23        Altered Skin Integrity 05/16/23 1722 Right lower Buttocks #3 Other (comment) Intact skin with non-blanchable redness of localized area   Date First Assessed/Time First Assessed: 05/16/23 1722   Altered Skin Integrity Present on Admission - Did Patient arrive to the hospital with altered skin?: yes  Side: (c) Right  Orientation: lower  Location: Buttocks  Wound Number: #3  Is this injur...   Wound Image    Dressing Appearance Moist drainage;Saturated   Drainage Amount Small   Appearance Red;Pink   Red (%), Wound Tissue Color 100 %   Periwound Area Moist;Scar tissue   Wound Edges Irregular   Wound Length (cm) 4 cm   Wound Width (cm) 2.5 cm   Wound Depth (cm) 0.2 cm   Wound Volume (cm^3) 2 cm^3   Wound Surface Area (cm^2) 10 cm^2

## 2023-05-17 NOTE — ASSESSMENT & PLAN NOTE
States this is the 2nd time this has occurred and she required consultation with an eyelid specialist in the past - no specific treatment provided during prior episode, advised on watchful waiting in the past.   - Has been ongoing over a month  - Continue with QID warm compresses followed by gentle cleansing with baby soap.  - BNP <10 on admission

## 2023-05-17 NOTE — ASSESSMENT & PLAN NOTE
- Chronic stage III decubitus ulcers secondary to immobility despite with wound care efforts at home with home health.   - Probed deep to bone concerning for possible stage IV.    - s/p sacral wound debridement with general surgery on 5/16  -- OR cultures with bacterial growth  - Infectious disease consulted  -- Follow up wound and bone cultures  -- Bone culture with GNR, GPC, GPR  -- Sacral culture with GNR. GPC  -- Continue antibiotics per ID   - Wound care consulted  - Nutrition conuslted

## 2023-05-17 NOTE — CONSULTS
"Nutrition consult received stating "healed bilateral buttox wound".  Pt seen by Catholic RD 5/15, please see note for full assessment.  See recommendations below.    Recommendations    1) Rec'd vitamin C and Zinc supplement for wound healing.  2) Hill BID to promote wound healing.  3) RD following.    Goals: Patient will meet 50-75% EEN/EPN by RD follow up  Nutrition Goal Status: new  Communication of RD Recs:  (POC)    Thanks!  MS Karlene, RD, LDN   "

## 2023-05-17 NOTE — ASSESSMENT & PLAN NOTE
- Continue Nifedipine 30 mg daily  - Continue Losartan 50 mg bid (home telmisartan)  - Continue Spirolactone 25 mg daily  - Continue to monitor

## 2023-05-17 NOTE — PROGRESS NOTES
"[facility] ([address] Medicine  Progress Note    Patient Name: Trista Gr  MRN: [id number]  Patient Class: IP- Inpatient   Admission Date: 5/13/2023  Length of Stay: 1 days  Attending Physician: Eliot Pina MD  Primary Care Provider: REGINO Yeung MD        Subjective:     Principal Problem:Lower extremity weakness        HPI:  Per Kathleen Ro, PA-C:    "Ms. Trista Gr is a 72 y.o. female, with PMH of HTN, chronic low back pain, normal pressure hydrocephalus, urinary incontinence, who [place] [facility] [place] due to burning with urination.  She states she was diagnosed with the UTI 3 days ago, and went to see her PCP who prescribed her Bactrim.  She states her symptoms have not improved while on Bactrim.  She notes associated dizziness, lightheadedness, weakness, loss of appetite.  She denied fever, chills, nausea, vomiting.  She states that the pressure injury on her buttocks has been having increased pain in the past few days as well.  She was evaluated in the ED with labs that showed leukocytosis of 16 K, and left shift.  Metabolic panel was overall fairly normal.  A UA showed nitrate positive UTI with 11 wbc's, moderate bacteria, and trace leukocytes.  Urine cultures pending.  She was treated in the ED with Rocephin."      Overview/Hospital Course:  Patient is 72-year-old woman with past medical history of hypertension, chronic lower back pain > 10 years with degenerative joint disease of the spine with cervical and lumbar spinal stenosis with progressive gait instability who has been bed-bound for last 2 years with stage III sacral decubitus ulcer, urinary incontinence who presented with recent urinary tract infection which did not improve with treatment with oral trimethoprim/sulfamethoxazole.    Prior urine culture collected on 11/7/2022 positive for E coli resistant to trimethoprim/sulfamethoxazole concerning for antibtioic resistance " now.  Patient treated with intravenous ceftriaxone with resolution of dysuria.  Urine culture pending.  Patient also with persistently elevated leukocytosis despite antibiotic treatment.  Patient also with chronic decubitus ulcer which stage III and possible stage IV now.  General surgery consult recommended surgical debridement.    In terms of progressive debility, patient has been seen by Dr. Sravan Josue in June 2021.  Imaging at that time multilevel cervical and lumbar stenosis.  Patient deemded a poor surgical canddiated due to debility at that time.      Neurology consulted on this admission due to concern for possible normal pressure hydrocephalus.  Dr. Ildefonso Navarro Nadimpally suspect debility most likely due to progressive cervical and lumbar spinal stenosis with myelopathy and not normal pressure hydrocephalus.  Recommended repeat spinal imaging including MRI cervical, thoracic and lumbar spine without contrast, along with ongoing thearpy efforts, the addition of gabapentin for pain, and follow-up with spine specialist.  Unable to obtain MRI due to body size.    Requesting transfer to [facility] in order for patient to undergo neuroimaging recommended along with more definitive input by Orthopedic spine surgery/Neurosurgery regarding lower extremity weakness.      Interval History:   No events overnight. MRI completed. Ortho spine consulted based on MRI findings. ID consulted for positive sacral culture from OR. Continue antibiotics. Pain control. No other complaints.       Review of Systems   Constitutional:  Positive for fatigue. Negative for activity change, appetite change, chills, diaphoresis and fever.   HENT:  Negative for rhinorrhea and sore throat.    Respiratory:  Negative for cough, chest tightness and shortness of breath.    Cardiovascular:  Negative for chest pain and palpitations.   Gastrointestinal:  Negative for abdominal pain, constipation, diarrhea and nausea.   Endocrine:  Negative for cold intolerance.   Genitourinary:  Negative for decreased urine volume and dysuria.   Musculoskeletal:  Positive for neck pain. Negative for arthralgias and myalgias.   Skin:  Positive for wound (Sacral). Negative for rash.   Neurological:  Positive for weakness. Negative for dizziness, numbness and headaches.   Psychiatric/Behavioral:  Negative for agitation, behavioral problems and confusion.    Objective:     Vital Signs (Most Recent):  Temp: 98.3 °F (36.8 °C) (05/17/23 1232)  Pulse: 95 (05/17/23 1232)  Resp: 18 (05/17/23 0715)  BP: (!) 163/74 (05/17/23 1232)  SpO2: 99 % (05/17/23 1232) Vital Signs (24h Range):  Temp:  [97.7 °F (36.5 °C)-99 °F (37.2 °C)] 98.3 °F (36.8 °C)  Pulse:  [] 95  Resp:  [12-20] 18  SpO2:  [95 %-99 %] 99 %  BP: (109-163)/(57-78) 163/74     Weight: 91.5 kg (201 lb 11.5 oz)  Body mass index is 31.59 kg/m².    Intake/Output Summary (Last 24 hours) at 5/17/2023 1338  Last data filed at 5/16/2023 2328  Gross per 24 hour   Intake --   Output 200 ml   Net -200 ml         Physical Exam  Vitals and nursing note reviewed.   Constitutional:       General: She is not in acute distress.     Appearance: She is well-developed. She is obese. She is ill-appearing (chronically). She is not toxic-appearing.   HENT:      Head: Normocephalic and atraumatic.      Right Ear: External ear normal.      Left Ear: External ear normal.      Nose: Nose normal.      Mouth/Throat:      Mouth: Mucous membranes are moist.      Pharynx: Oropharynx is clear.   Eyes:      General: No scleral icterus.        Right eye: No discharge.         Left eye: No discharge or hordeolum.      Extraocular Movements: Extraocular movements intact.      Conjunctiva/sclera:      Right eye: Right conjunctiva is not injected. No chemosis, exudate or hemorrhage.     Left eye: Left conjunctiva is not injected. No chemosis, exudate or hemorrhage.  Neck:      Trachea: No tracheal deviation.      Comments: Head tilted to left  side  Cardiovascular:      Rate and Rhythm: Normal rate and regular rhythm.      Pulses: Normal pulses.      Heart sounds: Normal heart sounds. No murmur heard.    No gallop.   Pulmonary:      Effort: Pulmonary effort is normal. No respiratory distress.      Breath sounds: Normal breath sounds. No stridor. No wheezing or rales.   Abdominal:      General: Bowel sounds are normal. There is no distension.      Palpations: Abdomen is soft. There is no mass.      Tenderness: There is no abdominal tenderness. There is no guarding.   Musculoskeletal:      Cervical back: Neck supple.      Right lower leg: Edema present.      Left lower leg: Edema present.      Comments: Bilateral LE edema with chronic skin changes - no erythema   Skin:     General: Skin is warm and dry.      Coloration: Skin is not pale.      Findings: No erythema or rash.      Comments: Stage 3 pressure wound with drainage   Neurological:      Mental Status: She is alert and oriented to person, place, and time.      Motor: Weakness present. No abnormal muscle tone.      Gait: Gait abnormal.      Comments: Significant BLE weakness. BUE weakness.    Psychiatric:         Mood and Affect: Mood normal.         Behavior: Behavior normal.           Significant Labs: All pertinent labs within the past 24 hours have been reviewed.  CBC:   Recent Labs   Lab 05/16/23  0422 05/17/23  0450   WBC 14.56* 15.28*   HGB 11.5* 11.3*   HCT 34.2* 35.1*    342     CMP:   Recent Labs   Lab 05/16/23  0422 05/17/23  0450    140   K 3.6 3.9    103   CO2 27 25   * 103   BUN 8 9   CREATININE 0.6 0.6   CALCIUM 9.3 9.4   PROT  --  6.6   ALBUMIN  --  2.8*   BILITOT  --  0.4   ALKPHOS  --  101   AST  --  22   ALT  --  31   ANIONGAP 8 12       Significant Imaging: I have reviewed all pertinent imaging results/findings within the past 24 hours.      Assessment/Plan:      * Lower extremity weakness  Patient with lower extremity weakness with progressive debility  and has been bed-bound for 2 years now.  Patient reports some improvement with physical therapy and skilled nurse facility however since discharge from skilled nursing facility patient has been bed-bound and nonambulatory.  Etiology of lower extremity weakness due to suspected spinal stenosis.  Imaging also concerning for possible normal pressure hydrocephalus.    - Neurology consulted   -- Suspect debility most likely due to progressive cervical and lumbar spinal stenosis with myelopathy and not normal pressure hydrocephalus.  -- [place] [facility] [place]   -- [place] C[place] [facility] [place] mild spinal canal stenosis at C2-3, Moderate canal stenosis at L3-4 and L4-5  - Ortho Spine consulted  -- Follow up consults recs  - Patient at functional baseline per PT/OT    Pressure injury of buttock, stage 3  - Chronic stage III decubitus ulcers secondary to immobility despite with wound care efforts at home with home health.   - Probed deep to bone concerning for possible stage IV.    - s/p sacral wound debridement with general surgery on 5/16  -- OR cultures with bacterial growth  - Infectious disease consulted  -- Follow up wound and bone cultures  -- Bone culture with GNR, GPC, GPR  -- Sacral culture with GNR. GPC  -- Continue antibiotics per ID   - Wound care consulted  - Nutrition conuslted    Urinary tract infection without hematuria  - Patient presented with dysuria   - UA positive  - Urine culture with mixed resistance E coli, sensitive to CTX  - CTX started on 5/13, completed tx on 5/18    Leukocytosis  - See above    Swelling of left eyelid  States this is the 2nd time this has occurred and she required consultation with an eyelid specialist in the past - no specific treatment provided during prior episode, advised on watchful waiting in the past.   - Has been ongoing over a month  - Continue with QID warm compresses followed by gentle cleansing with baby soap.  - BNP <10 on  admission    Neck pain on right side  - States this is chronic and seems positional as patient is leaning head to the left - there is palpable tightening/stiffness of the right side of the neck.  - X-ray of C-spine unremarkable  - Continue Lidocaine patch  - Warm compress PRN  - Gabapentin 300mg qhs    Urinary incontinence without sensory awareness  Continue with Pure wick    Obesity (BMI 30.0-34.9)  - BMI 32.70 on admission - patient would benefit for weight reduction.  - Nutrition consulted    Essential hypertension  - Continue Nifedipine 30 mg daily  - Continue Losartan 50 mg bid (home telmisartan)  - Continue Spirolactone 25 mg daily  - Continue to monitor      VTE Risk Mitigation (From admission, onward)         Ordered     heparin (porcine) injection 5,000 Units  Every 8 hours         05/14/23 0210     IP VTE HIGH RISK PATIENT  Once         05/14/23 0210     Place sequential compression device  Until discontinued         05/14/23 0210                Discharge Planning   MATTHEW: 5/23/2023     Code Status: Full Code   Is the patient medically ready for discharge?: No    Reason for patient still in hospital (select all that apply): Patient trending condition, Laboratory test, Treatment, Consult recommendations and Pending disposition  Discharge Plan A: Home Health                  Eliot Pina MD  Department of Hospital Medicine   [facility] - Intensive Care ([place]

## 2023-05-17 NOTE — ASSESSMENT & PLAN NOTE
Patient with lower extremity weakness with progressive debility and has been bed-bound for 2 years now.  Patient reports some improvement with physical therapy and skilled nurse facility however since discharge from skilled nursing facility patient has been bed-bound and nonambulatory.  Etiology of lower extremity weakness due to suspected spinal stenosis.  Imaging also concerning for possible normal pressure hydrocephalus.    - Neurology consulted   -- Suspect debility most likely due to progressive cervical and lumbar spinal stenosis with myelopathy and not normal pressure hydrocephalus.  -- Patient transferred to [facility] [place] due to body habitus   -- MRI C-T-L- Spine completed at [facility] with mild spinal canal stenosis at C2-3, Moderate canal stenosis at L3-4 and L4-5  - Ortho Spine consulted  -- Follow up consults recs  - Patient at functional baseline per PT/OT  - Continue baclofen, diclofenac

## 2023-05-17 NOTE — ASSESSMENT & PLAN NOTE
- States this is chronic and seems positional as patient is leaning head to the left - there is palpable tightening/stiffness of the right side of the neck.  - X-ray of C-spine unremarkable  - Continue Lidocaine patch  - Warm compress PRN  - Gabapentin 300mg qhs

## 2023-05-17 NOTE — SUBJECTIVE & OBJECTIVE
Past Medical History:   Diagnosis Date    Debility     Decubitus ulcer of buttock     GERD (gastroesophageal reflux disease) 09/22/2012    HTN (hypertension) 09/22/2012    Normal stress echocardiogram 09/22/2012    Vitamin D deficiency disease 09/22/2012       Past Surgical History:   Procedure Laterality Date    APPENDECTOMY      BILATERAL SALPINGOOPHORECTOMY      DEBRIDEMENT OF SACRAL WOUND N/A 5/16/2023    Procedure: DEBRIDEMENT, WOUND, SACRUM;  Surgeon: Barney Edward MD;  Location: [facility];  Service: General;  Laterality: N/A;    HYSTERECTOMY         Review of patient's allergies indicates:   Allergen Reactions    Aspirin      Sore stomach    Codeine Itching    Pcn [penicillins] Itching       Medications:  Medications Prior to Admission   Medication Sig    acetaminophen (TYLENOL) 650 MG TbSR Take 1 tablet (650 mg total) by mouth 3 (three) times daily as needed.    ascorbic acid, vitamin C, (VITAMIN C) 500 MG tablet Take 500 mg by mouth 2 (two) times a day.    ciprofloxacin HCl (CILOXAN) 0.3 % ophthalmic solution Place 1 drop into the left eye 4 (four) times daily.    esomeprazole (NEXIUM) 20 MG capsule Take 20 mg by mouth daily as needed.    ferrous sulfate (FEOSOL) 325 mg (65 mg iron) Tab tablet Take 325 mg by mouth daily with breakfast.    fish oil-omega-3 fatty acids 300-1,000 mg capsule Take 1 g by mouth once daily.    furosemide (LASIX) 20 MG tablet TAKE 1 TABLET(20 MG) BY MOUTH TWICE DAILY    sulfamethoxazole-trimethoprim 800-160mg (BACTRIM DS) 800-160 mg Tab Take 1 tablet by mouth 2 (two) times daily.    telmisartan (MICARDIS) 40 MG Tab Take 1 tablet (40 mg total) by mouth once daily.    traMADoL (ULTRAM) 50 mg tablet TAKE 1 TABLET BY MOUTH EVERY OTHER DAY AS NEEDED FOR PAIN    zinc sulfate (ZINCATE) 50 mg zinc (220 mg) capsule Take 220 mg by mouth once daily.    multivitamin with minerals tablet Take 1 tablet by mouth once daily.    spironolactone (ALDACTONE) 25 MG tablet Take 1 tablet (25 mg  total) by mouth once daily.     Antibiotics (From admission, onward)      Start     Stop Route Frequency Ordered    05/17/23 2100  metroNIDAZOLE tablet 500 mg         -- Oral Every 12 hours 05/17/23 1534    05/17/23 2034  cefTRIAXone (ROCEPHIN) 2 g in dextrose 5 % in water (D5W) 5 % 50 mL IVPB (MB+)         -- IV Every 24 hours (non-standard times) 05/17/23 1534          Antifungals (From admission, onward)      Start     Stop Route Frequency Ordered    05/17/23 1645  fluconazole tablet 150 mg         -- Oral Once 05/17/23 1540          Antivirals (From admission, onward)      None             Immunization History   Administered Date(s) Administered    COVID-19 Vaccine 03/23/2021, 04/20/2021    COVID-19, MRNA, LN-S, PF (MODERNA FULL 0.5 ML DOSE) 03/23/2021, 04/20/2021    COVID-19, mRNA, LNP-S, bivalent booster, PF (PFIZER OMICRON) 11/15/2022    Influenza (FLUAD) - Quadrivalent - Adjuvanted - PF *Preferred* (65+) 10/26/2021    Influenza - High Dose - PF (65 years and older) 01/08/2019    Influenza - Intradermal - Quadrivalent - PF 12/01/2015    Influenza - Quadrivalent - PF *Preferred* (6 months and older) 12/08/2016, 12/05/2017    Influenza - Trivalent - MDCK - PF 12/27/2013    Influenza Split 11/07/2014    PPD Test 11/11/2022    Pneumococcal Conjugate - 13 Valent 07/10/2018    Pneumococcal Polysaccharide - 23 Valent 04/01/2011    Tdap 10/22/2005       Family History       Problem Relation (Age of Onset)    Diabetes Brother, Maternal Grandmother    Hypertension Mother, Father    Stroke Father    Uterine cancer Sister          Social History     Socioeconomic History    Marital status:     Number of children: 0   Occupational History    Occupation: Human      Employer: [employer]     Comment: [employer]   Tobacco Use    Smoking status: Never    Smokeless tobacco: Never   Substance and Sexual Activity    Alcohol use: No     Alcohol/week: 0.0 standard drinks    Drug use: No    Sexual  activity: Not Currently   Social History Narrative    Uses cane sometime    2020 - Walks with a walker     Social Determinants of Health     Financial Resource Strain: Low Risk     Difficulty of Paying Living Expenses: Not hard at all   Food Insecurity: No Food Insecurity    Worried About Running Out of Food in the Last Year: Never true    Ran Out of Food in the Last Year: Never true   Transportation Needs: No Transportation Needs    Lack of Transportation (Medical): No    Lack of Transportation (Non-Medical): No   Physical Activity: Inactive    Days of Exercise per Week: 0 days    Minutes of Exercise per Session: 0 min   Stress: Stress Concern Present    Feeling of Stress : To some extent   Social Connections: Moderately Isolated    Frequency of Communication with Friends and Family: More than three times a week    Frequency of Social Gatherings with Friends and Family: Never    Attends Anglican Services: Never    Active Member of Clubs or Organizations: No    Attends Club or Organization Meetings: Never    Marital Status:    Housing Stability: Low Risk     Unable to Pay for Housing in the Last Year: No    Number of Places Lived in the Last Year: 1    Unstable Housing in the Last Year: No     Review of Systems   Constitutional:  Positive for fatigue. Negative for chills, diaphoresis and fever.   Respiratory:  Negative for cough, chest tightness and shortness of breath.    Cardiovascular:  Negative for chest pain and leg swelling.   Gastrointestinal:  Negative for abdominal pain, constipation, diarrhea and nausea.   Genitourinary:  Positive for dysuria (improved). Negative for decreased urine volume.   Musculoskeletal:  Positive for back pain, gait problem and neck pain. Negative for arthralgias and myalgias.   Skin:  Positive for wound (Sacral). Negative for rash.   Neurological:  Positive for weakness. Negative for dizziness, numbness and headaches.   Psychiatric/Behavioral:  Negative for agitation,  behavioral problems and confusion. The patient is not nervous/anxious.    Objective:     Vital Signs (Most Recent):  Temp: 98.3 °F (36.8 °C) (05/17/23 1232)  Pulse: 95 (05/17/23 1232)  Resp: 18 (05/17/23 1539)  BP: (!) 163/74 (05/17/23 1232)  SpO2: 99 % (05/17/23 1232) Vital Signs (24h Range):  Temp:  [97.7 °F (36.5 °C)-99 °F (37.2 °C)] 98.3 °F (36.8 °C)  Pulse:  [] 95  Resp:  [12-20] 18  SpO2:  [95 %-99 %] 99 %  BP: (109-163)/(57-78) 163/74     Weight: 91.5 kg (201 lb 11.5 oz)  Body mass index is 31.59 kg/m².    Estimated Creatinine Clearance: 101.4 mL/min (based on SCr of 0.6 mg/dL).     Physical Exam  Vitals and nursing note reviewed.   Constitutional:       General: She is not in acute distress.     Appearance: She is well-developed. She is obese. She is not ill-appearing or toxic-appearing.   HENT:      Head: Normocephalic and atraumatic.      Nose: Nose normal.      Mouth/Throat:      Mouth: Mucous membranes are moist.      Pharynx: Oropharynx is clear.   Eyes:      General: No scleral icterus.  Cardiovascular:      Rate and Rhythm: Normal rate and regular rhythm.   Pulmonary:      Effort: Pulmonary effort is normal. No respiratory distress.      Breath sounds: Normal breath sounds. No wheezing or rales.   Abdominal:      General: Bowel sounds are normal. There is no distension.      Palpations: Abdomen is soft. There is no mass.      Tenderness: There is no abdominal tenderness. There is no guarding.   Musculoskeletal:      Right lower leg: Edema present.      Left lower leg: Edema present.   Skin:     General: Skin is warm and dry.      Coloration: Skin is not pale.      Findings: No erythema or rash.      Comments: Sacral wound not examined. See media tab   Neurological:      Mental Status: She is alert and oriented to person, place, and time.      Motor: Weakness present. No abnormal muscle tone.      Gait: Gait abnormal.   Psychiatric:         Mood and Affect: Mood normal.         Behavior: Behavior  normal.        Significant Labs: CBC:   Recent Labs   Lab 05/16/23 0422 05/17/23  0450   WBC 14.56* 15.28*   HGB 11.5* 11.3*   HCT 34.2* 35.1*    342     CMP:   Recent Labs   Lab 05/16/23  0422 05/17/23  0450    140   K 3.6 3.9    103   CO2 27 25   * 103   BUN 8 9   CREATININE 0.6 0.6   CALCIUM 9.3 9.4   PROT  --  6.6   ALBUMIN  --  2.8*   BILITOT  --  0.4   ALKPHOS  --  101   AST  --  22   ALT  --  31   ANIONGAP 8 12     Microbiology Results (last 7 days)       Procedure Component Value Units Date/Time    AFB Culture & Smear [[id number]] Collected: 05/16/23 1008    Order Status: Completed Specimen: Bone from Sacrum Updated: 05/17/23 1424     AFB CULTURE STAIN No acid fast bacilli seen.    AFB Culture & Smear [[id number]] Collected: 05/16/23 1008    Order Status: Completed Specimen: Decubitus from Sacrum Updated: 05/17/23 1424     AFB CULTURE STAIN No acid fast bacilli seen.    Aerobic culture [[id number]]  (Abnormal) Collected: 05/16/23 1008    Order Status: Completed Specimen: Decubitus from Sacrum Updated: 05/17/23 1154     Aerobic Bacterial Culture GRAM NEGATIVE NICOLE  Few  Identification and susceptibility pending      Aerobic culture [[id number]]  (Abnormal) Collected: 05/16/23 1008    Order Status: Completed Specimen: Bone from Sacrum Updated: 05/17/23 1153     Aerobic Bacterial Culture GRAM NEGATIVE NICOLE  Few  Identification and susceptibility pending      Culture, Anaerobic [[id number]] Collected: 05/16/23 1008    Order Status: Completed Specimen: Decubitus from Sacrum Updated: 05/17/23 0624     Anaerobic Culture Culture in progress    Culture, Anaerobic [[id number]] Collected: 05/16/23 1008    Order Status: Completed Specimen: Bone from Sacrum Updated: 05/17/23 0624     Anaerobic Culture Culture in progress    Gram stain [[id number]] Collected: 05/16/23 1008    Order Status: Completed Specimen: Decubitus from Sacrum Updated: 05/16/23 2107     Gram Stain Result Rare WBC's      Rare Gram  positive cocci      Few Gram negative rods    Gram stain [[id number]] Collected: 05/16/23 1008    Order Status: Completed Specimen: Bone from Sacrum Updated: 05/16/23 2058     Gram Stain Result Rare WBC's      Many Gram positive cocci      Many Gram negative rods      Few Gram positive rods    Fungus culture [[id number]] Collected: 05/16/23 1008    Order Status: Sent Specimen: Decubitus from Sacrum Updated: 05/16/23 1723    Fungus culture [[id number]] Collected: 05/16/23 1008    Order Status: Sent Specimen: Bone from Sacrum Updated: 05/16/23 1723    Urine culture [[id number]]  (Abnormal)  (Susceptibility) Collected: 05/14/23 0026    Order Status: Completed Specimen: Urine Updated: 05/16/23 1039     Urine Culture, Routine ESCHERICHIA COLI  >100,000 cfu/ml      Narrative:      Specimen Source->Urine          Recent Lab Results         05/17/23  1240   05/17/23  0450   05/16/23  1816        Albumin   2.8         Alkaline Phosphatase   101         ALT   31         Anion Gap   12         AST   22         Baso #   0.07         Basophil %   0.5         BILIRUBIN TOTAL   0.4  Comment: For infants and newborns, interpretation of results should be based  on gestational age, weight and in agreement with clinical  observations.    Premature Infant recommended reference ranges:  Up to 24 hours.............<8.0 mg/dL  Up to 48 hours............<12.0 mg/dL  3-5 days..................<15.0 mg/dL  6-29 days.................<15.0 mg/dL           BUN   9         Calcium   9.4         Chloride   103         CO2   25         Creatinine   0.6         Differential Method   Automated         eGFR   >60.0         Eos #   0.3         Eosinophil %   2.1         Glucose   103         Gran # (ANC)   9.7         Gran %   63.6         Hematocrit   35.1         Hemoglobin   11.3         Immature Grans (Abs)   0.22  Comment: Mild elevation in immature granulocytes is non specific and   can be seen in a variety of conditions including stress response,    acute inflammation, trauma and pregnancy. Correlation with other   laboratory and clinical findings is essential.           Immature Granulocytes   1.4         Lymph #   3.2         Lymph %   21.1         Magnesium   1.6         MCH   26.5         MCHC   32.2         MCV   82         Mono #   1.7         Mono %   11.3         MPV   10.2         nRBC   0         Phosphorus   2.7         Platelets   342         POCT Glucose 112     116       Potassium   3.9         PROTEIN TOTAL   6.6         RBC   4.27         RDW   13.2         Sodium   140         WBC   15.28                 Significant Imaging:     Imaging Results              X-Ray Chest AP Portable (Final result)  Result time 05/13/23 23:54:11      Final result by Alton Noland MD (05/13/23 23:54:11)                   Impression:      Mild diminished depth of inspiration, there is no radiographic evidence for superimposed acute intrathoracic process.      Electronically signed by: Alton Noland  Date:    05/13/2023  Time:    23:54               Narrative:    EXAMINATION:  XR CHEST AP PORTABLE    CLINICAL HISTORY:  Cough, unspecified    TECHNIQUE:  Single frontal view of the chest was performed.    COMPARISON:  Chest radiograph November 7, 2022    FINDINGS:  Single portable chest view is submitted.  The technologist indicates the patient has extreme kyphosis, likely associated with the aforementioned the position of the patient's head and neck obscures the upper chest.  There is mild diminished depth of inspiration and minimal rotation, when accounting for these factors the cardiomediastinal silhouette appears appropriate and stable.    Mild accentuation of pulmonary bronchovascular markings consistent with diminished depth of inspiration noted.  There is no evidence for superimposed confluent infiltrate or consolidation, significant pleural effusion or pneumothorax.    The osseous structures demonstrate chronic change.                                     Time: 75 minutes   50% of time spent on face-to-face counseling and coordination of care. Counseling included review of test results, diagnosis, and treatment plan with patient and/or family.

## 2023-05-17 NOTE — HPI
Trista Gr is a 72 y.o. old female w/PMH of chronic low back and neck pain, normal pressure hydrocephalus, urinary incontinence (around 1 year), sacral decubitus ulcer (s/p I&D 5/16/23) who presents with and bilateral upper extremity weakness and shoulder pain.  Patient has been wheelchair bound for over 2 years and was last seen in orthopedic spine clinic with Dr. Josue (June 2021) for similar symptoms of bilateral upper extremity weakness/pain but was deemed a poor surgical candidate at that time.  She was recently admitted for treatment of UTI and has reportedly been experiencing around two weeks of weakness at bilateral upper extremities along with aching pain at her shoulders.  MRI was obtained showing previously established mild-moderate canal stenosis at C2-C3 and L3-L4 with multi level neural foraminal narrowing bilaterally.

## 2023-05-17 NOTE — ASSESSMENT & PLAN NOTE
72-year-old female with history of spinal stenosis with progressive gait instability who has been bed-bound for last 2 years c/b development of sacral decubitus ulcer, urinary incontinence c/b reucrrent UTIs admitted for UTI and infected sacral ulcer probing to bone.      General surgery performed surgical debridement 5/16. Bone culture of coccyx is positive for GNR. Urine culture 5/14 + E. Coli. She is on Ceftriaxone. ID consulted for antibiotic recommendations    Recommendations  · Continue Ceftriaxone though increase dose for osteomyelitis  · Add empiric Flagyl for anaerobic coverage  · Recommend wound care consult to assess if she would be appropriate for wound vac.  · She needs pressure offloading, nutrition optimization for optimal chance to heal her wound. Consider diverting colostomy. Continue pure wick.   · Will follow tissue/bone cultures to guide antibiotics  · Anticipate 6 weeks of antibiotic therapy  · Discussed antibiotic plan with ID staff. ID will follow.

## 2023-05-17 NOTE — SUBJECTIVE & OBJECTIVE
Interval History:   No events overnight. MRI completed. Ortho spine consulted based on MRI findings. ID consulted for positive sacral culture from OR. Continue antibiotics. Pain control. No other complaints.       Review of Systems   Constitutional:  Positive for fatigue. Negative for activity change, appetite change, chills, diaphoresis and fever.   HENT:  Negative for rhinorrhea and sore throat.    Respiratory:  Negative for cough, chest tightness and shortness of breath.    Cardiovascular:  Negative for chest pain and palpitations.   Gastrointestinal:  Negative for abdominal pain, constipation, diarrhea and nausea.   Endocrine: Negative for cold intolerance.   Genitourinary:  Negative for decreased urine volume and dysuria.   Musculoskeletal:  Positive for neck pain. Negative for arthralgias and myalgias.   Skin:  Positive for wound (Sacral). Negative for rash.   Neurological:  Positive for weakness. Negative for dizziness, numbness and headaches.   Psychiatric/Behavioral:  Negative for agitation, behavioral problems and confusion.    Objective:     Vital Signs (Most Recent):  Temp: 98.3 °F (36.8 °C) (05/17/23 1232)  Pulse: 95 (05/17/23 1232)  Resp: 18 (05/17/23 0715)  BP: (!) 163/74 (05/17/23 1232)  SpO2: 99 % (05/17/23 1232) Vital Signs (24h Range):  Temp:  [97.7 °F (36.5 °C)-99 °F (37.2 °C)] 98.3 °F (36.8 °C)  Pulse:  [] 95  Resp:  [12-20] 18  SpO2:  [95 %-99 %] 99 %  BP: (109-163)/(57-78) 163/74     Weight: 91.5 kg (201 lb 11.5 oz)  Body mass index is 31.59 kg/m².    Intake/Output Summary (Last 24 hours) at 5/17/2023 1338  Last data filed at 5/16/2023 2328  Gross per 24 hour   Intake --   Output 200 ml   Net -200 ml         Physical Exam  Vitals and nursing note reviewed.   Constitutional:       General: She is not in acute distress.     Appearance: She is well-developed. She is obese. She is ill-appearing (chronically). She is not toxic-appearing.   HENT:      Head: Normocephalic and atraumatic.       Right Ear: External ear normal.      Left Ear: External ear normal.      Nose: Nose normal.      Mouth/Throat:      Mouth: Mucous membranes are moist.      Pharynx: Oropharynx is clear.   Eyes:      General: No scleral icterus.        Right eye: No discharge.         Left eye: No discharge or hordeolum.      Extraocular Movements: Extraocular movements intact.      Conjunctiva/sclera:      Right eye: Right conjunctiva is not injected. No chemosis, exudate or hemorrhage.     Left eye: Left conjunctiva is not injected. No chemosis, exudate or hemorrhage.  Neck:      Trachea: No tracheal deviation.      Comments: Head tilted to left side  Cardiovascular:      Rate and Rhythm: Normal rate and regular rhythm.      Pulses: Normal pulses.      Heart sounds: Normal heart sounds. No murmur heard.    No gallop.   Pulmonary:      Effort: Pulmonary effort is normal. No respiratory distress.      Breath sounds: Normal breath sounds. No stridor. No wheezing or rales.   Abdominal:      General: Bowel sounds are normal. There is no distension.      Palpations: Abdomen is soft. There is no mass.      Tenderness: There is no abdominal tenderness. There is no guarding.   Musculoskeletal:      Cervical back: Neck supple.      Right lower leg: Edema present.      Left lower leg: Edema present.      Comments: Bilateral LE edema with chronic skin changes - no erythema   Skin:     General: Skin is warm and dry.      Coloration: Skin is not pale.      Findings: No erythema or rash.      Comments: Stage 3 pressure wound with drainage   Neurological:      Mental Status: She is alert and oriented to person, place, and time.      Motor: Weakness present. No abnormal muscle tone.      Gait: Gait abnormal.      Comments: Significant BLE weakness. BUE weakness.    Psychiatric:         Mood and Affect: Mood normal.         Behavior: Behavior normal.           Significant Labs: All pertinent labs within the past 24 hours have been reviewed.  CBC:    Recent Labs   Lab 05/16/23 0422 05/17/23  0450   WBC 14.56* 15.28*   HGB 11.5* 11.3*   HCT 34.2* 35.1*    342     CMP:   Recent Labs   Lab 05/16/23 0422 05/17/23 0450    140   K 3.6 3.9    103   CO2 27 25   * 103   BUN 8 9   CREATININE 0.6 0.6   CALCIUM 9.3 9.4   PROT  --  6.6   ALBUMIN  --  2.8*   BILITOT  --  0.4   ALKPHOS  --  101   AST  --  22   ALT  --  31   ANIONGAP 8 12       Significant Imaging: I have reviewed all pertinent imaging results/findings within the past 24 hours.

## 2023-05-17 NOTE — ASSESSMENT & PLAN NOTE
- Patient presented with dysuria   - UA positive  - Urine culture with mixed resistance E coli, sensitive to CTX  - CTX started on 5/13, completed tx on 5/18

## 2023-05-17 NOTE — CONSULTS
[facility] ([place] [id number])  Infectious Disease  Consult Note    Patient Name: Trista Gr  MRN: [id number]  Admission Date: 5/13/2023  Hospital Length of Stay: 1 days  Attending Physician: Eliot Pina MD  Primary Care Provider: REGINO Yeung MD     Isolation Status: No active isolations    Patient information was obtained from patient and past medical records.      Inpatient consult to Infectious Diseases  Consult performed by: Dahiana Katz PA-C  Consult ordered by: Eliot Pina MD        Assessment/Plan:     ID  Osteomyelitis of coccyx  72-year-old female with history of spinal stenosis with progressive gait instability who has been bed-bound for last 2 years c/b development of sacral decubitus ulcer, urinary incontinence c/b reucrrent UTIs admitted for UTI and infected sacral ulcer probing to bone.      General surgery performed surgical debridement 5/16. Bone culture of coccyx is positive for GNR. Urine culture 5/14 + E. Coli. She is on Ceftriaxone. ID consulted for antibiotic recommendations    Recommendations  · Continue Ceftriaxone though increase dose for osteomyelitis  · Add empiric Flagyl for anaerobic coverage  · Recommend wound care consult to assess if she would be appropriate for wound vac.  · She needs pressure offloading, nutrition optimization for optimal chance to heal her wound. Consider diverting colostomy. Continue pure wick.   · Will follow tissue/bone cultures to guide antibiotics  · Anticipate 6 weeks of antibiotic therapy  · Discussed antibiotic plan with ID staff. ID will follow.        Thank you for the consult. Please secure chat for any questions.  Dahiana Katz PA-C      Subjective:     Principal Problem: Lower extremity weakness    HPI: 72-year-old female with past medical history of hypertension, cervical and lumbar spinal stenosis with progressive gait instability who has been bed-bound for last 2 years c/b development of sacral decubitus  ulcer, urinary incontinence c/b reucrrent UTIs who presented with dysuria.      Urine culture 5/14 + E. Coli. Patient started on IV ceftriaxone with resolution of dysuria.  Her sacral wound has worsened with exposed coccyx. General surgery performed surgical debridement 5/16. Bone culture of coccyx is positive for GNR.     In terms of progressive debility, Neurology consulted and suspects debility most likely due to progressive cervical and lumbar spinal stenosis with myelopathy and not normal pressure hydrocephalus.  Recommended repeat spinal imaging. Patient unable to obtain MRI due to body size at OSH and [place] [facility] [place] and spine surgery evaluation.    Patient reports living with her  who cares for her at home. She is incontinent. She wears a diaper and when needs to have a bowel movement uses a bedpan.        Past Medical History:   Diagnosis Date    Debility     Decubitus ulcer of buttock     GERD (gastroesophageal reflux disease) 09/22/2012    HTN (hypertension) 09/22/2012    Normal stress echocardiogram 09/22/2012    Vitamin D deficiency disease 09/22/2012       Past Surgical History:   Procedure Laterality Date    APPENDECTOMY      BILATERAL SALPINGOOPHORECTOMY      DEBRIDEMENT OF SACRAL WOUND N/A 5/16/2023    Procedure: DEBRIDEMENT, WOUND, SACRUM;  Surgeon: Barney Edward MD;  Location: [facility] OR;  Service: General;  Laterality: N/A;    HYSTERECTOMY         Review of patient's allergies indicates:   Allergen Reactions    Aspirin      Sore stomach    Codeine Itching    Pcn [penicillins] Itching       Medications:  Medications Prior to Admission   Medication Sig    acetaminophen (TYLENOL) 650 MG TbSR Take 1 tablet (650 mg total) by mouth 3 (three) times daily as needed.    ascorbic acid, vitamin C, (VITAMIN C) 500 MG tablet Take 500 mg by mouth 2 (two) times a day.    ciprofloxacin HCl (CILOXAN) 0.3 % ophthalmic solution Place 1 drop into the left eye 4 (four)  times daily.    esomeprazole (NEXIUM) 20 MG capsule Take 20 mg by mouth daily as needed.    ferrous sulfate (FEOSOL) 325 mg (65 mg iron) Tab tablet Take 325 mg by mouth daily with breakfast.    fish oil-omega-3 fatty acids 300-1,000 mg capsule Take 1 g by mouth once daily.    furosemide (LASIX) 20 MG tablet TAKE 1 TABLET(20 MG) BY MOUTH TWICE DAILY    sulfamethoxazole-trimethoprim 800-160mg (BACTRIM DS) 800-160 mg Tab Take 1 tablet by mouth 2 (two) times daily.    telmisartan (MICARDIS) 40 MG Tab Take 1 tablet (40 mg total) by mouth once daily.    traMADoL (ULTRAM) 50 mg tablet TAKE 1 TABLET BY MOUTH EVERY OTHER DAY AS NEEDED FOR PAIN    zinc sulfate (ZINCATE) 50 mg zinc (220 mg) capsule Take 220 mg by mouth once daily.    multivitamin with minerals tablet Take 1 tablet by mouth once daily.    spironolactone (ALDACTONE) 25 MG tablet Take 1 tablet (25 mg total) by mouth once daily.     Antibiotics (From admission, onward)      Start     Stop Route Frequency Ordered    05/17/23 2100  metroNIDAZOLE tablet 500 mg         -- Oral Every 12 hours 05/17/23 1534    05/17/23 2034  cefTRIAXone (ROCEPHIN) 2 g in dextrose 5 % in water (D5W) 5 % 50 mL IVPB (MB+)         -- IV Every 24 hours (non-standard times) 05/17/23 1534          Antifungals (From admission, onward)      Start     Stop Route Frequency Ordered    05/17/23 1645  fluconazole tablet 150 mg         -- Oral Once 05/17/23 1540          Antivirals (From admission, onward)      None             Immunization History   Administered Date(s) Administered    COVID-19 Vaccine 03/23/2021, 04/20/2021    COVID-19, MRNA, LN-S, PF (MODERNA FULL 0.5 ML DOSE) 03/23/2021, 04/20/2021    COVID-19, mRNA, LNP-S, bivalent booster, PF (PFIZER OMICRON) 11/15/2022    Influenza (FLUAD) - Quadrivalent - Adjuvanted - PF *Preferred* (65+) 10/26/2021    Influenza - High Dose - PF (65 years and older) 01/08/2019    Influenza - Intradermal - Quadrivalent - PF 12/01/2015     Influenza - Quadrivalent - PF *Preferred* (6 months and older) 12/08/2016, 12/05/2017    Influenza - Trivalent - MDCK - PF 12/27/2013    Influenza Split 11/07/2014    PPD Test 11/11/2022    Pneumococcal Conjugate - 13 Valent 07/10/2018    Pneumococcal Polysaccharide - 23 Valent 04/01/2011    Tdap 10/22/2005       Family History       Problem Relation (Age of Onset)    Diabetes Brother, Maternal Grandmother    Hypertension Mother, Father    Stroke Father    Uterine cancer Sister          Social History     Socioeconomic History    Marital status:     Number of children: 0   Occupational History    Occupation: Human      Employer: [employer]     Comment: [employer]   Tobacco Use    Smoking status: Never    Smokeless tobacco: Never   Substance and Sexual Activity    Alcohol use: No     Alcohol/week: 0.0 standard drinks    Drug use: No    Sexual activity: Not Currently   Social History Narrative    Uses cane sometime    2020 - Walks with a walker     Social Determinants of Health     Financial Resource Strain: Low Risk     Difficulty of Paying Living Expenses: Not hard at all   Food Insecurity: No Food Insecurity    Worried About Running Out of Food in the Last Year: Never true    Ran Out of Food in the Last Year: Never true   Transportation Needs: No Transportation Needs    Lack of Transportation (Medical): No    Lack of Transportation (Non-Medical): No   Physical Activity: Inactive    Days of Exercise per Week: 0 days    Minutes of Exercise per Session: 0 min   Stress: Stress Concern Present    Feeling of Stress : To some extent   Social Connections: Moderately Isolated    Frequency of Communication with Friends and Family: More than three times a week    Frequency of Social Gatherings with Friends and Family: Never    Attends Gnosticist Services: Never    Active Member of Clubs or Organizations: No    Attends Club or Organization Meetings: Never    Marital  Status:    Housing Stability: Low Risk     Unable to Pay for Housing in the Last Year: No    Number of Places Lived in the Last Year: 1    Unstable Housing in the Last Year: No     Review of Systems   Constitutional:  Positive for fatigue. Negative for chills, diaphoresis and fever.   Respiratory:  Negative for cough, chest tightness and shortness of breath.    Cardiovascular:  Negative for chest pain and leg swelling.   Gastrointestinal:  Negative for abdominal pain, constipation, diarrhea and nausea.   Genitourinary:  Positive for dysuria (improved). Negative for decreased urine volume.   Musculoskeletal:  Positive for back pain, gait problem and neck pain. Negative for arthralgias and myalgias.   Skin:  Positive for wound (Sacral). Negative for rash.   Neurological:  Positive for weakness. Negative for dizziness, numbness and headaches.   Psychiatric/Behavioral:  Negative for agitation, behavioral problems and confusion. The patient is not nervous/anxious.    Objective:     Vital Signs (Most Recent):  Temp: 98.3 °F (36.8 °C) (05/17/23 1232)  Pulse: 95 (05/17/23 1232)  Resp: 18 (05/17/23 1539)  BP: (!) 163/74 (05/17/23 1232)  SpO2: 99 % (05/17/23 1232) Vital Signs (24h Range):  Temp:  [97.7 °F (36.5 °C)-99 °F (37.2 °C)] 98.3 °F (36.8 °C)  Pulse:  [] 95  Resp:  [12-20] 18  SpO2:  [95 %-99 %] 99 %  BP: (109-163)/(57-78) 163/74     Weight: 91.5 kg (201 lb 11.5 oz)  Body mass index is 31.59 kg/m².    Estimated Creatinine Clearance: 101.4 mL/min (based on SCr of 0.6 mg/dL).     Physical Exam  Vitals and nursing note reviewed.   Constitutional:       General: She is not in acute distress.     Appearance: She is well-developed. She is obese. She is not ill-appearing or toxic-appearing.   HENT:      Head: Normocephalic and atraumatic.      Nose: Nose normal.      Mouth/Throat:      Mouth: Mucous membranes are moist.      Pharynx: Oropharynx is clear.   Eyes:      General: No scleral  icterus.  Cardiovascular:      Rate and Rhythm: Normal rate and regular rhythm.   Pulmonary:      Effort: Pulmonary effort is normal. No respiratory distress.      Breath sounds: Normal breath sounds. No wheezing or rales.   Abdominal:      General: Bowel sounds are normal. There is no distension.      Palpations: Abdomen is soft. There is no mass.      Tenderness: There is no abdominal tenderness. There is no guarding.   Musculoskeletal:      Right lower leg: Edema present.      Left lower leg: Edema present.   Skin:     General: Skin is warm and dry.      Coloration: Skin is not pale.      Findings: No erythema or rash.      Comments: Sacral wound not examined. See media tab   Neurological:      Mental Status: She is alert and oriented to person, place, and time.      Motor: Weakness present. No abnormal muscle tone.      Gait: Gait abnormal.   Psychiatric:         Mood and Affect: Mood normal.         Behavior: Behavior normal.        Significant Labs: CBC:   Recent Labs   Lab 05/16/23 0422 05/17/23 0450   WBC 14.56* 15.28*   HGB 11.5* 11.3*   HCT 34.2* 35.1*    342     CMP:   Recent Labs   Lab 05/16/23 0422 05/17/23 0450    140   K 3.6 3.9    103   CO2 27 25   * 103   BUN 8 9   CREATININE 0.6 0.6   CALCIUM 9.3 9.4   PROT  --  6.6   ALBUMIN  --  2.8*   BILITOT  --  0.4   ALKPHOS  --  101   AST  --  22   ALT  --  31   ANIONGAP 8 12     Microbiology Results (last 7 days)       Procedure Component Value Units Date/Time    AFB Culture & Smear [[id number]] Collected: 05/16/23 1008    Order Status: Completed Specimen: Bone from Sacrum Updated: 05/17/23 1424     AFB CULTURE STAIN No acid fast bacilli seen.    AFB Culture & Smear [[id number]] Collected: 05/16/23 1008    Order Status: Completed Specimen: Decubitus from Sacrum Updated: 05/17/23 1424     AFB CULTURE STAIN No acid fast bacilli seen.    Aerobic culture [[id number]]  (Abnormal) Collected: 05/16/23 1008    Order Status: Completed  Specimen: Decubitus from Sacrum Updated: 05/17/23 1154     Aerobic Bacterial Culture GRAM NEGATIVE NICOLE  Few  Identification and susceptibility pending      Aerobic culture [[id number]]  (Abnormal) Collected: 05/16/23 1008    Order Status: Completed Specimen: Bone from Sacrum Updated: 05/17/23 1153     Aerobic Bacterial Culture GRAM NEGATIVE NICOLE  Few  Identification and susceptibility pending      Culture, Anaerobic [[id number]] Collected: 05/16/23 1008    Order Status: Completed Specimen: Decubitus from Sacrum Updated: 05/17/23 0624     Anaerobic Culture Culture in progress    Culture, Anaerobic [[id number]] Collected: 05/16/23 1008    Order Status: Completed Specimen: Bone from Sacrum Updated: 05/17/23 0624     Anaerobic Culture Culture in progress    Gram stain [[id number]] Collected: 05/16/23 1008    Order Status: Completed Specimen: Decubitus from Sacrum Updated: 05/16/23 2107     Gram Stain Result Rare WBC's      Rare Gram positive cocci      Few Gram negative rods    Gram stain [[id number]] Collected: 05/16/23 1008    Order Status: Completed Specimen: Bone from Sacrum Updated: 05/16/23 2058     Gram Stain Result Rare WBC's      Many Gram positive cocci      Many Gram negative rods      Few Gram positive rods    Fungus culture [[id number]] Collected: 05/16/23 1008    Order Status: Sent Specimen: Decubitus from Sacrum Updated: 05/16/23 1723    Fungus culture [[id number]] Collected: 05/16/23 1008    Order Status: Sent Specimen: Bone from Sacrum Updated: 05/16/23 1723    Urine culture [[id number]]  (Abnormal)  (Susceptibility) Collected: 05/14/23 0026    Order Status: Completed Specimen: Urine Updated: 05/16/23 1039     Urine Culture, Routine ESCHERICHIA COLI  >100,000 cfu/ml      Narrative:      Specimen Source->Urine          Recent Lab Results         05/17/23  1240   05/17/23  0450   05/16/23  1816        Albumin   2.8         Alkaline Phosphatase   101         ALT   31         Anion Gap   12         AST   22          Baso #   0.07         Basophil %   0.5         BILIRUBIN TOTAL   0.4  Comment: For infants and newborns, interpretation of results should be based  on gestational age, weight and in agreement with clinical  observations.    Premature Infant recommended reference ranges:  Up to 24 hours.............<8.0 mg/dL  Up to 48 hours............<12.0 mg/dL  3-5 days..................<15.0 mg/dL  6-29 days.................<15.0 mg/dL           BUN   9         Calcium   9.4         Chloride   103         CO2   25         Creatinine   0.6         Differential Method   Automated         eGFR   >60.0         Eos #   0.3         Eosinophil %   2.1         Glucose   103         Gran # (ANC)   9.7         Gran %   63.6         Hematocrit   35.1         Hemoglobin   11.3         Immature Grans (Abs)   0.22  Comment: Mild elevation in immature granulocytes is non specific and   can be seen in a variety of conditions including stress response,   acute inflammation, trauma and pregnancy. Correlation with other   laboratory and clinical findings is essential.           Immature Granulocytes   1.4         Lymph #   3.2         Lymph %   21.1         Magnesium   1.6         MCH   26.5         MCHC   32.2         MCV   82         Mono #   1.7         Mono %   11.3         MPV   10.2         nRBC   0         Phosphorus   2.7         Platelets   342         POCT Glucose 112     116       Potassium   3.9         PROTEIN TOTAL   6.6         RBC   4.27         RDW   13.2         Sodium   140         WBC   15.28                 Significant Imaging:     Imaging Results              X-Ray Chest AP Portable (Final result)  Result time 05/13/23 23:54:11      Final result by Alton Noland MD (05/13/23 23:54:11)                   Impression:      Mild diminished depth of inspiration, there is no radiographic evidence for superimposed acute intrathoracic process.      Electronically signed by: Alton Noland  Date:    05/13/2023  Time:    23:54                Narrative:    EXAMINATION:  XR CHEST AP PORTABLE    CLINICAL HISTORY:  Cough, unspecified    TECHNIQUE:  Single frontal view of the chest was performed.    COMPARISON:  Chest radiograph November 7, 2022    FINDINGS:  Single portable chest view is submitted.  The technologist indicates the patient has extreme kyphosis, likely associated with the aforementioned the position of the patient's head and neck obscures the upper chest.  There is mild diminished depth of inspiration and minimal rotation, when accounting for these factors the cardiomediastinal silhouette appears appropriate and stable.    Mild accentuation of pulmonary bronchovascular markings consistent with diminished depth of inspiration noted.  There is no evidence for superimposed confluent infiltrate or consolidation, significant pleural effusion or pneumothorax.    The osseous structures demonstrate chronic change.                                    Time: 75 minutes   50% of time spent on face-to-face counseling and coordination of care. Counseling included review of test results, diagnosis, and treatment plan with patient and/or family.

## 2023-05-17 NOTE — NURSING
Patient AOX4, VSS on RA. Bed locked/ lowered, call light/ bedside table within reach. Patient complaining of bilateral shoulder pain. MD notified.   No acute events during shift.

## 2023-05-17 NOTE — PLAN OF CARE
Problem: Adult Inpatient Plan of Care  Goal: Plan of Care Review  Outcome: Ongoing, Progressing  Flowsheets (Taken 5/17/2023 0244)  Plan of Care Reviewed With:   patient   family     Problem: Infection  Goal: Absence of Infection Signs and Symptoms  Outcome: Ongoing, Progressing     Problem: Impaired Wound Healing  Goal: Optimal Wound Healing  Outcome: Ongoing, Progressing     Problem: Pain Acute  Goal: Acceptable Pain Control and Functional Ability  Outcome: Ongoing, Progressing  Intervention: Develop Pain Management Plan  Flowsheets (Taken 5/17/2023 0244)  Pain Management Interventions:   care clustered   medication offered   pain management plan reviewed with patient/caregiver   pillow support provided   position adjusted   quiet environment facilitated   relaxation techniques promoted  POC reviewed with patient and family, verbalized understanding. Patient AAOX4; VSS.  Pt reports pain to bilateral shoulders, back, and sacrum; medicated with prn tylenol and OXy. BLE with 3+ edema, elevated on a pillow. Left eyelid swollen, no acute events overnight. Fall precautions maintained. Bed locked and in lowest position, side rails up and locked X2. Bed alarm set and audible; call light and personal belongings within reach. All questions and concerns addressed; pt progressing towards goal. See flow sheet for full assessment and V/S info. Pt instructed to call for assistance, verbalized understanding. Patient transported to radiology for MRI of cervical/thoracic/lumbar spine.

## 2023-05-17 NOTE — ASSESSMENT & PLAN NOTE
Patient with lower extremity weakness with progressive debility and has been bed-bound for 2 years now.  Patient reports some improvement with physical therapy and skilled nurse facility however since discharge from skilled nursing facility patient has been bed-bound and nonambulatory.  Etiology of lower extremity weakness due to suspected spinal stenosis.  Imaging also concerning for possible normal pressure hydrocephalus.    - Neurology consulted   -- Suspect debility most likely due to progressive cervical and lumbar spinal stenosis with myelopathy and not normal pressure hydrocephalus.  -- Patient transferred to [facility] [place] for MRI due to body habitus   -- MRI C-T-L- Spine completed at [facility] with mild spinal canal stenosis at C2-3, Moderate canal stenosis at L3-4 and L4-5  - Ortho Spine consulted  -- Follow up consults recs  - Patient at functional baseline per PT/OT

## 2023-05-17 NOTE — HPI
72-year-old female with past medical history of hypertension, cervical and lumbar spinal stenosis with progressive gait instability who has been bed-bound for last 2 years c/b development of sacral decubitus ulcer, urinary incontinence c/b reucrrent UTIs who presented with dysuria.      Urine culture 5/14 + E. Coli. Patient started on IV ceftriaxone with resolution of dysuria.  Her sacral wound has worsened with exposed coccyx. General surgery performed surgical debridement 5/16. Bone culture of coccyx is positive for GNR.     In terms of progressive debility, Neurology consulted and suspects debility most likely due to progressive cervical and lumbar spinal stenosis with myelopathy and not normal pressure hydrocephalus.  Recommended repeat spinal imaging. Patient unable to obtain MRI due to body size at OSH and transferred to [facility] for imaging and spine surgery evaluation.    Patient reports living with her  who cares for her at home. She is incontinent. She wears a diaper and when needs to have a bowel movement uses a bedpan.

## 2023-05-18 PROBLEM — R29.898 UPPER EXTREMITY WEAKNESS: Status: ACTIVE | Noted: 2023-05-18

## 2023-05-18 PROBLEM — R53.1 WEAKNESS: Status: ACTIVE | Noted: 2023-05-18

## 2023-05-18 LAB
ALBUMIN SERPL BCP-MCNC: 2.6 G/DL (ref 3.5–5.2)
ALP SERPL-CCNC: 99 U/L (ref 55–135)
ALT SERPL W/O P-5'-P-CCNC: 38 U/L (ref 10–44)
ANION GAP SERPL CALC-SCNC: 10 MMOL/L (ref 8–16)
AST SERPL-CCNC: 37 U/L (ref 10–40)
BACTERIA SPEC AEROBE CULT: ABNORMAL
BACTERIA SPEC AEROBE CULT: ABNORMAL
BASOPHILS # BLD AUTO: 0.08 K/UL (ref 0–0.2)
BASOPHILS NFR BLD: 0.5 % (ref 0–1.9)
BILIRUB SERPL-MCNC: 0.3 MG/DL (ref 0.1–1)
BUN SERPL-MCNC: 11 MG/DL (ref 8–23)
CALCIUM SERPL-MCNC: 9.3 MG/DL (ref 8.7–10.5)
CHLORIDE SERPL-SCNC: 101 MMOL/L (ref 95–110)
CO2 SERPL-SCNC: 28 MMOL/L (ref 23–29)
CREAT SERPL-MCNC: 0.7 MG/DL (ref 0.5–1.4)
DIFFERENTIAL METHOD: ABNORMAL
EOSINOPHIL # BLD AUTO: 0.3 K/UL (ref 0–0.5)
EOSINOPHIL NFR BLD: 2.2 % (ref 0–8)
ERYTHROCYTE [DISTWIDTH] IN BLOOD BY AUTOMATED COUNT: 13.2 % (ref 11.5–14.5)
EST. GFR  (NO RACE VARIABLE): >60 ML/MIN/1.73 M^2
GLUCOSE SERPL-MCNC: 122 MG/DL (ref 70–110)
HCT VFR BLD AUTO: 33.2 % (ref 37–48.5)
HGB BLD-MCNC: 10.7 G/DL (ref 12–16)
IMM GRANULOCYTES # BLD AUTO: 0.24 K/UL (ref 0–0.04)
IMM GRANULOCYTES NFR BLD AUTO: 1.6 % (ref 0–0.5)
LYMPHOCYTES # BLD AUTO: 2.8 K/UL (ref 1–4.8)
LYMPHOCYTES NFR BLD: 18.5 % (ref 18–48)
MAGNESIUM SERPL-MCNC: 1.6 MG/DL (ref 1.6–2.6)
MCH RBC QN AUTO: 26.6 PG (ref 27–31)
MCHC RBC AUTO-ENTMCNC: 32.2 G/DL (ref 32–36)
MCV RBC AUTO: 83 FL (ref 82–98)
MONOCYTES # BLD AUTO: 1.6 K/UL (ref 0.3–1)
MONOCYTES NFR BLD: 10.7 % (ref 4–15)
NEUTROPHILS # BLD AUTO: 10.1 K/UL (ref 1.8–7.7)
NEUTROPHILS NFR BLD: 66.5 % (ref 38–73)
NRBC BLD-RTO: 0 /100 WBC
PHOSPHATE SERPL-MCNC: 2.9 MG/DL (ref 2.7–4.5)
PLATELET # BLD AUTO: 330 K/UL (ref 150–450)
PMV BLD AUTO: 9.9 FL (ref 9.2–12.9)
POCT GLUCOSE: 126 MG/DL (ref 70–110)
POCT GLUCOSE: 134 MG/DL (ref 70–110)
POCT GLUCOSE: 142 MG/DL (ref 70–110)
POTASSIUM SERPL-SCNC: 3.7 MMOL/L (ref 3.5–5.1)
PROT SERPL-MCNC: 6.2 G/DL (ref 6–8.4)
RBC # BLD AUTO: 4.02 M/UL (ref 4–5.4)
SODIUM SERPL-SCNC: 139 MMOL/L (ref 136–145)
WBC # BLD AUTO: 15.26 K/UL (ref 3.9–12.7)

## 2023-05-18 PROCEDURE — 99233 PR SUBSEQUENT HOSPITAL CARE,LEVL III: ICD-10-PCS | Mod: ,,, | Performed by: PHYSICIAN ASSISTANT

## 2023-05-18 PROCEDURE — 83735 ASSAY OF MAGNESIUM: CPT | Performed by: STUDENT IN AN ORGANIZED HEALTH CARE EDUCATION/TRAINING PROGRAM

## 2023-05-18 PROCEDURE — 20600001 HC STEP DOWN PRIVATE ROOM

## 2023-05-18 PROCEDURE — 99233 SBSQ HOSP IP/OBS HIGH 50: CPT | Mod: ,,, | Performed by: PHYSICIAN ASSISTANT

## 2023-05-18 PROCEDURE — 36415 COLL VENOUS BLD VENIPUNCTURE: CPT | Performed by: STUDENT IN AN ORGANIZED HEALTH CARE EDUCATION/TRAINING PROGRAM

## 2023-05-18 PROCEDURE — 85025 COMPLETE CBC W/AUTO DIFF WBC: CPT | Performed by: STUDENT IN AN ORGANIZED HEALTH CARE EDUCATION/TRAINING PROGRAM

## 2023-05-18 PROCEDURE — 25000003 PHARM REV CODE 250: Performed by: PHYSICIAN ASSISTANT

## 2023-05-18 PROCEDURE — 84100 ASSAY OF PHOSPHORUS: CPT | Performed by: STUDENT IN AN ORGANIZED HEALTH CARE EDUCATION/TRAINING PROGRAM

## 2023-05-18 PROCEDURE — 99233 PR SUBSEQUENT HOSPITAL CARE,LEVL III: ICD-10-PCS | Mod: ,,, | Performed by: INTERNAL MEDICINE

## 2023-05-18 PROCEDURE — 63600175 PHARM REV CODE 636 W HCPCS: Performed by: PHYSICIAN ASSISTANT

## 2023-05-18 PROCEDURE — 25000003 PHARM REV CODE 250

## 2023-05-18 PROCEDURE — A4216 STERILE WATER/SALINE, 10 ML: HCPCS | Performed by: PHYSICIAN ASSISTANT

## 2023-05-18 PROCEDURE — 80053 COMPREHEN METABOLIC PANEL: CPT | Performed by: STUDENT IN AN ORGANIZED HEALTH CARE EDUCATION/TRAINING PROGRAM

## 2023-05-18 PROCEDURE — 25000003 PHARM REV CODE 250: Performed by: INTERNAL MEDICINE

## 2023-05-18 PROCEDURE — 99223 PR INITIAL HOSPITAL CARE,LEVL III: ICD-10-PCS | Mod: ,,, | Performed by: ORTHOPAEDIC SURGERY

## 2023-05-18 PROCEDURE — 99233 SBSQ HOSP IP/OBS HIGH 50: CPT | Mod: ,,, | Performed by: INTERNAL MEDICINE

## 2023-05-18 PROCEDURE — 25000003 PHARM REV CODE 250: Performed by: STUDENT IN AN ORGANIZED HEALTH CARE EDUCATION/TRAINING PROGRAM

## 2023-05-18 PROCEDURE — 99223 1ST HOSP IP/OBS HIGH 75: CPT | Mod: ,,, | Performed by: ORTHOPAEDIC SURGERY

## 2023-05-18 RX ADMIN — LOSARTAN POTASSIUM 50 MG: 50 TABLET, FILM COATED ORAL at 09:05

## 2023-05-18 RX ADMIN — FUROSEMIDE 20 MG: 20 TABLET ORAL at 08:05

## 2023-05-18 RX ADMIN — NIFEDIPINE 30 MG: 30 TABLET, FILM COATED, EXTENDED RELEASE ORAL at 09:05

## 2023-05-18 RX ADMIN — LIDOCAINE 1 PATCH: 50 PATCH CUTANEOUS at 06:05

## 2023-05-18 RX ADMIN — DICLOFENAC 2 G: 10 GEL TOPICAL at 09:05

## 2023-05-18 RX ADMIN — METRONIDAZOLE 500 MG: 500 TABLET ORAL at 08:05

## 2023-05-18 RX ADMIN — SENNOSIDES 8.6 MG: 8.6 TABLET, FILM COATED ORAL at 09:05

## 2023-05-18 RX ADMIN — GABAPENTIN 300 MG: 300 CAPSULE ORAL at 08:05

## 2023-05-18 RX ADMIN — PHENAZOPYRIDINE HYDROCHLORIDE 100 MG: 100 TABLET ORAL at 12:05

## 2023-05-18 RX ADMIN — PHENAZOPYRIDINE HYDROCHLORIDE 100 MG: 100 TABLET ORAL at 04:05

## 2023-05-18 RX ADMIN — HEPARIN SODIUM 5000 UNITS: 5000 INJECTION INTRAVENOUS; SUBCUTANEOUS at 09:05

## 2023-05-18 RX ADMIN — CEFTRIAXONE 2 G: 2 INJECTION, POWDER, FOR SOLUTION INTRAMUSCULAR; INTRAVENOUS at 08:05

## 2023-05-18 RX ADMIN — BACLOFEN 5 MG: 5 TABLET ORAL at 08:05

## 2023-05-18 RX ADMIN — Medication 10 ML: at 05:05

## 2023-05-18 RX ADMIN — FUROSEMIDE 20 MG: 20 TABLET ORAL at 09:05

## 2023-05-18 RX ADMIN — HEPARIN SODIUM 5000 UNITS: 5000 INJECTION INTRAVENOUS; SUBCUTANEOUS at 01:05

## 2023-05-18 RX ADMIN — BACLOFEN 5 MG: 5 TABLET ORAL at 09:05

## 2023-05-18 RX ADMIN — LOSARTAN POTASSIUM 50 MG: 50 TABLET, FILM COATED ORAL at 08:05

## 2023-05-18 RX ADMIN — BACLOFEN 5 MG: 5 TABLET ORAL at 04:05

## 2023-05-18 RX ADMIN — SPIRONOLACTONE 25 MG: 25 TABLET, FILM COATED ORAL at 09:05

## 2023-05-18 RX ADMIN — OXYCODONE HYDROCHLORIDE 5 MG: 5 TABLET ORAL at 08:05

## 2023-05-18 RX ADMIN — Medication 10 ML: at 10:05

## 2023-05-18 RX ADMIN — HEPARIN SODIUM 5000 UNITS: 5000 INJECTION INTRAVENOUS; SUBCUTANEOUS at 05:05

## 2023-05-18 RX ADMIN — Medication 10 ML: at 01:05

## 2023-05-18 RX ADMIN — METRONIDAZOLE 500 MG: 500 TABLET ORAL at 09:05

## 2023-05-18 NOTE — NURSING
Offered to complete wound care.  Patient stated she is very tired and would prefer it be completed tonight.  Encouraged Patient to allow wound care completion prior to shift change.

## 2023-05-18 NOTE — NURSING
Immunization tab reveals multiple COVID-19 vaccines, some with same dates, different manufacturers.  Patient's Spouse asked to provide Vaccination documentation to confirm immunization schedule.

## 2023-05-18 NOTE — NURSING
Patient's spouse requested COVID-19 and Flu vaccine prior to discharge.  He also requested Shingles vaccine.   Patient does not know how many COVID-19 vaccines Spouse had or . MARCELLO Ojeda M.D. notified via secure chat.

## 2023-05-18 NOTE — PLAN OF CARE
Problem: Infection  Goal: Absence of Infection Signs and Symptoms  Outcome: Ongoing, Progressing     Problem: Adult Inpatient Plan of Care  Goal: Plan of Care Review  Outcome: Ongoing, Progressing  Goal: Patient-Specific Goal (Individualized)  Outcome: Ongoing, Progressing  Goal: Absence of Hospital-Acquired Illness or Injury  Outcome: Ongoing, Progressing  Goal: Optimal Comfort and Wellbeing  Outcome: Ongoing, Progressing  Goal: Readiness for Transition of Care  Outcome: Ongoing, Progressing     Problem: Impaired Wound Healing  Goal: Optimal Wound Healing  Outcome: Ongoing, Progressing     Problem: Pain Acute  Goal: Acceptable Pain Control and Functional Ability  Outcome: Ongoing, Progressing

## 2023-05-18 NOTE — PROGRESS NOTES
"[facility] ([address] Medicine  Progress Note    Patient Name: Trista Gr  MRN: [id number]  Patient Class: IP- Inpatient   Admission Date: 5/13/2023  Length of Stay: 2 days  Attending Physician: Malgorzata Ojeda MD  Primary Care Provider: REGINO Yeung MD        Subjective:     Principal Problem:Lower extremity weakness        HPI:  Per Kathleen Ro, PAJoeyC:    "Ms. Trista Gr is a 72 y.o. female, with PMH of HTN, chronic low back pain, normal pressure hydrocephalus, urinary incontinence, who presented to [facility] ED on 05/13/2023 due to burning with urination.  She states she was diagnosed with the UTI 3 days ago, and went to see her PCP who prescribed her Bactrim.  She states her symptoms have not improved while on Bactrim.  She notes associated dizziness, lightheadedness, weakness, loss of appetite.  She denied fever, chills, nausea, vomiting.  She states that the pressure injury on her buttocks has been having increased pain in the past few days as well.  She was evaluated in the ED with labs that showed leukocytosis of 16 K, and left shift.  Metabolic panel was overall fairly normal.  A UA showed nitrate positive UTI with 11 wbc's, moderate bacteria, and trace leukocytes.  Urine cultures pending.  She was treated in the ED with Rocephin."      Overview/Hospital Course:  Patient is 72-year-old woman with past medical history of hypertension, chronic lower back pain > 10 years with degenerative joint disease of the spine with cervical and lumbar spinal stenosis with progressive gait instability who has been bed-bound for last 2 years with stage III sacral decubitus ulcer, urinary incontinence who presented with recent urinary tract infection which did not improve with treatment with oral trimethoprim/sulfamethoxazole.    Prior urine culture collected on 11/7/2022 positive for E coli resistant to trimethoprim/sulfamethoxazole concerning for antibtioic resistance " now.  Patient treated with intravenous ceftriaxone with resolution of dysuria.  Urine culture pending.  Patient also with persistently elevated leukocytosis despite antibiotic treatment.  Patient also with chronic decubitus ulcer which stage III and possible stage IV now.  General surgery consult recommended surgical debridement.    In terms of progressive debility, patient has been seen by Dr. Sravan Josue in June 2021.  Imaging at that time multilevel cervical and lumbar stenosis.  Patient deemded a poor surgical canddiated due to debility at that time.      Neurology consulted on this admission due to concern for possible normal pressure hydrocephalus.  Dr. Ildefonso Navarro Nadimpally suspect debility most likely due to progressive cervical and lumbar spinal stenosis with myelopathy and not normal pressure hydrocephalus.  Recommended repeat spinal imaging including MRI cervical, thoracic and lumbar spine without contrast, along with ongoing thearpy efforts, the addition of gabapentin for pain, and follow-up with spine specialist.  Unable to obtain MRI due to body size.    Requesting transfer to [facility] in order for patient to undergo neuroimaging recommended along with more definitive input by Orthopedic spine surgery/Neurosurgery regarding lower extremity weakness.    Patient transferred to [facility].  Underwent MRI spine with mild cervical stenosis and moderate lumbar stenosis.  Ortho spine consulted.  Awaiting recommendations.  Wound cultures from bone and tissue from surgical debridement on 5/16 with multiple bacteria.  Infectious Disease consulted.  Wound care consulted for wound management recommendations.    No chest pain, shortness of breath, lightheadedness. Tolerating oral intake. Pain controlled    NAD, AO3  NC, AT  RRR  CTAB  SNTND+BS  Wound care pictures reviewed.   PERRL    Vitals, labs and radiographs from past 24h reviewed and personally interpreted.     Assessment/Plan:      * Lower extremity  weakness  Patient with lower extremity weakness with progressive debility and has been bed-bound for 2 years now.  Patient reports some improvement with physical therapy and skilled nurse facility however since discharge from skilled nursing facility patient has been bed-bound and nonambulatory.  Etiology of lower extremity weakness due to suspected spinal stenosis.  Imaging also concerning for possible normal pressure hydrocephalus.    - Neurology consulted   -- Suspect debility most likely due to progressive cervical and lumbar spinal stenosis with myelopathy and not normal pressure hydrocephalus.  -- Patient transferred to [facility] for MRI due to body habitus   -- MRI C-T-L- Spine completed at [facility] with mild spinal canal stenosis at C2-3, Moderate canal stenosis at L3-4 and L4-5  - Ortho Spine consulted  -- Follow up consults recs  - Patient at functional baseline per PT/OT  - Continue baclofen, diclofenac    Leukocytosis  - See above    Swelling of left eyelid  States this is the 2nd time this has occurred and she required consultation with an eyelid specialist in the past - no specific treatment provided during prior episode, advised on watchful waiting in the past.   - Has been ongoing over a month  - Continue with QID warm compresses followed by gentle cleansing with baby soap.  - BNP <10 on admission    Neck pain on right side  - States this is chronic and seems positional as patient is leaning head to the left - there is palpable tightening/stiffness of the right side of the neck.  - X-ray of C-spine unremarkable  - Continue Lidocaine patch  - Warm compress PRN  - Gabapentin 300mg qhs    Urinary incontinence without sensory awareness  Continue with Pure wick    Pressure injury of buttock, stage 3  - Chronic stage III decubitus ulcers secondary to immobility despite with wound care efforts at home with home health.   - Probed deep to bone concerning for possible stage IV.    - s/p sacral wound debridement with  general surgery on 5/16  -- OR cultures with bacterial growth  - Infectious disease consulted  -- Follow up wound and bone cultures  -- Bone culture with GNR, GPC, GPR  -- Sacral culture with GNR. GPC  -- Continue antibiotics per ID   - Wound care consulted  - Nutrition conuslted  -Continue CTX, flagyl  -Gen surgery consulted     Urinary tract infection without hematuria  - Patient presented with dysuria   - UA positive  - Urine culture with mixed resistance E coli, sensitive to CTX  - CTX started on 5/13, completed tx on 5/18    Obesity (BMI 30.0-34.9)  - BMI 32.70 on admission - patient would benefit for weight reduction.  - Nutrition consulted    Essential hypertension  - Continue Nifedipine 30 mg daily  - Continue Losartan 50 mg bid (home telmisartan)  - Continue Spirolactone 25 mg daily  - Continue to monitor      VTE Risk Mitigation (From admission, onward)           Ordered     heparin (porcine) injection 5,000 Units  Every 8 hours         05/14/23 0210     IP VTE HIGH RISK PATIENT  Once         05/14/23 0210     Place sequential compression device  Until discontinued         05/14/23 0210                    Discharge Planning   MATTHEW: 5/23/2023     Code Status: Full Code   Is the patient medically ready for discharge?: No    Reason for patient still in hospital (select all that apply): Patient trending condition, Laboratory test, Treatment, Consult recommendations and Pending disposition  Discharge Plan A: Home Health                  Malgorzata Ojeda MD  Department of Hospital Medicine   [facility] ([facility]

## 2023-05-18 NOTE — ASSESSMENT & PLAN NOTE
72-year-old female with history of spinal stenosis with progressive gait instability who has been bed-bound for last 2 years c/b development of sacral decubitus ulcer, urinary incontinence c/b reucrrent UTIs admitted for UTI and infected sacral ulcer probing to bone.      General surgery performed surgical debridement 5/16. Bone culture of coccyx is positive for E. Coli and bacteroides. Urine culture 5/14 + E. Coli. She is on Ceftriaxone and flagyl. ID consulted for antibiotic recommendations    Recommendations  · Continue Ceftriaxone and Flagyl   · Local wound care per wound care team  · She needs pressure offloading, nutrition optimization for optimal chance to heal her wound. Consider diverting colostomy. Continue pure wick.   · Anticipate 6 weeks of antibiotic therapy  · Recommend LTAC placement for IV antibiotics and wound care  · Discussed antibiotic plan with ID staff. ID will sign off.          Outpatient Antibiotic Therapy Plan:    1) Infection: osteomyelitis    2) Discharge Antibiotics:    Intravenous antibiotics:   Ceftriaxone 2 g IV q 24 hours    Oral antibiotics:   Flagyl 500 mg PO q 12 hours    3) Therapy Duration:  6 weeks from debridement     Estimated end date of IV antibiotics:  6/27/23    4) Outpatient Weekly Labs:    Order the following labs to be drawn on Mondays:    CBC   CMP    CRP    5) Fax Lab Results to Infectious Diseases Provider: Dahiana Katz    [facility] Fax Number: [phone]    6) Outpatient Infectious Diseases Follow-up     Follow-up appointment will be arranged by the ID clinic and will be found in the patient's appointments tab.     Prior to discharge, please ensure the patient's follow-up has been scheduled.     If there is still no follow-up scheduled prior to discharge, please send an EPIC message to Jessica Lazcano in Infectious Diseases.

## 2023-05-18 NOTE — NURSING
Pt alert oriented x 4. Turn q 2 hrs and safety measure maintained. Bilatrela lower extremities performed with negative result. Pt medicated with oxycodone x1 for generalized pain. Call light within reach, spouse at bedside.   Coccyx wound care performed pt tolerated it well, spouse at bedside.

## 2023-05-18 NOTE — ASSESSMENT & PLAN NOTE
Trista Gr is a 72 y.o. old female w/PMH of chronic low back and neck pain, normal pressure hydrocephalus, urinary incontinence (around 1 year), sacral decubitus ulcer (s/p I&D 5/16/23) who presents with bilateral upper extremity weakness and shoulder pain.  She was previously seen in orthopedic spine clinic for cervical radiculopathy and imaging at that time showed multi-level foraminal stenosis but was treated non-operatively due to her being a poor surgical candidate.  On physical exam, she is experiencing pain around the superior aspect of bilateral shoulders along the trapezius muscle and has limited, non-painful, active range of motion.  No symptoms or physical exam findings suggestive of cauda equina syndrome.  Imaging significant for multilevel foraminal stenosis at the cervical and lumbar spine with severe right-foraminal narrowing at C2-3.  Pain is likely 2/2 ongoing cervical radiculopathy, however, no acute orthopedic intervention is warranted at this time.  Recommend multimodal pain regimen and weightbearing as tolerated to bilateral upper extremities.

## 2023-05-18 NOTE — NURSING
Fall risk precautions maintained.  Patient is AAOx4, calm, cooperative, and very pleasant.  Patient is on room air, SPO2 %, no telemetry, daily weight, bedbound, and assist x 2.  Plan is Home Health when stable.  Orthopedic Surgery and General Surgery consults completed.  OT/PT ordered. Lasix 20 mg oral BID and Aldactone 25 mg oral daily to diurese.  Ceftriaxone 2g IVPB q24h for UTI.  Flagyl 500 mg oral q12h for Bone/Joint.  Wound Care to coccyx and right lower buttocks completed.  LE edema noted.  Patient placed on immersion bed, turned on side using pillow. Patient is incontinent of Bowel and Bladder.  Last BM 5/17/23.  Patient voids by external female Catheter.  Meal setup up and assistance with feeding is necessary.  All meals were fed to Patient.  Spouse at bedside.  Call light within reach.

## 2023-05-18 NOTE — SUBJECTIVE & OBJECTIVE
Interval History: NAEON. Afebrile. WBC stable. No new complaints.     Review of Systems   Constitutional:  Positive for fatigue. Negative for chills, diaphoresis and fever.   Respiratory:  Negative for cough, chest tightness and shortness of breath.    Cardiovascular:  Negative for chest pain and leg swelling.   Gastrointestinal:  Negative for abdominal pain, constipation, diarrhea and nausea.   Genitourinary:  Positive for dysuria (improved). Negative for decreased urine volume.   Musculoskeletal:  Positive for back pain and gait problem. Negative for arthralgias and myalgias.   Skin:  Positive for wound (Sacral). Negative for rash.   Neurological:  Positive for weakness. Negative for dizziness, numbness and headaches.   Psychiatric/Behavioral:  Negative for agitation, behavioral problems and confusion. The patient is not nervous/anxious.    Objective:     Vital Signs (Most Recent):  Temp: 98.4 °F (36.9 °C) (05/18/23 1557)  Pulse: 83 (05/18/23 1557)  Resp: 18 (05/18/23 1557)  BP: (!) 127/57 (05/18/23 1557)  SpO2: 96 % (05/18/23 1557) Vital Signs (24h Range):  Temp:  [97.7 °F (36.5 °C)-98.9 °F (37.2 °C)] 98.4 °F (36.9 °C)  Pulse:  [83-96] 83  Resp:  [18-20] 18  SpO2:  [93 %-100 %] 96 %  BP: (105-161)/(55-78) 127/57     Weight: 91.5 kg (201 lb 11.5 oz)  Body mass index is 31.59 kg/m².    Estimated Creatinine Clearance: 86.9 mL/min (based on SCr of 0.7 mg/dL).     Physical Exam  Vitals and nursing note reviewed.   Constitutional:       General: She is not in acute distress.     Appearance: She is well-developed. She is obese. She is not ill-appearing or toxic-appearing.   HENT:      Head: Normocephalic and atraumatic.      Nose: Nose normal.      Mouth/Throat:      Mouth: Mucous membranes are moist.      Pharynx: Oropharynx is clear.   Eyes:      General: No scleral icterus.  Cardiovascular:      Rate and Rhythm: Normal rate and regular rhythm.   Pulmonary:      Effort: Pulmonary effort is normal. No respiratory  distress.      Breath sounds: Normal breath sounds. No wheezing or rales.   Abdominal:      General: Bowel sounds are normal. There is no distension.      Palpations: Abdomen is soft. There is no mass.      Tenderness: There is no abdominal tenderness. There is no guarding.   Musculoskeletal:      Right lower leg: Edema present.      Left lower leg: Edema present.   Skin:     General: Skin is warm and dry.      Coloration: Skin is not pale.      Findings: No erythema or rash.      Comments: Sacral wound not examined. See media tab   Neurological:      Mental Status: She is alert and oriented to person, place, and time.      Motor: Weakness present. No abnormal muscle tone.      Gait: Gait abnormal.   Psychiatric:         Mood and Affect: Mood normal.         Behavior: Behavior normal.                Significant Labs: CBC:   Recent Labs   Lab 05/17/23 0450 05/18/23 0424   WBC 15.28* 15.26*   HGB 11.3* 10.7*   HCT 35.1* 33.2*    330     CMP:   Recent Labs   Lab 05/17/23 0450 05/18/23 0424    139   K 3.9 3.7    101   CO2 25 28    122*   BUN 9 11   CREATININE 0.6 0.7   CALCIUM 9.4 9.3   PROT 6.6 6.2   ALBUMIN 2.8* 2.6*   BILITOT 0.4 0.3   ALKPHOS 101 99   AST 22 37   ALT 31 38   ANIONGAP 12 10     Microbiology Results (last 7 days)       Procedure Component Value Units Date/Time    Culture, Anaerobic [[id number]]  (Abnormal) Collected: 05/16/23 1008    Order Status: Completed Specimen: Decubitus from Sacrum Updated: 05/18/23 1453     Anaerobic Culture BACTEROIDES SPECIES  Moderate      Culture, Anaerobic [[id number]]  (Abnormal) Collected: 05/16/23 1008    Order Status: Completed Specimen: Bone from Sacrum Updated: 05/18/23 1453     Anaerobic Culture BACTEROIDES OVATUS  Moderate      Aerobic culture [[id number]]  (Abnormal)  (Susceptibility) Collected: 05/16/23 1008    Order Status: Completed Specimen: Bone from Sacrum Updated: 05/18/23 1153     Aerobic Bacterial Culture ESCHERICHIA  COLI  Few  Skin cj also present      Aerobic culture [[id number]]  (Abnormal)  (Susceptibility) Collected: 05/16/23 1008    Order Status: Completed Specimen: Decubitus from Sacrum Updated: 05/18/23 1152     Aerobic Bacterial Culture ESCHERICHIA COLI  Few  Skin cj also present      Fungus culture [[id number]] Collected: 05/16/23 1008    Order Status: Completed Specimen: Decubitus from Sacrum Updated: 05/18/23 0650     Fungus (Mycology) Culture Culture in progress    Fungus culture [[id number]] Collected: 05/16/23 1008    Order Status: Completed Specimen: Bone from Sacrum Updated: 05/18/23 0650     Fungus (Mycology) Culture Culture in progress    AFB Culture & Smear [[id number]] Collected: 05/16/23 1008    Order Status: Completed Specimen: Decubitus from Sacrum Updated: 05/17/23 2127     AFB Culture & Smear Culture in progress     AFB CULTURE STAIN No acid fast bacilli seen.    AFB Culture & Smear [[id number]] Collected: 05/16/23 1008    Order Status: Completed Specimen: Bone from Sacrum Updated: 05/17/23 2127     AFB Culture & Smear Culture in progress     AFB CULTURE STAIN No acid fast bacilli seen.    Gram stain [[id number]] Collected: 05/16/23 1008    Order Status: Completed Specimen: Decubitus from Sacrum Updated: 05/16/23 2107     Gram Stain Result Rare WBC's      Rare Gram positive cocci      Few Gram negative rods    Gram stain [[id number]] Collected: 05/16/23 1008    Order Status: Completed Specimen: Bone from Sacrum Updated: 05/16/23 2058     Gram Stain Result Rare WBC's      Many Gram positive cocci      Many Gram negative rods      Few Gram positive rods    Urine culture [[id number]]  (Abnormal)  (Susceptibility) Collected: 05/14/23 0026    Order Status: Completed Specimen: Urine Updated: 05/16/23 1039     Urine Culture, Routine ESCHERICHIA COLI  >100,000 cfu/ml      Narrative:      Specimen Source->Urine          Wound Culture:   Recent Labs   Lab 05/16/23  1008   LABAERO ESCHERICHIA COLI  Few  Skin cj  also present  *  ESCHERICHIA COLI  Few  Skin cj also present  *       Significant Imaging:     Imaging Results              X-Ray Chest AP Portable (Final result)  Result time 05/13/23 23:54:11      Final result by Alton Noland MD (05/13/23 23:54:11)                   Impression:      Mild diminished depth of inspiration, there is no radiographic evidence for superimposed acute intrathoracic process.      Electronically signed by: Alton Noland  Date:    05/13/2023  Time:    23:54               Narrative:    EXAMINATION:  XR CHEST AP PORTABLE    CLINICAL HISTORY:  Cough, unspecified    TECHNIQUE:  Single frontal view of the chest was performed.    COMPARISON:  Chest radiograph November 7, 2022    FINDINGS:  Single portable chest view is submitted.  The technologist indicates the patient has extreme kyphosis, likely associated with the aforementioned the position of the patient's head and neck obscures the upper chest.  There is mild diminished depth of inspiration and minimal rotation, when accounting for these factors the cardiomediastinal silhouette appears appropriate and stable.    Mild accentuation of pulmonary bronchovascular markings consistent with diminished depth of inspiration noted.  There is no evidence for superimposed confluent infiltrate or consolidation, significant pleural effusion or pneumothorax.    The osseous structures demonstrate chronic change.                                    Time: 50 minutes   50% of time spent on face-to-face counseling and coordination of care. Counseling included review of test results, diagnosis, and treatment plan with patient and/or family.

## 2023-05-18 NOTE — NURSING
Wound care to coccyx completed.  Site cleansed with Vashe, pippa-wound area treated with Triad, wound packed with Kerlix moistened in Dakins solution, covered with 4x4 and ABD, and secured with Tape.

## 2023-05-18 NOTE — PLAN OF CARE
Problem: Adult Inpatient Plan of Care  Goal: Plan of Care Review  Outcome: Ongoing, Progressing  Goal: Patient-Specific Goal (Individualized)  Outcome: Ongoing, Progressing  Goal: Absence of Hospital-Acquired Illness or Injury  Outcome: Ongoing, Progressing  Goal: Optimal Comfort and Wellbeing  Outcome: Ongoing, Progressing     Problem: Pain Acute  Goal: Acceptable Pain Control and Functional Ability  Outcome: Ongoing, Progressing

## 2023-05-18 NOTE — CONSULTS
[facility] ([place] [address])  Orthopedics  Consult Note    Patient Name: Trista Gr  MRN: [id number]  Admission Date: 5/13/2023  Hospital Length of Stay: 2 days  Attending Provider: Amor Amador MD  Primary Care Provider: REGINO Yeung MD    Patient information was obtained from patient and ER records.     Inpatient consult to Orthopedics  Consult performed by: MILES Cheney MD  Consult ordered by: Eliot Pina MD      Subjective:     Principal Problem:Lower extremity weakness    Chief Complaint:   Chief Complaint   Patient presents with    multiple complaints      Pt c/o burning and pain during urination pt recently d/c with dx of UTI pt currently on bactrim 3 days ago.  Pt also c/o bed sore on buttock that has increasing pain.         HPI: Trista Gr is a 72 y.o. old female w/PMH of chronic low back and neck pain, normal pressure hydrocephalus, urinary incontinence (around 1 year), sacral decubitus ulcer (s/p I&D 5/16/23) who presents with and bilateral upper extremity weakness and shoulder pain.  Patient has been wheelchair bound for over 2 years and was last seen in orthopedic spine clinic with Dr. Josue (June 2021) for similar symptoms of bilateral upper extremity weakness/pain but was deemed a poor surgical candidate at that time.  She was recently admitted for treatment of UTI and has reportedly been experiencing around two weeks of weakness at bilateral upper extremities along with aching pain at her shoulders.  MRI was obtained showing previously established mild-moderate canal stenosis at C2-C3 and L3-L4 with multi level neural foraminal narrowing bilaterally.        Past Medical History:   Diagnosis Date    Debility     Decubitus ulcer of buttock     GERD (gastroesophageal reflux disease) 09/22/2012    HTN (hypertension) 09/22/2012    Normal stress echocardiogram 09/22/2012    Vitamin D deficiency disease 09/22/2012       Past Surgical History:   Procedure  Laterality Date    APPENDECTOMY      BILATERAL SALPINGOOPHORECTOMY      DEBRIDEMENT OF SACRAL WOUND N/A 5/16/2023    Procedure: DEBRIDEMENT, WOUND, SACRUM;  Surgeon: Barney Edward MD;  Location: [facility] OR;  Service: General;  Laterality: N/A;    HYSTERECTOMY         Review of patient's allergies indicates:   Allergen Reactions    Aspirin      Sore stomach    Codeine Itching    Pcn [penicillins] Itching       Current Facility-Administered Medications   Medication    acetaminophen tablet 650 mg    baclofen tablet 5 mg    cefTRIAXone (ROCEPHIN) 2 g in dextrose 5 % in water (D5W) 5 % 50 mL IVPB (MB+)    diclofenac sodium 1 % gel 2 g    furosemide tablet 20 mg    gabapentin capsule 300 mg    heparin (porcine) injection 5,000 Units    LIDOcaine 5 % patch 1 patch    losartan tablet 50 mg    melatonin tablet 6 mg    metroNIDAZOLE tablet 500 mg    naloxone 0.4 mg/mL injection 0.02 mg    NIFEdipine 24 hr tablet 30 mg    ondansetron injection 4 mg    oxyCODONE immediate release tablet 5 mg    phenazopyridine tablet 100 mg    polyethylene glycol packet 17 g    senna tablet 8.6 mg    sodium chloride 0.9% flush 10 mL    sodium chloride 0.9% flush 3 mL    spironolactone tablet 25 mg    tramadol split tablet 25 mg     Family History       Problem Relation (Age of Onset)    Diabetes Brother, Maternal Grandmother    Hypertension Mother, Father    Stroke Father    Uterine cancer Sister          Tobacco Use    Smoking status: Never    Smokeless tobacco: Never   Substance and Sexual Activity    Alcohol use: No     Alcohol/week: 0.0 standard drinks    Drug use: No    Sexual activity: Not Currently     ROS  Constitutional: Denies fever/chills  Eyes: Denies change in vision  ENT: Denies sore throat or rhinorrhea   Respiratory: Denies shortness of breath or cough  Cardiovascular: Denies chest pain or palpitations  Gastrointestinal: Denies abdominal pain, nausea, or vomiting  Genitourinary: Denies dysuria and flank pain  Skin: Denies  "new rash or skin lesions   Allergic/Immunologic: Denies adverse reactions to current medications  Neurological: Denies headaches or dizziness  Musculoskeletal: see HPI    Objective:     Vital Signs (Most Recent):  Temp: 98.2 °F (36.8 °C) (05/17/23 2009)  Pulse: 96 (05/17/23 2009)  Resp: 18 (05/17/23 2100)  BP: (!) 161/78 (05/17/23 2052)  SpO2: 97 % (05/17/23 2009) Vital Signs (24h Range):  Temp:  [97.7 °F (36.5 °C)-99 °F (37.2 °C)] 98.2 °F (36.8 °C)  Pulse:  [81-97] 96  Resp:  [14-20] 18  SpO2:  [95 %-99 %] 97 %  BP: (109-163)/(57-78) 161/78     Weight: 91.5 kg (201 lb 11.5 oz)  Height: 5' 7" (170.2 cm)  Body mass index is 31.59 kg/m².      Intake/Output Summary (Last 24 hours) at 5/17/2023 2131  Last data filed at 5/17/2023 1801  Gross per 24 hour   Intake --   Output 1100 ml   Net -1100 ml        Ortho/SPM Exam  Physical Exam:  General:  no apparent distress, WDWN  HENT:  NCAT, Bilateral ears/eyes normal  CV:  Normal pulses, color, and cap refill  Lungs:  Normal respiratory effort  Neuro: No FND, awake, alert  Psych:  Oriented to Person, Place, and Time    MSK:       LUE:  Inspection: Skin intact throughout, no swelling, no effusions, no ecchymosis   Palpation: Non-TTP throughout, no palpable abnormality; normal muscle tone   ROM: AROM and PROM of the elbow, wrist, and hand intact without pain.  Limited active range of motion at shoulder especially with abduction; no pain with passive ROM   Neuro: AIN/PIN/Radial/Median/Ulnar Nerves assessed in isolation without deficit.   SILT throughout.    Vascular: Radial artery palpated 2+. Capillary refill <3s.         RUE:  Inspection: Skin intact throughout, no swelling, no effusions, no ecchymosis   Palpation: Non-TTP throughout, no palpable abnormality; normal muscle tone    ROM: AROM and PROM of the elbow, wrist, and hand intact without pain.  Limited active range of motion at shoulder especially with abduction; no pain with passive ROM   Neuro: " AIN/PIN/Radial/Median/Ulnar Nerves assessed in isolation without deficit.   SILT throughout.    Vascular: Radial artery palpated 2+. Capillary refill <3s.         LLE:  Inspection: Skin intact throughout, no effusions   Palpation: Non-TTP throughout. No palpable abnormality.   ROM: AROM and PROM of the hip, knee, ankle, and foot intact without pain.   Neuro: TA/EHL/Gastroc/FHL assessed in isolation without deficit.   SILT throughout.    Vascular: DP and PT arteries palpated 2+. Capillary refill <3s.         RLE:  Inspection: Skin intact throughout, no effusions   Palpation: Non-TTP throughout. No palpable abnormality.   ROM: AROM and PROM of the hip, knee, ankle, and foot intact without pain.   Neuro: TA/EHL/Gastroc/FHL assessed in isolation without deficit.   SILT throughout.    Vascular: DP and PT arteries palpated 2+. Capillary refill <3s.        Spine/pelvis/axial body:  Chin on chest deformity   Mild TTP along cervical spine   No tenderness to palpation of thoracic, or lumbar spine  No chest wall or abdominal tenderness  No decubitus ulcers  Muscle tone normal      Significant Labs: BMP:   Recent Labs   Lab 05/17/23  0450         K 3.9      CO2 25   BUN 9   CREATININE 0.6   CALCIUM 9.4   MG 1.6     CBC:   Recent Labs   Lab 05/16/23  0422 05/17/23  0450   WBC 14.56* 15.28*   HGB 11.5* 11.3*   HCT 34.2* 35.1*    342     All pertinent labs within the past 24 hours have been reviewed.    Significant Imaging: I have reviewed and interpreted all pertinent imaging results/findings.  MRI cervical thoracic and lumbar spine showing severe kyphosis with mild central stenosis at C2-3 and severe right foraminal narrowing at C2-C3.    Assessment/Plan:     Upper extremity weakness  Trista Gr is a 72 y.o. old female w/PMH of chronic low back and neck pain, normal pressure hydrocephalus, urinary incontinence (around 1 year), sacral decubitus ulcer (s/p I&D 5/16/23) who presents with  bilateral upper extremity weakness and shoulder pain.  She was previously seen in orthopedic spine clinic for cervical radiculopathy and imaging at that time showed multi-level foraminal stenosis but was treated non-operatively due to her being a poor surgical candidate.  On physical exam, she is experiencing pain around the superior aspect of bilateral shoulders along the trapezius muscle and has limited, non-painful, active range of motion.  No symptoms or physical exam findings suggestive of cauda equina syndrome.  Imaging significant for multilevel foraminal stenosis at the cervical and lumbar spine with severe right-foraminal narrowing at C2-3.  Pain is likely 2/2 ongoing cervical radiculopathy, however, no acute orthopedic intervention is warranted at this time.  Recommend multimodal pain regimen and weightbearing as tolerated to bilateral upper extremities.          MILES Cheney MD  Orthopedics  [facility] - Intensive Care ([place]

## 2023-05-18 NOTE — PROGRESS NOTES
[facility] ([place] [address])  Infectious Disease  Progress Note    Patient Name: Trista Gr  MRN: [id number]  Admission Date: 5/13/2023  Length of Stay: 2 days  Attending Physician: Malgorzata Ojeda MD  Primary Care Provider: REGINO Yeung MD    Isolation Status: No active isolations  Assessment/Plan:      ID  Osteomyelitis of coccyx  72-year-old female with history of spinal stenosis with progressive gait instability who has been bed-bound for last 2 years c/b development of sacral decubitus ulcer, urinary incontinence c/b reucrrent UTIs admitted for UTI and infected sacral ulcer probing to bone.      General surgery performed surgical debridement 5/16. Bone culture of coccyx is positive for E. Coli and bacteroides. Urine culture 5/14 + E. Coli. She is on Ceftriaxone and flagyl. ID consulted for antibiotic recommendations    Recommendations  Continue Ceftriaxone and Flagyl   Local wound care per wound care team  She needs pressure offloading, nutrition optimization for optimal chance to heal her wound. Consider diverting colostomy. Continue pure wick.   Anticipate 6 weeks of antibiotic therapy  Recommend LTAC placement for IV antibiotics and wound care  Discussed antibiotic plan with ID staff. ID will sign off. Please notify ID if bone cultures grow any new organisms.           Outpatient Antibiotic Therapy Plan:    1) Infection: osteomyelitis    2) Discharge Antibiotics:    Intravenous antibiotics:  Ceftriaxone 2 g IV q 24 hours    Oral antibiotics:  Flagyl 500 mg PO q 12 hours    3) Therapy Duration:  6 weeks from debridement     Estimated end date of IV antibiotics:  6/27/23    4) Outpatient Weekly Labs:    Order the following labs to be drawn on Mondays:   CBC  CMP   CRP    5) Fax Lab Results to Infectious Diseases Provider: Dahiana Katz    [facility] Fax Number: [phone]    6) Outpatient Infectious Diseases Follow-up    Follow-up appointment will be arranged by the ID clinic  and will be found in the patient's appointments tab.    Prior to discharge, please ensure the patient's follow-up has been scheduled.    If there is still no follow-up scheduled prior to discharge, please send an EPIC message to Jessica Lazcano in Infectious Diseases.                  Thank you for the consult. Please secure chat for any questions.  Dahiana Katz PA-C      Subjective:     Principal Problem:Lower extremity weakness    HPI: 72-year-old female with past medical history of hypertension, cervical and lumbar spinal stenosis with progressive gait instability who has been bed-bound for last 2 years c/b development of sacral decubitus ulcer, urinary incontinence c/b reucrrent UTIs who presented with dysuria.      Urine culture 5/14 + E. Coli. Patient started on IV ceftriaxone with resolution of dysuria.  Her sacral wound has worsened with exposed coccyx. General surgery performed surgical debridement 5/16. Bone culture of coccyx is positive for GNR.     In terms of progressive debility, Neurology consulted and suspects debility most likely due to progressive cervical and lumbar spinal stenosis with myelopathy and not normal pressure hydrocephalus.  Recommended repeat spinal imaging. Patient unable to obtain MRI due to body size at OSH and transferred to [facility] for imaging and spine surgery evaluation.    Patient reports living with her  who cares for her at home. She is incontinent. She wears a diaper and when needs to have a bowel movement uses a bedpan.      Interval History: NAEON. Afebrile. WBC stable. No new complaints.     Review of Systems   Constitutional:  Positive for fatigue. Negative for chills, diaphoresis and fever.   Respiratory:  Negative for cough, chest tightness and shortness of breath.    Cardiovascular:  Negative for chest pain and leg swelling.   Gastrointestinal:  Negative for abdominal pain, constipation, diarrhea and nausea.   Genitourinary:  Positive for dysuria  (improved). Negative for decreased urine volume.   Musculoskeletal:  Positive for back pain and gait problem. Negative for arthralgias and myalgias.   Skin:  Positive for wound (Sacral). Negative for rash.   Neurological:  Positive for weakness. Negative for dizziness, numbness and headaches.   Psychiatric/Behavioral:  Negative for agitation, behavioral problems and confusion. The patient is not nervous/anxious.    Objective:     Vital Signs (Most Recent):  Temp: 98.4 °F (36.9 °C) (05/18/23 1557)  Pulse: 83 (05/18/23 1557)  Resp: 18 (05/18/23 1557)  BP: (!) 127/57 (05/18/23 1557)  SpO2: 96 % (05/18/23 1557) Vital Signs (24h Range):  Temp:  [97.7 °F (36.5 °C)-98.9 °F (37.2 °C)] 98.4 °F (36.9 °C)  Pulse:  [83-96] 83  Resp:  [18-20] 18  SpO2:  [93 %-100 %] 96 %  BP: (105-161)/(55-78) 127/57     Weight: 91.5 kg (201 lb 11.5 oz)  Body mass index is 31.59 kg/m².    Estimated Creatinine Clearance: 86.9 mL/min (based on SCr of 0.7 mg/dL).     Physical Exam  Vitals and nursing note reviewed.   Constitutional:       General: She is not in acute distress.     Appearance: She is well-developed. She is obese. She is not ill-appearing or toxic-appearing.   HENT:      Head: Normocephalic and atraumatic.      Nose: Nose normal.      Mouth/Throat:      Mouth: Mucous membranes are moist.      Pharynx: Oropharynx is clear.   Eyes:      General: No scleral icterus.  Cardiovascular:      Rate and Rhythm: Normal rate and regular rhythm.   Pulmonary:      Effort: Pulmonary effort is normal. No respiratory distress.      Breath sounds: Normal breath sounds. No wheezing or rales.   Abdominal:      General: Bowel sounds are normal. There is no distension.      Palpations: Abdomen is soft. There is no mass.      Tenderness: There is no abdominal tenderness. There is no guarding.   Musculoskeletal:      Right lower leg: Edema present.      Left lower leg: Edema present.   Skin:     General: Skin is warm and dry.      Coloration: Skin is not  pale.      Findings: No erythema or rash.      Comments: Sacral wound not examined. See media tab   Neurological:      Mental Status: She is alert and oriented to person, place, and time.      Motor: Weakness present. No abnormal muscle tone.      Gait: Gait abnormal.   Psychiatric:         Mood and Affect: Mood normal.         Behavior: Behavior normal.                Significant Labs: CBC:   Recent Labs   Lab 05/17/23 0450 05/18/23  0424   WBC 15.28* 15.26*   HGB 11.3* 10.7*   HCT 35.1* 33.2*    330     CMP:   Recent Labs   Lab 05/17/23 0450 05/18/23  0424    139   K 3.9 3.7    101   CO2 25 28    122*   BUN 9 11   CREATININE 0.6 0.7   CALCIUM 9.4 9.3   PROT 6.6 6.2   ALBUMIN 2.8* 2.6*   BILITOT 0.4 0.3   ALKPHOS 101 99   AST 22 37   ALT 31 38   ANIONGAP 12 10     Microbiology Results (last 7 days)       Procedure Component Value Units Date/Time    Culture, Anaerobic [[id number]]  (Abnormal) Collected: 05/16/23 1008    Order Status: Completed Specimen: Decubitus from Sacrum Updated: 05/18/23 1453     Anaerobic Culture BACTEROIDES SPECIES  Moderate      Culture, Anaerobic [[id number]]  (Abnormal) Collected: 05/16/23 1008    Order Status: Completed Specimen: Bone from Sacrum Updated: 05/18/23 1453     Anaerobic Culture BACTEROIDES OVATUS  Moderate      Aerobic culture [[id number]]  (Abnormal)  (Susceptibility) Collected: 05/16/23 1008    Order Status: Completed Specimen: Bone from Sacrum Updated: 05/18/23 1153     Aerobic Bacterial Culture ESCHERICHIA COLI  Few  Skin cj also present      Aerobic culture [[id number]]  (Abnormal)  (Susceptibility) Collected: 05/16/23 1008    Order Status: Completed Specimen: Decubitus from Sacrum Updated: 05/18/23 1152     Aerobic Bacterial Culture ESCHERICHIA COLI  Few  Skin cj also present      Fungus culture [[id number]] Collected: 05/16/23 1008    Order Status: Completed Specimen: Decubitus from Sacrum Updated: 05/18/23 0650     Fungus (Mycology)  Culture Culture in progress    Fungus culture [[id number]] Collected: 05/16/23 1008    Order Status: Completed Specimen: Bone from Sacrum Updated: 05/18/23 0650     Fungus (Mycology) Culture Culture in progress    AFB Culture & Smear [[id number]] Collected: 05/16/23 1008    Order Status: Completed Specimen: Decubitus from Sacrum Updated: 05/17/23 2127     AFB Culture & Smear Culture in progress     AFB CULTURE STAIN No acid fast bacilli seen.    AFB Culture & Smear [[id number]] Collected: 05/16/23 1008    Order Status: Completed Specimen: Bone from Sacrum Updated: 05/17/23 2127     AFB Culture & Smear Culture in progress     AFB CULTURE STAIN No acid fast bacilli seen.    Gram stain [[id number]] Collected: 05/16/23 1008    Order Status: Completed Specimen: Decubitus from Sacrum Updated: 05/16/23 2107     Gram Stain Result Rare WBC's      Rare Gram positive cocci      Few Gram negative rods    Gram stain [[id number]] Collected: 05/16/23 1008    Order Status: Completed Specimen: Bone from Sacrum Updated: 05/16/23 2058     Gram Stain Result Rare WBC's      Many Gram positive cocci      Many Gram negative rods      Few Gram positive rods    Urine culture [[id number]]  (Abnormal)  (Susceptibility) Collected: 05/14/23 0026    Order Status: Completed Specimen: Urine Updated: 05/16/23 1039     Urine Culture, Routine ESCHERICHIA COLI  >100,000 cfu/ml      Narrative:      Specimen Source->Urine          Wound Culture:   Recent Labs   Lab 05/16/23  1008   LABAERO ESCHERICHIA COLI  Few  Skin cj also present  *  ESCHERICHIA COLI  Few  Skin cj also present  *       Significant Imaging:     Imaging Results              X-Ray Chest AP Portable (Final result)  Result time 05/13/23 23:54:11      Final result by Altno Noland MD (05/13/23 23:54:11)                   Impression:      Mild diminished depth of inspiration, there is no radiographic evidence for superimposed acute intrathoracic process.      Electronically signed  by: Alton Noland  Date:    05/13/2023  Time:    23:54               Narrative:    EXAMINATION:  XR CHEST AP PORTABLE    CLINICAL HISTORY:  Cough, unspecified    TECHNIQUE:  Single frontal view of the chest was performed.    COMPARISON:  Chest radiograph November 7, 2022    FINDINGS:  Single portable chest view is submitted.  The technologist indicates the patient has extreme kyphosis, likely associated with the aforementioned the position of the patient's head and neck obscures the upper chest.  There is mild diminished depth of inspiration and minimal rotation, when accounting for these factors the cardiomediastinal silhouette appears appropriate and stable.    Mild accentuation of pulmonary bronchovascular markings consistent with diminished depth of inspiration noted.  There is no evidence for superimposed confluent infiltrate or consolidation, significant pleural effusion or pneumothorax.    The osseous structures demonstrate chronic change.                                    Time: 50 minutes   50% of time spent on face-to-face counseling and coordination of care. Counseling included review of test results, diagnosis, and treatment plan with patient and/or family.

## 2023-05-18 NOTE — SUBJECTIVE & OBJECTIVE
Past Medical History:   Diagnosis Date    Debility     Decubitus ulcer of buttock     GERD (gastroesophageal reflux disease) 09/22/2012    HTN (hypertension) 09/22/2012    Normal stress echocardiogram 09/22/2012    Vitamin D deficiency disease 09/22/2012       Past Surgical History:   Procedure Laterality Date    APPENDECTOMY      BILATERAL SALPINGOOPHORECTOMY      DEBRIDEMENT OF SACRAL WOUND N/A 5/16/2023    Procedure: DEBRIDEMENT, WOUND, SACRUM;  Surgeon: Barney Edward MD;  Location: [facility];  Service: General;  Laterality: N/A;    HYSTERECTOMY         Review of patient's allergies indicates:   Allergen Reactions    Aspirin      Sore stomach    Codeine Itching    Pcn [penicillins] Itching       Current Facility-Administered Medications   Medication    acetaminophen tablet 650 mg    baclofen tablet 5 mg    cefTRIAXone (ROCEPHIN) 2 g in dextrose 5 % in water (D5W) 5 % 50 mL IVPB (MB+)    diclofenac sodium 1 % gel 2 g    furosemide tablet 20 mg    gabapentin capsule 300 mg    heparin (porcine) injection 5,000 Units    LIDOcaine 5 % patch 1 patch    losartan tablet 50 mg    melatonin tablet 6 mg    metroNIDAZOLE tablet 500 mg    naloxone 0.4 mg/mL injection 0.02 mg    NIFEdipine 24 hr tablet 30 mg    ondansetron injection 4 mg    oxyCODONE immediate release tablet 5 mg    phenazopyridine tablet 100 mg    polyethylene glycol packet 17 g    senna tablet 8.6 mg    sodium chloride 0.9% flush 10 mL    sodium chloride 0.9% flush 3 mL    spironolactone tablet 25 mg    tramadol split tablet 25 mg     Family History       Problem Relation (Age of Onset)    Diabetes Brother, Maternal Grandmother    Hypertension Mother, Father    Stroke Father    Uterine cancer Sister          Tobacco Use    Smoking status: Never    Smokeless tobacco: Never   Substance and Sexual Activity    Alcohol use: No     Alcohol/week: 0.0 standard drinks    Drug use: No    Sexual activity: Not Currently     ROS  Constitutional: Denies  "fever/chills  Eyes: Denies change in vision  ENT: Denies sore throat or rhinorrhea   Respiratory: Denies shortness of breath or cough  Cardiovascular: Denies chest pain or palpitations  Gastrointestinal: Denies abdominal pain, nausea, or vomiting  Genitourinary: Denies dysuria and flank pain  Skin: Denies new rash or skin lesions   Allergic/Immunologic: Denies adverse reactions to current medications  Neurological: Denies headaches or dizziness  Musculoskeletal: see HPI    Objective:     Vital Signs (Most Recent):  Temp: 98.2 °F (36.8 °C) (05/17/23 2009)  Pulse: 96 (05/17/23 2009)  Resp: 18 (05/17/23 2100)  BP: (!) 161/78 (05/17/23 2052)  SpO2: 97 % (05/17/23 2009) Vital Signs (24h Range):  Temp:  [97.7 °F (36.5 °C)-99 °F (37.2 °C)] 98.2 °F (36.8 °C)  Pulse:  [81-97] 96  Resp:  [14-20] 18  SpO2:  [95 %-99 %] 97 %  BP: (109-163)/(57-78) 161/78     Weight: 91.5 kg (201 lb 11.5 oz)  Height: 5' 7" (170.2 cm)  Body mass index is 31.59 kg/m².      Intake/Output Summary (Last 24 hours) at 5/17/2023 2131  Last data filed at 5/17/2023 1801  Gross per 24 hour   Intake --   Output 1100 ml   Net -1100 ml        Ortho/SPM Exam  Physical Exam:  General:  no apparent distress, WDWN  HENT:  NCAT, Bilateral ears/eyes normal  CV:  Normal pulses, color, and cap refill  Lungs:  Normal respiratory effort  Neuro: No FND, awake, alert  Psych:  Oriented to Person, Place, and Time    MSK:       LUE:  Inspection: Skin intact throughout, no swelling, no effusions, no ecchymosis   Palpation: Non-TTP throughout, no palpable abnormality; normal muscle tone   ROM: AROM and PROM of the elbow, wrist, and hand intact without pain.  Limited active range of motion at shoulder especially with abduction; no pain with passive ROM   Neuro: AIN/PIN/Radial/Median/Ulnar Nerves assessed in isolation without deficit.   SILT throughout.    Vascular: Radial artery palpated 2+. Capillary refill <3s.         RUE:  Inspection: Skin intact throughout, no swelling, " no effusions, no ecchymosis   Palpation: Non-TTP throughout, no palpable abnormality; normal muscle tone    ROM: AROM and PROM of the elbow, wrist, and hand intact without pain.  Limited active range of motion at shoulder especially with abduction; no pain with passive ROM   Neuro: AIN/PIN/Radial/Median/Ulnar Nerves assessed in isolation without deficit.   SILT throughout.    Vascular: Radial artery palpated 2+. Capillary refill <3s.         LLE:  Inspection: Skin intact throughout, no effusions   Palpation: Non-TTP throughout. No palpable abnormality.   ROM: AROM and PROM of the hip, knee, ankle, and foot intact without pain.   Neuro: TA/EHL/Gastroc/FHL assessed in isolation without deficit.   SILT throughout.    Vascular: DP and PT arteries palpated 2+. Capillary refill <3s.         RLE:  Inspection: Skin intact throughout, no effusions   Palpation: Non-TTP throughout. No palpable abnormality.   ROM: AROM and PROM of the hip, knee, ankle, and foot intact without pain.   Neuro: TA/EHL/Gastroc/FHL assessed in isolation without deficit.   SILT throughout.    Vascular: DP and PT arteries palpated 2+. Capillary refill <3s.        Spine/pelvis/axial body:  Chin on chest deformity   Mild TTP along cervical spine   No tenderness to palpation of thoracic, or lumbar spine  No chest wall or abdominal tenderness  No decubitus ulcers  Muscle tone normal      Significant Labs: BMP:   Recent Labs   Lab 05/17/23  0450         K 3.9      CO2 25   BUN 9   CREATININE 0.6   CALCIUM 9.4   MG 1.6     CBC:   Recent Labs   Lab 05/16/23  0422 05/17/23  0450   WBC 14.56* 15.28*   HGB 11.5* 11.3*   HCT 34.2* 35.1*    342     All pertinent labs within the past 24 hours have been reviewed.    Significant Imaging: I have reviewed and interpreted all pertinent imaging results/findings.  MRI cervical thoracic and lumbar spine showing severe kyphosis with mild central stenosis at C2-3 and severe right foraminal narrowing  at C2-C3.

## 2023-05-19 LAB
ALBUMIN SERPL BCP-MCNC: 2.6 G/DL (ref 3.5–5.2)
ALP SERPL-CCNC: 108 U/L (ref 55–135)
ALT SERPL W/O P-5'-P-CCNC: 37 U/L (ref 10–44)
ANION GAP SERPL CALC-SCNC: 7 MMOL/L (ref 8–16)
AST SERPL-CCNC: 28 U/L (ref 10–40)
BASOPHILS # BLD AUTO: 0.12 K/UL (ref 0–0.2)
BASOPHILS NFR BLD: 0.8 % (ref 0–1.9)
BILIRUB SERPL-MCNC: 0.3 MG/DL (ref 0.1–1)
BUN SERPL-MCNC: 11 MG/DL (ref 8–23)
CALCIUM SERPL-MCNC: 9.4 MG/DL (ref 8.7–10.5)
CHLORIDE SERPL-SCNC: 100 MMOL/L (ref 95–110)
CO2 SERPL-SCNC: 32 MMOL/L (ref 23–29)
CREAT SERPL-MCNC: 0.7 MG/DL (ref 0.5–1.4)
DIFFERENTIAL METHOD: ABNORMAL
EOSINOPHIL # BLD AUTO: 0.4 K/UL (ref 0–0.5)
EOSINOPHIL NFR BLD: 2.5 % (ref 0–8)
ERYTHROCYTE [DISTWIDTH] IN BLOOD BY AUTOMATED COUNT: 13.2 % (ref 11.5–14.5)
EST. GFR  (NO RACE VARIABLE): >60 ML/MIN/1.73 M^2
GLUCOSE SERPL-MCNC: 117 MG/DL (ref 70–110)
HCT VFR BLD AUTO: 36.1 % (ref 37–48.5)
HGB BLD-MCNC: 11.3 G/DL (ref 12–16)
IMM GRANULOCYTES # BLD AUTO: 0.34 K/UL (ref 0–0.04)
IMM GRANULOCYTES NFR BLD AUTO: 2.4 % (ref 0–0.5)
LYMPHOCYTES # BLD AUTO: 3 K/UL (ref 1–4.8)
LYMPHOCYTES NFR BLD: 21.1 % (ref 18–48)
MAGNESIUM SERPL-MCNC: 1.6 MG/DL (ref 1.6–2.6)
MCH RBC QN AUTO: 26.2 PG (ref 27–31)
MCHC RBC AUTO-ENTMCNC: 31.3 G/DL (ref 32–36)
MCV RBC AUTO: 84 FL (ref 82–98)
MONOCYTES # BLD AUTO: 1.5 K/UL (ref 0.3–1)
MONOCYTES NFR BLD: 10.8 % (ref 4–15)
NEUTROPHILS # BLD AUTO: 8.9 K/UL (ref 1.8–7.7)
NEUTROPHILS NFR BLD: 62.4 % (ref 38–73)
NRBC BLD-RTO: 0 /100 WBC
PHOSPHATE SERPL-MCNC: 3.5 MG/DL (ref 2.7–4.5)
PLATELET # BLD AUTO: 326 K/UL (ref 150–450)
PMV BLD AUTO: 10.2 FL (ref 9.2–12.9)
POTASSIUM SERPL-SCNC: 4 MMOL/L (ref 3.5–5.1)
PROT SERPL-MCNC: 6.2 G/DL (ref 6–8.4)
RBC # BLD AUTO: 4.31 M/UL (ref 4–5.4)
SODIUM SERPL-SCNC: 139 MMOL/L (ref 136–145)
WBC # BLD AUTO: 14.25 K/UL (ref 3.9–12.7)

## 2023-05-19 PROCEDURE — 99233 SBSQ HOSP IP/OBS HIGH 50: CPT | Mod: ,,, | Performed by: INTERNAL MEDICINE

## 2023-05-19 PROCEDURE — 25000003 PHARM REV CODE 250: Performed by: PHYSICIAN ASSISTANT

## 2023-05-19 PROCEDURE — 99233 PR SUBSEQUENT HOSPITAL CARE,LEVL III: ICD-10-PCS | Mod: ,,, | Performed by: INTERNAL MEDICINE

## 2023-05-19 PROCEDURE — 80053 COMPREHEN METABOLIC PANEL: CPT | Performed by: STUDENT IN AN ORGANIZED HEALTH CARE EDUCATION/TRAINING PROGRAM

## 2023-05-19 PROCEDURE — 36415 COLL VENOUS BLD VENIPUNCTURE: CPT | Performed by: STUDENT IN AN ORGANIZED HEALTH CARE EDUCATION/TRAINING PROGRAM

## 2023-05-19 PROCEDURE — 85025 COMPLETE CBC W/AUTO DIFF WBC: CPT | Performed by: STUDENT IN AN ORGANIZED HEALTH CARE EDUCATION/TRAINING PROGRAM

## 2023-05-19 PROCEDURE — 83735 ASSAY OF MAGNESIUM: CPT | Performed by: STUDENT IN AN ORGANIZED HEALTH CARE EDUCATION/TRAINING PROGRAM

## 2023-05-19 PROCEDURE — 25000003 PHARM REV CODE 250

## 2023-05-19 PROCEDURE — 63600175 PHARM REV CODE 636 W HCPCS: Performed by: PHYSICIAN ASSISTANT

## 2023-05-19 PROCEDURE — A4216 STERILE WATER/SALINE, 10 ML: HCPCS | Performed by: PHYSICIAN ASSISTANT

## 2023-05-19 PROCEDURE — 25000003 PHARM REV CODE 250: Performed by: STUDENT IN AN ORGANIZED HEALTH CARE EDUCATION/TRAINING PROGRAM

## 2023-05-19 PROCEDURE — 25000003 PHARM REV CODE 250: Performed by: INTERNAL MEDICINE

## 2023-05-19 PROCEDURE — 20600001 HC STEP DOWN PRIVATE ROOM

## 2023-05-19 PROCEDURE — 84100 ASSAY OF PHOSPHORUS: CPT | Performed by: STUDENT IN AN ORGANIZED HEALTH CARE EDUCATION/TRAINING PROGRAM

## 2023-05-19 RX ADMIN — OXYCODONE HYDROCHLORIDE 5 MG: 5 TABLET ORAL at 10:05

## 2023-05-19 RX ADMIN — DICLOFENAC 2 G: 10 GEL TOPICAL at 09:05

## 2023-05-19 RX ADMIN — METRONIDAZOLE 500 MG: 500 TABLET ORAL at 08:05

## 2023-05-19 RX ADMIN — SENNOSIDES 8.6 MG: 8.6 TABLET, FILM COATED ORAL at 09:05

## 2023-05-19 RX ADMIN — BACLOFEN 5 MG: 5 TABLET ORAL at 08:05

## 2023-05-19 RX ADMIN — OXYCODONE HYDROCHLORIDE 5 MG: 5 TABLET ORAL at 12:05

## 2023-05-19 RX ADMIN — Medication 10 ML: at 06:05

## 2023-05-19 RX ADMIN — Medication 10 ML: at 02:05

## 2023-05-19 RX ADMIN — GABAPENTIN 300 MG: 300 CAPSULE ORAL at 08:05

## 2023-05-19 RX ADMIN — HEPARIN SODIUM 5000 UNITS: 5000 INJECTION INTRAVENOUS; SUBCUTANEOUS at 02:05

## 2023-05-19 RX ADMIN — PHENAZOPYRIDINE HYDROCHLORIDE 100 MG: 100 TABLET ORAL at 12:05

## 2023-05-19 RX ADMIN — HEPARIN SODIUM 5000 UNITS: 5000 INJECTION INTRAVENOUS; SUBCUTANEOUS at 06:05

## 2023-05-19 RX ADMIN — ACETAMINOPHEN 650 MG: 325 TABLET, FILM COATED ORAL at 12:05

## 2023-05-19 RX ADMIN — OXYCODONE HYDROCHLORIDE 5 MG: 5 TABLET ORAL at 09:05

## 2023-05-19 RX ADMIN — CEFTRIAXONE 2 G: 2 INJECTION, POWDER, FOR SOLUTION INTRAMUSCULAR; INTRAVENOUS at 08:05

## 2023-05-19 RX ADMIN — BACLOFEN 5 MG: 5 TABLET ORAL at 09:05

## 2023-05-19 RX ADMIN — OXYCODONE HYDROCHLORIDE 5 MG: 5 TABLET ORAL at 04:05

## 2023-05-19 RX ADMIN — METRONIDAZOLE 500 MG: 500 TABLET ORAL at 09:05

## 2023-05-19 RX ADMIN — PHENAZOPYRIDINE HYDROCHLORIDE 100 MG: 100 TABLET ORAL at 07:05

## 2023-05-19 RX ADMIN — LIDOCAINE 1 PATCH: 50 PATCH CUTANEOUS at 07:05

## 2023-05-19 RX ADMIN — Medication 10 ML: at 10:05

## 2023-05-19 RX ADMIN — FUROSEMIDE 20 MG: 20 TABLET ORAL at 08:05

## 2023-05-19 RX ADMIN — FUROSEMIDE 20 MG: 20 TABLET ORAL at 09:05

## 2023-05-19 RX ADMIN — LOSARTAN POTASSIUM 50 MG: 50 TABLET, FILM COATED ORAL at 09:05

## 2023-05-19 RX ADMIN — HEPARIN SODIUM 5000 UNITS: 5000 INJECTION INTRAVENOUS; SUBCUTANEOUS at 10:05

## 2023-05-19 RX ADMIN — NIFEDIPINE 30 MG: 30 TABLET, FILM COATED, EXTENDED RELEASE ORAL at 09:05

## 2023-05-19 RX ADMIN — LOSARTAN POTASSIUM 50 MG: 50 TABLET, FILM COATED ORAL at 08:05

## 2023-05-19 RX ADMIN — BACLOFEN 5 MG: 5 TABLET ORAL at 02:05

## 2023-05-19 RX ADMIN — SPIRONOLACTONE 25 MG: 25 TABLET, FILM COATED ORAL at 09:05

## 2023-05-19 NOTE — PROGRESS NOTES
Patient seen for wound care follow-up.  Reviewed chart for this encounter.   See Flow Sheet for findings.    The plan was to have NPWT applied to sacral wound, interdisciplinary team spoke to pt about ostomy, initially refused. WC explained to pt and  benefits and risks and ostomy management and care and patient and  have decided to proceed with the ostomy to allow the wound to heal and then place the NPWT. MD team notified.     Pt will continue with BID dressing changes for wound care at this time. Post-ostomy will place NPWT.    RECOMMENDATIONS:  See above    Discussed POC with patient and primary RN.   See EMR for orders & patient education.    Nursing to continue care.  Nursing to maintain pressure injury prevention interventions.    No new orders at this time.

## 2023-05-19 NOTE — PHYSICIAN QUERY
PT Name: Trista Gr  MR #: [id number]     DOCUMENTATION CLARIFICATION     CDS/: Agustin Pacheco RN       Contact information: [email or web address]    This form is a permanent document in the medical record.     Query Date: May 19, 2023    By submitting this query, we are merely seeking further clarification of documentation.  Please utilize your independent clinical judgment when addressing the question(s) below.    The Medical Record contains the following:   Indicators   Supporting Clinical Findings Location in Medical Record    Non-blanchable erythema/redness     x Ulcer/Injury/Skin Breakdown Pre-Operative Diagnosis:   Pressure injury of buttock, stage 3, unspecified laterality     Post-Operative Diagnosis:  Unstageable sacral wound involving the coccyx    Pressure injury of buttock, stage 3  Chronic stage III decubitus ulcers secondary to immobility despite with wound care efforts at home with home health.   Probed deep to bone concerning for possible stage IV.    s/p sacral wound debridement with general surgery on 5/16  OR cultures with bacterial growth   General Sx Op Note (5/16)            HM DS (5/24)    Deep Tissue Injury     x Wound care consult Altered Skin Integrity Present on Admission - Did Patient arrive to the hospital with altered skin?: yes  Location: Coccyx      Full thickness tissue loss. Base is covered by slough and/or eschar in the wound bed    Wound edges are necrotic, non-viable tissue found through out the wound bed, surgical consultation recommended and called into Dr Edward. Measures 6x5x4 with tunneling of 6 - 8cm at 12:00 (bone is palpable).     Wound Care Consult (5/15)     x Acute/Chronic Illness Patient is 72-year-old woman with past medical history of hypertension, chronic lower back pain > 10 years with degenerative joint disease of the spine with cervical and lumbar spinal stenosis with progressive gait instability who has been bed-bound for last 2 years with  stage III sacral decubitus ulcer, urinary incontinence who presented with recent urinary tract infection which did not improve with treatment with oral trimethoprim/sulfamethoxazole.   HM DS (5/24)   x Medication/Treatment Cleansed with:;Antimicrobial agent;Wound cleanser;Applied:;Skin Barrier    Orders written for Vashe soaked gauze, 4x4, and covered with ABD pad, Every shift - Wound care orders to be discontinued when surgical intervention implemented.  Triad paste applied to the pippa-wound  IAD.  Cavalon skin barrier to pippa-dressing skin.    10 by 8 x 8 cm sacral wound with some necrotic tissue debrided as well as exposed coccyx   Wound Care Consult (5/15)                General Sx Op Note (5/16)    Other       The clinical guidelines noted are only a system guideline. It does not replace the providers clinical judgment.    Per the National Pressure Injury Advisory Panel:   A pressure injury is localized damage to the skin and underlying soft tissue usually over a bony prominence or related to a medical or other device. The injury can present as intact skin or an open ulcer and may be painful. The injury occurs as a result of intense and/or prolonged pressure or pressure in combination with shear. The tolerance of soft tissue for pressure and shear may also be affected by microclimate, nutrition, perfusion, co-morbidities and condition of the soft tissue.       Stage 1 Pressure Injury:  Intact skin with a localized area of non-blanchable erythema, which may appear differently in darkly pigmented skin. Color changes do not include purple or maroon discoloration; these may indicate deep tissue pressure injury.    Stage 2 Pressure Injury:  Partial-thickness loss of skin with exposed dermis. The wound bed is viable, pink or red, moist, and may also present as an intact or ruptured serum-filled blister.    Stage 3 Pressure Injury:  Full-thickness loss of skin, in which adipose (fat) is visible in the ulcer and  granulation tissue and epibole (rolled wound edges) are often present. Slough and/or eschar may be visible. Undermining and tunneling may occur.    Stage 4 Pressure Injury:  Full-thickness skin and tissue loss with exposed or directly palpable fascia, muscle, tendon, ligament, cartilage or bone in the ulcer. Slough and/or eschar may be visible. Epibole (rolled edges), undermining and/or tunneling often occur.    Unstageable Pressure Injury:  Full-thickness skin and tissue loss in which the extent of tissue damage within the ulcer cannot be confirmed because it is obscured by slough or eschar. If slough or eschar is removed, a Stage 3 or Stage 4 pressure injury will be revealed.    Deep Tissue Pressure Injury:  Intact or non-intact skin with localized area of persistent non-blanchable deep red, maroon, purple discoloration or epidermal separation revealing a dark wound bed or blood filled blister. This injury results from intense and/or prolonged pressure and shear forces at the bone-muscle interface. The wound may evolve rapidly to reveal the actual extent of tissue injury, or may resolve without tissue loss. If necrotic tissue, subcutaneous tissue, granulation tissue, fascia, muscle or other underlying structures are visible, this indicates a full thickness pressure injury (Unstageable, Stage 3 or Stage 4). Do not use DTPI to describe vascular, traumatic, neuropathic, or dermatologic conditions.     Provider, please clarify the integumentary diagnosis related to the documentation of (Coccyx):     [  x ] Pressure Injury/Decubitus Ulcer, Stage 4, Coccyx     [   ] Other Integumentary Diagnosis (please specify):______________         Please document in your progress notes daily for the duration of treatment until resolved and include in your discharge summary.    Reference:    MICKEY Bernard., MARY Rivers., Goldberg, M., TOÑA Torres., TOÑA Farrell., & KALPESH Hawthorne. (2016). Revised National Pressure Ulcer Advisory Panel  Pressure Injury Staging System: Revised Pressure Injury Staging System. J Wound Ostomy Continence Nurs, 43(6), 585-597. doi:10.1097/won.6739904699256033    Form No.[id number]

## 2023-05-19 NOTE — NURSING
Patient requested to see Attending to discuss options.  Patient/spouse stated they want the Wound VAC and not a colostomy.  MARCELLO Ojeda M.D. notified via secure chat.

## 2023-05-19 NOTE — NURSING
Patient alert oriented x 4. She reported on and off shoulder pain. Oxycodone administered x 1.  She is tolerating oral intake very well. Spouse stayed at bedside most of the time.Pt and spouse educated on importance of turn q 2 hrs to prevent further skin damage. Coccyx wound dressing performed per order this am, pt tolerated it well.

## 2023-05-19 NOTE — NURSING
Patient's Spouse informed Bedside that Patient was not fed yesterday.  Meal setup and assistance with feeding has been provided for all meals today.  Patient's Spouse stood in front of Nurse's Station and loudly stated that Patient has not been fed and that her food needed to be heated up...pertaining to dinner meal.  He then left the floor.  Bedside entered the Patient's room, and had to wake Patient up to feed her dinner meal.  Patient ate a few bites and drank a few sips of soda and went back to sleep.

## 2023-05-19 NOTE — NURSING
Patient's Spouse requested new dinner tray with correct items ordered.  Family member feels someone consume a  portion of the fish product. Called Dietary Services to request new dinner meal.  Patient notified.

## 2023-05-19 NOTE — PROGRESS NOTES
"[facility] ([address] Medicine  Progress Note    Patient Name: Trista Gr  MRN: [id number]  Patient Class: IP- Inpatient   Admission Date: 5/13/2023  Length of Stay: 3 days  Attending Physician: Malgorzata Ojeda MD  Primary Care Provider: REGINO Yeung MD        Subjective:     Principal Problem:Lower extremity weakness        HPI:  Per Kathleen Ro, PAJoeyC:    "Ms. Trista Gr is a 72 y.o. female, with PMH of HTN, chronic low back pain, normal pressure hydrocephalus, urinary incontinence, who [place] [facility] [place]23 due to burning with urination.  She states she was diagnosed with the UTI 3 days ago, and went to see her PCP who prescribed her Bactrim.  She states her symptoms have not improved while on Bactrim.  She notes associated dizziness, lightheadedness, weakness, loss of appetite.  She denied fever, chills, nausea, vomiting.  She states that the pressure injury on her buttocks has been having increased pain in the past few days as well.  She was evaluated in the ED with labs that showed leukocytosis of 16 K, and left shift.  Metabolic panel was overall fairly normal.  A UA showed nitrate positive UTI with 11 wbc's, moderate bacteria, and trace leukocytes.  Urine cultures pending.  She was treated in the ED with Rocephin."      Overview/Hospital Course:  Patient is 72-year-old woman with past medical history of hypertension, chronic lower back pain > 10 years with degenerative joint disease of the spine with cervical and lumbar spinal stenosis with progressive gait instability who has been bed-bound for last 2 years with stage III sacral decubitus ulcer, urinary incontinence who presented with recent urinary tract infection which did not improve with treatment with oral trimethoprim/sulfamethoxazole.    Prior urine culture collected on 11/7/2022 positive for E coli resistant to trimethoprim/sulfamethoxazole concerning for antibtioic resistance " now.  Patient treated with intravenous ceftriaxone with resolution of dysuria.  Urine culture pending.  Patient also with persistently elevated leukocytosis despite antibiotic treatment.  Patient also with chronic decubitus ulcer which stage III and possible stage IV now.  General surgery consult recommended surgical debridement.    In terms of progressive debility, patient has been seen by Dr. Sravan Josue in June 2021.  Imaging at that time multilevel cervical and lumbar stenosis.  Patient deemded a poor surgical canddiated due to debility at that time.      Neurology consulted on this admission due to concern for possible normal pressure hydrocephalus.  Dr. Ildefonso Navarro Nadimpally suspect debility most likely due to progressive cervical and lumbar spinal stenosis with myelopathy and not normal pressure hydrocephalus.  Recommended repeat spinal imaging including MRI cervical, thoracic and lumbar spine without contrast, along with ongoing thearpy efforts, the addition of gabapentin for pain, and follow-up with spine specialist.  Unable to obtain MRI due to body size.    Requesting transfer to [facility] in order for patient to undergo neuroimaging recommended along with more definitive input by Orthopedic spine surgery/Neurosurgery regarding lower extremity weakness.    Patient transferred to [facility].  Underwent MRI spine with mild cervical stenosis and moderate lumbar stenosis.  Ortho spine consulted.  Awaiting recommendations.  Wound cultures from bone and tissue from surgical debridement on 5/16 with multiple bacteria.  Infectious Disease consulted.  Wound care consulted for wound management recommendations.    No chest pain, shortness of breath, lightheadedness. Tolerating oral intake. Pain controlled    NAD, AO3  NC, AT  RRR  CTAB  SNTND+BS  Wound care pictures reviewed.   PERRL    Vitals, labs and radiographs from past 24h reviewed and personally interpreted.     Assessment/Plan:      * Lower extremity  weakness  Patient with lower extremity weakness with progressive debility and has been bed-bound for 2 years now.  Patient reports some improvement with physical therapy and skilled nurse facility however since discharge from skilled nursing facility patient has been bed-bound and nonambulatory.  Etiology of lower extremity weakness due to suspected spinal stenosis.  Imaging also concerning for possible normal pressure hydrocephalus.    - Neurology consulted   -- Suspect debility most likely due to progressive cervical and lumbar spinal stenosis with myelopathy and not normal pressure hydrocephalus.  -- Patient transferred to [facility] for MRI due to body habitus   -- MRI C-T-L- Spine completed at [facility] with mild spinal canal stenosis at C2-3, Moderate canal stenosis at L3-4 and L4-5  - Ortho Spine consulted  -- Follow up consults recs  - Patient at functional baseline per PT/OT  - Continue baclofen, diclofenac    Leukocytosis  - See above    Swelling of left eyelid  States this is the 2nd time this has occurred and she required consultation with an eyelid specialist in the past - no specific treatment provided during prior episode, advised on watchful waiting in the past.   - Has been ongoing over a month  - Continue with QID warm compresses followed by gentle cleansing with baby soap.  - BNP <10 on admission    Neck pain on right side  - States this is chronic and seems positional as patient is leaning head to the left - there is palpable tightening/stiffness of the right side of the neck.  - X-ray of C-spine unremarkable  - Continue Lidocaine patch  - Warm compress PRN  - Gabapentin 300mg qhs    Urinary incontinence without sensory awareness  Continue with Pure wick    Pressure injury of buttock, stage 3  - Chronic stage III decubitus ulcers secondary to immobility despite with wound care efforts at home with home health.   - Probed deep to bone concerning for possible stage IV.    - s/p sacral wound debridement with  general surgery on 5/16  -- OR cultures with bacterial growth  - Infectious disease consulted  -- Follow up wound and bone cultures  -- Bone culture with GNR, GPC, GPR  -- Sacral culture with GNR. GPC  -- Continue antibiotics per ID   - Wound care consulted  - Nutrition conuslted  -Continue CTX, flagyl  -Wound vac requested; family and patient do not want a diverting colostomy    -Gen surgery consulted     Urinary tract infection without hematuria  - Patient presented with dysuria   - UA positive  - Urine culture with mixed resistance E coli, sensitive to CTX  - CTX started on 5/13, completed tx on 5/18    Obesity (BMI 30.0-34.9)  - BMI 32.70 on admission - patient would benefit for weight reduction.  - Nutrition consulted    Essential hypertension  - Continue Nifedipine 30 mg daily  - Continue Losartan 50 mg bid (home telmisartan)  - Continue Spirolactone 25 mg daily  - Continue to monitor      VTE Risk Mitigation (From admission, onward)           Ordered     heparin (porcine) injection 5,000 Units  Every 8 hours         05/14/23 0210     IP VTE HIGH RISK PATIENT  Once         05/14/23 0210     Place sequential compression device  Until discontinued         05/14/23 0210                    Discharge Planning   MATTHEW: 5/23/2023     Code Status: Full Code   Is the patient medically ready for discharge?: No    Reason for patient still in hospital (select all that apply): Patient trending condition, Laboratory test, Treatment, Consult recommendations and Pending disposition  Discharge Plan A: Home Health                  Malgorzata Ojeda MD  Department of Hospital Medicine   [facility] ([place])

## 2023-05-19 NOTE — PLAN OF CARE
05/19/23 1209   Post-Acute Status   Post-Acute Authorization Placement   Post-Acute Placement Status Referrals Sent         SW spoke to patient and patient spouse and she is wanting to dc to a SNF, patient spouse reports that he cannot assist with the care she needs at home and would prefer SNF first.  SW has sent referrals via [employer] Internet and will continue to follow up.      Meena Powell LMSW  [facility]   [phone]

## 2023-05-19 NOTE — PLAN OF CARE
[facility] ([facility]  Discharge Reassessment    Primary Care Provider: REGINO Yeung MD    Expected Discharge Date: 5/23/2023    Reassessment (most recent)       Discharge Reassessment - 05/19/23 0843          Discharge Reassessment    Assessment Type Discharge Planning Reassessment (P)      Did the patient's condition or plan change since previous assessment? Yes (P)      Communicated MATTHEW with patient/caregiver Yes (P)      Discharge Plan A Home Health (P)      Discharge Plan B Home with family (P)                    Patient expected to dc home with HH when stable to dc.      Meena Powell LMSW  [facility]   [phone]

## 2023-05-19 NOTE — NURSING
Patient's Spouse reported that Patient received incorrect lunch meal order.  Called Dietary services to request correct order (Chicken, potatoes, green beans).

## 2023-05-19 NOTE — NURSING
"Patient requested pain medication for 10/10 generalized pain.  Medication was pulled from [facility] and prepared for Patient.  Patient stated, "It is going to make me too sleepy for breakfast. Can I take it later."  Medication returned to [facility].  "

## 2023-05-19 NOTE — NURSING
Fall risk precautions maintained.  Patient is AAOx4, calm, cooperative, and very pleasant.  Patient is on room air, SPO2 %, no telemetry, daily weight, bedbound, and assist x 2.  Plan is SNF when stable.  Patient agreed to ostomy placement and decided not to have Wound VAC placed.  /Case Management and Wound Care consults completed.  OT/PT ordered. Lasix 20 mg oral BID and Aldactone 25 mg oral daily to diurese.  Ceftriaxone 2g IVPB q24h for UTI.  Flagyl 500 mg oral q12h for Bone/Joint. Wound Care to coccyx and right lower buttocks completed.  Lidocaine Patch applied to right upper arm.  LE edema noted.  Patient placed on immersion bed (setting on 4 bars, Patient weighs 201 lbs.), turned on side using pillow/wedge. Patient is incontinent of Bowel and Bladder.  Last BM 5/17/23.  Patient voids by external female Catheter,  ml.  Meal setup up and assistance with feeding is necessary.  Spouse assisted with all meals today.  All meals were fed to Patient.  Patient's appetite is adequate, eating only a few bites from each meal.  PIV site rotated.  Spouse at bedside.  Call light within reach.

## 2023-05-20 LAB
ALBUMIN SERPL BCP-MCNC: 2.5 G/DL (ref 3.5–5.2)
ALP SERPL-CCNC: 99 U/L (ref 55–135)
ALT SERPL W/O P-5'-P-CCNC: 32 U/L (ref 10–44)
ANION GAP SERPL CALC-SCNC: 9 MMOL/L (ref 8–16)
AST SERPL-CCNC: 22 U/L (ref 10–40)
BASOPHILS # BLD AUTO: 0.07 K/UL (ref 0–0.2)
BASOPHILS NFR BLD: 0.6 % (ref 0–1.9)
BILIRUB SERPL-MCNC: 0.2 MG/DL (ref 0.1–1)
BUN SERPL-MCNC: 10 MG/DL (ref 8–23)
CALCIUM SERPL-MCNC: 9.4 MG/DL (ref 8.7–10.5)
CHLORIDE SERPL-SCNC: 99 MMOL/L (ref 95–110)
CO2 SERPL-SCNC: 30 MMOL/L (ref 23–29)
CREAT SERPL-MCNC: 0.7 MG/DL (ref 0.5–1.4)
DIFFERENTIAL METHOD: ABNORMAL
EOSINOPHIL # BLD AUTO: 0.2 K/UL (ref 0–0.5)
EOSINOPHIL NFR BLD: 1.6 % (ref 0–8)
ERYTHROCYTE [DISTWIDTH] IN BLOOD BY AUTOMATED COUNT: 13.2 % (ref 11.5–14.5)
EST. GFR  (NO RACE VARIABLE): >60 ML/MIN/1.73 M^2
GLUCOSE SERPL-MCNC: 143 MG/DL (ref 70–110)
HCT VFR BLD AUTO: 33.5 % (ref 37–48.5)
HGB BLD-MCNC: 10.7 G/DL (ref 12–16)
IMM GRANULOCYTES # BLD AUTO: 0.37 K/UL (ref 0–0.04)
IMM GRANULOCYTES NFR BLD AUTO: 3 % (ref 0–0.5)
LYMPHOCYTES # BLD AUTO: 2.4 K/UL (ref 1–4.8)
LYMPHOCYTES NFR BLD: 19.4 % (ref 18–48)
MAGNESIUM SERPL-MCNC: 1.5 MG/DL (ref 1.6–2.6)
MCH RBC QN AUTO: 26.4 PG (ref 27–31)
MCHC RBC AUTO-ENTMCNC: 31.9 G/DL (ref 32–36)
MCV RBC AUTO: 83 FL (ref 82–98)
MONOCYTES # BLD AUTO: 1.4 K/UL (ref 0.3–1)
MONOCYTES NFR BLD: 11.2 % (ref 4–15)
NEUTROPHILS # BLD AUTO: 8 K/UL (ref 1.8–7.7)
NEUTROPHILS NFR BLD: 64.2 % (ref 38–73)
NRBC BLD-RTO: 0 /100 WBC
PHOSPHATE SERPL-MCNC: 3.1 MG/DL (ref 2.7–4.5)
PLATELET # BLD AUTO: 349 K/UL (ref 150–450)
PMV BLD AUTO: 10.2 FL (ref 9.2–12.9)
POTASSIUM SERPL-SCNC: 3.4 MMOL/L (ref 3.5–5.1)
PROT SERPL-MCNC: 6.1 G/DL (ref 6–8.4)
RBC # BLD AUTO: 4.06 M/UL (ref 4–5.4)
SODIUM SERPL-SCNC: 138 MMOL/L (ref 136–145)
WBC # BLD AUTO: 12.44 K/UL (ref 3.9–12.7)

## 2023-05-20 PROCEDURE — 20600001 HC STEP DOWN PRIVATE ROOM

## 2023-05-20 PROCEDURE — 25000003 PHARM REV CODE 250: Performed by: INTERNAL MEDICINE

## 2023-05-20 PROCEDURE — 85025 COMPLETE CBC W/AUTO DIFF WBC: CPT | Performed by: STUDENT IN AN ORGANIZED HEALTH CARE EDUCATION/TRAINING PROGRAM

## 2023-05-20 PROCEDURE — 63600175 PHARM REV CODE 636 W HCPCS: Performed by: PHYSICIAN ASSISTANT

## 2023-05-20 PROCEDURE — 99233 PR SUBSEQUENT HOSPITAL CARE,LEVL III: ICD-10-PCS | Mod: ,,, | Performed by: INTERNAL MEDICINE

## 2023-05-20 PROCEDURE — 63600175 PHARM REV CODE 636 W HCPCS: Performed by: INTERNAL MEDICINE

## 2023-05-20 PROCEDURE — 36415 COLL VENOUS BLD VENIPUNCTURE: CPT | Performed by: STUDENT IN AN ORGANIZED HEALTH CARE EDUCATION/TRAINING PROGRAM

## 2023-05-20 PROCEDURE — 83735 ASSAY OF MAGNESIUM: CPT | Performed by: STUDENT IN AN ORGANIZED HEALTH CARE EDUCATION/TRAINING PROGRAM

## 2023-05-20 PROCEDURE — 25000003 PHARM REV CODE 250: Performed by: STUDENT IN AN ORGANIZED HEALTH CARE EDUCATION/TRAINING PROGRAM

## 2023-05-20 PROCEDURE — A4216 STERILE WATER/SALINE, 10 ML: HCPCS | Performed by: PHYSICIAN ASSISTANT

## 2023-05-20 PROCEDURE — 25000003 PHARM REV CODE 250

## 2023-05-20 PROCEDURE — 25000003 PHARM REV CODE 250: Performed by: PHYSICIAN ASSISTANT

## 2023-05-20 PROCEDURE — 99233 SBSQ HOSP IP/OBS HIGH 50: CPT | Mod: ,,, | Performed by: INTERNAL MEDICINE

## 2023-05-20 PROCEDURE — 84100 ASSAY OF PHOSPHORUS: CPT | Performed by: STUDENT IN AN ORGANIZED HEALTH CARE EDUCATION/TRAINING PROGRAM

## 2023-05-20 PROCEDURE — 80053 COMPREHEN METABOLIC PANEL: CPT | Performed by: STUDENT IN AN ORGANIZED HEALTH CARE EDUCATION/TRAINING PROGRAM

## 2023-05-20 RX ORDER — POTASSIUM CHLORIDE 20 MEQ/1
40 TABLET, EXTENDED RELEASE ORAL EVERY 4 HOURS
Status: COMPLETED | OUTPATIENT
Start: 2023-05-20 | End: 2023-05-20

## 2023-05-20 RX ORDER — MUPIROCIN 20 MG/G
OINTMENT TOPICAL 2 TIMES DAILY
Status: DISCONTINUED | OUTPATIENT
Start: 2023-05-20 | End: 2023-05-24 | Stop reason: HOSPADM

## 2023-05-20 RX ORDER — FUROSEMIDE 20 MG/1
20 TABLET ORAL 2 TIMES DAILY
Status: DISCONTINUED | OUTPATIENT
Start: 2023-05-21 | End: 2023-05-24 | Stop reason: HOSPADM

## 2023-05-20 RX ORDER — MAGNESIUM SULFATE HEPTAHYDRATE 40 MG/ML
2 INJECTION, SOLUTION INTRAVENOUS ONCE
Status: COMPLETED | OUTPATIENT
Start: 2023-05-20 | End: 2023-05-20

## 2023-05-20 RX ORDER — LOSARTAN POTASSIUM 50 MG/1
50 TABLET ORAL 2 TIMES DAILY
Status: DISCONTINUED | OUTPATIENT
Start: 2023-05-21 | End: 2023-05-24 | Stop reason: HOSPADM

## 2023-05-20 RX ADMIN — METRONIDAZOLE 500 MG: 500 TABLET ORAL at 08:05

## 2023-05-20 RX ADMIN — HEPARIN SODIUM 5000 UNITS: 5000 INJECTION INTRAVENOUS; SUBCUTANEOUS at 02:05

## 2023-05-20 RX ADMIN — SENNOSIDES 8.6 MG: 8.6 TABLET, FILM COATED ORAL at 08:05

## 2023-05-20 RX ADMIN — METRONIDAZOLE 500 MG: 500 TABLET ORAL at 09:05

## 2023-05-20 RX ADMIN — DICLOFENAC 2 G: 10 GEL TOPICAL at 08:05

## 2023-05-20 RX ADMIN — Medication 10 ML: at 06:05

## 2023-05-20 RX ADMIN — POTASSIUM CHLORIDE 40 MEQ: 1500 TABLET, EXTENDED RELEASE ORAL at 10:05

## 2023-05-20 RX ADMIN — MUPIROCIN: 20 OINTMENT TOPICAL at 09:05

## 2023-05-20 RX ADMIN — LIDOCAINE 1 PATCH: 50 PATCH CUTANEOUS at 06:05

## 2023-05-20 RX ADMIN — BACLOFEN 5 MG: 5 TABLET ORAL at 02:05

## 2023-05-20 RX ADMIN — HEPARIN SODIUM 5000 UNITS: 5000 INJECTION INTRAVENOUS; SUBCUTANEOUS at 09:05

## 2023-05-20 RX ADMIN — POTASSIUM CHLORIDE 40 MEQ: 1500 TABLET, EXTENDED RELEASE ORAL at 02:05

## 2023-05-20 RX ADMIN — MAGNESIUM SULFATE 2 G: 2 INJECTION INTRAVENOUS at 10:05

## 2023-05-20 RX ADMIN — Medication 10 ML: at 10:05

## 2023-05-20 RX ADMIN — BACLOFEN 5 MG: 5 TABLET ORAL at 08:05

## 2023-05-20 RX ADMIN — CEFTRIAXONE 2 G: 2 INJECTION, POWDER, FOR SOLUTION INTRAMUSCULAR; INTRAVENOUS at 09:05

## 2023-05-20 RX ADMIN — Medication 10 ML: at 02:05

## 2023-05-20 RX ADMIN — Medication 6 MG: at 09:05

## 2023-05-20 RX ADMIN — GABAPENTIN 300 MG: 300 CAPSULE ORAL at 09:05

## 2023-05-20 RX ADMIN — ACETAMINOPHEN 650 MG: 325 TABLET, FILM COATED ORAL at 09:05

## 2023-05-20 RX ADMIN — OXYCODONE HYDROCHLORIDE 5 MG: 5 TABLET ORAL at 05:05

## 2023-05-20 RX ADMIN — TRAMADOL HYDROCHLORIDE 25 MG: 50 TABLET, COATED ORAL at 03:05

## 2023-05-20 RX ADMIN — TRAMADOL HYDROCHLORIDE 25 MG: 50 TABLET, COATED ORAL at 05:05

## 2023-05-20 RX ADMIN — SODIUM CHLORIDE 500 ML: 9 INJECTION, SOLUTION INTRAVENOUS at 09:05

## 2023-05-20 RX ADMIN — HEPARIN SODIUM 5000 UNITS: 5000 INJECTION INTRAVENOUS; SUBCUTANEOUS at 05:05

## 2023-05-20 RX ADMIN — BACLOFEN 5 MG: 5 TABLET ORAL at 09:05

## 2023-05-20 NOTE — NURSING
BP 97/52, Map 68, HR 75.  Patient complaining of lightheadedness and dizziness. MARCELLO Ojeda M.D. notified via secure chat.  Morning BP and diuretic medications held.

## 2023-05-20 NOTE — NURSING
Fall risk precautions maintained.  Patient is AAOx4, calm, and cooperative, and assist x 2, placed on immersion bed (setting on 4 bars, Patient weighs 201 lbs.).  Patient is on room air, SPO2 %, no telemetry, daily weight, bedbound, and assist x 2.  Plan is SNF when stable.  Patient agreed (on 5/19/23) to ostomy placement and decided against Wound VAC placement.   Lasix 20 mg oral BID, Aldactone 25 mg oral daily, Nifedipine and Losartan held for (am doses) due to low BP.  NaCl+ bolus 500 ml given once. Ceftriaxone 2g IVPB q24h for UTI.  Flagyl 500 mg oral q12h for Bone/Joint. Wound Care to coccyx and right lower buttocks completed.  Lidocaine Patch applied to right upper arm.  Oxy 5 mg discontinued.  LE edema noted.  Repositioned on side using pillow/wedge, q2h and/or PRN.  Patient is incontinent of Bowel and Bladder.  Last BM 5/17/23.  Patient voids by external female Catheter, UOP 50 ml, tubing and cannister changed.  Meal setup up and assistance with feeding is necessary.  All meals were fed to Patient.  Patient's appetite is adequate, eating only a few bites from each meal.  Sheet and incontinent pads changed.  Call light within reach.

## 2023-05-20 NOTE — NURSING
Latest Reference Range & Units 05/20/23 06:27   Potassium 3.5 - 5.1 mmol/L 3.4 (L)   (L): Data is abnormally low     Latest Reference Range & Units 05/20/23 06:27   Magnesium 1.6 - 2.6 mg/dL 1.5 (L)   (L): Data is abnormally low      MARCELLO Ojeda M.D. notified via secure chat.

## 2023-05-20 NOTE — NURSING
Oxy discontinued, Tramadol ordered.  Patient stated that she would rather have oxy for pain management and discontinue Tramadol.  MARCELLO Ojeda M.D. notified via secure chat.

## 2023-05-20 NOTE — NURSING
Wound care to coccyx completed.  Site cleansed with Vashe, pippa-wound area treated with Triad, wound packed with Kerlix moistened in Dakins solution, covered with 4x4 and ABD, and secured with Tape.  Bleeding noted.  Patient repositioned on right side using wedge.  Call light within reach.  Cell phone charging.

## 2023-05-20 NOTE — NURSING
Spouse called Dietary Services via  to order all meals for Patient.  Bedside RN called Dietary Services after report to order meals per Spouse's request.

## 2023-05-20 NOTE — PLAN OF CARE
Problem: Infection  Goal: Absence of Infection Signs and Symptoms  Outcome: Ongoing, Progressing     Problem: Adult Inpatient Plan of Care  Goal: Plan of Care Review  Outcome: Ongoing, Progressing  Goal: Patient-Specific Goal (Individualized)  Outcome: Ongoing, Progressing  Goal: Absence of Hospital-Acquired Illness or Injury  Outcome: Ongoing, Progressing  Goal: Optimal Comfort and Wellbeing  Outcome: Ongoing, Progressing  Goal: Readiness for Transition of Care  Outcome: Ongoing, Progressing     Problem: Impaired Wound Healing  Goal: Optimal Wound Healing  Outcome: Ongoing, Progressing  Wound care done following wound care recommendation     Problem: Pain Acute  Goal: Acceptable Pain Control and Functional Ability  Outcome: Ongoing, Progressing   Oxy and tramadol administered for pain with moderate relief   Per patient family (jaylen ()), patient did not eat the day before because the patient did not received the appropriate meals ( Meals follow up will be done in the morning by day shift RN) . Snacks was provided to patient overnight. Q2hrs turning done and heels boots applied. Patient remain safe through the night.

## 2023-05-20 NOTE — PROGRESS NOTES
"[facility] ([address] Medicine  Progress Note    Patient Name: Trista Gr  MRN: [id number]  Patient Class: IP- Inpatient   Admission Date: 5/13/2023  Length of Stay: 4 days  Attending Physician: Malgorzata Ojeda MD  Primary Care Provider: REGINO Yeung MD        Subjective:     Principal Problem:Lower extremity weakness        HPI:  Per Kathleen Ro, PAJoeyC:    "Ms. Trista Gr is a 72 y.o. female, with PMH of HTN, chronic low back pain, normal pressure hydrocephalus, urinary incontinence, who presented to [facility] ED on 05/13/2023 due to burning with urination.  She states she was diagnosed with the UTI 3 days ago, and went to see her PCP who prescribed her Bactrim.  She states her symptoms have not improved while on Bactrim.  She notes associated dizziness, lightheadedness, weakness, loss of appetite.  She denied fever, chills, nausea, vomiting.  She states that the pressure injury on her buttocks has been having increased pain in the past few days as well.  She was evaluated in the ED with labs that showed leukocytosis of 16 K, and left shift.  Metabolic panel was overall fairly normal.  A UA showed nitrate positive UTI with 11 wbc's, moderate bacteria, and trace leukocytes.  Urine cultures pending.  She was treated in the ED with Rocephin."      Overview/Hospital Course:  Patient is 72-year-old woman with past medical history of hypertension, chronic lower back pain > 10 years with degenerative joint disease of the spine with cervical and lumbar spinal stenosis with progressive gait instability who has been bed-bound for last 2 years with stage III sacral decubitus ulcer, urinary incontinence who presented with recent urinary tract infection which did not improve with treatment with oral trimethoprim/sulfamethoxazole.    Prior urine culture collected on 11/7/2022 positive for E coli resistant to trimethoprim/sulfamethoxazole concerning for antibtioic resistance " now.  Patient treated with intravenous ceftriaxone with resolution of dysuria.  Urine culture pending.  Patient also with persistently elevated leukocytosis despite antibiotic treatment.  Patient also with chronic decubitus ulcer which stage III and possible stage IV now.  General surgery consult recommended surgical debridement.    In terms of progressive debility, patient has been seen by Dr. Sravan Josue in June 2021.  Imaging at that time multilevel cervical and lumbar stenosis.  Patient deemded a poor surgical canddiated due to debility at that time.      Neurology consulted on this admission due to concern for possible normal pressure hydrocephalus.  Dr. Ildefonso Navarro Nadimpally suspect debility most likely due to progressive cervical and lumbar spinal stenosis with myelopathy and not normal pressure hydrocephalus.  Recommended repeat spinal imaging including MRI cervical, thoracic and lumbar spine without contrast, along with ongoing thearpy efforts, the addition of gabapentin for pain, and follow-up with spine specialist.  Unable to obtain MRI due to body size.    Requesting transfer to [facility] in order for patient to undergo neuroimaging recommended along with more definitive input by Orthopedic spine surgery/Neurosurgery regarding lower extremity weakness.    Patient transferred to [facility].  Underwent MRI spine with mild cervical stenosis and moderate lumbar stenosis.  Ortho spine consulted.  Awaiting recommendations.  Wound cultures from bone and tissue from surgical debridement on 5/16 with multiple bacteria.  Infectious Disease consulted.  Wound care consulted for wound management recommendations.    No chest pain, shortness of breath, lightheadedness. Tolerating oral intake. Pain controlled but hypotensive this AM after oxy.     NAD, AO3  NC, AT  RRR  CTAB  SNTND+BS  Wound care pictures reviewed.   PERRL    Vitals, labs and radiographs from past 24h reviewed and personally interpreted.      Assessment/Plan:      * Lower extremity weakness  Patient with lower extremity weakness with progressive debility and has been bed-bound for 2 years now.  Patient reports some improvement with physical therapy and skilled nurse facility however since discharge from skilled nursing facility patient has been bed-bound and nonambulatory.  Etiology of lower extremity weakness due to suspected spinal stenosis.  Imaging also concerning for possible normal pressure hydrocephalus.    - Neurology consulted   -- Suspect debility most likely due to progressive cervical and lumbar spinal stenosis with myelopathy and not normal pressure hydrocephalus.  -- Patient transferred to [facility] for MRI due to body habitus   -- MRI C-T-L- Spine completed at [facility] with mild spinal canal stenosis at C2-3, Moderate canal stenosis at L3-4 and L4-5  - Ortho Spine consulted  -- Follow up consults recs  - Patient at functional baseline per PT/OT  - Continue baclofen, diclofenac   -tramadol for pain; discontinuing oxy given hypotension thereafter. Hypotension now resolved    Leukocytosis  - See above    Swelling of left eyelid  States this is the 2nd time this has occurred and she required consultation with an eyelid specialist in the past - no specific treatment provided during prior episode, advised on watchful waiting in the past.   - Has been ongoing over a month  - Continue with QID warm compresses followed by gentle cleansing with baby soap.  - BNP <10 on admission    Neck pain on right side  - States this is chronic and seems positional as patient is leaning head to the left - there is palpable tightening/stiffness of the right side of the neck.  - X-ray of C-spine unremarkable  - Continue Lidocaine patch  - Warm compress PRN  - Gabapentin 300mg qhs    Urinary incontinence without sensory awareness  Continue with Pure wick    Pressure injury of buttock, stage 3  - Chronic stage III decubitus ulcers secondary to immobility despite with wound  care efforts at home with home health.   - Probed deep to bone concerning for possible stage IV.    - s/p sacral wound debridement with general surgery on 5/16  -- OR cultures with bacterial growth  - Infectious disease consulted  -- Follow up wound and bone cultures  -- Bone culture with GNR, GPC, GPR  -- Sacral culture with GNR. GPC  -- Continue antibiotics per ID   - Wound care consulted  - Nutrition conuslted  -Continue CTX, flagyl  -Wound vac requested; family and patient considering a diverting colostomy    -Gen surgery consulted     Urinary tract infection without hematuria  - Patient presented with dysuria   - UA positive  - Urine culture with mixed resistance E coli, sensitive to CTX  - CTX started on 5/13, completed tx on 5/18    Obesity (BMI 30.0-34.9)  - BMI 32.70 on admission - patient would benefit for weight reduction.  - Nutrition consulted    Essential hypertension  - Continue Nifedipine 30 mg daily  - Continue Losartan 50 mg bid (home telmisartan)  - Continue Spirolactone 25 mg daily  - Continue to monitor  -5/20: Held this AM given hypotension. Follow      VTE Risk Mitigation (From admission, onward)           Ordered     heparin (porcine) injection 5,000 Units  Every 8 hours         05/14/23 0210     IP VTE HIGH RISK PATIENT  Once         05/14/23 0210     Place sequential compression device  Until discontinued         05/14/23 0210                    Discharge Planning   MATTHEW: 5/23/2023     Code Status: Full Code   Is the patient medically ready for discharge?: No    Reason for patient still in hospital (select all that apply): Patient trending condition, Laboratory test, Treatment, Consult recommendations and Pending disposition  Discharge Plan A: Home Health                  Malgorzata Ojeda MD  Department of Hospital Medicine   [facility] - Intensive Care ([facility]

## 2023-05-20 NOTE — NURSING
Wound care done following wound care recommendation. Patient tolerated dressing change, complained of pain, tramadol given.

## 2023-05-20 NOTE — NURSING
Patient is very lethargic/tired.  Tramadol 25 mg oral given at 0314 hours.  Oxy 5 mg oral given at 0534 hours. MARCELLO Ojeda M.D. notified during rounding.  Oxy will be discontinued.

## 2023-05-20 NOTE — NURSING
NaCl+ Bolus 500 ml given.  BP now 111/52, Map 75, HR 74.  Patient is resting with no complaints at this time.

## 2023-05-21 LAB
ALBUMIN SERPL BCP-MCNC: 2.5 G/DL (ref 3.5–5.2)
ALP SERPL-CCNC: 99 U/L (ref 55–135)
ALT SERPL W/O P-5'-P-CCNC: 34 U/L (ref 10–44)
ANION GAP SERPL CALC-SCNC: 8 MMOL/L (ref 8–16)
AST SERPL-CCNC: 31 U/L (ref 10–40)
BASOPHILS # BLD AUTO: 0.08 K/UL (ref 0–0.2)
BASOPHILS NFR BLD: 0.6 % (ref 0–1.9)
BILIRUB SERPL-MCNC: 0.2 MG/DL (ref 0.1–1)
BUN SERPL-MCNC: 7 MG/DL (ref 8–23)
CALCIUM SERPL-MCNC: 8.8 MG/DL (ref 8.7–10.5)
CHLORIDE SERPL-SCNC: 102 MMOL/L (ref 95–110)
CO2 SERPL-SCNC: 28 MMOL/L (ref 23–29)
CREAT SERPL-MCNC: 0.6 MG/DL (ref 0.5–1.4)
DIFFERENTIAL METHOD: ABNORMAL
EOSINOPHIL # BLD AUTO: 0.2 K/UL (ref 0–0.5)
EOSINOPHIL NFR BLD: 1.3 % (ref 0–8)
ERYTHROCYTE [DISTWIDTH] IN BLOOD BY AUTOMATED COUNT: 13.4 % (ref 11.5–14.5)
EST. GFR  (NO RACE VARIABLE): >60 ML/MIN/1.73 M^2
GLUCOSE SERPL-MCNC: 106 MG/DL (ref 70–110)
HCT VFR BLD AUTO: 33.7 % (ref 37–48.5)
HGB BLD-MCNC: 10.7 G/DL (ref 12–16)
IMM GRANULOCYTES # BLD AUTO: 0.48 K/UL (ref 0–0.04)
IMM GRANULOCYTES NFR BLD AUTO: 3.6 % (ref 0–0.5)
LYMPHOCYTES # BLD AUTO: 3.1 K/UL (ref 1–4.8)
LYMPHOCYTES NFR BLD: 23.7 % (ref 18–48)
MAGNESIUM SERPL-MCNC: 1.8 MG/DL (ref 1.6–2.6)
MCH RBC QN AUTO: 26.4 PG (ref 27–31)
MCHC RBC AUTO-ENTMCNC: 31.8 G/DL (ref 32–36)
MCV RBC AUTO: 83 FL (ref 82–98)
MONOCYTES # BLD AUTO: 1.4 K/UL (ref 0.3–1)
MONOCYTES NFR BLD: 10.7 % (ref 4–15)
NEUTROPHILS # BLD AUTO: 7.9 K/UL (ref 1.8–7.7)
NEUTROPHILS NFR BLD: 60.1 % (ref 38–73)
NRBC BLD-RTO: 0 /100 WBC
PHOSPHATE SERPL-MCNC: 2.6 MG/DL (ref 2.7–4.5)
PLATELET # BLD AUTO: 360 K/UL (ref 150–450)
PMV BLD AUTO: 9.9 FL (ref 9.2–12.9)
POTASSIUM SERPL-SCNC: 4.5 MMOL/L (ref 3.5–5.1)
PROT SERPL-MCNC: 6.3 G/DL (ref 6–8.4)
RBC # BLD AUTO: 4.06 M/UL (ref 4–5.4)
SODIUM SERPL-SCNC: 138 MMOL/L (ref 136–145)
WBC # BLD AUTO: 13.16 K/UL (ref 3.9–12.7)

## 2023-05-21 PROCEDURE — 63600175 PHARM REV CODE 636 W HCPCS: Performed by: PHYSICIAN ASSISTANT

## 2023-05-21 PROCEDURE — 25000003 PHARM REV CODE 250: Performed by: STUDENT IN AN ORGANIZED HEALTH CARE EDUCATION/TRAINING PROGRAM

## 2023-05-21 PROCEDURE — 99232 PR SUBSEQUENT HOSPITAL CARE,LEVL II: ICD-10-PCS | Mod: ,,, | Performed by: INTERNAL MEDICINE

## 2023-05-21 PROCEDURE — 83735 ASSAY OF MAGNESIUM: CPT | Performed by: STUDENT IN AN ORGANIZED HEALTH CARE EDUCATION/TRAINING PROGRAM

## 2023-05-21 PROCEDURE — 80053 COMPREHEN METABOLIC PANEL: CPT | Performed by: STUDENT IN AN ORGANIZED HEALTH CARE EDUCATION/TRAINING PROGRAM

## 2023-05-21 PROCEDURE — 25000003 PHARM REV CODE 250: Performed by: PHYSICIAN ASSISTANT

## 2023-05-21 PROCEDURE — 25000003 PHARM REV CODE 250: Performed by: INTERNAL MEDICINE

## 2023-05-21 PROCEDURE — 20600001 HC STEP DOWN PRIVATE ROOM

## 2023-05-21 PROCEDURE — 99232 SBSQ HOSP IP/OBS MODERATE 35: CPT | Mod: ,,, | Performed by: INTERNAL MEDICINE

## 2023-05-21 PROCEDURE — 84100 ASSAY OF PHOSPHORUS: CPT | Performed by: STUDENT IN AN ORGANIZED HEALTH CARE EDUCATION/TRAINING PROGRAM

## 2023-05-21 PROCEDURE — A4216 STERILE WATER/SALINE, 10 ML: HCPCS | Performed by: PHYSICIAN ASSISTANT

## 2023-05-21 PROCEDURE — 85025 COMPLETE CBC W/AUTO DIFF WBC: CPT | Performed by: STUDENT IN AN ORGANIZED HEALTH CARE EDUCATION/TRAINING PROGRAM

## 2023-05-21 PROCEDURE — 36415 COLL VENOUS BLD VENIPUNCTURE: CPT | Performed by: STUDENT IN AN ORGANIZED HEALTH CARE EDUCATION/TRAINING PROGRAM

## 2023-05-21 RX ORDER — TRAMADOL HYDROCHLORIDE 50 MG/1
50 TABLET ORAL EVERY 4 HOURS PRN
Status: DISCONTINUED | OUTPATIENT
Start: 2023-05-21 | End: 2023-05-24 | Stop reason: HOSPADM

## 2023-05-21 RX ADMIN — GABAPENTIN 300 MG: 300 CAPSULE ORAL at 09:05

## 2023-05-21 RX ADMIN — CARBAMIDE PEROXIDE 6.5% 5 DROP: 6.5 LIQUID AURICULAR (OTIC) at 05:05

## 2023-05-21 RX ADMIN — SENNOSIDES 8.6 MG: 8.6 TABLET, FILM COATED ORAL at 09:05

## 2023-05-21 RX ADMIN — TRAMADOL HYDROCHLORIDE 25 MG: 50 TABLET, COATED ORAL at 05:05

## 2023-05-21 RX ADMIN — METRONIDAZOLE 500 MG: 500 TABLET ORAL at 09:05

## 2023-05-21 RX ADMIN — FUROSEMIDE 20 MG: 20 TABLET ORAL at 09:05

## 2023-05-21 RX ADMIN — Medication 10 ML: at 05:05

## 2023-05-21 RX ADMIN — DICLOFENAC 2 G: 10 GEL TOPICAL at 09:05

## 2023-05-21 RX ADMIN — BACLOFEN 5 MG: 5 TABLET ORAL at 04:05

## 2023-05-21 RX ADMIN — BACLOFEN 5 MG: 5 TABLET ORAL at 09:05

## 2023-05-21 RX ADMIN — LOSARTAN POTASSIUM 50 MG: 50 TABLET, FILM COATED ORAL at 09:05

## 2023-05-21 RX ADMIN — Medication 10 ML: at 09:05

## 2023-05-21 RX ADMIN — CARBAMIDE PEROXIDE 6.5% 5 DROP: 6.5 LIQUID AURICULAR (OTIC) at 09:05

## 2023-05-21 RX ADMIN — NIFEDIPINE 30 MG: 30 TABLET, FILM COATED, EXTENDED RELEASE ORAL at 09:05

## 2023-05-21 RX ADMIN — ACETAMINOPHEN 650 MG: 325 TABLET, FILM COATED ORAL at 06:05

## 2023-05-21 RX ADMIN — TRAMADOL HYDROCHLORIDE 25 MG: 50 TABLET, COATED ORAL at 03:05

## 2023-05-21 RX ADMIN — CEFTRIAXONE 2 G: 2 INJECTION, POWDER, FOR SOLUTION INTRAMUSCULAR; INTRAVENOUS at 09:05

## 2023-05-21 RX ADMIN — HEPARIN SODIUM 5000 UNITS: 5000 INJECTION INTRAVENOUS; SUBCUTANEOUS at 06:05

## 2023-05-21 RX ADMIN — TRAMADOL HYDROCHLORIDE 50 MG: 50 TABLET, COATED ORAL at 09:05

## 2023-05-21 RX ADMIN — MUPIROCIN: 20 OINTMENT TOPICAL at 09:05

## 2023-05-21 RX ADMIN — Medication 10 ML: at 06:05

## 2023-05-21 RX ADMIN — POLYETHYLENE GLYCOL 3350 17 G: 17 POWDER, FOR SOLUTION ORAL at 11:05

## 2023-05-21 RX ADMIN — SPIRONOLACTONE 25 MG: 25 TABLET, FILM COATED ORAL at 09:05

## 2023-05-21 RX ADMIN — LIDOCAINE 1 PATCH: 50 PATCH CUTANEOUS at 06:05

## 2023-05-21 RX ADMIN — HEPARIN SODIUM 5000 UNITS: 5000 INJECTION INTRAVENOUS; SUBCUTANEOUS at 04:05

## 2023-05-21 NOTE — NURSING
Fall risk precautions maintained.  Patient is AAOx4, and assist x 2, placed on immersion bed (setting on 4 bars, Patient weighs 201 lbs.).  Patient is on room air, SPO2 %, no telemetry, daily weight, bedbound, and assist x 2.  Plan is SNF when stable.  Patient agreed (on 5/19/23) to ostomy placement and decided against Wound VAC placement.   Lasix 20 mg oral BID & Aldactone 25 mg oral daily to diurese. Ceftriaxone 2g IVPB q24h for UTI.  Flagyl 500 mg oral q12h for Bone/Joint. Wound Care to coccyx and right lower buttocks completed.  Lidocaine Patch removed.  Oxy 5 mg discontinued.  Tramadol 50 mg added to MAR, PRN. LE edema noted.  Repositioned on side using pillow/wedge, q2h and/or PRN.  Patient is incontinent of Bowel and Bladder.  Last BM 5/17/23.  Miralax given. Patient voids by external female Catheter,  ml, tubing and cannister changed.  Meal setup up and assistance with feeding is necessary.  All meals were fed to Patient.  Patient's appetite is adequate, eating only a few bites from each meal.  Sheet and incontinent pads changed.  Call light and cell phone within reach.

## 2023-05-21 NOTE — NURSING
"Patient did not want to take Baclofen and Tramadol 50 mg together, to avoid lethargy and "difficulty communicating".  Requested 25 mg from Pharmacy.  "

## 2023-05-21 NOTE — NURSING
Patient reported ear discomfort, Carbamide Peroxide ordered.  Patient stated Tramadol was not controlling pain.  Dose changed to 50 mg oral q4h PRN.

## 2023-05-21 NOTE — NURSING
"Patient stated that her right ear is "stopped up."  She stated it feels "full."   No drainage noted. MARCELLO Ojeda M.D. notified via secure chat.  "

## 2023-05-21 NOTE — NURSING
"Discussed with Patient plan to change dressing.   Offered Patient 50 mg of Tramadol prior to dressing change.  Patient stated, "I don't want to take Tramadol 50 mg with Baclofen."  Tramadol 50 mg returned to pyxis.  Tramadol 25 mg ordered from Pharmacy.  While dressing change supplies were being prepared Patient stated, "Wow, can I get the 50 mg now.  I already hurt."  Dressing change not completed at this time, will provide Tramadol delivered from Pharmacy.  "

## 2023-05-21 NOTE — PT/OT/SLP PROGRESS
Occupational Therapy      Patient Name:  Trista Gr   MRN:  [id number]    Pt states she is dependent for all ADLs/mobility in her bed and uses a lift device for transfers.  Per patient as well as per past charting pt performs no functional mobility which has been her baseline for ~6months.  Pt has lift, HB, and w/c however is left alone at home at times.  Recommend d/c to basic nursing facility or home with 24/7 assist.    5/21/2023

## 2023-05-21 NOTE — PT/OT/SLP PROGRESS
Physical Therapy Screen      Patient Name:  Trista Gr   MRN:  [id number]    Patient is drowsy upon PT arrival and requires multiple tactile cues to remain awake.  Pt states she is dependent for all mobility in her bed and uses a lift device for transfers.  Per patient as well as per past charting pt performs no functional mobility which has been her baseline for ~6months.  Pt has lift, HB, and w/c however is left alone at home at times.  Recommend d/c to basic nursing facility or home with 24/7 assist.

## 2023-05-21 NOTE — NURSING
Pt slept well this shift. A&Ox4 . VSS. Pt inconsolable describing how much pain she is in. Although, once again, pt slept very well. Medicated per MAR. T&RQ2. Wound care completed on buttocks wound. Safety precautions in place. Call light in reach. No further concerns noted at this time.

## 2023-05-21 NOTE — PROGRESS NOTES
"[facility] ([address] Medicine  Progress Note    Patient Name: Trista Gr  MRN: [id number]  Patient Class: IP- Inpatient   Admission Date: 5/13/2023  Length of Stay: 5 days  Attending Physician: Malgorzata Ojeda MD  Primary Care Provider: REGINO Yeung MD        Subjective:     Principal Problem:Lower extremity weakness        HPI:  Per Kathleen Ro, PAJoeyC:    "Ms. Trista Gr is a 72 y.o. female, with PMH of HTN, chronic low back pain, normal pressure hydrocephalus, urinary incontinence, who presented to [facility] ED on 05/13/2023 due to burning with urination.  She states she was diagnosed with the UTI 3 days ago, and went to see her PCP who prescribed her Bactrim.  She states her symptoms have not improved while on Bactrim.  She notes associated dizziness, lightheadedness, weakness, loss of appetite.  She denied fever, chills, nausea, vomiting.  She states that the pressure injury on her buttocks has been having increased pain in the past few days as well.  She was evaluated in the ED with labs that showed leukocytosis of 16 K, and left shift.  Metabolic panel was overall fairly normal.  A UA showed nitrate positive UTI with 11 wbc's, moderate bacteria, and trace leukocytes.  Urine cultures pending.  She was treated in the ED with Rocephin."      Overview/Hospital Course:  Patient is 72-year-old woman with past medical history of hypertension, chronic lower back pain > 10 years with degenerative joint disease of the spine with cervical and lumbar spinal stenosis with progressive gait instability who has been bed-bound for last 2 years with stage III sacral decubitus ulcer, urinary incontinence who presented with recent urinary tract infection which did not improve with treatment with oral trimethoprim/sulfamethoxazole.    Prior urine culture collected on 11/7/2022 positive for E coli resistant to trimethoprim/sulfamethoxazole concerning for antibtioic resistance " now.  Patient treated with intravenous ceftriaxone with resolution of dysuria.  Urine culture pending.  Patient also with persistently elevated leukocytosis despite antibiotic treatment.  Patient also with chronic decubitus ulcer which stage III and possible stage IV now.  General surgery consult recommended surgical debridement.    In terms of progressive debility, patient has been seen by Dr. Sravan Josue in June 2021.  Imaging at that time multilevel cervical and lumbar stenosis.  Patient deemded a poor surgical canddiated due to debility at that time.      Neurology consulted on this admission due to concern for possible normal pressure hydrocephalus.  Dr. Ildefonso Navarro Nadimpally suspect debility most likely due to progressive cervical and lumbar spinal stenosis with myelopathy and not normal pressure hydrocephalus.  Recommended repeat spinal imaging including MRI cervical, thoracic and lumbar spine without contrast, along with ongoing thearpy efforts, the addition of gabapentin for pain, and follow-up with spine specialist.  Unable to obtain MRI due to body size.    Requesting transfer to [facility] in order for patient to undergo neuroimaging recommended along with more definitive input by Orthopedic spine surgery/Neurosurgery regarding lower extremity weakness.    Patient transferred to [facility].  Underwent MRI spine with mild cervical stenosis and moderate lumbar stenosis.  Ortho spine consulted.  Awaiting recommendations.  Wound cultures from bone and tissue from surgical debridement on 5/16 with multiple bacteria.  Infectious Disease consulted.  Wound care consulted for wound management recommendations.    No chest pain, shortness of breath, lightheadedness. Tolerating oral intake. Pain uncontrolled on tramadol 25    NAD, AO3  NC, AT  RRR  CTAB  SNTND+BS  Wound care pictures reviewed.   PERRL    Vitals, labs and radiographs from past 24h reviewed and personally interpreted.     Assessment/Plan:      *  Lower extremity weakness  Patient with lower extremity weakness with progressive debility and has been bed-bound for 2 years now.  Patient reports some improvement with physical therapy and skilled nurse facility however since discharge from skilled nursing facility patient has been bed-bound and nonambulatory.  Etiology of lower extremity weakness due to suspected spinal stenosis.  Imaging also concerning for possible normal pressure hydrocephalus.    - Neurology consulted   -- Suspect debility most likely due to progressive cervical and lumbar spinal stenosis with myelopathy and not normal pressure hydrocephalus.  -- Patient transferred to [facility] for MRI due to body habitus   -- MRI C-T-L- Spine completed at [facility] with mild spinal canal stenosis at C2-3, Moderate canal stenosis at L3-4 and L4-5  - Ortho Spine consulted  -- Follow up consults recs  - Patient at functional baseline per PT/OT  - Continue baclofen, diclofenac   -tramadol for pain; discontinuing oxy given hypotension thereafter. Hypotension now resolved    Leukocytosis  - See above    Swelling of left eyelid  States this is the 2nd time this has occurred and she required consultation with an eyelid specialist in the past - no specific treatment provided during prior episode, advised on watchful waiting in the past.   - Has been ongoing over a month  - Continue with QID warm compresses followed by gentle cleansing with baby soap.  - BNP <10 on admission    Neck pain on right side  - States this is chronic and seems positional as patient is leaning head to the left - there is palpable tightening/stiffness of the right side of the neck.  - X-ray of C-spine unremarkable  - Continue Lidocaine patch  - Warm compress PRN  - Gabapentin 300mg qhs    Urinary incontinence without sensory awareness  Continue with Pure wick    Pressure injury of buttock, stage 3  - Chronic stage III decubitus ulcers secondary to immobility despite with wound care efforts at home with  home health.   - Probed deep to bone concerning for possible stage IV.    - s/p sacral wound debridement with general surgery on 5/16  -- OR cultures with bacterial growth  - Infectious disease consulted  -- Follow up wound and bone cultures  -- Bone culture with GNR, GPC, GPR  -- Sacral culture with GNR. GPC  -- Continue antibiotics per ID   - Wound care consulted  - Nutrition conuslted  -Continue CTX, flagyl  -Family now opting for diverting colostomy. Will discuss with surgery  -Gen surgery consulted   -increasing tramadol to 25 for moderate pain, 50 for severe    Urinary tract infection without hematuria  - Patient presented with dysuria   - UA positive  - Urine culture with mixed resistance E coli, sensitive to CTX  - CTX started on 5/13, completed tx on 5/18    Obesity (BMI 30.0-34.9)  - BMI 32.70 on admission - patient would benefit for weight reduction.  - Nutrition consulted    Essential hypertension  - Continue Nifedipine 30 mg daily  - Continue Losartan 50 mg bid (home telmisartan)  - Continue Spirolactone 25 mg daily  - Continue to monitor  -5/20: Held this AM given hypotension. Follow      VTE Risk Mitigation (From admission, onward)           Ordered     heparin (porcine) injection 5,000 Units  Every 8 hours         05/14/23 0210     IP VTE HIGH RISK PATIENT  Once         05/14/23 0210     Place sequential compression device  Until discontinued         05/14/23 0210                    Discharge Planning   MATTHEW: 5/23/2023     Code Status: Full Code   Is the patient medically ready for discharge?: No    Reason for patient still in hospital (select all that apply): Patient trending condition, Laboratory test, Treatment, Consult recommendations and Pending disposition  Discharge Plan A: Home Health                  Malgorzata Ojeda MD  Department of Hospital Medicine   [facility] - Intensive Care ([facility]-)

## 2023-05-22 LAB
ALBUMIN SERPL BCP-MCNC: 2.6 G/DL (ref 3.5–5.2)
ALP SERPL-CCNC: 109 U/L (ref 55–135)
ALT SERPL W/O P-5'-P-CCNC: 66 U/L (ref 10–44)
ANION GAP SERPL CALC-SCNC: 9 MMOL/L (ref 8–16)
AST SERPL-CCNC: 74 U/L (ref 10–40)
BACTERIA SPEC ANAEROBE CULT: ABNORMAL
BASOPHILS # BLD AUTO: 0.09 K/UL (ref 0–0.2)
BASOPHILS NFR BLD: 0.7 % (ref 0–1.9)
BILIRUB SERPL-MCNC: 0.2 MG/DL (ref 0.1–1)
BUN SERPL-MCNC: 7 MG/DL (ref 8–23)
CALCIUM SERPL-MCNC: 9.5 MG/DL (ref 8.7–10.5)
CHLORIDE SERPL-SCNC: 101 MMOL/L (ref 95–110)
CO2 SERPL-SCNC: 28 MMOL/L (ref 23–29)
CREAT SERPL-MCNC: 0.6 MG/DL (ref 0.5–1.4)
DIFFERENTIAL METHOD: ABNORMAL
EOSINOPHIL # BLD AUTO: 0.2 K/UL (ref 0–0.5)
EOSINOPHIL NFR BLD: 1.6 % (ref 0–8)
ERYTHROCYTE [DISTWIDTH] IN BLOOD BY AUTOMATED COUNT: 13.4 % (ref 11.5–14.5)
EST. GFR  (NO RACE VARIABLE): >60 ML/MIN/1.73 M^2
GLUCOSE SERPL-MCNC: 95 MG/DL (ref 70–110)
HCT VFR BLD AUTO: 35.9 % (ref 37–48.5)
HGB BLD-MCNC: 11.2 G/DL (ref 12–16)
IMM GRANULOCYTES # BLD AUTO: 0.32 K/UL (ref 0–0.04)
IMM GRANULOCYTES NFR BLD AUTO: 2.6 % (ref 0–0.5)
LYMPHOCYTES # BLD AUTO: 3 K/UL (ref 1–4.8)
LYMPHOCYTES NFR BLD: 24.3 % (ref 18–48)
MAGNESIUM SERPL-MCNC: 1.8 MG/DL (ref 1.6–2.6)
MCH RBC QN AUTO: 25.9 PG (ref 27–31)
MCHC RBC AUTO-ENTMCNC: 31.2 G/DL (ref 32–36)
MCV RBC AUTO: 83 FL (ref 82–98)
MONOCYTES # BLD AUTO: 1.3 K/UL (ref 0.3–1)
MONOCYTES NFR BLD: 10.4 % (ref 4–15)
NEUTROPHILS # BLD AUTO: 7.4 K/UL (ref 1.8–7.7)
NEUTROPHILS NFR BLD: 60.4 % (ref 38–73)
NRBC BLD-RTO: 0 /100 WBC
PHOSPHATE SERPL-MCNC: 3.1 MG/DL (ref 2.7–4.5)
PLATELET # BLD AUTO: 391 K/UL (ref 150–450)
PMV BLD AUTO: 10.2 FL (ref 9.2–12.9)
POTASSIUM SERPL-SCNC: 4.4 MMOL/L (ref 3.5–5.1)
PROT SERPL-MCNC: 6.6 G/DL (ref 6–8.4)
RBC # BLD AUTO: 4.32 M/UL (ref 4–5.4)
SODIUM SERPL-SCNC: 138 MMOL/L (ref 136–145)
WBC # BLD AUTO: 12.21 K/UL (ref 3.9–12.7)

## 2023-05-22 PROCEDURE — 36415 COLL VENOUS BLD VENIPUNCTURE: CPT | Performed by: STUDENT IN AN ORGANIZED HEALTH CARE EDUCATION/TRAINING PROGRAM

## 2023-05-22 PROCEDURE — 20600001 HC STEP DOWN PRIVATE ROOM

## 2023-05-22 PROCEDURE — 25000003 PHARM REV CODE 250: Performed by: INTERNAL MEDICINE

## 2023-05-22 PROCEDURE — 63600175 PHARM REV CODE 636 W HCPCS: Performed by: PHYSICIAN ASSISTANT

## 2023-05-22 PROCEDURE — 99232 SBSQ HOSP IP/OBS MODERATE 35: CPT | Mod: ,,, | Performed by: INTERNAL MEDICINE

## 2023-05-22 PROCEDURE — 25000003 PHARM REV CODE 250: Performed by: STUDENT IN AN ORGANIZED HEALTH CARE EDUCATION/TRAINING PROGRAM

## 2023-05-22 PROCEDURE — 25000003 PHARM REV CODE 250: Performed by: PHYSICIAN ASSISTANT

## 2023-05-22 PROCEDURE — 99232 PR SUBSEQUENT HOSPITAL CARE,LEVL II: ICD-10-PCS | Mod: ,,, | Performed by: INTERNAL MEDICINE

## 2023-05-22 PROCEDURE — 83735 ASSAY OF MAGNESIUM: CPT | Performed by: STUDENT IN AN ORGANIZED HEALTH CARE EDUCATION/TRAINING PROGRAM

## 2023-05-22 PROCEDURE — 85025 COMPLETE CBC W/AUTO DIFF WBC: CPT | Performed by: STUDENT IN AN ORGANIZED HEALTH CARE EDUCATION/TRAINING PROGRAM

## 2023-05-22 PROCEDURE — 84100 ASSAY OF PHOSPHORUS: CPT | Performed by: STUDENT IN AN ORGANIZED HEALTH CARE EDUCATION/TRAINING PROGRAM

## 2023-05-22 PROCEDURE — 80053 COMPREHEN METABOLIC PANEL: CPT | Performed by: STUDENT IN AN ORGANIZED HEALTH CARE EDUCATION/TRAINING PROGRAM

## 2023-05-22 PROCEDURE — A4216 STERILE WATER/SALINE, 10 ML: HCPCS | Performed by: PHYSICIAN ASSISTANT

## 2023-05-22 RX ADMIN — LOSARTAN POTASSIUM 50 MG: 50 TABLET, FILM COATED ORAL at 09:05

## 2023-05-22 RX ADMIN — MUPIROCIN: 20 OINTMENT TOPICAL at 09:05

## 2023-05-22 RX ADMIN — ACETAMINOPHEN 650 MG: 325 TABLET, FILM COATED ORAL at 03:05

## 2023-05-22 RX ADMIN — HEPARIN SODIUM 5000 UNITS: 5000 INJECTION INTRAVENOUS; SUBCUTANEOUS at 04:05

## 2023-05-22 RX ADMIN — TRAMADOL HYDROCHLORIDE 25 MG: 50 TABLET, COATED ORAL at 12:05

## 2023-05-22 RX ADMIN — NIFEDIPINE 30 MG: 30 TABLET, FILM COATED, EXTENDED RELEASE ORAL at 09:05

## 2023-05-22 RX ADMIN — Medication 6 MG: at 12:05

## 2023-05-22 RX ADMIN — SENNOSIDES 8.6 MG: 8.6 TABLET, FILM COATED ORAL at 09:05

## 2023-05-22 RX ADMIN — FUROSEMIDE 20 MG: 20 TABLET ORAL at 09:05

## 2023-05-22 RX ADMIN — DICLOFENAC 2 G: 10 GEL TOPICAL at 09:05

## 2023-05-22 RX ADMIN — CARBAMIDE PEROXIDE 6.5% 5 DROP: 6.5 LIQUID AURICULAR (OTIC) at 09:05

## 2023-05-22 RX ADMIN — BACLOFEN 5 MG: 5 TABLET ORAL at 09:05

## 2023-05-22 RX ADMIN — LIDOCAINE 1 PATCH: 50 PATCH CUTANEOUS at 06:05

## 2023-05-22 RX ADMIN — SPIRONOLACTONE 25 MG: 25 TABLET, FILM COATED ORAL at 09:05

## 2023-05-22 RX ADMIN — METRONIDAZOLE 500 MG: 500 TABLET ORAL at 09:05

## 2023-05-22 RX ADMIN — Medication 10 ML: at 06:05

## 2023-05-22 RX ADMIN — BACLOFEN 5 MG: 5 TABLET ORAL at 04:05

## 2023-05-22 RX ADMIN — Medication 10 ML: at 02:05

## 2023-05-22 NOTE — PLAN OF CARE
Recommendations     1. Continue Regular diet + Hill.   2. RD to monitor & follow-up.     Goals: Meet % EEN, EPN by RD f/u date  Nutrition Goal Status: new  Communication of RD Recs: other (comment) (POC)

## 2023-05-22 NOTE — CONSULTS
[facility] ([place] [address])  General Surgery  Consult Note    Patient Name: Trista Gr  MRN: [id number]  Code Status: Full Code  Admission Date: 5/13/2023  Hospital Length of Stay: 6 days  Attending Physician: Malgorzata Ojeda MD  Primary Care Provider: REGINO Yeung MD    Patient information was obtained from patient, parent, past medical records and primary team.     Inpatient consult to General Surgery  Consult performed by: Naif White MD  Consult ordered by: Malgorzata Ojeda MD  Reason for consult: See below   Assessment/Recommendations: See below         Subjective:     Principal Problem: Lower extremity weakness    History of Present Illness: 72F with chronic sacral wound and general surgery has been re-consulted to discuss the possibly of diverting ostomy. This pt has been bed nound for multiple years and states had had this wound for under a year but it started to get worse after December of last year when she was dishcarge from SNF and having home health assistance. She recently had this wound debrided at [facility] on 5/16 23. She was transferred to OSH. On evaluation of the wound last time we saw her the wound looked good and did not required any further surgical debridement. Wound care has been folliwing her and she is on abx  for bacteria growing from her sacral wound. She has never had any abdominal surgery. No MI of CVA. Never smoker.         No current facility-administered medications on file prior to encounter.     Current Outpatient Medications on File Prior to Encounter   Medication Sig    acetaminophen (TYLENOL) 650 MG TbSR Take 1 tablet (650 mg total) by mouth 3 (three) times daily as needed.    ascorbic acid, vitamin C, (VITAMIN C) 500 MG tablet Take 500 mg by mouth 2 (two) times a day.    ciprofloxacin HCl (CILOXAN) 0.3 % ophthalmic solution Place 1 drop into the left eye 4 (four) times daily.    esomeprazole (NEXIUM) 20 MG capsule Take 20 mg by mouth daily as  needed.    ferrous sulfate (FEOSOL) 325 mg (65 mg iron) Tab tablet Take 325 mg by mouth daily with breakfast.    fish oil-omega-3 fatty acids 300-1,000 mg capsule Take 1 g by mouth once daily.    furosemide (LASIX) 20 MG tablet TAKE 1 TABLET(20 MG) BY MOUTH TWICE DAILY    sulfamethoxazole-trimethoprim 800-160mg (BACTRIM DS) 800-160 mg Tab Take 1 tablet by mouth 2 (two) times daily.    telmisartan (MICARDIS) 40 MG Tab Take 1 tablet (40 mg total) by mouth once daily.    traMADoL (ULTRAM) 50 mg tablet TAKE 1 TABLET BY MOUTH EVERY OTHER DAY AS NEEDED FOR PAIN    zinc sulfate (ZINCATE) 50 mg zinc (220 mg) capsule Take 220 mg by mouth once daily.    multivitamin with minerals tablet Take 1 tablet by mouth once daily.    spironolactone (ALDACTONE) 25 MG tablet Take 1 tablet (25 mg total) by mouth once daily.       Review of patient's allergies indicates:   Allergen Reactions    Aspirin      Sore stomach    Codeine Itching    Pcn [penicillins] Itching       Past Medical History:   Diagnosis Date    Debility     Decubitus ulcer of buttock     GERD (gastroesophageal reflux disease) 09/22/2012    HTN (hypertension) 09/22/2012    Normal stress echocardiogram 09/22/2012    Vitamin D deficiency disease 09/22/2012     Past Surgical History:   Procedure Laterality Date    APPENDECTOMY      BILATERAL SALPINGOOPHORECTOMY      DEBRIDEMENT OF SACRAL WOUND N/A 5/16/2023    Procedure: DEBRIDEMENT, WOUND, SACRUM;  Surgeon: Barney Edward MD;  Location: [facility];  Service: General;  Laterality: N/A;    HYSTERECTOMY       Family History       Problem Relation (Age of Onset)    Diabetes Brother, Maternal Grandmother    Hypertension Mother, Father    Stroke Father    Uterine cancer Sister          Tobacco Use    Smoking status: Never    Smokeless tobacco: Never   Substance and Sexual Activity    Alcohol use: No     Alcohol/week: 0.0 standard drinks    Drug use: No    Sexual activity: Not Currently     Review  of Systems   Gastrointestinal:  Negative for abdominal pain.   Musculoskeletal:  Positive for back pain.   Skin:         Sacral wound    Objective:     Vital Signs (Most Recent):  Temp: 98.1 °F (36.7 °C) (05/22/23 1202)  Pulse: 75 (05/22/23 1202)  Resp: 19 (05/22/23 1202)  BP: (!) 118/56 (05/22/23 1600)  SpO2: 98 % (05/22/23 1202) Vital Signs (24h Range):  Temp:  [98 °F (36.7 °C)-98.2 °F (36.8 °C)] 98.1 °F (36.7 °C)  Pulse:  [75-93] 75  Resp:  [13-19] 19  SpO2:  [98 %-100 %] 98 %  BP: (107-132)/(56-70) 118/56     Weight: 91.5 kg (201 lb 11.5 oz)  Body mass index is 31.59 kg/m².     Physical Exam  Vitals reviewed.   Constitutional:       General: She is not in acute distress.  HENT:      Head: Normocephalic and atraumatic.      Nose: Nose normal.   Eyes:      General:         Right eye: No discharge.         Left eye: No discharge.   Cardiovascular:      Rate and Rhythm: Normal rate and regular rhythm.   Pulmonary:      Effort: Pulmonary effort is normal. No respiratory distress.      Breath sounds: No stridor.   Abdominal:      Comments: Soft, ND, NT   Musculoskeletal:         General: Normal range of motion.      Cervical back: Normal range of motion.   Skin:     General: Skin is warm and dry.      Capillary Refill: Capillary refill takes 2 to 3 seconds.      Comments: Sacral wound with good granulation tissues  No autumn necrotic tissue   At least 5cm from anus    Neurological:      General: No focal deficit present.      Mental Status: She is alert and oriented to person, place, and time.   Psychiatric:         Mood and Affect: Mood normal.         Behavior: Behavior normal.          I have reviewed all pertinent lab results within the past 24 hours.    Significant Diagnostics:  I have reviewed all pertinent imaging results/findings within the past 24 hours.      Assessment/Plan:     Pressure injury of buttock, stage 3  72F with chronic sacral wound who has been bed bound for multiple years and recently underwent  surgical debridement on 5/16/23. Surgery consulted for possible fecal diversion.     -Extensive conversation with pt at the possibility of ostomy creation to facilitate healing.   -Pt would like to avoid surgery if possible which is reasonable given an ostomy may be permanent for her given her clinical condition. It may be premature to consider ostomy at this point as well seeing that her wound looks pretty good and that it is relatively far from the anus. Not sure stool diversion would benefit her all that much at this point.   -We recommend continued aggressive local wound care and wound vac placement per the wound  care team  -If at a later date her wound  deteriorates and is more clear fecal diversion would benefit her please re-consult.           VTE Risk Mitigation (From admission, onward)         Ordered     heparin (porcine) injection 5,000 Units  Every 8 hours         05/14/23 0210     IP VTE HIGH RISK PATIENT  Once         05/14/23 0210     Place sequential compression device  Until discontinued         05/14/23 0210                Thank you for your consult. I will sign off. Please contact us if you have any additional questions.    Naif White MD  General Surgery  [facility] ([place]-)

## 2023-05-22 NOTE — ASSESSMENT & PLAN NOTE
72F with chronic sacral wound who has been bed bound for multiple years and recently underwent surgical debridement on 5/16/23. Surgery consulted for possible fecal diversion.     -Extensive conversation with pt at the possibility of ostomy creation to facilitate healing.   -Pt would like to avoid surgery if possible which is reasonable given an ostomy may be permanent for her given her clinical condition. It may be premature to consider ostomy at this point as well seeing how good her wound looks and that it is relatively far from the anus. Not sure stool diversion would benefit her all that much at this point.   -We recommend continued aggressive local wound care and wound vac placement per the wound  care team  -If at a later date her wound  deteriorates and is more clear fecal diversion would benefit her please re-consult.

## 2023-05-22 NOTE — SUBJECTIVE & OBJECTIVE
No current facility-administered medications on file prior to encounter.     Current Outpatient Medications on File Prior to Encounter   Medication Sig    acetaminophen (TYLENOL) 650 MG TbSR Take 1 tablet (650 mg total) by mouth 3 (three) times daily as needed.    ascorbic acid, vitamin C, (VITAMIN C) 500 MG tablet Take 500 mg by mouth 2 (two) times a day.    ciprofloxacin HCl (CILOXAN) 0.3 % ophthalmic solution Place 1 drop into the left eye 4 (four) times daily.    esomeprazole (NEXIUM) 20 MG capsule Take 20 mg by mouth daily as needed.    ferrous sulfate (FEOSOL) 325 mg (65 mg iron) Tab tablet Take 325 mg by mouth daily with breakfast.    fish oil-omega-3 fatty acids 300-1,000 mg capsule Take 1 g by mouth once daily.    furosemide (LASIX) 20 MG tablet TAKE 1 TABLET(20 MG) BY MOUTH TWICE DAILY    sulfamethoxazole-trimethoprim 800-160mg (BACTRIM DS) 800-160 mg Tab Take 1 tablet by mouth 2 (two) times daily.    telmisartan (MICARDIS) 40 MG Tab Take 1 tablet (40 mg total) by mouth once daily.    traMADoL (ULTRAM) 50 mg tablet TAKE 1 TABLET BY MOUTH EVERY OTHER DAY AS NEEDED FOR PAIN    zinc sulfate (ZINCATE) 50 mg zinc (220 mg) capsule Take 220 mg by mouth once daily.    multivitamin with minerals tablet Take 1 tablet by mouth once daily.    spironolactone (ALDACTONE) 25 MG tablet Take 1 tablet (25 mg total) by mouth once daily.       Review of patient's allergies indicates:   Allergen Reactions    Aspirin      Sore stomach    Codeine Itching    Pcn [penicillins] Itching       Past Medical History:   Diagnosis Date    Debility     Decubitus ulcer of buttock     GERD (gastroesophageal reflux disease) 09/22/2012    HTN (hypertension) 09/22/2012    Normal stress echocardiogram 09/22/2012    Vitamin D deficiency disease 09/22/2012     Past Surgical History:   Procedure Laterality Date    APPENDECTOMY      BILATERAL SALPINGOOPHORECTOMY      DEBRIDEMENT OF SACRAL WOUND N/A 5/16/2023    Procedure: DEBRIDEMENT, WOUND,  SACRUM;  Surgeon: Barney Edward MD;  Location: [facility];  Service: General;  Laterality: N/A;    HYSTERECTOMY       Family History       Problem Relation (Age of Onset)    Diabetes Brother, Maternal Grandmother    Hypertension Mother, Father    Stroke Father    Uterine cancer Sister          Tobacco Use    Smoking status: Never    Smokeless tobacco: Never   Substance and Sexual Activity    Alcohol use: No     Alcohol/week: 0.0 standard drinks    Drug use: No    Sexual activity: Not Currently     Review of Systems   Gastrointestinal:  Negative for abdominal pain.   Musculoskeletal:  Positive for back pain.   Skin:         Sacral wound    Objective:     Vital Signs (Most Recent):  Temp: 98.1 °F (36.7 °C) (05/22/23 1202)  Pulse: 75 (05/22/23 1202)  Resp: 19 (05/22/23 1202)  BP: (!) 118/56 (05/22/23 1600)  SpO2: 98 % (05/22/23 1202) Vital Signs (24h Range):  Temp:  [98 °F (36.7 °C)-98.2 °F (36.8 °C)] 98.1 °F (36.7 °C)  Pulse:  [75-93] 75  Resp:  [13-19] 19  SpO2:  [98 %-100 %] 98 %  BP: (107-132)/(56-70) 118/56     Weight: 91.5 kg (201 lb 11.5 oz)  Body mass index is 31.59 kg/m².     Physical Exam  Vitals reviewed.   Constitutional:       General: She is not in acute distress.  HENT:      Head: Normocephalic and atraumatic.      Nose: Nose normal.   Eyes:      General:         Right eye: No discharge.         Left eye: No discharge.   Cardiovascular:      Rate and Rhythm: Normal rate and regular rhythm.   Pulmonary:      Effort: Pulmonary effort is normal. No respiratory distress.      Breath sounds: No stridor.   Abdominal:      Comments: Soft, ND, NT   Musculoskeletal:         General: Normal range of motion.      Cervical back: Normal range of motion.   Skin:     General: Skin is warm and dry.      Capillary Refill: Capillary refill takes 2 to 3 seconds.      Comments: Sacral wound with good granulation tissues  No autumn necrotic tissue   At least 5cm from anus    Neurological:      General: No focal deficit  present.      Mental Status: She is alert and oriented to person, place, and time.   Psychiatric:         Mood and Affect: Mood normal.         Behavior: Behavior normal.          I have reviewed all pertinent lab results within the past 24 hours.    Significant Diagnostics:  I have reviewed all pertinent imaging results/findings within the past 24 hours.

## 2023-05-22 NOTE — PLAN OF CARE
05/22/23 1555   Post-Acute Status   Post-Acute Authorization Placement   Post-Acute Placement Status Pending payor review/awaiting authorization (if required)     Auth has been requested to [facility]. NIA has left a message in careport for the coordinator with [facility]. Patient reports that she would like to admit to this facility when stable to dc.         Meena Powell, GO  [facility]   [phone]

## 2023-05-22 NOTE — NURSING
Wound care done today. Pt had uneventful shift. Vitals remained stable and at baseline. No complaints of pain or discomfort. Will continue to monitor pt remaining of shift.

## 2023-05-23 LAB
ALBUMIN SERPL BCP-MCNC: 2.8 G/DL (ref 3.5–5.2)
ALP SERPL-CCNC: 125 U/L (ref 55–135)
ALT SERPL W/O P-5'-P-CCNC: 76 U/L (ref 10–44)
ANION GAP SERPL CALC-SCNC: 12 MMOL/L (ref 8–16)
AST SERPL-CCNC: 76 U/L (ref 10–40)
BASOPHILS # BLD AUTO: 0.12 K/UL (ref 0–0.2)
BASOPHILS NFR BLD: 0.9 % (ref 0–1.9)
BILIRUB SERPL-MCNC: 0.2 MG/DL (ref 0.1–1)
BUN SERPL-MCNC: 10 MG/DL (ref 8–23)
CALCIUM SERPL-MCNC: 9.7 MG/DL (ref 8.7–10.5)
CHLORIDE SERPL-SCNC: 99 MMOL/L (ref 95–110)
CO2 SERPL-SCNC: 25 MMOL/L (ref 23–29)
CREAT SERPL-MCNC: 0.6 MG/DL (ref 0.5–1.4)
DIFFERENTIAL METHOD: ABNORMAL
EOSINOPHIL # BLD AUTO: 0.2 K/UL (ref 0–0.5)
EOSINOPHIL NFR BLD: 1.1 % (ref 0–8)
ERYTHROCYTE [DISTWIDTH] IN BLOOD BY AUTOMATED COUNT: 13.8 % (ref 11.5–14.5)
EST. GFR  (NO RACE VARIABLE): >60 ML/MIN/1.73 M^2
GLUCOSE SERPL-MCNC: 100 MG/DL (ref 70–110)
HCT VFR BLD AUTO: 36.9 % (ref 37–48.5)
HGB BLD-MCNC: 11.9 G/DL (ref 12–16)
IMM GRANULOCYTES # BLD AUTO: 0.33 K/UL (ref 0–0.04)
IMM GRANULOCYTES NFR BLD AUTO: 2.4 % (ref 0–0.5)
LYMPHOCYTES # BLD AUTO: 2.4 K/UL (ref 1–4.8)
LYMPHOCYTES NFR BLD: 17.2 % (ref 18–48)
MAGNESIUM SERPL-MCNC: 1.7 MG/DL (ref 1.6–2.6)
MCH RBC QN AUTO: 27 PG (ref 27–31)
MCHC RBC AUTO-ENTMCNC: 32.2 G/DL (ref 32–36)
MCV RBC AUTO: 84 FL (ref 82–98)
MONOCYTES # BLD AUTO: 1.4 K/UL (ref 0.3–1)
MONOCYTES NFR BLD: 9.9 % (ref 4–15)
NEUTROPHILS # BLD AUTO: 9.4 K/UL (ref 1.8–7.7)
NEUTROPHILS NFR BLD: 68.5 % (ref 38–73)
NRBC BLD-RTO: 0 /100 WBC
PHOSPHATE SERPL-MCNC: 3.5 MG/DL (ref 2.7–4.5)
PLATELET # BLD AUTO: 379 K/UL (ref 150–450)
PMV BLD AUTO: 10 FL (ref 9.2–12.9)
POCT GLUCOSE: 104 MG/DL (ref 70–110)
POTASSIUM SERPL-SCNC: 4.6 MMOL/L (ref 3.5–5.1)
PROT SERPL-MCNC: 7.1 G/DL (ref 6–8.4)
RBC # BLD AUTO: 4.41 M/UL (ref 4–5.4)
SODIUM SERPL-SCNC: 136 MMOL/L (ref 136–145)
WBC # BLD AUTO: 13.7 K/UL (ref 3.9–12.7)

## 2023-05-23 PROCEDURE — 25000003 PHARM REV CODE 250: Performed by: PHYSICIAN ASSISTANT

## 2023-05-23 PROCEDURE — 25000003 PHARM REV CODE 250: Performed by: STUDENT IN AN ORGANIZED HEALTH CARE EDUCATION/TRAINING PROGRAM

## 2023-05-23 PROCEDURE — 80053 COMPREHEN METABOLIC PANEL: CPT | Performed by: STUDENT IN AN ORGANIZED HEALTH CARE EDUCATION/TRAINING PROGRAM

## 2023-05-23 PROCEDURE — 36415 COLL VENOUS BLD VENIPUNCTURE: CPT | Performed by: STUDENT IN AN ORGANIZED HEALTH CARE EDUCATION/TRAINING PROGRAM

## 2023-05-23 PROCEDURE — 83735 ASSAY OF MAGNESIUM: CPT | Performed by: STUDENT IN AN ORGANIZED HEALTH CARE EDUCATION/TRAINING PROGRAM

## 2023-05-23 PROCEDURE — 85025 COMPLETE CBC W/AUTO DIFF WBC: CPT | Performed by: STUDENT IN AN ORGANIZED HEALTH CARE EDUCATION/TRAINING PROGRAM

## 2023-05-23 PROCEDURE — 63600175 PHARM REV CODE 636 W HCPCS: Performed by: PHYSICIAN ASSISTANT

## 2023-05-23 PROCEDURE — 25000003 PHARM REV CODE 250: Performed by: INTERNAL MEDICINE

## 2023-05-23 PROCEDURE — A4216 STERILE WATER/SALINE, 10 ML: HCPCS | Performed by: PHYSICIAN ASSISTANT

## 2023-05-23 PROCEDURE — 99232 PR SUBSEQUENT HOSPITAL CARE,LEVL II: ICD-10-PCS | Mod: ,,, | Performed by: INTERNAL MEDICINE

## 2023-05-23 PROCEDURE — 84100 ASSAY OF PHOSPHORUS: CPT | Performed by: STUDENT IN AN ORGANIZED HEALTH CARE EDUCATION/TRAINING PROGRAM

## 2023-05-23 PROCEDURE — 99232 SBSQ HOSP IP/OBS MODERATE 35: CPT | Mod: ,,, | Performed by: INTERNAL MEDICINE

## 2023-05-23 PROCEDURE — 20600001 HC STEP DOWN PRIVATE ROOM

## 2023-05-23 RX ADMIN — BACLOFEN 5 MG: 5 TABLET ORAL at 08:05

## 2023-05-23 RX ADMIN — CARBAMIDE PEROXIDE 6.5% 5 DROP: 6.5 LIQUID AURICULAR (OTIC) at 08:05

## 2023-05-23 RX ADMIN — NIFEDIPINE 30 MG: 30 TABLET, FILM COATED, EXTENDED RELEASE ORAL at 08:05

## 2023-05-23 RX ADMIN — METRONIDAZOLE 500 MG: 500 TABLET ORAL at 12:05

## 2023-05-23 RX ADMIN — LOSARTAN POTASSIUM 50 MG: 50 TABLET, FILM COATED ORAL at 12:05

## 2023-05-23 RX ADMIN — GABAPENTIN 300 MG: 300 CAPSULE ORAL at 12:05

## 2023-05-23 RX ADMIN — ACETAMINOPHEN 650 MG: 325 TABLET, FILM COATED ORAL at 01:05

## 2023-05-23 RX ADMIN — HEPARIN SODIUM 5000 UNITS: 5000 INJECTION INTRAVENOUS; SUBCUTANEOUS at 08:05

## 2023-05-23 RX ADMIN — Medication 10 ML: at 09:05

## 2023-05-23 RX ADMIN — METRONIDAZOLE 500 MG: 500 TABLET ORAL at 08:05

## 2023-05-23 RX ADMIN — HEPARIN SODIUM 5000 UNITS: 5000 INJECTION INTRAVENOUS; SUBCUTANEOUS at 12:05

## 2023-05-23 RX ADMIN — CARBAMIDE PEROXIDE 6.5% 5 DROP: 6.5 LIQUID AURICULAR (OTIC) at 09:05

## 2023-05-23 RX ADMIN — Medication 10 ML: at 12:05

## 2023-05-23 RX ADMIN — SENNOSIDES 8.6 MG: 8.6 TABLET, FILM COATED ORAL at 08:05

## 2023-05-23 RX ADMIN — BACLOFEN 5 MG: 5 TABLET ORAL at 12:05

## 2023-05-23 RX ADMIN — Medication 6 MG: at 09:05

## 2023-05-23 RX ADMIN — BACLOFEN 5 MG: 5 TABLET ORAL at 03:05

## 2023-05-23 RX ADMIN — LIDOCAINE 1 PATCH: 50 PATCH CUTANEOUS at 08:05

## 2023-05-23 RX ADMIN — CEFTRIAXONE 2 G: 2 INJECTION, POWDER, FOR SOLUTION INTRAMUSCULAR; INTRAVENOUS at 09:05

## 2023-05-23 RX ADMIN — CARBAMIDE PEROXIDE 6.5% 5 DROP: 6.5 LIQUID AURICULAR (OTIC) at 12:05

## 2023-05-23 RX ADMIN — DICLOFENAC 2 G: 10 GEL TOPICAL at 08:05

## 2023-05-23 RX ADMIN — CEFTRIAXONE 2 G: 2 INJECTION, POWDER, FOR SOLUTION INTRAMUSCULAR; INTRAVENOUS at 12:05

## 2023-05-23 RX ADMIN — FUROSEMIDE 20 MG: 20 TABLET ORAL at 09:05

## 2023-05-23 RX ADMIN — GABAPENTIN 300 MG: 300 CAPSULE ORAL at 09:05

## 2023-05-23 RX ADMIN — HEPARIN SODIUM 5000 UNITS: 5000 INJECTION INTRAVENOUS; SUBCUTANEOUS at 03:05

## 2023-05-23 RX ADMIN — SPIRONOLACTONE 25 MG: 25 TABLET, FILM COATED ORAL at 08:05

## 2023-05-23 RX ADMIN — MUPIROCIN: 20 OINTMENT TOPICAL at 09:05

## 2023-05-23 RX ADMIN — Medication 10 ML: at 02:05

## 2023-05-23 RX ADMIN — TRAMADOL HYDROCHLORIDE 25 MG: 50 TABLET, COATED ORAL at 12:05

## 2023-05-23 RX ADMIN — HEPARIN SODIUM 5000 UNITS: 5000 INJECTION INTRAVENOUS; SUBCUTANEOUS at 09:05

## 2023-05-23 RX ADMIN — MUPIROCIN: 20 OINTMENT TOPICAL at 12:05

## 2023-05-23 RX ADMIN — LOSARTAN POTASSIUM 50 MG: 50 TABLET, FILM COATED ORAL at 08:05

## 2023-05-23 RX ADMIN — METRONIDAZOLE 500 MG: 500 TABLET ORAL at 09:05

## 2023-05-23 RX ADMIN — Medication 10 ML: at 06:05

## 2023-05-23 NOTE — PROGRESS NOTES
"[facility] - [facility] ([address] Medicine  Progress Note    Patient Name: Trista Gr  MRN: [id number]  Patient Class: IP- Inpatient   Admission Date: 5/13/2023  Length of Stay: 7 days  Attending Physician: Malgorzata Ojeda MD  Primary Care Provider: REGINO Yeung MD        Subjective:     Principal Problem:Lower extremity weakness        HPI:  Per Kathleen Ro, PAJoeyC:    "Ms. Trista Gr is a 72 y.o. female, with PMH of HTN, chronic low back pain, normal pressure hydrocephalus, urinary incontinence, who [place] [facility] [place] due to burning with urination.  She states she was diagnosed with the UTI 3 days ago, and went to see her PCP who prescribed her Bactrim.  She states her symptoms have not improved while on Bactrim.  She notes associated dizziness, lightheadedness, weakness, loss of appetite.  She denied fever, chills, nausea, vomiting.  She states that the pressure injury on her buttocks has been having increased pain in the past few days as well.  She was evaluated in the ED with labs that showed leukocytosis of 16 K, and left shift.  Metabolic panel was overall fairly normal.  A UA showed nitrate positive UTI with 11 wbc's, moderate bacteria, and trace leukocytes.  Urine cultures pending.  She was treated in the ED with Rocephin."      Overview/Hospital Course:  Patient is 72-year-old woman with past medical history of hypertension, chronic lower back pain > 10 years with degenerative joint disease of the spine with cervical and lumbar spinal stenosis with progressive gait instability who has been bed-bound for last 2 years with stage III sacral decubitus ulcer, urinary incontinence who presented with recent urinary tract infection which did not improve with treatment with oral trimethoprim/sulfamethoxazole.    Prior urine culture collected on 11/7/2022 positive for E coli resistant to trimethoprim/sulfamethoxazole concerning for antibtioic resistance " now.  Patient treated with intravenous ceftriaxone with resolution of dysuria.  Urine culture pending.  Patient also with persistently elevated leukocytosis despite antibiotic treatment.  Patient also with chronic decubitus ulcer which stage III and possible stage IV now.  General surgery consult recommended surgical debridement.    In terms of progressive debility, patient has been seen by Dr. Sravan Josue in June 2021.  Imaging at that time multilevel cervical and lumbar stenosis.  Patient deemded a poor surgical canddiated due to debility at that time.      Neurology consulted on this admission due to concern for possible normal pressure hydrocephalus.  Dr. Ildefonso Navarro Nadimpally suspect debility most likely due to progressive cervical and lumbar spinal stenosis with myelopathy and not normal pressure hydrocephalus.  Recommended repeat spinal imaging including MRI cervical, thoracic and lumbar spine without contrast, along with ongoing thearpy efforts, the addition of gabapentin for pain, and follow-up with spine specialist.  Unable to obtain MRI due to body size.    Requesting transfer to [facility] in order for patient to undergo neuroimaging recommended along with more definitive input by Orthopedic spine surgery/Neurosurgery regarding lower extremity weakness.    Patient transferred to [facility].  Underwent MRI spine with mild cervical stenosis and moderate lumbar stenosis.  Ortho spine consulted.  Awaiting recommendations.  Wound cultures from bone and tissue from surgical debridement on 5/16 with multiple bacteria.  Infectious Disease consulted.  Wound care consulted for wound management recommendations.    No chest pain, shortness of breath, lightheadedness. Tolerating oral intake. Pain uncontrolled on tramadol 25 but improved with tramadol 50    NAD, AO3  NC, AT  RRR  CTAB  SNTND+BS  Wound care pictures reviewed.   PERRL    Vitals, labs and radiographs from past 24h reviewed and personally  interpreted.     Assessment/Plan:      * Lower extremity weakness  Patient with lower extremity weakness with progressive debility and has been bed-bound for 2 years now.  Patient reports some improvement with physical therapy and skilled nurse facility however since discharge from skilled nursing facility patient has been bed-bound and nonambulatory.  Etiology of lower extremity weakness due to suspected spinal stenosis.  Imaging also concerning for possible normal pressure hydrocephalus.    - Neurology consulted   -- Suspect debility most likely due to progressive cervical and lumbar spinal stenosis with myelopathy and not normal pressure hydrocephalus.  -- Patient transferred to [facility] for MRI due to body habitus   -- MRI C-T-L- Spine completed at [facility] with mild spinal canal stenosis at C2-3, Moderate canal stenosis at L3-4 and L4-5  - Ortho Spine consulted  -- Follow up consults recs  - Patient at functional baseline per PT/OT  - Continue baclofen, diclofenac   -tramadol for pain; discontinuing oxy given hypotension thereafter. Hypotension now resolved    Leukocytosis  - See above    Swelling of left eyelid  States this is the 2nd time this has occurred and she required consultation with an eyelid specialist in the past - no specific treatment provided during prior episode, advised on watchful waiting in the past.   - Has been ongoing over a month  - Continue with QID warm compresses followed by gentle cleansing with baby soap.  - BNP <10 on admission    Neck pain on right side  - States this is chronic and seems positional as patient is leaning head to the left - there is palpable tightening/stiffness of the right side of the neck.  - X-ray of C-spine unremarkable  - Continue Lidocaine patch  - Warm compress PRN  - Gabapentin 300mg qhs    Urinary incontinence without sensory awareness  Continue with Pure wick    Pressure injury of buttock, stage 3  - Chronic stage III decubitus ulcers secondary to immobility  despite with wound care efforts at home with home health.   - Probed deep to bone concerning for possible stage IV.    - s/p sacral wound debridement with general surgery on 5/16  -- OR cultures with bacterial growth  - Infectious disease consulted  -- Follow up wound and bone cultures  -- Bone culture with GNR, GPC, GPR  -- Sacral culture with GNR. GPC  -- Continue antibiotics per ID   - Wound care consulted  - Nutrition conuslted  -Continue CTX, flagyl  -Surgery has deemed diverting colostomy to not be appropriate  -wound vac intermittently failing  -Gen surgery consulted   -increasing tramadol to 25 for moderate pain, 50 for severe    Urinary tract infection without hematuria  - Patient presented with dysuria   - UA positive  - Urine culture with mixed resistance E coli, sensitive to CTX  - CTX started on 5/13, completed tx on 5/18    Obesity (BMI 30.0-34.9)  - BMI 32.70 on admission - patient would benefit for weight reduction.  - Nutrition consulted    Essential hypertension  - Continue Nifedipine 30 mg daily  - Continue Losartan 50 mg bid (home telmisartan)  - Continue Spirolactone 25 mg daily  - Continue to monitor  -5/20: Held this AM given hypotension. Follow      VTE Risk Mitigation (From admission, onward)           Ordered     heparin (porcine) injection 5,000 Units  Every 8 hours         05/14/23 0210     IP VTE HIGH RISK PATIENT  Once         05/14/23 0210     Place sequential compression device  Until discontinued         05/14/23 0210                    Discharge Planning   MATTHEW: 5/25/2023     Code Status: Full Code   Is the patient medically ready for discharge?: No    Reason for patient still in hospital (select all that apply): Patient trending condition, Laboratory test, Treatment, Consult recommendations and Pending disposition  Discharge Plan A: Home Health                  Malgorzata Ojeda MD  Department of Hospital Medicine   [facility] - Intensive Care ([facility]

## 2023-05-23 NOTE — PROGRESS NOTES
Patient seen for wound care follow-up  Reviewed chart for this encounter.   See Flow Sheet for findings.    Pt laying in bed, agreed to assessment.   RNs at bedside assisted in turning pt for assessment.Pt had BM that was hard and black/brown in color and seems to have some impaction with residual stool in rectum. Pt urinated and Purewick was not in proper place so urine went all over the gavin pads under the pt, she was cleansed and wound dressing removed and replaced with Vashe soaked kerlix, covered with ABD pad secure with tape.     RECOMMENDATIONS:  Pt would benefit from ostomy diversion to optimize wound healing.    Discussed POC with patient and primary RN.   See EMR for orders & patient education.    Nursing to continue care.  Nursing to maintain pressure injury prevention interventions.       05/23/23 1400   Positioning   Body Position log-rolled   Head of Bed (HOB) Positioning HOB at 60-90 degrees   Positioning/Transfer Devices pillows;in use;wedge        Altered Skin Integrity 05/13/23 2251 Coccyx #1 Full thickness tissue loss with exposed bone, tendon, or muscle. Often includes undermining and tunneling. May extend into muscle and/or supporting structures.   Date First Assessed/Time First Assessed: 05/13/23 2251   Altered Skin Integrity Present on Admission - Did Patient arrive to the hospital with altered skin?: yes  Location: Coccyx  Wound Number: #1  Description of Altered Skin Integrity: (c) Full thic...   Wound Image     Dressing Appearance Moist drainage;Saturated   Drainage Amount Moderate   Drainage Characteristics/Odor Serosanguineous;Yellow;Brown   Appearance Red;Moist;Bone;Muscle   Tissue loss description Full thickness   Red (%), Wound Tissue Color 100 %   Periwound Area Macerated;Moist;Scar tissue   Wound Edges Irregular   Care Cleansed with:;Antimicrobial agent   Dressing Applied;Gauze, wet to dry   Packing packing removed   Packing Inserted  1   Packing Removed 1   Periwound Care Dry  periwound area maintained   Off Loading Other (see comments)  (pt on immerse--settings are correct)   Dressing Change Due 05/24/23

## 2023-05-23 NOTE — NURSING
Patient AOX4, VSS on RA. Patient clean and dry. Call light/ bedside table within reach. Bed locked. All safety measures in place.

## 2023-05-23 NOTE — PROGRESS NOTES
"[facility] - [facility] ([address] Medicine  Progress Note    Patient Name: Trista Gr  MRN: [id number]  Patient Class: IP- Inpatient   Admission Date: 5/13/2023  Length of Stay: 7 days  Attending Physician: Malgorzata Ojeda MD  Primary Care Provider: REGINO Yeung MD        Subjective:     Principal Problem:Lower extremity weakness        HPI:  Per Kathleen Ro, PAJoeyC:    "Ms. Trista Gr is a 72 y.o. female, with PMH of HTN, chronic low back pain, normal pressure hydrocephalus, urinary incontinence, who presented to [facility] ED on 05/13/2023 due to burning with urination.  She states she was diagnosed with the UTI 3 days ago, and went to see her PCP who prescribed her Bactrim.  She states her symptoms have not improved while on Bactrim.  She notes associated dizziness, lightheadedness, weakness, loss of appetite.  She denied fever, chills, nausea, vomiting.  She states that the pressure injury on her buttocks has been having increased pain in the past few days as well.  She was evaluated in the ED with labs that showed leukocytosis of 16 K, and left shift.  Metabolic panel was overall fairly normal.  A UA showed nitrate positive UTI with 11 wbc's, moderate bacteria, and trace leukocytes.  Urine cultures pending.  She was treated in the ED with Rocephin."      Overview/Hospital Course:  Patient is 72-year-old woman with past medical history of hypertension, chronic lower back pain > 10 years with degenerative joint disease of the spine with cervical and lumbar spinal stenosis with progressive gait instability who has been bed-bound for last 2 years with stage III sacral decubitus ulcer, urinary incontinence who presented with recent urinary tract infection which did not improve with treatment with oral trimethoprim/sulfamethoxazole.    Prior urine culture collected on 11/7/2022 positive for E coli resistant to trimethoprim/sulfamethoxazole concerning for antibtioic resistance " now.  Patient treated with intravenous ceftriaxone with resolution of dysuria.  Urine culture pending.  Patient also with persistently elevated leukocytosis despite antibiotic treatment.  Patient also with chronic decubitus ulcer which stage III and possible stage IV now.  General surgery consult recommended surgical debridement.    In terms of progressive debility, patient has been seen by Dr. Sravan Josue in June 2021.  Imaging at that time multilevel cervical and lumbar stenosis.  Patient deemded a poor surgical canddiated due to debility at that time.      Neurology consulted on this admission due to concern for possible normal pressure hydrocephalus.  Dr. Ildefonso Navarro Nadimpally suspect debility most likely due to progressive cervical and lumbar spinal stenosis with myelopathy and not normal pressure hydrocephalus.  Recommended repeat spinal imaging including MRI cervical, thoracic and lumbar spine without contrast, along with ongoing thearpy efforts, the addition of gabapentin for pain, and follow-up with spine specialist.  Unable to obtain MRI due to body size.    Requesting transfer to [facility] in order for patient to undergo neuroimaging recommended along with more definitive input by Orthopedic spine surgery/Neurosurgery regarding lower extremity weakness.    Patient transferred to [facility].  Underwent MRI spine with mild cervical stenosis and moderate lumbar stenosis.  Ortho spine consulted.  Awaiting recommendations.  Wound cultures from bone and tissue from surgical debridement on 5/16 with multiple bacteria.  Infectious Disease consulted.  Wound care consulted for wound management recommendations.    No chest pain, shortness of breath, lightheadedness. Tolerating oral intake. Pain uncontrolled on tramadol 25    NAD, AO3  NC, AT  RRR  CTAB  SNTND+BS  Wound care pictures reviewed.   PERRL    Vitals, labs and radiographs from past 24h reviewed and personally interpreted.     Assessment/Plan:      *  Lower extremity weakness  Patient with lower extremity weakness with progressive debility and has been bed-bound for 2 years now.  Patient reports some improvement with physical therapy and skilled nurse facility however since discharge from skilled nursing facility patient has been bed-bound and nonambulatory.  Etiology of lower extremity weakness due to suspected spinal stenosis.  Imaging also concerning for possible normal pressure hydrocephalus.    - Neurology consulted   -- Suspect debility most likely due to progressive cervical and lumbar spinal stenosis with myelopathy and not normal pressure hydrocephalus.  -- Patient transferred to [facility] for MRI due to body habitus   -- MRI C-T-L- Spine completed at [facility] with mild spinal canal stenosis at C2-3, Moderate canal stenosis at L3-4 and L4-5  - Ortho Spine consulted  -- Follow up consults recs  - Patient at functional baseline per PT/OT  - Continue baclofen, diclofenac   -tramadol for pain; discontinuing oxy given hypotension thereafter. Hypotension now resolved    Leukocytosis  - See above    Swelling of left eyelid  States this is the 2nd time this has occurred and she required consultation with an eyelid specialist in the past - no specific treatment provided during prior episode, advised on watchful waiting in the past.   - Has been ongoing over a month  - Continue with QID warm compresses followed by gentle cleansing with baby soap.  - BNP <10 on admission    Neck pain on right side  - States this is chronic and seems positional as patient is leaning head to the left - there is palpable tightening/stiffness of the right side of the neck.  - X-ray of C-spine unremarkable  - Continue Lidocaine patch  - Warm compress PRN  - Gabapentin 300mg qhs    Urinary incontinence without sensory awareness  Continue with Pure wick    Pressure injury of buttock, stage 3  - Chronic stage III decubitus ulcers secondary to immobility despite with wound care efforts at home with  home health.   - Probed deep to bone concerning for possible stage IV.    - s/p sacral wound debridement with general surgery on 5/16  -- OR cultures with bacterial growth  - Infectious disease consulted  -- Follow up wound and bone cultures  -- Bone culture with GNR, GPC, GPR  -- Sacral culture with GNR. GPC  -- Continue antibiotics per ID   - Wound care consulted  - Nutrition conuslted  -Continue CTX, flagyl  -Gen surgery consulted   -increasing tramadol to 25 for moderate pain, 50 for severe    Urinary tract infection without hematuria  - Patient presented with dysuria   - UA positive  - Urine culture with mixed resistance E coli, sensitive to CTX  - CTX started on 5/13, completed tx on 5/18    Obesity (BMI 30.0-34.9)  - BMI 32.70 on admission - patient would benefit for weight reduction.  - Nutrition consulted    Essential hypertension  - Continue Nifedipine 30 mg daily  - Continue Losartan 50 mg bid (home telmisartan)  - Continue Spirolactone 25 mg daily  - Continue to monitor  -5/20: Held this AM given hypotension. Follow      VTE Risk Mitigation (From admission, onward)           Ordered     heparin (porcine) injection 5,000 Units  Every 8 hours         05/14/23 0210     IP VTE HIGH RISK PATIENT  Once         05/14/23 0210     Place sequential compression device  Until discontinued         05/14/23 0210                    Discharge Planning   MATTHEW: 5/25/2023     Code Status: Full Code   Is the patient medically ready for discharge?: No    Reason for patient still in hospital (select all that apply): Patient trending condition, Laboratory test, Treatment, Consult recommendations and Pending disposition  Discharge Plan A: Home Health                  Malgorzata Ojeda MD  Department of Hospital Medicine   [facility] - Intensive Care ([place]-[facility])

## 2023-05-23 NOTE — PLAN OF CARE
Pt slept well this shift. A&Ox 4. VSS. Pain controlled. T&RQ2. Drsg to sacral wound C/D/I. Safety precautions in place. Call light in reach. No further concerns noted at this time.      Problem: Infection  Goal: Absence of Infection Signs and Symptoms  Outcome: Ongoing, Progressing     Problem: Adult Inpatient Plan of Care  Goal: Plan of Care Review  Outcome: Ongoing, Progressing  Goal: Patient-Specific Goal (Individualized)  Outcome: Ongoing, Progressing  Goal: Absence of Hospital-Acquired Illness or Injury  Outcome: Ongoing, Progressing  Goal: Optimal Comfort and Wellbeing  Outcome: Ongoing, Progressing  Goal: Readiness for Transition of Care  Outcome: Ongoing, Progressing     Problem: Impaired Wound Healing  Goal: Optimal Wound Healing  Outcome: Ongoing, Progressing     Problem: Pain Acute  Goal: Acceptable Pain Control and Functional Ability  Outcome: Ongoing, Progressing

## 2023-05-24 VITALS
SYSTOLIC BLOOD PRESSURE: 115 MMHG | HEART RATE: 101 BPM | BODY MASS INDEX: 31.66 KG/M2 | OXYGEN SATURATION: 99 % | HEIGHT: 67 IN | WEIGHT: 201.75 LBS | DIASTOLIC BLOOD PRESSURE: 58 MMHG | RESPIRATION RATE: 20 BRPM | TEMPERATURE: 99 F

## 2023-05-24 LAB
ALBUMIN SERPL BCP-MCNC: 2.7 G/DL (ref 3.5–5.2)
ALP SERPL-CCNC: 113 U/L (ref 55–135)
ALT SERPL W/O P-5'-P-CCNC: 77 U/L (ref 10–44)
ANION GAP SERPL CALC-SCNC: 13 MMOL/L (ref 8–16)
AST SERPL-CCNC: 62 U/L (ref 10–40)
BASOPHILS # BLD AUTO: 0.15 K/UL (ref 0–0.2)
BASOPHILS NFR BLD: 1 % (ref 0–1.9)
BILIRUB SERPL-MCNC: 0.3 MG/DL (ref 0.1–1)
BUN SERPL-MCNC: 12 MG/DL (ref 8–23)
CALCIUM SERPL-MCNC: 9.4 MG/DL (ref 8.7–10.5)
CHLORIDE SERPL-SCNC: 99 MMOL/L (ref 95–110)
CO2 SERPL-SCNC: 23 MMOL/L (ref 23–29)
CREAT SERPL-MCNC: 0.7 MG/DL (ref 0.5–1.4)
DIFFERENTIAL METHOD: ABNORMAL
EOSINOPHIL # BLD AUTO: 0.2 K/UL (ref 0–0.5)
EOSINOPHIL NFR BLD: 1.3 % (ref 0–8)
ERYTHROCYTE [DISTWIDTH] IN BLOOD BY AUTOMATED COUNT: 13.8 % (ref 11.5–14.5)
EST. GFR  (NO RACE VARIABLE): >60 ML/MIN/1.73 M^2
GLUCOSE SERPL-MCNC: 94 MG/DL (ref 70–110)
HCT VFR BLD AUTO: 35.7 % (ref 37–48.5)
HGB BLD-MCNC: 11.9 G/DL (ref 12–16)
IMM GRANULOCYTES # BLD AUTO: 0.61 K/UL (ref 0–0.04)
IMM GRANULOCYTES NFR BLD AUTO: 3.9 % (ref 0–0.5)
LYMPHOCYTES # BLD AUTO: 3.2 K/UL (ref 1–4.8)
LYMPHOCYTES NFR BLD: 20.4 % (ref 18–48)
MAGNESIUM SERPL-MCNC: 1.8 MG/DL (ref 1.6–2.6)
MCH RBC QN AUTO: 27.2 PG (ref 27–31)
MCHC RBC AUTO-ENTMCNC: 33.3 G/DL (ref 32–36)
MCV RBC AUTO: 82 FL (ref 82–98)
MONOCYTES # BLD AUTO: 1.8 K/UL (ref 0.3–1)
MONOCYTES NFR BLD: 11.4 % (ref 4–15)
NEUTROPHILS # BLD AUTO: 9.6 K/UL (ref 1.8–7.7)
NEUTROPHILS NFR BLD: 62 % (ref 38–73)
NRBC BLD-RTO: 0 /100 WBC
PHOSPHATE SERPL-MCNC: 3.7 MG/DL (ref 2.7–4.5)
PLATELET # BLD AUTO: 335 K/UL (ref 150–450)
PMV BLD AUTO: 9.7 FL (ref 9.2–12.9)
POTASSIUM SERPL-SCNC: 4.5 MMOL/L (ref 3.5–5.1)
PROT SERPL-MCNC: 6.7 G/DL (ref 6–8.4)
RBC # BLD AUTO: 4.37 M/UL (ref 4–5.4)
SODIUM SERPL-SCNC: 135 MMOL/L (ref 136–145)
WBC # BLD AUTO: 15.49 K/UL (ref 3.9–12.7)

## 2023-05-24 PROCEDURE — 25000003 PHARM REV CODE 250: Performed by: INTERNAL MEDICINE

## 2023-05-24 PROCEDURE — 99239 HOSP IP/OBS DSCHRG MGMT >30: CPT | Mod: ,,, | Performed by: INTERNAL MEDICINE

## 2023-05-24 PROCEDURE — 25000003 PHARM REV CODE 250: Performed by: PHYSICIAN ASSISTANT

## 2023-05-24 PROCEDURE — 80053 COMPREHEN METABOLIC PANEL: CPT | Performed by: STUDENT IN AN ORGANIZED HEALTH CARE EDUCATION/TRAINING PROGRAM

## 2023-05-24 PROCEDURE — 76937 US GUIDE VASCULAR ACCESS: CPT

## 2023-05-24 PROCEDURE — 83735 ASSAY OF MAGNESIUM: CPT | Performed by: STUDENT IN AN ORGANIZED HEALTH CARE EDUCATION/TRAINING PROGRAM

## 2023-05-24 PROCEDURE — 84100 ASSAY OF PHOSPHORUS: CPT | Performed by: STUDENT IN AN ORGANIZED HEALTH CARE EDUCATION/TRAINING PROGRAM

## 2023-05-24 PROCEDURE — 36573 INSJ PICC RS&I 5 YR+: CPT

## 2023-05-24 PROCEDURE — C1751 CATH, INF, PER/CENT/MIDLINE: HCPCS

## 2023-05-24 PROCEDURE — 25000003 PHARM REV CODE 250: Performed by: STUDENT IN AN ORGANIZED HEALTH CARE EDUCATION/TRAINING PROGRAM

## 2023-05-24 PROCEDURE — 99239 PR HOSPITAL DISCHARGE DAY,>30 MIN: ICD-10-PCS | Mod: ,,, | Performed by: INTERNAL MEDICINE

## 2023-05-24 PROCEDURE — A4216 STERILE WATER/SALINE, 10 ML: HCPCS | Performed by: PHYSICIAN ASSISTANT

## 2023-05-24 PROCEDURE — 85025 COMPLETE CBC W/AUTO DIFF WBC: CPT | Performed by: STUDENT IN AN ORGANIZED HEALTH CARE EDUCATION/TRAINING PROGRAM

## 2023-05-24 PROCEDURE — 36415 COLL VENOUS BLD VENIPUNCTURE: CPT | Performed by: STUDENT IN AN ORGANIZED HEALTH CARE EDUCATION/TRAINING PROGRAM

## 2023-05-24 PROCEDURE — 63600175 PHARM REV CODE 636 W HCPCS: Performed by: PHYSICIAN ASSISTANT

## 2023-05-24 RX ORDER — SODIUM CHLORIDE 0.9 % (FLUSH) 0.9 %
10 SYRINGE (ML) INJECTION
Status: DISCONTINUED | OUTPATIENT
Start: 2023-05-24 | End: 2023-05-24 | Stop reason: HOSPADM

## 2023-05-24 RX ORDER — SENNOSIDES 8.6 MG/1
1 TABLET ORAL DAILY
Start: 2023-05-25

## 2023-05-24 RX ORDER — NIFEDIPINE 30 MG/1
30 TABLET, EXTENDED RELEASE ORAL DAILY
Qty: 30 TABLET | Refills: 11 | OUTPATIENT
Start: 2023-05-25 | End: 2023-06-30

## 2023-05-24 RX ORDER — TALC
6 POWDER (GRAM) TOPICAL NIGHTLY PRN
Refills: 0
Start: 2023-05-24

## 2023-05-24 RX ORDER — TRAMADOL HYDROCHLORIDE 50 MG/1
25 TABLET ORAL EVERY 6 HOURS PRN
Qty: 5 TABLET | Refills: 0 | OUTPATIENT
Start: 2023-05-24 | End: 2023-06-30

## 2023-05-24 RX ORDER — BACLOFEN 5 MG/1
5 TABLET ORAL 3 TIMES DAILY
Qty: 12 TABLET | Refills: 0 | OUTPATIENT
Start: 2023-05-24 | End: 2023-06-30

## 2023-05-24 RX ORDER — SODIUM CHLORIDE 0.9 % (FLUSH) 0.9 %
10 SYRINGE (ML) INJECTION EVERY 6 HOURS
Status: DISCONTINUED | OUTPATIENT
Start: 2023-05-24 | End: 2023-05-24 | Stop reason: HOSPADM

## 2023-05-24 RX ORDER — LIDOCAINE 50 MG/G
1 PATCH TOPICAL DAILY
Refills: 0
Start: 2023-05-24

## 2023-05-24 RX ORDER — FAMOTIDINE 20 MG/1
20 TABLET, FILM COATED ORAL 2 TIMES DAILY PRN
Status: DISCONTINUED | OUTPATIENT
Start: 2023-05-24 | End: 2023-05-24 | Stop reason: HOSPADM

## 2023-05-24 RX ORDER — METRONIDAZOLE 500 MG/1
500 TABLET ORAL EVERY 12 HOURS
Start: 2023-05-24 | End: 2023-06-27

## 2023-05-24 RX ORDER — GABAPENTIN 300 MG/1
300 CAPSULE ORAL NIGHTLY
Qty: 30 CAPSULE | Refills: 11 | OUTPATIENT
Start: 2023-05-24 | End: 2023-06-30

## 2023-05-24 RX ORDER — FAMOTIDINE 20 MG/1
20 TABLET, FILM COATED ORAL 2 TIMES DAILY PRN
Start: 2023-05-24 | End: 2023-06-30

## 2023-05-24 RX ADMIN — LIDOCAINE 1 PATCH: 50 PATCH CUTANEOUS at 07:05

## 2023-05-24 RX ADMIN — Medication 10 ML: at 07:05

## 2023-05-24 RX ADMIN — TRAMADOL HYDROCHLORIDE 50 MG: 50 TABLET, COATED ORAL at 03:05

## 2023-05-24 RX ADMIN — SENNOSIDES 8.6 MG: 8.6 TABLET, FILM COATED ORAL at 09:05

## 2023-05-24 RX ADMIN — BACLOFEN 5 MG: 5 TABLET ORAL at 03:05

## 2023-05-24 RX ADMIN — ACETAMINOPHEN 650 MG: 325 TABLET, FILM COATED ORAL at 12:05

## 2023-05-24 RX ADMIN — DICLOFENAC 2 G: 10 GEL TOPICAL at 09:05

## 2023-05-24 RX ADMIN — LOSARTAN POTASSIUM 50 MG: 50 TABLET, FILM COATED ORAL at 09:05

## 2023-05-24 RX ADMIN — FAMOTIDINE 20 MG: 20 TABLET, FILM COATED ORAL at 09:05

## 2023-05-24 RX ADMIN — METRONIDAZOLE 500 MG: 500 TABLET ORAL at 09:05

## 2023-05-24 RX ADMIN — SPIRONOLACTONE 25 MG: 25 TABLET, FILM COATED ORAL at 09:05

## 2023-05-24 RX ADMIN — FUROSEMIDE 20 MG: 20 TABLET ORAL at 09:05

## 2023-05-24 RX ADMIN — BACLOFEN 5 MG: 5 TABLET ORAL at 09:05

## 2023-05-24 RX ADMIN — MUPIROCIN: 20 OINTMENT TOPICAL at 09:05

## 2023-05-24 RX ADMIN — TRAMADOL HYDROCHLORIDE 25 MG: 50 TABLET, COATED ORAL at 11:05

## 2023-05-24 RX ADMIN — HEPARIN SODIUM 5000 UNITS: 5000 INJECTION INTRAVENOUS; SUBCUTANEOUS at 07:05

## 2023-05-24 NOTE — DISCHARGE SUMMARY
[facility] - Intensive Care ([address] Medicine  Discharge Summary      Patient Name: Trista Gr  MRN: [id number]  Admission Date: 5/13/2023  Hospital Length of Stay: 8 days  Discharge Date and Time:  05/24/2023 2:15 PM  Attending Physician: Malgorzata Ojeda MD   Discharging Provider: Malgorzata Ojeda MD  Primary Care Provider: REGINO Yeung MD        HPI:     Ms. Trista Gr is a 72 y.o. female, with PMH of HTN, chronic low back pain, normal pressure hydrocephalus, urinary incontinence, who presented to [facility] on 05/13/2023 due to burning with urination.  She states she was diagnosed with the UTI 3 days ago, and went to see her PCP who prescribed her Bactrim.  She states her symptoms have not improved while on Bactrim.  She notes associated dizziness, lightheadedness, weakness, loss of appetite.  She denied fever, chills, nausea, vomiting.  She states that the pressure injury on her buttocks has been having increased pain in the past few days as well.  She was evaluated in the ED with labs that showed leukocytosis of 16 K, and left shift.  Metabolic panel was overall fairly normal.  A UA showed nitrate positive UTI with 11 wbc's, moderate bacteria, and trace leukocytes.  Urine cultures pending.  She was treated in the ED with Rocephin.  She was placed on observation.       Procedure(s) (LRB):  DEBRIDEMENT, WOUND, SACRUM (N/A)      Hospital Course:     Patient is 72-year-old woman with past medical history of hypertension, chronic lower back pain > 10 years with degenerative joint disease of the spine with cervical and lumbar spinal stenosis with progressive gait instability who has been bed-bound for last 2 years with stage III sacral decubitus ulcer, urinary incontinence who presented with recent urinary tract infection which did not improve with treatment with oral trimethoprim/sulfamethoxazole.     Prior urine culture collected on 11/7/2022 positive for E coli resistant to  trimethoprim/sulfamethoxazole concerning for antibtioic resistance now.  Patient treated with intravenous ceftriaxone with resolution of dysuria.  Urine culture pending.  Patient also with persistently elevated leukocytosis despite antibiotic treatment.  Patient also with chronic decubitus ulcer which stage III and possible stage IV now.  General surgery consult recommended surgical debridement.     In terms of progressive debility, patient has been seen by Dr. Sravan Josue in June 2021.  Imaging at that time multilevel cervical and lumbar stenosis.  Patient deemded a poor surgical canddiated due to debility at that time.       Neurology consulted on this admission due to concern for possible normal pressure hydrocephalus.  Dr. Ildefonso Navarro Nadimpally suspect debility most likely due to progressive cervical and lumbar spinal stenosis with myelopathy and not normal pressure hydrocephalus.  Recommended repeat spinal imaging including MRI cervical, thoracic and lumbar spine without contrast, along with ongoing thearpy efforts, the addition of gabapentin for pain, and follow-up with spine specialist.  Unable to obtain MRI due to body size.     Requesting transfer to [facility] in order for patient to undergo neuroimaging recommended along with more definitive input by Orthopedic spine surgery/Neurosurgery regarding lower extremity weakness.     [place] [facility] [place]  [place]went [place] spine with mild cervical stenosis and moderate lumbar stenosis.  Ortho spine consulted.  Awaiting recommendations.  Wound cultures from bone and tissue from surgical debridement on 5/16 with multiple bacteria.  Infectious Disease consulted.  Wound care consulted for wound management recommendations.Wound vac recommended and agreed to by LTAC. Extended abx plan recommended by ID with ceftriaxone and flagl       * Lower extremity weakness  Patient with lower extremity weakness with progressive debility and has been bed-bound for  2 years now.  Patient reports some improvement with physical therapy and skilled nurse facility however since discharge from skilled nursing facility patient has been bed-bound and nonambulatory.  Etiology of lower extremity weakness due to suspected spinal stenosis.  Imaging also concerning for possible normal pressure hydrocephalus.     - Neurology consulted   -- Suspect debility most likely due to progressive cervical and lumbar spinal stenosis with myelopathy and not normal pressure hydrocephalus.  -- Patient transferred to [facility] for MRI due to body habitus   -- MRI C-T-L- Spine completed at [facility] with mild spinal canal stenosis at C2-3, Moderate canal stenosis at L3-4 and L4-5  - Ortho Spine consulted  -- Follow up consults recs  - Patient at functional baseline per PT/OT  - Continue baclofen, diclofenac   -tramadol for pain; discontinuing oxy given hypotension thereafter. Hypotension now resolved     Leukocytosis  - See above     Swelling of left eyelid  States this is the 2nd time this has occurred and she required consultation with an eyelid specialist in the past - no specific treatment provided during prior episode, advised on watchful waiting in the past.   - Has been ongoing over a month  - Continue with QID warm compresses followed by gentle cleansing with baby soap.  - BNP <10 on admission     Neck pain on right side  - States this is chronic and seems positional as patient is leaning head to the left - there is palpable tightening/stiffness of the right side of the neck.  - X-ray of C-spine unremarkable  - Continue Lidocaine patch  - Warm compress PRN  - Gabapentin 300mg qhs     Urinary incontinence without sensory awareness  Continue with Pure wick     Pressure injury of buttock, stage 3  - Chronic stage III decubitus ulcers secondary to immobility despite with wound care efforts at home with home health.   - Probed deep to bone concerning for possible stage IV.    - s/p sacral wound debridement with  general surgery on 5/16  -- OR cultures with bacterial growth  - Infectious disease consulted  -- Follow up wound and bone cultures  -- Bone culture with GNR, GPC, GPR  -- Sacral culture with GNR. GPC  -- Continue antibiotics per ID   - Wound care consulted  - Nutrition conuslted  -Continue CTX, flagyl  -Surgery has deemed diverting colostomy to not be appropriate  -wound vac   -Gen surgery consulted   -increasing tramadol to50 for severe     Urinary tract infection without hematuria  - Patient presented with dysuria   - UA positive  - Urine culture with mixed resistance E coli, sensitive to CTX  - CTX started on 5/13, completed tx on 5/18     Obesity (BMI 30.0-34.9)  - BMI 32.70 on admission - patient would benefit for weight reduction.  - Nutrition consulted     Essential hypertension  - Continue Nifedipine 30 mg daily  - Continue Losartan 50 mg bid (home telmisartan)  - Continue Spirolactone 25 mg daily      Amb referrals to ID, surgery, neurology and neurosurgery in one month or after LTAC if not possible while in LTAC       Consults:   Consults (From admission, onward)          Status Ordering Provider     Inpatient consult to PICC team (\[facility] [place] [facility]")  Once        Provider:  (Not yet assigned)    Acknowledged MISTY GREGORY     Inpatient consult to General Surgery  Once        Provider:  (Not yet assigned)    Completed MISTY GREGORY     Inpatient consult to PICC team (\[facility] [place] [facility]\"")  Once        Provider:  (Not yet assigned)    Completed MISTY GREGORY     Inpatient consult to General Surgery  Once        Provider:  (Not yet assigned)    Completed MISTY GREGORY     Inpatient consult to Orthopedics  Once        Provider:  (Not yet assigned)    Completed DARRIN MOBLEY     Inpatient consult to Infectious Diseases  Once        Provider:  (Not yet assigned)    Completed DARRIN MOBLEY     Inpatient consult to Registered Dietitian/Nutritionist  Once        Provider:  (Not yet assigned)    Completed LEANDRA  DARRIN HERNANDEZ     Inpatient consult to General Surgery  Once        Provider:  Barney Edward MD    Completed SUZANNA MOREL     Inpatient consult to Registered Dietitian/Nutritionist  Once        Provider:  (Not yet assigned)    Completed SUZANNA MOREL     Inpatient consult to [facility] Neurology  Once        Provider:  Ildefonso Gutierres MD    Completed SUZANNA MOREL            Final Active Diagnoses:    Diagnosis Date Noted POA    PRINCIPAL PROBLEM:  Lower extremity weakness [R29.898] 11/07/2022 Yes    Upper extremity weakness [R29.898] 05/18/2023 Yes    Weakness [R53.1] 05/18/2023 Yes    Osteomyelitis of coccyx [M86.9] 05/17/2023 Yes    Leukocytosis [D72.829] 05/16/2023 Yes    Neck pain on right side [M54.2] 05/14/2023 Yes    Swelling of left eyelid [H02.846] 05/14/2023 Yes    Urinary incontinence without sensory awareness [N39.42] 12/20/2022 Yes    Urinary tract infection without hematuria [N39.0] 11/07/2022 Yes    Pressure injury of buttock, stage 3 [L89.303] 11/07/2022 Yes    Obesity (BMI 30.0-34.9) [E66.9] 01/08/2019 Yes    Essential hypertension [I10] 09/22/2012 Yes      Problems Resolved During this Admission:      Discharged Condition: stable    Disposition: Long Term Care    Follow Up:    Patient Instructions:      Ambulatory referral/consult to General Surgery   Standing Status: Future   Referral Priority: Routine Referral Type: Consultation   Referral Reason: Specialty Services Required   Requested Specialty: General Surgery   Number of Visits Requested: 1     Ambulatory referral/consult to Infectious Disease   Standing Status: Future   Referral Priority: Routine Referral Type: Consultation   Referral Reason: Specialty Services Required   Requested Specialty: Infectious Diseases   Number of Visits Requested: 1     Notify your health care provider if you experience any of the following:  temperature >100.4     Notify your health care provider if you experience any of  the following:  persistent nausea and vomiting or diarrhea     Notify your health care provider if you experience any of the following:  severe uncontrolled pain     Notify your health care provider if you experience any of the following:  difficulty breathing or increased cough     Notify your health care provider if you experience any of the following:  severe persistent headache     Notify your health care provider if you experience any of the following:  worsening rash     Notify your health care provider if you experience any of the following:  persistent dizziness, light-headedness, or visual disturbances     Notify your health care provider if you experience any of the following:  increased confusion or weakness     Notify your health care provider if you experience any of the following:     Medications:  Reconciled Home Medications:      Medication List        START taking these medications      baclofen 5 mg Tab tablet  Commonly known as: LIORESAL  Take 1 tablet (5 mg total) by mouth 3 (three) times daily.     dextrose 5 % in water (D5W) 5 % PgBk 50 mL with cefTRIAXone 2 gram SolR 2 g  Inject 2 g into the vein every evening.     famotidine 20 MG tablet  Commonly known as: PEPCID  Take 1 tablet (20 mg total) by mouth 2 (two) times daily as needed (Heartburn/reflux).     gabapentin 300 MG capsule  Commonly known as: NEURONTIN  Take 1 capsule (300 mg total) by mouth every evening.     LIDOcaine 5 %  Commonly known as: LIDODERM  Place 1 patch onto the skin once daily. Place patch to right side of neck. Leave on for 12 hours and remove for 12 hours     melatonin 3 mg tablet  Commonly known as: MELATIN  Take 2 tablets (6 mg total) by mouth nightly as needed for Insomnia.     metroNIDAZOLE 500 MG tablet  Commonly known as: FLAGYL  Take 1 tablet (500 mg total) by mouth every 12 (twelve) hours.     NIFEdipine 30 MG (OSM) 24 hr tablet  Commonly known as: PROCARDIA-XL  Take 1 tablet (30 mg total) by mouth once  daily.  Start taking on: May 25, 2023     senna 8.6 mg tablet  Commonly known as: SENOKOT  Take 1 tablet by mouth once daily.  Start taking on: May 25, 2023            CHANGE how you take these medications      traMADoL 50 mg tablet  Commonly known as: ULTRAM  Take 0.5 tablets (25 mg total) by mouth every 6 (six) hours as needed (Severe Pain).  What changed: See the new instructions.            CONTINUE taking these medications      acetaminophen 650 MG Tbsr  Commonly known as: TYLENOL  Take 1 tablet (650 mg total) by mouth 3 (three) times daily as needed.     ascorbic acid (vitamin C) 500 MG tablet  Commonly known as: VITAMIN C  Take 500 mg by mouth 2 (two) times a day.     ferrous sulfate 325 mg (65 mg iron) Tab tablet  Commonly known as: FEOSOL  Take 325 mg by mouth daily with breakfast.     fish oil-omega-3 fatty acids 300-1,000 mg capsule  Take 1 g by mouth once daily.     furosemide 20 MG tablet  Commonly known as: LASIX  TAKE 1 TABLET(20 MG) BY MOUTH TWICE DAILY     multivitamin with minerals tablet  Take 1 tablet by mouth once daily.     spironolactone 25 MG tablet  Commonly known as: ALDACTONE  Take 1 tablet (25 mg total) by mouth once daily.     telmisartan 40 MG Tab  Commonly known as: MICARDIS  Take 1 tablet (40 mg total) by mouth once daily.     zinc sulfate 50 mg zinc (220 mg) capsule  Commonly known as: ZINCATE  Take 220 mg by mouth once daily.            STOP taking these medications      ciprofloxacin HCl 0.3 % ophthalmic solution  Commonly known as: CILOXAN     esomeprazole 20 MG capsule  Commonly known as: NEXIUM     sulfamethoxazole-trimethoprim 800-160mg 800-160 mg Tab  Commonly known as: BACTRIM DS              Significant Diagnostic Studies: N/A    Pending Diagnostic Studies:       None          Indwelling Lines/Drains at time of discharge:   Lines/Drains/Airways       Drain  Duration             Female External Urinary Catheter 05/16/23 1202 8 days                    Time spent on the  discharge of patient: 35 minutes         Malgorzata Ojeda MD  Department of Hospital Medicine  [facility] - Intensive Care ([place]

## 2023-05-24 NOTE — PROCEDURES
"Trista Gr is a 72 y.o. female patient.    Temp: 98.2 °F (36.8 °C) (05/24/23 1427)  Pulse: 90 (05/24/23 1427)  Resp: 17 (05/24/23 1427)  BP: 106/63 (05/24/23 1427)  SpO2: 99 % (05/24/23 1427)  Weight: 91.5 kg (201 lb 11.5 oz) (05/22/23 1210)  Height: 5' 7" (170.2 cm) (05/22/23 1210)    PICC  Date/Time: 5/24/2023 3:30 PM  Performed by: Aretha White RN  Assisting provider: Say Gomez RN  Consent Done: Yes  Time out: Immediately prior to procedure a time out was called to verify the correct patient, procedure, equipment, support staff and site/side marked as required  Indications: med administration and vascular access  Anesthesia: local infiltration  Local anesthetic: lidocaine 1% without epinephrine  Anesthetic Total (mL): 3  Description of findings: PICC  Preparation: skin prepped with ChloraPrep  Skin prep agent dried: skin prep agent completely dried prior to procedure  Sterile barriers: all five maximum sterile barriers used - cap, mask, sterile gown, sterile gloves, and large sterile sheet  Location details: right brachial  Catheter size: 5 Fr  Catheter Length: 33cm    Ultrasound guidance: yes  Vessel Caliber: medium and patent, compressibility normal  Vascular Doppler: not done  Needle advanced into vessel with real time Ultrasound guidance.  Guidewire confirmed in vessel.  Image recorded and saved.  Sterile sheath used.  Number of attempts: 1  Post-procedure: blood return through all ports, chlorhexidine patch and sterile dressing applied  Technical procedures used: 3CG  Specimens: No  Implants: No  Assessment: placement verified by x-ray  Complications: none        Name   5/24/2023    "

## 2023-05-24 NOTE — PLAN OF CARE
[facility] - Intensive Care ([facility])  Discharge Final Note    Primary Care Provider: REGINO Yeung MD    Expected Discharge Date: 5/24/2023    Final Discharge Note (most recent)       Final Note - 05/24/23 1556          Final Note    Assessment Type Final Discharge Note (P)      Anticipated Discharge Disposition Long Term Care (P)                      Important Message from Medicare           Patient will be discharging today to [facility]. SW has arranged transport for 4:30pm. Nurse call report to [phone]. SW has informed patient of this information.       Meena Powell LMSW  [facility]   [id number]

## 2023-05-24 NOTE — NURSING
Report called to Rehab.  Pino GILLIAM.  Pt AAO.  Right PICC remains intact.  Family at bedside.  Will transfer via ambulance .

## 2023-05-24 NOTE — PLAN OF CARE
LTAC ORDERS    05/24/2023  [facility]  [facility] - INTENSIVE CARE ([facility] [place])  [address]  [facility] [address]  Dept: [phone]  Loc: [phone]     Admit to Nursing Home:  Long Term Acute Care    Diagnoses:  Active Hospital Problems    Diagnosis  POA    *Lower extremity weakness [R29.898]  Yes    Upper extremity weakness [R29.898]  Yes    Weakness [R53.1]  Yes    Osteomyelitis of coccyx [M86.9]  Yes    Leukocytosis [D72.829]  Yes    Neck pain on right side [M54.2]  Yes    Swelling of left eyelid [H02.846]  Yes    Urinary incontinence without sensory awareness [N39.42]  Yes    Urinary tract infection without hematuria [N39.0]  Yes    Pressure injury of buttock, stage 3 [L89.303]  Yes    Obesity (BMI 30.0-34.9) [E66.9]  Yes    Essential hypertension [I10]  Yes     1993          Resolved Hospital Problems   No resolved problems to display.       Patient is homebound due to:  Lower extremity weakness    Allergies:  Review of patient's allergies indicates:   Allergen Reactions    Aspirin      Sore stomach    Codeine Itching    Pcn [penicillins] Itching       Vitals:  Every shift    Diet: cardiac diet    Activities:   Activity as tolerated    Goals of Care Treatment Preferences:  Code Status: Full Code      Nursing Precautions:  Aspiration , Fall, and Pressure ulcer prevention    Consults:   PT to evaluate and treat- 5 times a week, OT to evaluate and treat- 5 times a week, Wound Care, and Nutrition to evaluate and recommend diet     Miscellaneous Care: Santana Care: Empty Santana bag every shift.  Change Santana every month  Routine Skin for Bedridden Patients:  Apply moisture barrier cream to all      CHF Care: Daily Weight with notification of MD/NP of 2lb or > increase in 24 hours    v/s and O2 sat every shift    Oxygen as needed for sats <90%    Report abnormal breath sounds to MD/NP    Edema checks q shift- notify MD/NP of increased edema    Task segmentation by nursing for daily  care to decrease exertion    CHF education to include diet ,medication, and CHF flags for MD notification          Wound Vac   Dressing frequency:  and   With 500 mL gel canister  Dressing size white foam  Cycle setting: Continuous  Negative pressure settin mmHg  Foam kit: black, white    a. Ensure that canister is engaged into pump, replace canister weekly or when full   b. If leak alarm occurs, apply additional thin film at location of leak   c. Alarm troubleshooting contact [facility]. Phone # is [phone], Select option 5. [employer] representative is available 24 hours a day 7 days per week.   e. When therapy is discontinued or patient is discharged, please return non disposable NPWT unit per facility guidelines.:     \  Infectious disease:    1) Infection: osteomyelitis     2) Discharge Antibiotics:     Intravenous antibiotics:  Ceftriaxone 2 g IV q 24 hours     Oral antibiotics:  Flagyl 500 mg PO q 12 hours     3) Therapy Duration:  6 weeks from debridement      Estimated end date of IV antibiotics:  23     4) Outpatient Weekly Labs:     Order the following labs to be drawn on :   CBC  CMP   CRP  Mag, Phos                  Amb referrals to ID, surgery, neurology and neurosurgery in one month or after LTAC if not possible while in LTAC       Medications: Discontinue all previous medication orders, if any. See new list below.     Medication List        START taking these medications      baclofen 5 mg Tab tablet  Commonly known as: LIORESAL  Take 1 tablet (5 mg total) by mouth 3 (three) times daily.     dextrose 5 % in water (D5W) 5 % PgBk 50 mL with cefTRIAXone 2 gram SolR 2 g  Inject 2 g into the vein every evening.     famotidine 20 MG tablet  Commonly known as: PEPCID  Take 1 tablet (20 mg total) by mouth 2 (two) times daily as needed (Heartburn/reflux).     gabapentin 300 MG capsule  Commonly known as: NEURONTIN  Take 1 capsule (300 mg total) by mouth every evening.     LIDOcaine 5  %  Commonly known as: LIDODERM  Place 1 patch onto the skin once daily. Place patch to right side of neck. Leave on for 12 hours and remove for 12 hours     melatonin 3 mg tablet  Commonly known as: MELATIN  Take 2 tablets (6 mg total) by mouth nightly as needed for Insomnia.     metroNIDAZOLE 500 MG tablet  Commonly known as: FLAGYL  Take 1 tablet (500 mg total) by mouth every 12 (twelve) hours.     NIFEdipine 30 MG (OSM) 24 hr tablet  Commonly known as: PROCARDIA-XL  Take 1 tablet (30 mg total) by mouth once daily.  Start taking on: May 25, 2023     senna 8.6 mg tablet  Commonly known as: SENOKOT  Take 1 tablet by mouth once daily.  Start taking on: May 25, 2023            CHANGE how you take these medications      traMADoL 50 mg tablet  Commonly known as: ULTRAM  Take 0.5 tablets (25 mg total) by mouth every 6 (six) hours as needed (Severe Pain).  What changed: See the new instructions.            CONTINUE taking these medications      acetaminophen 650 MG Tbsr  Commonly known as: TYLENOL  Take 1 tablet (650 mg total) by mouth 3 (three) times daily as needed.     ascorbic acid (vitamin C) 500 MG tablet  Commonly known as: VITAMIN C  Take 500 mg by mouth 2 (two) times a day.     ferrous sulfate 325 mg (65 mg iron) Tab tablet  Commonly known as: FEOSOL  Take 325 mg by mouth daily with breakfast.     fish oil-omega-3 fatty acids 300-1,000 mg capsule  Take 1 g by mouth once daily.     furosemide 20 MG tablet  Commonly known as: LASIX  TAKE 1 TABLET(20 MG) BY MOUTH TWICE DAILY     multivitamin with minerals tablet  Take 1 tablet by mouth once daily.     spironolactone 25 MG tablet  Commonly known as: ALDACTONE  Take 1 tablet (25 mg total) by mouth once daily.     telmisartan 40 MG Tab  Commonly known as: MICARDIS  Take 1 tablet (40 mg total) by mouth once daily.     zinc sulfate 50 mg zinc (220 mg) capsule  Commonly known as: ZINCATE  Take 220 mg by mouth once daily.            STOP taking these medications       ciprofloxacin HCl 0.3 % ophthalmic solution  Commonly known as: CILOXAN     esomeprazole 20 MG capsule  Commonly known as: NEXIUM     sulfamethoxazole-trimethoprim 800-160mg 800-160 mg Tab  Commonly known as: BACTRIM DS                Immunizations Administered as of 5/24/2023       Name Date Dose VIS Date Route Exp Date    COVID-19, MRNA, LN-S, PF (Moderna) 4/20/2021 -- -- Intramuscular --    Site: Right arm     Lot: [id number]     COVID-19, MRNA, LN-S, PF (Moderna) 3/23/2021 -- -- Intramuscular --    Site: Right arm     Lot: [id number]               Some patients may experience side effects after vaccination.  These may include fever, headache, muscle or joint aches.  Most symptoms resolve with 24-48 hours and do not require urgent medical evaluation unless they persist for more than 72 hours or symptoms are concerning for an unrelated medical condition.          _________________________________  Malgorzata Ojeda MD  05/24/2023

## 2023-05-24 NOTE — CONSULTS
Double lumen PICC to right brachial vein.  33 cm in length, 44 cm arm circumference and 0 cm exposed.   Lot # [id number].

## 2023-06-10 RX ORDER — TELMISARTAN 40 MG/1
TABLET ORAL
Qty: 90 TABLET | Refills: 1 | Status: SHIPPED | OUTPATIENT
Start: 2023-06-10 | End: 2023-09-21 | Stop reason: SDUPTHER

## 2023-06-19 NOTE — ADDENDUM NOTE
Addendum  created 06/19/23 0738 by Sravan Lara MD    Attestation recorded in Intraprocedure, Intraprocedure Attestations filed, Intraprocedure Event edited

## 2023-06-20 LAB
FUNGUS SPEC CULT: NORMAL
FUNGUS SPEC CULT: NORMAL

## 2023-06-30 ENCOUNTER — HOSPITAL ENCOUNTER (EMERGENCY)
Facility: HOSPITAL | Age: 73
Discharge: HOME | End: 2023-06-30
Attending: EMERGENCY MEDICINE
Payer: MEDICARE

## 2023-06-30 VITALS
WEIGHT: 201.75 LBS | BODY MASS INDEX: 31.59 KG/M2 | TEMPERATURE: 98 F | OXYGEN SATURATION: 99 % | SYSTOLIC BLOOD PRESSURE: 137 MMHG | HEART RATE: 98 BPM | RESPIRATION RATE: 18 BRPM | DIASTOLIC BLOOD PRESSURE: 74 MMHG

## 2023-06-30 DIAGNOSIS — Z48.89 ENCOUNTER FOR POST SURGICAL WOUND CHECK: Primary | ICD-10-CM

## 2023-06-30 LAB
ALBUMIN SERPL BCP-MCNC: 3.4 G/DL (ref 3.5–5.2)
ALP SERPL-CCNC: 89 U/L (ref 55–135)
ALT SERPL W/O P-5'-P-CCNC: 12 U/L (ref 10–44)
ANION GAP SERPL CALC-SCNC: 16 MMOL/L (ref 8–16)
AST SERPL-CCNC: 16 U/L (ref 10–40)
BACTERIA #/AREA URNS AUTO: ABNORMAL /HPF
BASOPHILS # BLD AUTO: 0.06 K/UL (ref 0–0.2)
BASOPHILS NFR BLD: 0.4 % (ref 0–1.9)
BILIRUB SERPL-MCNC: 0.3 MG/DL (ref 0.1–1)
BILIRUB UR QL STRIP: NEGATIVE
BUN SERPL-MCNC: 6 MG/DL (ref 8–23)
CALCIUM SERPL-MCNC: 9.7 MG/DL (ref 8.7–10.5)
CHLORIDE SERPL-SCNC: 103 MMOL/L (ref 95–110)
CLARITY UR REFRACT.AUTO: CLEAR
CO2 SERPL-SCNC: 22 MMOL/L (ref 23–29)
COLOR UR AUTO: ABNORMAL
CREAT SERPL-MCNC: 0.6 MG/DL (ref 0.5–1.4)
CRP SERPL-MCNC: 36.9 MG/L (ref 0–8.2)
DIFFERENTIAL METHOD: ABNORMAL
EOSINOPHIL # BLD AUTO: 0.2 K/UL (ref 0–0.5)
EOSINOPHIL NFR BLD: 0.9 % (ref 0–8)
ERYTHROCYTE [DISTWIDTH] IN BLOOD BY AUTOMATED COUNT: 14.7 % (ref 11.5–14.5)
ERYTHROCYTE [SEDIMENTATION RATE] IN BLOOD BY PHOTOMETRIC METHOD: 116 MM/HR (ref 0–36)
EST. GFR  (NO RACE VARIABLE): >60 ML/MIN/1.73 M^2
GLUCOSE SERPL-MCNC: 95 MG/DL (ref 70–110)
GLUCOSE UR QL STRIP: NEGATIVE
HCT VFR BLD AUTO: 37.5 % (ref 37–48.5)
HCV AB SERPL QL IA: NORMAL
HGB BLD-MCNC: 12.3 G/DL (ref 12–16)
HGB UR QL STRIP: ABNORMAL
HIV 1+2 AB+HIV1 P24 AG SERPL QL IA: NORMAL
HYALINE CASTS UR QL AUTO: 7 /LPF
IMM GRANULOCYTES # BLD AUTO: 0.11 K/UL (ref 0–0.04)
IMM GRANULOCYTES NFR BLD AUTO: 0.7 % (ref 0–0.5)
KETONES UR QL STRIP: ABNORMAL
LEUKOCYTE ESTERASE UR QL STRIP: ABNORMAL
LYMPHOCYTES # BLD AUTO: 2.4 K/UL (ref 1–4.8)
LYMPHOCYTES NFR BLD: 14.8 % (ref 18–48)
MCH RBC QN AUTO: 27.1 PG (ref 27–31)
MCHC RBC AUTO-ENTMCNC: 32.8 G/DL (ref 32–36)
MCV RBC AUTO: 83 FL (ref 82–98)
MICROSCOPIC COMMENT: ABNORMAL
MONOCYTES # BLD AUTO: 1.2 K/UL (ref 0.3–1)
MONOCYTES NFR BLD: 7.6 % (ref 4–15)
NEUTROPHILS # BLD AUTO: 12 K/UL (ref 1.8–7.7)
NEUTROPHILS NFR BLD: 75.6 % (ref 38–73)
NITRITE UR QL STRIP: NEGATIVE
NRBC BLD-RTO: 0 /100 WBC
PH UR STRIP: 5 [PH] (ref 5–8)
PLATELET # BLD AUTO: 412 K/UL (ref 150–450)
PMV BLD AUTO: 10.5 FL (ref 9.2–12.9)
POTASSIUM SERPL-SCNC: 4.3 MMOL/L (ref 3.5–5.1)
PROT SERPL-MCNC: 7.8 G/DL (ref 6–8.4)
PROT UR QL STRIP: NEGATIVE
RBC # BLD AUTO: 4.54 M/UL (ref 4–5.4)
RBC #/AREA URNS AUTO: 13 /HPF (ref 0–4)
SODIUM SERPL-SCNC: 141 MMOL/L (ref 136–145)
SP GR UR STRIP: 1 (ref 1–1.03)
URN SPEC COLLECT METH UR: ABNORMAL
WBC # BLD AUTO: 15.84 K/UL (ref 3.9–12.7)
WBC #/AREA URNS AUTO: 2 /HPF (ref 0–5)

## 2023-06-30 PROCEDURE — 81001 URINALYSIS AUTO W/SCOPE: CPT | Performed by: EMERGENCY MEDICINE

## 2023-06-30 PROCEDURE — 85025 COMPLETE CBC W/AUTO DIFF WBC: CPT | Performed by: EMERGENCY MEDICINE

## 2023-06-30 PROCEDURE — 86803 HEPATITIS C AB TEST: CPT | Performed by: PHYSICIAN ASSISTANT

## 2023-06-30 PROCEDURE — 86140 C-REACTIVE PROTEIN: CPT | Performed by: EMERGENCY MEDICINE

## 2023-06-30 PROCEDURE — 85652 RBC SED RATE AUTOMATED: CPT | Performed by: EMERGENCY MEDICINE

## 2023-06-30 PROCEDURE — 99284 EMERGENCY DEPT VISIT MOD MDM: CPT

## 2023-06-30 PROCEDURE — 87389 HIV-1 AG W/HIV-1&-2 AB AG IA: CPT | Performed by: PHYSICIAN ASSISTANT

## 2023-06-30 PROCEDURE — 25000003 PHARM REV CODE 250: Performed by: EMERGENCY MEDICINE

## 2023-06-30 PROCEDURE — 80053 COMPREHEN METABOLIC PANEL: CPT | Performed by: EMERGENCY MEDICINE

## 2023-06-30 RX ORDER — BACLOFEN 5 MG/1
2.5 TABLET ORAL 3 TIMES DAILY
Qty: 45 TABLET | Refills: 0 | Status: SHIPPED | OUTPATIENT
Start: 2023-06-30 | End: 2023-09-21 | Stop reason: SDUPTHER

## 2023-06-30 RX ORDER — SPIRONOLACTONE 25 MG/1
25 TABLET ORAL DAILY
Qty: 30 TABLET | Refills: 11 | Status: SHIPPED | OUTPATIENT
Start: 2023-06-30 | End: 2024-06-29

## 2023-06-30 RX ORDER — FAMOTIDINE 20 MG/1
20 TABLET, FILM COATED ORAL 2 TIMES DAILY
Qty: 60 TABLET | Refills: 0 | Status: SHIPPED | OUTPATIENT
Start: 2023-06-30 | End: 2023-07-30

## 2023-06-30 RX ORDER — FERROUS SULFATE 325(65) MG
325 TABLET, DELAYED RELEASE (ENTERIC COATED) ORAL DAILY
Qty: 30 TABLET | Refills: 0 | Status: SHIPPED | OUTPATIENT
Start: 2023-06-30

## 2023-06-30 RX ORDER — TRAMADOL HYDROCHLORIDE 50 MG/1
50 TABLET ORAL
Status: DISCONTINUED | OUTPATIENT
Start: 2023-06-30 | End: 2023-06-30

## 2023-06-30 RX ORDER — TRAMADOL HYDROCHLORIDE 50 MG/1
25 TABLET ORAL EVERY 6 HOURS PRN
Qty: 12 TABLET | Refills: 0 | Status: SHIPPED | OUTPATIENT
Start: 2023-06-30 | End: 2023-07-06 | Stop reason: SDUPTHER

## 2023-06-30 RX ORDER — CALCIUM CARBONATE 500(1250)
1 TABLET,CHEWABLE ORAL EVERY 4 HOURS PRN
Qty: 90 TABLET | Refills: 11 | Status: SHIPPED | OUTPATIENT
Start: 2023-06-30 | End: 2024-06-29

## 2023-06-30 RX ORDER — NIFEDIPINE 30 MG/1
30 TABLET, EXTENDED RELEASE ORAL DAILY
Qty: 30 TABLET | Refills: 11 | Status: SHIPPED | OUTPATIENT
Start: 2023-06-30 | End: 2024-06-29

## 2023-06-30 RX ORDER — PANTOPRAZOLE SODIUM 40 MG/1
40 TABLET, DELAYED RELEASE ORAL 2 TIMES DAILY
Qty: 60 TABLET | Refills: 11 | Status: SHIPPED | OUTPATIENT
Start: 2023-06-30 | End: 2024-06-29

## 2023-06-30 RX ORDER — GABAPENTIN 300 MG/1
300 CAPSULE ORAL NIGHTLY
Qty: 30 CAPSULE | Refills: 0 | Status: SHIPPED | OUTPATIENT
Start: 2023-06-30 | End: 2023-07-11 | Stop reason: SDUPTHER

## 2023-06-30 RX ORDER — DOCUSATE SODIUM 100 MG/1
100 CAPSULE, LIQUID FILLED ORAL 2 TIMES DAILY
Qty: 60 CAPSULE | Refills: 0 | Status: SHIPPED | OUTPATIENT
Start: 2023-06-30 | End: 2023-07-30

## 2023-06-30 RX ORDER — ASCORBIC ACID 500 MG
500 TABLET ORAL 2 TIMES DAILY
Qty: 60 TABLET | Refills: 0 | Status: SHIPPED | OUTPATIENT
Start: 2023-06-30 | End: 2023-07-30

## 2023-06-30 RX ORDER — NYSTATIN 100000 [USP'U]/ML
500000 SUSPENSION ORAL 4 TIMES DAILY
Qty: 40 ML | Refills: 0 | Status: SHIPPED | OUTPATIENT
Start: 2023-06-30 | End: 2023-07-02

## 2023-06-30 RX ADMIN — TRAMADOL HYDROCHLORIDE 25 MG: 50 TABLET, COATED ORAL at 05:06

## 2023-06-30 NOTE — ED PROVIDER NOTES
Encounter Date: 6/30/2023       History     Chief Complaint   Patient presents with    Wound Check     Has sacral wounds causing her increasing pain,  having difficulty taking care of her, recently dc from long term care facility     HPI patient is a 72-year-old female with a previous history of cervical and lumbar stenosis, hypertension, chronic disability and has been largely bed-bound for the previous 2 years, significant sacral decubitus ulcer, urinary incontinence and recurrent urinary tract infections who was discharged the day prior to arrival from [facility] to home.  The patient's  explains that the patient was supposed to be discharged to home with home health and wound care including wound VAC for her sacral wound, hospital bed, medications however when the patient returned home there was no equipment to meet her and the patient's  did not feel comfortable having the patient at home over the weekend without the equipment and medications that they needed.  The patient explains she has persistent pain in her sacral wound similar to previous but has not had access to her tramadol which she normally takes for pain.  The patient has completed her course of antibiotics per the patient and per records and had a right upper extremity PICC line removed recently.  The patient and family explains that they preferred to be discharged to a long-term skilled nursing facility but it was declined by the patient's health insurance.  Review of patient's allergies indicates:   Allergen Reactions    Aspirin      Sore stomach    Codeine Itching    Pcn [penicillins] Itching     Past Medical History:   Diagnosis Date    Debility     Decubitus ulcer of buttock     GERD (gastroesophageal reflux disease) 09/22/2012    HTN (hypertension) 09/22/2012    Normal stress echocardiogram 09/22/2012    Vitamin D deficiency disease 09/22/2012     Past Surgical History:   Procedure Laterality Date    APPENDECTOMY      BILATERAL  SALPINGOOPHORECTOMY      DEBRIDEMENT OF SACRAL WOUND N/A 5/16/2023    Procedure: DEBRIDEMENT, WOUND, SACRUM;  Surgeon: Barney Edward MD;  Location: [facility];  Service: General;  Laterality: N/A;    HYSTERECTOMY       Family History   Problem Relation Age of Onset    Hypertension Mother     Hypertension Father     Stroke Father     Diabetes Brother     Diabetes Maternal Grandmother     Uterine cancer Sister      Social History     Tobacco Use    Smoking status: Never    Smokeless tobacco: Never   Substance Use Topics    Alcohol use: No     Alcohol/week: 0.0 standard drinks    Drug use: No     Review of Systems  10 point review of systems reviewed with patient otherwise negative.     Physical Exam     Initial Vitals [06/30/23 0225]   BP Pulse Resp Temp SpO2   (!) 154/79 110 20 97.9 °F (36.6 °C) 96 %      MAP       --         Physical Exam     Nursing note and vitals reviewed.  Constitutional: Patient appears well-developed and well-nourished. No distress.   HENT:   Head: Normocephalic and atraumatic.   Eyes: Conjunctivae and EOM are normal. Pupils are equal, round, and reactive to light.   Neck: Neck supple.   Normal range of motion.  Cardiovascular: Normal rate, regular rhythm, normal heart sounds and intact distal pulses.   Pulmonary/Chest: Breath sounds normal.   Abdominal: Abdomen is soft. Patient exhibits no distension. There is no abdominal tenderness.   Musculoskeletal:      1+ BL pitting LE edema without erythema   Neurological: Patient is alert and oriented to person, place, and time. No cranial nerve deficit. Gait normal. GCS score is 15.    Skin:  Well dressed sacral wound with moist gauze intact, no surrounding erythema   Psych: Normal mood/affect    ED Course   Procedures  Labs Reviewed   CBC W/ AUTO DIFFERENTIAL - Abnormal; Notable for the following components:       Result Value    WBC 15.84 (*)     RDW 14.7 (*)     Immature Granulocytes 0.7 (*)     Gran # (ANC) 12.0 (*)     Immature Grans  (Abs) 0.11 (*)     Mono # 1.2 (*)     Gran % 75.6 (*)     Lymph % 14.8 (*)     All other components within normal limits    Narrative:     Release to patient->Immediate   COMPREHENSIVE METABOLIC PANEL - Abnormal; Notable for the following components:    CO2 22 (*)     BUN 6 (*)     Albumin 3.4 (*)     All other components within normal limits    Narrative:     Release to patient->Immediate   URINALYSIS, REFLEX TO URINE CULTURE - Abnormal; Notable for the following components:    Ketones, UA 1+ (*)     Occult Blood UA 1+ (*)     Leukocytes, UA Trace (*)     All other components within normal limits    Narrative:     Specimen Source->Urine   C-REACTIVE PROTEIN - Abnormal; Notable for the following components:    CRP 36.9 (*)     All other components within normal limits    Narrative:     Release to patient->Immediate   URINALYSIS MICROSCOPIC - Abnormal; Notable for the following components:    RBC, UA 13 (*)     Hyaline Casts, UA 7 (*)     All other components within normal limits    Narrative:     Specimen Source->Urine   HIV 1 / 2 ANTIBODY    Narrative:     Release to patient->Immediate   HEPATITIS C ANTIBODY    Narrative:     Release to patient->Immediate   SEDIMENTATION RATE          Imaging Results    None          Medications   tramadol split tablet 25 mg (25 mg Oral Given 6/30/23 0514)          Patient was evaluated by social work in the emergency department who made multiple phone calls and confirmed that the patient's wound VAC will be arriving this morning as well as a  to repair the patient's hospital bed at home.    The patient and  state that the only medications they have at home are Lasix and losartan but need prescriptions for everything and they were not sent by the discharge facility and thus prescriptions were sent for all of the home medications that were listed in the patient's MAR.  Of note, patient's antibiotics were noted to be stopped and patient is nystatin was recorded  "to be stopped tomorrow.    Patient and  feel comfortable returning home with plan for home health to meet them at home today.    NIA notes:     "Pt stated  her home hospital bed is too small and she made a request to [facility], but was denied.  Pt also stated [facility] informed pt that they will not pay for another bed however, they will send someone to repair her  current bed.     Nia reached out to [facility] (  Geovanna, [phone]) and was  told that  [facility]'s [facility] and repair pt's bed on  6- as per Geovanna ,Geovanna  also stated pt's has a home health visit scheduled for  6-  between 10 am and  11 am. To begin treatment.     Geovanna stated pt is aware of the scheduled  events . Nia  also [facility] to  [facility] ( [phone])  to confirm delivery date and time, as per Guerda , pt has a scheduled delivery for today 6- . Nia informed Guerda that pt stated  she thought her  equipment was due to be delivered on yesterday 6-.   Guerda stated she will reach out to  when they open at  9am , with an updated on exact delivery time. Guerda stated [facility] does not carry wound vacs and she is unaware of who will proved pt with a wound vac.     Nia reached out to [facility]  and spoke to  Emil Carrera ([phone]) who confirmed that  pt's was made aware prior to her discharge, of the delivery time and date and the equipment being delivered. Emil also confirmed that all pt's medical equipment,  including pt's wound vac will be delivered 6- by  [facility]..  Updated. "        Clinical Impression:   Final diagnoses:  [Z48.89] Encounter for post surgical wound check (Primary)        ED Disposition Condition    Discharge Stable          ED Prescriptions       Medication Sig Dispense Start Date End Date Auth. Provider    nystatin (MYCOSTATIN) 100,000 unit/mL suspension Take 5 mLs (500,000 Units total) by mouth 4 (four) times daily. for 2 days 40 mL " 6/30/2023 7/2/2023 Kristen Hutson MD    calcium carbonate (OS-ALEX) 500 mg calcium (1,250 mg) chewable tablet Take 1 tablet (500 mg total) by mouth every 4 (four) hours as needed for Heartburn. 90 tablet 6/30/2023 6/29/2024 Kristen Hutson MD    pantoprazole (PROTONIX) 40 MG tablet Take 1 tablet (40 mg total) by mouth 2 (two) times daily. 60 tablet 6/30/2023 6/29/2024 Kristen Hutson MD    baclofen (LIORESAL) 5 mg Tab tablet Take 0.5 tablets (2.5 mg total) by mouth 3 (three) times daily. 45 tablet 6/30/2023 7/30/2023 Kristen Hutson MD    docusate sodium (COLACE) 100 MG capsule Take 1 capsule (100 mg total) by mouth 2 (two) times daily. 60 capsule 6/30/2023 7/30/2023 Kristen Hutson MD    ascorbic acid, vitamin C, (VITAMIN C) 500 MG tablet Take 1 tablet (500 mg total) by mouth 2 (two) times daily. 60 tablet 6/30/2023 7/30/2023 Kristen Hutson MD    famotidine (PEPCID) 20 MG tablet Take 1 tablet (20 mg total) by mouth 2 (two) times daily. 60 tablet 6/30/2023 7/30/2023 Kristen Hutson MD    gabapentin (NEURONTIN) 300 MG capsule Take 1 capsule (300 mg total) by mouth every evening. 30 capsule 6/30/2023 7/30/2023 Kristen Hutson MD    NIFEdipine (PROCARDIA-XL) 30 MG (OSM) 24 hr tablet Take 1 tablet (30 mg total) by mouth once daily. 30 tablet 6/30/2023 6/29/2024 Kristen Hutson MD    traMADoL (ULTRAM) 50 mg tablet Take 0.5 tablets (25 mg total) by mouth every 6 (six) hours as needed for Pain. 12 tablet 6/30/2023 -- Kristen Hutson MD    ferrous sulfate 325 (65 FE) MG EC tablet Take 1 tablet (325 mg total) by mouth once daily. 30 tablet 6/30/2023 -- Kristen Hutson MD    spironolactone (ALDACTONE) 25 MG tablet Take 1 tablet (25 mg total) by mouth once daily. 30 tablet 6/30/2023 6/29/2024 Kristen Hutson MD          Follow-up Information       Follow up With Specialties Details Why Contact Clarita Yeung MD Internal Medicine  as [facility]11[facility]23 [facility] [address] [place] [address]  [phone]               Kristen Hutson MD  06/30/23 0645       Kristen Hutson,  MD  06/30/23 0648

## 2023-06-30 NOTE — PLAN OF CARE
Germaine met pt and pt's spouse at bedside . Pt stated she was discharged from [facility] on  [facility]-, but when she arrived  home, pt's wound vac was not delivered and pt's hospital bed was not delivered. Pt stated  her home hospital bed is too small. . Pt also stated [facility] informed pt that they will not pay for another bed however, they will send someone to repair her  current bed.     Sw reached out to [facility] ( [phone]) ( [facility]'s [facility] and repair pt's bed on  6- as per Geovanna ,Geovanna  also stated pt's has a home health visit scheduled for  6-  between 10 am and  11 am. To begin treatment.     Geovanna stated pt is aware of the scheduled  events . Sw  also reached out to  [employer] ( [phone])  to confirm delivery date and time, as per Guerda , pt has a scheduled delivery for today 6- . Germaine informed Guerda that pt stated  she thought her  equipment was due to be delivered on yesterday 6-.   Guerda stated she will reach out to  when they open at  9am , with an updated on exact delivery time  Guerda stated [facility] does not carry wound vacs and she is unaware of who will proved pt with a wound vac.     Germaine reached out to [facility]  and spoke to  Emil Carrera ([phone]) who stated pt's was made aware prior to her discharge, of the delivery time and date and the equipment being delivered. Emil confirmed that all pt's medical equipment,  including pt's wound vac will be delivered 6- by  [facility].. Dr. Whitaker.        Lupe Pina, [facility]  Case Management  Emergency Department  [phone]   .

## 2023-06-30 NOTE — ED TRIAGE NOTES
Trista Gr, a 72 y.o. female presents to the ED w/ complaint of wound check. Pt has sacral wounds that have been causing increased pain. Pt recently discharged from long term care facility. Pt  states he is unable to take care of pt like she deserves.     Triage note:  Chief Complaint   Patient presents with    Wound Check     Has sacral wounds causing her increasing pain,  having difficulty taking care of her, recently dc from long term care facility     Review of patient's allergies indicates:   Allergen Reactions    Aspirin      Sore stomach    Codeine Itching    Pcn [penicillins] Itching     Past Medical History:   Diagnosis Date    Debility     Decubitus ulcer of buttock     GERD (gastroesophageal reflux disease) 09/22/2012    HTN (hypertension) 09/22/2012    Normal stress echocardiogram 09/22/2012    Vitamin D deficiency disease 09/22/2012

## 2023-07-03 ENCOUNTER — EXTERNAL HOME HEALTH (OUTPATIENT)
Dept: HOME HEALTH SERVICES | Facility: HOSPITAL | Age: 73
End: 2023-07-03
Payer: MEDICARE

## 2023-07-04 LAB
ACID FAST MOD KINY STN SPEC: NORMAL
ACID FAST MOD KINY STN SPEC: NORMAL
MYCOBACTERIUM SPEC QL CULT: NORMAL
MYCOBACTERIUM SPEC QL CULT: NORMAL

## 2023-07-06 DIAGNOSIS — M54.50 CHRONIC LOW BACK PAIN, UNSPECIFIED BACK PAIN LATERALITY, UNSPECIFIED WHETHER SCIATICA PRESENT: Primary | ICD-10-CM

## 2023-07-06 DIAGNOSIS — G89.29 CHRONIC LOW BACK PAIN, UNSPECIFIED BACK PAIN LATERALITY, UNSPECIFIED WHETHER SCIATICA PRESENT: Primary | ICD-10-CM

## 2023-07-06 RX ORDER — TRAMADOL HYDROCHLORIDE 50 MG/1
50 TABLET ORAL EVERY OTHER DAY
Qty: 15 TABLET | Refills: 0 | Status: SHIPPED | OUTPATIENT
Start: 2023-07-06 | End: 2023-07-11

## 2023-07-10 ENCOUNTER — ANESTHESIA (OUTPATIENT)
Dept: SURGERY | Facility: OTHER | Age: 73
DRG: 580 | End: 2023-07-10
Payer: MEDICARE

## 2023-07-11 ENCOUNTER — OFFICE VISIT (OUTPATIENT)
Dept: INTERNAL MEDICINE | Facility: CLINIC | Age: 73
End: 2023-07-11
Attending: INTERNAL MEDICINE
Payer: MEDICARE

## 2023-07-11 DIAGNOSIS — I10 ESSENTIAL HYPERTENSION: Primary | ICD-10-CM

## 2023-07-11 DIAGNOSIS — Z74.01 CONFINED TO BED: ICD-10-CM

## 2023-07-11 DIAGNOSIS — M54.50 CHRONIC LOW BACK PAIN, UNSPECIFIED BACK PAIN LATERALITY, UNSPECIFIED WHETHER SCIATICA PRESENT: ICD-10-CM

## 2023-07-11 DIAGNOSIS — M54.16 LUMBAR RADICULOPATHY: ICD-10-CM

## 2023-07-11 DIAGNOSIS — L89.154 PRESSURE INJURY OF SACRAL REGION, STAGE 4: ICD-10-CM

## 2023-07-11 DIAGNOSIS — R29.898 WEAKNESS OF BOTH LOWER EXTREMITIES: ICD-10-CM

## 2023-07-11 DIAGNOSIS — R60.0 PERIPHERAL EDEMA: ICD-10-CM

## 2023-07-11 DIAGNOSIS — G89.29 CHRONIC LOW BACK PAIN, UNSPECIFIED BACK PAIN LATERALITY, UNSPECIFIED WHETHER SCIATICA PRESENT: ICD-10-CM

## 2023-07-11 PROBLEM — R06.02 SHORTNESS OF BREATH: Status: RESOLVED | Noted: 2022-11-07 | Resolved: 2023-07-11

## 2023-07-11 PROCEDURE — 4010F ACE/ARB THERAPY RXD/TAKEN: CPT | Mod: CPTII,S$GLB,, | Performed by: INTERNAL MEDICINE

## 2023-07-11 PROCEDURE — 99214 OFFICE O/P EST MOD 30 MIN: CPT | Mod: 95,S$GLB,, | Performed by: INTERNAL MEDICINE

## 2023-07-11 PROCEDURE — 1159F PR MEDICATION LIST DOCUMENTED IN MEDICAL RECORD: ICD-10-PCS | Mod: CPTII,S$GLB,, | Performed by: INTERNAL MEDICINE

## 2023-07-11 PROCEDURE — 1160F PR REVIEW ALL MEDS BY PRESCRIBER/CLIN PHARMACIST DOCUMENTED: ICD-10-PCS | Mod: CPTII,S$GLB,, | Performed by: INTERNAL MEDICINE

## 2023-07-11 PROCEDURE — 1159F MED LIST DOCD IN RCRD: CPT | Mod: CPTII,S$GLB,, | Performed by: INTERNAL MEDICINE

## 2023-07-11 PROCEDURE — 1160F RVW MEDS BY RX/DR IN RCRD: CPT | Mod: CPTII,S$GLB,, | Performed by: INTERNAL MEDICINE

## 2023-07-11 PROCEDURE — 4010F PR ACE/ARB THEARPY RXD/TAKEN: ICD-10-PCS | Mod: CPTII,S$GLB,, | Performed by: INTERNAL MEDICINE

## 2023-07-11 PROCEDURE — 99214 PR OFFICE/OUTPT VISIT, EST, LEVL IV, 30-39 MIN: ICD-10-PCS | Mod: 95,S$GLB,, | Performed by: INTERNAL MEDICINE

## 2023-07-11 RX ORDER — HYDROCODONE BITARTRATE AND ACETAMINOPHEN 5; 325 MG/1; MG/1
1 TABLET ORAL EVERY 8 HOURS PRN
Qty: 60 TABLET | Refills: 0 | Status: SHIPPED | OUTPATIENT
Start: 2023-07-11 | End: 2023-07-18

## 2023-07-11 RX ORDER — GABAPENTIN 300 MG/1
300 CAPSULE ORAL NIGHTLY
Qty: 30 CAPSULE | Refills: 0 | Status: SHIPPED | OUTPATIENT
Start: 2023-07-11 | End: 2023-11-28 | Stop reason: SDUPTHER

## 2023-07-11 NOTE — PROGRESS NOTES
Telemedicine Visit    The patient verbally consented to proceed with a telemedicine visit, following discussion of the options of face-to-face or telemedicine visit. The telemedicine visit was completed with real-time interactive video using Zoom.  The audio portion was carried out via telephone.    The visit was conducted with a limited physical exam (visual exam), and was medically appropriate to meet the patient's needs.    The patient gives permission for us to bill for this telemedicine visit.  The patient understands that there are limitations including diagnostic, technical, and physical exam.  The patient understands that they have a right to stop the telemedicine visit at any time.    The patient is at their home.  The patient appeared to be in the usual physical state, with normal responses and affect.  I did not notice any abnormal skin tone or abnormal breathing patterns.    I requested that the patient contact my office at [phone] to make an appointment for routine follow-up care, unless more specific instructions were given under A/P.    Chief Complaint: Stage 4 sacral decubitus    HPI:  She has been bed-bound for approximately 2 years.  She has seen spinal Orthopedics, Neurosurgery, and Neurology and had MRIs on her spine.  It is believed her debility is from advanced arthritis and degenerative disease in her spine.  She has been deemed inoperable in the past due to her poor health.  She was in the hospital from May 14th to May 24th for worsening sacral decubitus which was infected.  She did have the wound debrided to bone and was on antibiotics for quite some time.  She has been back home for over 1 week.  She has a wound VAC in place which is being changed by home health care.  She has been taking tramadol 50 mg 3 times per day without significant relief.  Her main complaint is burning and pain in her legs.        A/P:  She will continue with home health care and wound VAC for her stage IV  sacral decubitus.  Will change her pain medication to hydrocodone 5 mg and gabapentin 300 mg 3 times each day with food.  If her wound does not improve significantly with the wound VAC we will discuss initiating either home or in-hospital hospice care which will provide her with more significant pain relief.

## 2023-07-18 RX ORDER — TRAMADOL HYDROCHLORIDE 100 MG/1
1 TABLET, COATED ORAL
Qty: 90 TABLET | Refills: 0 | Status: SHIPPED | OUTPATIENT
Start: 2023-07-18 | End: 2023-07-19 | Stop reason: CLARIF

## 2023-07-18 RX ORDER — CLOTRIMAZOLE 10 MG/1
10 LOZENGE ORAL; TOPICAL 4 TIMES DAILY
Qty: 30 TABLET | Refills: 0 | Status: SHIPPED | OUTPATIENT
Start: 2023-07-18 | End: 2023-08-17

## 2023-07-19 DIAGNOSIS — M54.50 CHRONIC LOW BACK PAIN, UNSPECIFIED BACK PAIN LATERALITY, UNSPECIFIED WHETHER SCIATICA PRESENT: Primary | ICD-10-CM

## 2023-07-19 DIAGNOSIS — G89.29 CHRONIC LOW BACK PAIN, UNSPECIFIED BACK PAIN LATERALITY, UNSPECIFIED WHETHER SCIATICA PRESENT: Primary | ICD-10-CM

## 2023-07-19 DIAGNOSIS — L89.154 PRESSURE INJURY OF SACRAL REGION, STAGE 4: ICD-10-CM

## 2023-07-19 RX ORDER — TRAMADOL HYDROCHLORIDE 50 MG/1
100 TABLET ORAL EVERY 8 HOURS PRN
Qty: 180 TABLET | Refills: 0 | Status: SHIPPED | OUTPATIENT
Start: 2023-07-19 | End: 2023-08-24 | Stop reason: SDUPTHER

## 2023-08-03 ENCOUNTER — TELEPHONE (OUTPATIENT)
Dept: NEUROSURGERY | Facility: CLINIC | Age: 73
End: 2023-08-03
Payer: MEDICARE

## 2023-08-03 NOTE — TELEPHONE ENCOUNTER
Left VM for patient to return the call to the clinic to schedule an appointment with Giulia per the referral of Rusty for BUE weakness.

## 2023-08-06 ENCOUNTER — NURSE TRIAGE (OUTPATIENT)
Dept: ADMINISTRATIVE | Facility: CLINIC | Age: 73
End: 2023-08-06
Payer: MEDICARE

## 2023-08-06 NOTE — TELEPHONE ENCOUNTER
Pt reports severe abdominal pain 8/10. Currently has a catheter in place w/ urine results. Advised, per protocol but would rather not be seen in the ED due to current health status. I have paged the provider on call in this regard. Dr. Wolf agrees with the protocol to have patient evaluated in the ED. Patient updated, would like follow up with office in this regard.    Reason for Disposition   SEVERE abdominal pain    Additional Information   Negative: Shock suspected (e.g., cold/pale/clammy skin, too weak to stand, low BP, rapid pulse)   Negative: Sounds like a life-threatening emergency to the triager    Protocols used: Urinary Catheter (e.g., Santana) Symptoms and [id number]

## 2023-08-24 DIAGNOSIS — G89.29 CHRONIC LOW BACK PAIN, UNSPECIFIED BACK PAIN LATERALITY, UNSPECIFIED WHETHER SCIATICA PRESENT: ICD-10-CM

## 2023-08-24 DIAGNOSIS — L89.154 PRESSURE INJURY OF SACRAL REGION, STAGE 4: ICD-10-CM

## 2023-08-24 DIAGNOSIS — M54.50 CHRONIC LOW BACK PAIN, UNSPECIFIED BACK PAIN LATERALITY, UNSPECIFIED WHETHER SCIATICA PRESENT: ICD-10-CM

## 2023-08-24 RX ORDER — TRAMADOL HYDROCHLORIDE 50 MG/1
100 TABLET ORAL EVERY 8 HOURS PRN
Qty: 180 TABLET | Refills: 0 | Status: SHIPPED | OUTPATIENT
Start: 2023-08-24 | End: 2023-09-21 | Stop reason: SDUPTHER

## 2023-08-28 DIAGNOSIS — N39.0 URINARY TRACT INFECTION WITHOUT HEMATURIA, SITE UNSPECIFIED: Primary | ICD-10-CM

## 2023-08-28 RX ORDER — CIPROFLOXACIN 500 MG/1
500 TABLET ORAL 2 TIMES DAILY
Qty: 14 TABLET | Refills: 0 | Status: SHIPPED | OUTPATIENT
Start: 2023-08-28 | End: 2023-08-28 | Stop reason: SINTOL

## 2023-08-28 RX ORDER — SULFAMETHOXAZOLE AND TRIMETHOPRIM 800; 160 MG/1; MG/1
1 TABLET ORAL 2 TIMES DAILY
Qty: 14 TABLET | Refills: 0 | Status: SHIPPED | OUTPATIENT
Start: 2023-08-28 | End: 2023-09-19

## 2023-09-05 ENCOUNTER — PATIENT OUTREACH (OUTPATIENT)
Dept: ADMINISTRATIVE | Facility: HOSPITAL | Age: 73
End: 2023-09-05
Payer: MEDICARE

## 2023-09-14 ENCOUNTER — TELEPHONE (OUTPATIENT)
Dept: ENDOCRINOLOGY | Facility: CLINIC | Age: 73
End: 2023-09-14
Payer: MEDICARE

## 2023-09-14 NOTE — TELEPHONE ENCOUNTER
----- Message from Kalie Jordan sent at 9/14/2023 11:27 AM CDT -----  Regarding: Patient Advice  Contact: Vangie [phone]  [facility] is calling to speak to provider about pt please call

## 2023-09-21 ENCOUNTER — OFFICE VISIT (OUTPATIENT)
Dept: INTERNAL MEDICINE | Facility: CLINIC | Age: 73
End: 2023-09-21
Attending: INTERNAL MEDICINE
Payer: MEDICARE

## 2023-09-21 VITALS — SYSTOLIC BLOOD PRESSURE: 135 MMHG | HEART RATE: 72 BPM | OXYGEN SATURATION: 96 % | DIASTOLIC BLOOD PRESSURE: 71 MMHG

## 2023-09-21 DIAGNOSIS — R29.898 WEAKNESS OF BOTH LOWER EXTREMITIES: ICD-10-CM

## 2023-09-21 DIAGNOSIS — M54.50 CHRONIC LOW BACK PAIN, UNSPECIFIED BACK PAIN LATERALITY, UNSPECIFIED WHETHER SCIATICA PRESENT: ICD-10-CM

## 2023-09-21 DIAGNOSIS — Z96.0 URINARY CATHETER IN PLACE: ICD-10-CM

## 2023-09-21 DIAGNOSIS — Z74.01 CONFINED TO BED: ICD-10-CM

## 2023-09-21 DIAGNOSIS — L89.154 PRESSURE INJURY OF SACRAL REGION, STAGE 4: ICD-10-CM

## 2023-09-21 DIAGNOSIS — N39.42 URINARY INCONTINENCE WITHOUT SENSORY AWARENESS: ICD-10-CM

## 2023-09-21 DIAGNOSIS — G89.29 CHRONIC LOW BACK PAIN, UNSPECIFIED BACK PAIN LATERALITY, UNSPECIFIED WHETHER SCIATICA PRESENT: ICD-10-CM

## 2023-09-21 DIAGNOSIS — I10 ESSENTIAL HYPERTENSION: Primary | ICD-10-CM

## 2023-09-21 PROCEDURE — 99499 UNLISTED E&M SERVICE: CPT | Mod: S$GLB,,, | Performed by: INTERNAL MEDICINE

## 2023-09-21 PROCEDURE — 3075F SYST BP GE 130 - 139MM HG: CPT | Mod: CPTII,S$GLB,, | Performed by: INTERNAL MEDICINE

## 2023-09-21 PROCEDURE — 99214 OFFICE O/P EST MOD 30 MIN: CPT | Mod: 95,S$GLB,, | Performed by: INTERNAL MEDICINE

## 2023-09-21 PROCEDURE — 1159F PR MEDICATION LIST DOCUMENTED IN MEDICAL RECORD: ICD-10-PCS | Mod: CPTII,S$GLB,, | Performed by: INTERNAL MEDICINE

## 2023-09-21 PROCEDURE — 3075F PR MOST RECENT SYSTOLIC BLOOD PRESS GE 130-139MM HG: ICD-10-PCS | Mod: CPTII,S$GLB,, | Performed by: INTERNAL MEDICINE

## 2023-09-21 PROCEDURE — 99214 PR OFFICE/OUTPT VISIT, EST, LEVL IV, 30-39 MIN: ICD-10-PCS | Mod: 95,S$GLB,, | Performed by: INTERNAL MEDICINE

## 2023-09-21 PROCEDURE — 3078F PR MOST RECENT DIASTOLIC BLOOD PRESSURE < 80 MM HG: ICD-10-PCS | Mod: CPTII,S$GLB,, | Performed by: INTERNAL MEDICINE

## 2023-09-21 PROCEDURE — 3078F DIAST BP <80 MM HG: CPT | Mod: CPTII,S$GLB,, | Performed by: INTERNAL MEDICINE

## 2023-09-21 PROCEDURE — 4010F PR ACE/ARB THEARPY RXD/TAKEN: ICD-10-PCS | Mod: CPTII,S$GLB,, | Performed by: INTERNAL MEDICINE

## 2023-09-21 PROCEDURE — 1160F RVW MEDS BY RX/DR IN RCRD: CPT | Mod: CPTII,S$GLB,, | Performed by: INTERNAL MEDICINE

## 2023-09-21 PROCEDURE — 1160F PR REVIEW ALL MEDS BY PRESCRIBER/CLIN PHARMACIST DOCUMENTED: ICD-10-PCS | Mod: CPTII,S$GLB,, | Performed by: INTERNAL MEDICINE

## 2023-09-21 PROCEDURE — 1159F MED LIST DOCD IN RCRD: CPT | Mod: CPTII,S$GLB,, | Performed by: INTERNAL MEDICINE

## 2023-09-21 PROCEDURE — 4010F ACE/ARB THERAPY RXD/TAKEN: CPT | Mod: CPTII,S$GLB,, | Performed by: INTERNAL MEDICINE

## 2023-09-21 RX ORDER — DICLOFENAC SODIUM 10 MG/G
4 GEL TOPICAL 4 TIMES DAILY PRN
Qty: 500 G | Refills: 3 | Status: SHIPPED | OUTPATIENT
Start: 2023-09-21

## 2023-09-21 RX ORDER — TELMISARTAN 40 MG/1
40 TABLET ORAL DAILY
Qty: 90 TABLET | Refills: 1 | Status: SHIPPED | OUTPATIENT
Start: 2023-09-21 | End: 2024-03-31

## 2023-09-21 RX ORDER — BACLOFEN 5 MG/1
2.5 TABLET ORAL 3 TIMES DAILY PRN
Qty: 45 TABLET | Refills: 0 | Status: SHIPPED | OUTPATIENT
Start: 2023-09-21 | End: 2023-10-21

## 2023-09-21 RX ORDER — TRAMADOL HYDROCHLORIDE 50 MG/1
100 TABLET ORAL EVERY 8 HOURS PRN
Qty: 180 TABLET | Refills: 0 | Status: SHIPPED | OUTPATIENT
Start: 2023-09-21 | End: 2023-10-23

## 2023-09-21 NOTE — PROGRESS NOTES
Telemedicine Visit    The patient verbally consented to proceed with a telemedicine visit, following discussion of the options of face-to-face or telemedicine visit. The telemedicine visit was completed with real-time interactive audio and video using Zoom without complication.    The visit was conducted with a limited physical exam (visual exam), and was medically appropriate to meet the patient's needs.    The patient gives permission for us to bill for this telemedicine visit.  The patient understands that there are limitations including diagnostic, technical, and physical exam.  The patient understands that they have a right to stop the telemedicine visit at any time.    The patient is at their home.  The patient appeared to be in the usual physical state, with normal responses and affect.  I did not notice any abnormal skin tone or abnormal breathing patterns.    I requested that the patient contact my office at [phone] to make an appointment for routine follow-up care, unless more specific instructions were given under A/P.    Chief Complaint:  Stage IV sacral decubitus ulcer    HPI:  She still has the wound VAC in place for her stage IV sacral decubitus ulcer.  She says it is getting better.  She has chronic pain in her lower back and legs.        A/P:  Continue current medications for hypertension, which is stable.    Continue wound VAC for stage IV sacral decubitus ulcer.  Urinalysis for UA and culture.  Continue tramadol, gabapentin, baclofen, and Voltaren gel as needed for lower back and leg pain.    Between 30 and 39 min of total time for evaluation and management services were spent on the patient today.  The medical problems and treatment options were discussed, and all questions were answered.

## 2023-10-09 RX ORDER — NITROFURANTOIN 25; 75 MG/1; MG/1
100 CAPSULE ORAL 2 TIMES DAILY
Qty: 14 CAPSULE | Refills: 0 | Status: SHIPPED | OUTPATIENT
Start: 2023-10-09 | End: 2023-11-28

## 2023-10-14 DIAGNOSIS — M54.50 CHRONIC LOW BACK PAIN, UNSPECIFIED BACK PAIN LATERALITY, UNSPECIFIED WHETHER SCIATICA PRESENT: ICD-10-CM

## 2023-10-14 DIAGNOSIS — L89.154 PRESSURE INJURY OF SACRAL REGION, STAGE 4: ICD-10-CM

## 2023-10-14 DIAGNOSIS — G89.29 CHRONIC LOW BACK PAIN, UNSPECIFIED BACK PAIN LATERALITY, UNSPECIFIED WHETHER SCIATICA PRESENT: ICD-10-CM

## 2023-10-16 RX ORDER — TRAMADOL HYDROCHLORIDE 50 MG/1
TABLET ORAL
Qty: 180 TABLET | OUTPATIENT
Start: 2023-10-16

## 2023-10-23 DIAGNOSIS — L89.154 PRESSURE INJURY OF SACRAL REGION, STAGE 4: ICD-10-CM

## 2023-10-23 DIAGNOSIS — M54.50 CHRONIC LOW BACK PAIN, UNSPECIFIED BACK PAIN LATERALITY, UNSPECIFIED WHETHER SCIATICA PRESENT: ICD-10-CM

## 2023-10-23 DIAGNOSIS — G89.29 CHRONIC LOW BACK PAIN, UNSPECIFIED BACK PAIN LATERALITY, UNSPECIFIED WHETHER SCIATICA PRESENT: ICD-10-CM

## 2023-10-23 RX ORDER — TRAMADOL HYDROCHLORIDE 50 MG/1
TABLET ORAL
Qty: 180 TABLET | Refills: 0 | Status: SHIPPED | OUTPATIENT
Start: 2023-10-23 | End: 2023-11-25

## 2023-11-01 DIAGNOSIS — R35.0 URINARY FREQUENCY: Primary | ICD-10-CM

## 2023-11-01 RX ORDER — PHENAZOPYRIDINE HYDROCHLORIDE 200 MG/1
200 TABLET, FILM COATED ORAL 3 TIMES DAILY PRN
Qty: 6 TABLET | Refills: 0 | Status: SHIPPED | OUTPATIENT
Start: 2023-11-01 | End: 2023-11-28

## 2023-11-24 DIAGNOSIS — L89.154 PRESSURE INJURY OF SACRAL REGION, STAGE 4: ICD-10-CM

## 2023-11-24 DIAGNOSIS — M54.50 CHRONIC LOW BACK PAIN, UNSPECIFIED BACK PAIN LATERALITY, UNSPECIFIED WHETHER SCIATICA PRESENT: ICD-10-CM

## 2023-11-24 DIAGNOSIS — G89.29 CHRONIC LOW BACK PAIN, UNSPECIFIED BACK PAIN LATERALITY, UNSPECIFIED WHETHER SCIATICA PRESENT: ICD-10-CM

## 2023-11-25 RX ORDER — TRAMADOL HYDROCHLORIDE 50 MG/1
TABLET ORAL
Qty: 180 TABLET | Refills: 1 | Status: SHIPPED | OUTPATIENT
Start: 2023-11-25 | End: 2024-01-30

## 2023-11-28 ENCOUNTER — OFFICE VISIT (OUTPATIENT)
Dept: INTERNAL MEDICINE | Facility: CLINIC | Age: 73
End: 2023-11-28
Attending: INTERNAL MEDICINE
Payer: MEDICARE

## 2023-11-28 DIAGNOSIS — L89.154 PRESSURE INJURY OF SACRAL REGION, STAGE 4: ICD-10-CM

## 2023-11-28 DIAGNOSIS — I10 ESSENTIAL HYPERTENSION: Primary | ICD-10-CM

## 2023-11-28 DIAGNOSIS — G89.29 CHRONIC LOW BACK PAIN, UNSPECIFIED BACK PAIN LATERALITY, UNSPECIFIED WHETHER SCIATICA PRESENT: ICD-10-CM

## 2023-11-28 DIAGNOSIS — M54.50 CHRONIC LOW BACK PAIN, UNSPECIFIED BACK PAIN LATERALITY, UNSPECIFIED WHETHER SCIATICA PRESENT: ICD-10-CM

## 2023-11-28 DIAGNOSIS — B37.31 VAGINAL YEAST INFECTION: ICD-10-CM

## 2023-11-28 DIAGNOSIS — L89.94 INFECTED DECUBITUS ULCER, STAGE IV: ICD-10-CM

## 2023-11-28 DIAGNOSIS — L08.9 INFECTED DECUBITUS ULCER, STAGE IV: ICD-10-CM

## 2023-11-28 PROCEDURE — 99214 PR OFFICE/OUTPT VISIT, EST, LEVL IV, 30-39 MIN: ICD-10-PCS | Mod: S$GLB,,, | Performed by: INTERNAL MEDICINE

## 2023-11-28 PROCEDURE — 99214 OFFICE O/P EST MOD 30 MIN: CPT | Mod: S$GLB,,, | Performed by: INTERNAL MEDICINE

## 2023-11-28 PROCEDURE — 4010F PR ACE/ARB THEARPY RXD/TAKEN: ICD-10-PCS | Mod: CPTII,S$GLB,, | Performed by: INTERNAL MEDICINE

## 2023-11-28 PROCEDURE — 4010F ACE/ARB THERAPY RXD/TAKEN: CPT | Mod: CPTII,S$GLB,, | Performed by: INTERNAL MEDICINE

## 2023-11-28 RX ORDER — GABAPENTIN 300 MG/1
300 CAPSULE ORAL NIGHTLY
Qty: 90 CAPSULE | Refills: 1 | Status: SHIPPED | OUTPATIENT
Start: 2023-11-28 | End: 2024-02-26

## 2023-11-28 RX ORDER — FLUCONAZOLE 150 MG/1
150 TABLET ORAL DAILY PRN
Qty: 3 TABLET | Refills: 0 | Status: SHIPPED | OUTPATIENT
Start: 2023-11-28 | End: 2023-11-29

## 2023-11-28 NOTE — PROGRESS NOTES
Telemedicine Visit    The patient verbally consented to proceed with a telemedicine visit, following discussion of the options of face-to-face or telemedicine visit. The telemedicine visit was completed with real-time interactive audio and video using Zoom without complication.    The visit was conducted with a limited physical exam (visual exam), and was medically appropriate to meet the patient's needs.    The patient gives permission for us to bill for this telemedicine visit.  The patient understands that there are limitations including diagnostic, technical, and physical exam.  The patient understands that they have a right to stop the telemedicine visit at any time.    The patient is at their home.  The patient appeared to be in the usual physical state, with normal responses and affect.  I did not notice any abnormal skin tone or abnormal breathing patterns.    I requested that the patient contact my office at [phone] to make an appointment for routine follow-up care, unless more specific instructions were given under A/P.    Chief Complaint:  Follow-up for stage IV decubitus ulcer.    HPI:  She has been using a wound VAC for 4 months for her stage IV decubitus ulcer.  The wound VAC was recently discontinued.  She had a wound culture which grew bacteria.  She is about to start an antibiotic.        A/P:  PLAN:  Per orders and D/C instructions.  Continue diet and/or meds for HTN, and low back pain, which are stable.  Diflucan for vaginal yeast infection.  Home health will continue care of stage IV decubitus ulcer.  Start PT and OT.    Between 30 and 39 min of total time for evaluation and management services were spent on the patient today.  The medical problems and treatment options were discussed, and all questions were answered.

## 2024-01-10 ENCOUNTER — PATIENT MESSAGE (OUTPATIENT)
Dept: RESEARCH | Facility: CLINIC | Age: 74
End: 2024-01-10
Payer: MEDICARE

## 2024-01-29 DIAGNOSIS — G89.29 CHRONIC LOW BACK PAIN, UNSPECIFIED BACK PAIN LATERALITY, UNSPECIFIED WHETHER SCIATICA PRESENT: ICD-10-CM

## 2024-01-29 DIAGNOSIS — M54.50 CHRONIC LOW BACK PAIN, UNSPECIFIED BACK PAIN LATERALITY, UNSPECIFIED WHETHER SCIATICA PRESENT: ICD-10-CM

## 2024-01-29 DIAGNOSIS — L89.154 PRESSURE INJURY OF SACRAL REGION, STAGE 4: ICD-10-CM

## 2024-01-30 ENCOUNTER — OFFICE VISIT (OUTPATIENT)
Dept: INTERNAL MEDICINE | Facility: CLINIC | Age: 74
End: 2024-01-30
Attending: INTERNAL MEDICINE
Payer: MEDICARE

## 2024-01-30 VITALS
DIASTOLIC BLOOD PRESSURE: 78 MMHG | HEART RATE: 89 BPM | TEMPERATURE: 98 F | SYSTOLIC BLOOD PRESSURE: 152 MMHG | RESPIRATION RATE: 18 BRPM

## 2024-01-30 DIAGNOSIS — M48.062 SPINAL STENOSIS OF LUMBAR REGION WITH NEUROGENIC CLAUDICATION: ICD-10-CM

## 2024-01-30 DIAGNOSIS — M48.02 NEURAL FORAMINAL STENOSIS OF CERVICAL SPINE: ICD-10-CM

## 2024-01-30 DIAGNOSIS — Z74.01 CONFINED TO BED: ICD-10-CM

## 2024-01-30 DIAGNOSIS — K21.9 GASTROESOPHAGEAL REFLUX DISEASE, UNSPECIFIED WHETHER ESOPHAGITIS PRESENT: ICD-10-CM

## 2024-01-30 DIAGNOSIS — L89.154 PRESSURE INJURY OF SACRAL REGION, STAGE 4: ICD-10-CM

## 2024-01-30 DIAGNOSIS — R29.898 WEAKNESS OF BOTH LOWER EXTREMITIES: ICD-10-CM

## 2024-01-30 DIAGNOSIS — I10 ESSENTIAL HYPERTENSION: Primary | ICD-10-CM

## 2024-01-30 PROCEDURE — 99214 OFFICE O/P EST MOD 30 MIN: CPT | Mod: 95,S$GLB,, | Performed by: INTERNAL MEDICINE

## 2024-01-30 RX ORDER — TRAMADOL HYDROCHLORIDE 50 MG/1
TABLET ORAL
Qty: 180 TABLET | Refills: 2 | Status: SHIPPED | OUTPATIENT
Start: 2024-01-30 | End: 2024-05-01

## 2024-01-30 RX ORDER — FUROSEMIDE 20 MG/1
20 TABLET ORAL DAILY
Qty: 90 TABLET | Refills: 1 | Status: SHIPPED | OUTPATIENT
Start: 2024-01-30

## 2024-01-30 NOTE — PROGRESS NOTES
Telemedicine Visit    The patient verbally consented to proceed with a telemedicine visit, following discussion of the options of face-to-face or telemedicine visit. The telemedicine visit was completed with real-time interactive audio and video using Zoom without complication.    The visit was conducted with a limited physical exam (visual exam), and was medically appropriate to meet the patient's needs.    The patient gives permission for us to bill for this telemedicine visit.  The patient understands that there are limitations including diagnostic, technical, and physical exam.  The patient understands that they have a right to stop the telemedicine visit at any time.    The patient is at their home.  The patient appeared to be in the usual physical state, with normal responses and affect.  I did not notice any abnormal skin tone or abnormal breathing patterns.    I requested that the patient contact my office at [phone] to make an appointment for routine follow-up care, unless more specific instructions were given under A/P.    Chief Complaint:  routine follow-up for bed-bound patient    HPI:  her stage IV sacral decubitus ulcer  is relatively unchanged over the past 2 months.  She has no longer using the wound VAC.  They are using a mixture of clotrimazole and gentamicin cream   the Santana catheter has been leaking, so she is going to change to a larger catheter.      A/P:    Continue current medications and a low-sodium diet for hypertension, which is stable.    Continue a low-sodium diet, furosemide, and spironolactone for peripheral edema, which is stable.    Continue wound care for stage IV sacral decubitus.    Restart PT and OT to help with mobility and a bed-bound patient with stage IV sacral decubitus.    Continue tramadol for pain, which is controlled.    Between 30 and 39 min of total time for evaluation and management services were spent on the patient today.  The medical problems and treatment  options were discussed, and all questions were answered.

## 2024-02-19 RX ORDER — OXYBUTYNIN CHLORIDE 5 MG/1
5 TABLET, EXTENDED RELEASE ORAL DAILY
Qty: 30 TABLET | Refills: 11 | Status: SHIPPED | OUTPATIENT
Start: 2024-02-19 | End: 2025-02-18

## 2024-03-04 ENCOUNTER — PATIENT MESSAGE (OUTPATIENT)
Dept: ADMINISTRATIVE | Facility: HOSPITAL | Age: 74
End: 2024-03-04
Payer: MEDICARE

## 2024-03-31 RX ORDER — TELMISARTAN 40 MG/1
40 TABLET ORAL
Qty: 90 TABLET | Refills: 1 | Status: SHIPPED | OUTPATIENT
Start: 2024-03-31

## 2024-04-23 ENCOUNTER — OFFICE VISIT (OUTPATIENT)
Dept: INTERNAL MEDICINE | Facility: CLINIC | Age: 74
End: 2024-04-23
Attending: INTERNAL MEDICINE
Payer: MEDICARE

## 2024-04-23 VITALS
SYSTOLIC BLOOD PRESSURE: 134 MMHG | DIASTOLIC BLOOD PRESSURE: 77 MMHG | TEMPERATURE: 98 F | HEART RATE: 64 BPM | OXYGEN SATURATION: 98 %

## 2024-04-23 DIAGNOSIS — M48.062 SPINAL STENOSIS OF LUMBAR REGION WITH NEUROGENIC CLAUDICATION: ICD-10-CM

## 2024-04-23 DIAGNOSIS — G89.29 CHRONIC LOW BACK PAIN, UNSPECIFIED BACK PAIN LATERALITY, UNSPECIFIED WHETHER SCIATICA PRESENT: ICD-10-CM

## 2024-04-23 DIAGNOSIS — L89.154 PRESSURE INJURY OF SACRAL REGION, STAGE 4: ICD-10-CM

## 2024-04-23 DIAGNOSIS — Z13.39 SCREENING FOR ALCOHOLISM: ICD-10-CM

## 2024-04-23 DIAGNOSIS — M48.02 NEURAL FORAMINAL STENOSIS OF CERVICAL SPINE: ICD-10-CM

## 2024-04-23 DIAGNOSIS — R29.898 WEAKNESS OF BOTH LOWER EXTREMITIES: ICD-10-CM

## 2024-04-23 DIAGNOSIS — M54.2 NECK PAIN ON RIGHT SIDE: ICD-10-CM

## 2024-04-23 DIAGNOSIS — M54.50 CHRONIC LOW BACK PAIN, UNSPECIFIED BACK PAIN LATERALITY, UNSPECIFIED WHETHER SCIATICA PRESENT: ICD-10-CM

## 2024-04-23 DIAGNOSIS — I10 ESSENTIAL HYPERTENSION: Primary | ICD-10-CM

## 2024-04-23 DIAGNOSIS — Z96.0 URINARY CATHETER IN PLACE: ICD-10-CM

## 2024-04-23 DIAGNOSIS — Z13.31 SCREENING FOR DEPRESSION: ICD-10-CM

## 2024-04-23 PROCEDURE — 3078F DIAST BP <80 MM HG: CPT | Mod: CPTII,95,, | Performed by: INTERNAL MEDICINE

## 2024-04-23 PROCEDURE — 3075F SYST BP GE 130 - 139MM HG: CPT | Mod: CPTII,95,, | Performed by: INTERNAL MEDICINE

## 2024-04-23 PROCEDURE — 1160F RVW MEDS BY RX/DR IN RCRD: CPT | Mod: CPTII,95,, | Performed by: INTERNAL MEDICINE

## 2024-04-23 PROCEDURE — G0442 ANNUAL ALCOHOL SCREEN 15 MIN: HCPCS | Mod: 95,59,, | Performed by: INTERNAL MEDICINE

## 2024-04-23 PROCEDURE — 1159F MED LIST DOCD IN RCRD: CPT | Mod: CPTII,95,, | Performed by: INTERNAL MEDICINE

## 2024-04-23 PROCEDURE — 4010F ACE/ARB THERAPY RXD/TAKEN: CPT | Mod: CPTII,95,, | Performed by: INTERNAL MEDICINE

## 2024-04-23 PROCEDURE — 99214 OFFICE O/P EST MOD 30 MIN: CPT | Mod: 95,25,, | Performed by: INTERNAL MEDICINE

## 2024-04-23 PROCEDURE — G0444 DEPRESSION SCREEN ANNUAL: HCPCS | Mod: 95,59,, | Performed by: INTERNAL MEDICINE

## 2024-04-23 RX ORDER — NIFEDIPINE 30 MG/1
30 TABLET, EXTENDED RELEASE ORAL DAILY
Qty: 90 TABLET | Refills: 3 | Status: SHIPPED | OUTPATIENT
Start: 2024-04-23 | End: 2025-04-23

## 2024-04-23 NOTE — PROGRESS NOTES
Telemedicine Visit    The patient verbally consented to proceed with a telemedicine visit, following discussion of the options of face-to-face or telemedicine visit. The telemedicine visit was completed with real-time interactive audio and video using Zoom without complication.    The visit was conducted with a limited physical exam (visual exam), and was medically appropriate to meet the patient's needs.    The patient gives permission for us to bill for this telemedicine visit.  The patient understands that there are limitations including diagnostic, technical, and physical exam.  The patient understands that they have a right to stop the telemedicine visit at any time.    The patient is at their home.  The patient appeared to be in the usual physical state, with normal responses and affect.  I did not notice any abnormal skin tone or abnormal breathing patterns.    I requested that the patient contact my office at [phone] to make an appointment for routine follow-up care, unless more specific instructions were given under A/P.    Chief Complaint:  Routine follow-up    HPI:  Her current [facility] is not providing physical therapy so she wants to change to [facility].  She has no longer using the wound VAC on her stage IV sacral decubitus, because her monthly co-pay is too expensive.  She also tried getting the pure wick instead of an internal Santana catheter, but it was also too expensive.        A/P:  PLAN:  Per orders and D/C instructions.  Continue diet and/or meds for  HTN, which is stable.  She continues to be bed-bound from her cervical and lumbar spinal stenosis.  She is getting local care for her stage IV sacral decubitus.  She requires tramadol for her chronic neck, lower back, and leg pain.  We will consult a new [employer] company with PT, OT, and wound care.  We will see if they can provide her with the pure Wick catheter.  We will also ask them to draw blood into a urinalysis for  culture.  Follow-up with me in 3-4 months.    Between 30 and 39 min of time for evaluation and management services were spent on the patient today.  This time is in addition to the time spent on alcohol and depression screening.  The medical problems and treatment options were discussed, and all questions were answered.      Screening: The patient was screened for depression with the PHQ2 questionnaire and possible health consequences were discussed with the patient, who understands (15 minutes spent).       The patient was screened for the misuse of alcohol, by asking the number of drinks per average week, and if pt has had more than 4 drinks (more than 3 for women and elderly) in 1 day within the past year. The health and legal consequences of misuse were discussed (15 minutes spent).

## 2024-04-30 ENCOUNTER — DOCUMENTATION ONLY (OUTPATIENT)
Dept: INTERNAL MEDICINE | Facility: CLINIC | Age: 74
End: 2024-04-30
Payer: MEDICARE

## 2024-04-30 NOTE — PROGRESS NOTES
On 4/18/24 Received a voicemail from [facility] stating that the patient and spouse requested to discontinue service with them and would like to start service with [facility].   Fax was sent to [facility] and [facility].   Tara with [facility] contacted the office on 4/23/24 stating they are not willing to take on the patient.

## 2024-05-01 DIAGNOSIS — M54.50 CHRONIC LOW BACK PAIN, UNSPECIFIED BACK PAIN LATERALITY, UNSPECIFIED WHETHER SCIATICA PRESENT: ICD-10-CM

## 2024-05-01 DIAGNOSIS — L89.154 PRESSURE INJURY OF SACRAL REGION, STAGE 4: ICD-10-CM

## 2024-05-01 DIAGNOSIS — G89.29 CHRONIC LOW BACK PAIN, UNSPECIFIED BACK PAIN LATERALITY, UNSPECIFIED WHETHER SCIATICA PRESENT: ICD-10-CM

## 2024-05-01 RX ORDER — TRAMADOL HYDROCHLORIDE 50 MG/1
100 TABLET ORAL EVERY 8 HOURS PRN
Qty: 180 TABLET | Refills: 2 | Status: SHIPPED | OUTPATIENT
Start: 2024-05-01

## 2024-05-27 ENCOUNTER — EXTERNAL HOME HEALTH (OUTPATIENT)
Dept: HOME HEALTH SERVICES | Facility: HOSPITAL | Age: 74
End: 2024-05-27
Payer: MEDICARE

## 2024-06-13 ENCOUNTER — DOCUMENT SCAN (OUTPATIENT)
Dept: HOME HEALTH SERVICES | Facility: HOSPITAL | Age: 74
End: 2024-06-13
Payer: MEDICARE

## 2024-06-17 ENCOUNTER — LAB VISIT (OUTPATIENT)
Dept: LAB | Facility: HOSPITAL | Age: 74
End: 2024-06-17
Attending: INTERNAL MEDICINE
Payer: MEDICARE

## 2024-06-17 DIAGNOSIS — N39.0 URINARY TRACT INFECTION, SITE NOT SPECIFIED: Primary | ICD-10-CM

## 2024-06-17 LAB
BACTERIA #/AREA URNS AUTO: ABNORMAL /HPF
BILIRUB UR QL STRIP: NEGATIVE
CLARITY UR REFRACT.AUTO: CLEAR
COLOR UR AUTO: YELLOW
GLUCOSE UR QL STRIP: NEGATIVE
HGB UR QL STRIP: NEGATIVE
KETONES UR QL STRIP: NEGATIVE
LEUKOCYTE ESTERASE UR QL STRIP: ABNORMAL
MICROSCOPIC COMMENT: ABNORMAL
NITRITE UR QL STRIP: NEGATIVE
PH UR STRIP: 6 [PH] (ref 5–8)
PROT UR QL STRIP: NEGATIVE
SP GR UR STRIP: 1.01 (ref 1–1.03)
URN SPEC COLLECT METH UR: ABNORMAL
WBC #/AREA URNS AUTO: 11 /HPF (ref 0–5)

## 2024-06-17 PROCEDURE — 81001 URINALYSIS AUTO W/SCOPE: CPT | Performed by: INTERNAL MEDICINE

## 2024-06-17 PROCEDURE — 87077 CULTURE AEROBIC IDENTIFY: CPT | Performed by: INTERNAL MEDICINE

## 2024-06-17 PROCEDURE — 87088 URINE BACTERIA CULTURE: CPT | Performed by: INTERNAL MEDICINE

## 2024-06-17 PROCEDURE — 87086 URINE CULTURE/COLONY COUNT: CPT | Performed by: INTERNAL MEDICINE

## 2024-06-17 PROCEDURE — 87186 SC STD MICRODIL/AGAR DIL: CPT | Mod: 59 | Performed by: INTERNAL MEDICINE

## 2024-06-19 LAB
BACTERIA UR CULT: ABNORMAL
BACTERIA UR CULT: ABNORMAL

## 2024-06-20 RX ORDER — CEFIXIME 400 MG/1
1 CAPSULE ORAL DAILY
Qty: 7 CAPSULE | Refills: 0 | Status: SHIPPED | OUTPATIENT
Start: 2024-06-20

## 2024-06-24 ENCOUNTER — DOCUMENT SCAN (OUTPATIENT)
Dept: HOME HEALTH SERVICES | Facility: HOSPITAL | Age: 74
End: 2024-06-24
Payer: MEDICARE

## 2024-07-03 ENCOUNTER — DOCUMENT SCAN (OUTPATIENT)
Dept: HOME HEALTH SERVICES | Facility: HOSPITAL | Age: 74
End: 2024-07-03
Payer: MEDICARE

## 2024-07-30 ENCOUNTER — OFFICE VISIT (OUTPATIENT)
Dept: INTERNAL MEDICINE | Facility: CLINIC | Age: 74
End: 2024-07-30
Attending: INTERNAL MEDICINE
Payer: MEDICARE

## 2024-07-30 VITALS — HEART RATE: 82 BPM | DIASTOLIC BLOOD PRESSURE: 71 MMHG | TEMPERATURE: 98 F | SYSTOLIC BLOOD PRESSURE: 134 MMHG

## 2024-07-30 DIAGNOSIS — R53.2 FUNCTIONAL QUADRIPLEGIA: ICD-10-CM

## 2024-07-30 DIAGNOSIS — M48.02 NEURAL FORAMINAL STENOSIS OF CERVICAL SPINE: ICD-10-CM

## 2024-07-30 DIAGNOSIS — G89.29 CHRONIC LOW BACK PAIN, UNSPECIFIED BACK PAIN LATERALITY, UNSPECIFIED WHETHER SCIATICA PRESENT: ICD-10-CM

## 2024-07-30 DIAGNOSIS — M54.50 CHRONIC LOW BACK PAIN, UNSPECIFIED BACK PAIN LATERALITY, UNSPECIFIED WHETHER SCIATICA PRESENT: ICD-10-CM

## 2024-07-30 DIAGNOSIS — M48.062 SPINAL STENOSIS OF LUMBAR REGION WITH NEUROGENIC CLAUDICATION: ICD-10-CM

## 2024-07-30 DIAGNOSIS — I10 ESSENTIAL HYPERTENSION: Primary | ICD-10-CM

## 2024-07-30 DIAGNOSIS — Z74.01 CONFINED TO BED: ICD-10-CM

## 2024-07-30 DIAGNOSIS — L89.154 PRESSURE INJURY OF SACRAL REGION, STAGE 4: ICD-10-CM

## 2024-07-30 PROBLEM — G91.2 NORMAL PRESSURE HYDROCEPHALUS: Status: RESOLVED | Noted: 2022-11-09 | Resolved: 2024-07-30

## 2024-07-30 PROCEDURE — 4010F ACE/ARB THERAPY RXD/TAKEN: CPT | Mod: CPTII,S$GLB,, | Performed by: INTERNAL MEDICINE

## 2024-07-30 PROCEDURE — 1159F MED LIST DOCD IN RCRD: CPT | Mod: CPTII,S$GLB,, | Performed by: INTERNAL MEDICINE

## 2024-07-30 PROCEDURE — 3078F DIAST BP <80 MM HG: CPT | Mod: CPTII,S$GLB,, | Performed by: INTERNAL MEDICINE

## 2024-07-30 PROCEDURE — 1160F RVW MEDS BY RX/DR IN RCRD: CPT | Mod: CPTII,S$GLB,, | Performed by: INTERNAL MEDICINE

## 2024-07-30 PROCEDURE — 99214 OFFICE O/P EST MOD 30 MIN: CPT | Mod: S$GLB,,, | Performed by: INTERNAL MEDICINE

## 2024-07-30 PROCEDURE — 3075F SYST BP GE 130 - 139MM HG: CPT | Mod: CPTII,S$GLB,, | Performed by: INTERNAL MEDICINE

## 2024-07-30 RX ORDER — TRAMADOL HYDROCHLORIDE 50 MG/1
100 TABLET ORAL EVERY 8 HOURS PRN
Qty: 180 TABLET | Refills: 2 | Status: SHIPPED | OUTPATIENT
Start: 2024-07-30

## 2024-07-30 RX ORDER — GABAPENTIN 300 MG/1
300 CAPSULE ORAL NIGHTLY
Qty: 90 CAPSULE | Refills: 1 | Status: SHIPPED | OUTPATIENT
Start: 2024-07-30 | End: 2024-10-28

## 2024-07-30 RX ORDER — SENNOSIDES 8.6 MG/1
1 TABLET ORAL DAILY
Qty: 90 TABLET | Refills: 3 | Status: SHIPPED | OUTPATIENT
Start: 2024-07-30

## 2024-07-30 NOTE — PROGRESS NOTES
Telemedicine Visit    The patient verbally consented to proceed with a telemedicine visit, following discussion of the options of face-to-face or telemedicine visit. The telemedicine visit was completed with real-time interactive audio and video using Zoom without complication.    The visit was conducted with a limited physical exam (visual exam), and was medically appropriate to meet the patient's needs.    The patient gives permission for us to bill for this telemedicine visit.  The patient understands that there are limitations including diagnostic, technical, and physical exam.  The patient understands that they have a right to stop the telemedicine visit at any time.    The patient is at their home.  The patient appeared to be in the usual physical state, with normal responses and affect.  I did not notice any abnormal skin tone or abnormal breathing patterns.    I requested that the patient contact my office at [phone] to make an appointment for routine follow-up care, unless more specific instructions were given under A/P.    Chief Complaint:  Home visit for chronic bed-bound patient    HPI:  She has been chronically bed-bound since 2021 due to severe spinal stenosis of the cervical and lumbar spine.  She takes tramadol which significantly alleviates her pain.  She gets wound care from home health care for her chronic stage IV sacral decubitus ulcer.  She recently had the wound cultured and is currently on Flagyl oral and a topical powder.    Her hypertension has been controlled.        A/P:  Continue current medications for hypertension, which is stable.  Continue home health care with PT for chronic severe spinal stenosis of the cervical and lumbar spine.  Continue home wound care for her chronic stage IV sacral decubitus ulcer.    Between 30 and 39 min of total time for evaluation and management services were spent on the patient today.  The medical problems and treatment options were discussed, and  all questions were answered.

## 2024-07-31 ENCOUNTER — DOCUMENT SCAN (OUTPATIENT)
Dept: HOME HEALTH SERVICES | Facility: HOSPITAL | Age: 74
End: 2024-07-31
Payer: MEDICARE

## 2024-08-09 ENCOUNTER — EXTERNAL HOME HEALTH (OUTPATIENT)
Dept: HOME HEALTH SERVICES | Facility: HOSPITAL | Age: 74
End: 2024-08-09
Payer: MEDICARE

## 2024-08-16 ENCOUNTER — LAB VISIT (OUTPATIENT)
Dept: LAB | Facility: HOSPITAL | Age: 74
End: 2024-08-16
Attending: OPHTHALMOLOGY
Payer: MEDICARE

## 2024-08-16 DIAGNOSIS — I10 ESSENTIAL HYPERTENSION, MALIGNANT: Primary | ICD-10-CM

## 2024-08-16 LAB
ALBUMIN SERPL BCP-MCNC: 3.7 G/DL (ref 3.5–5.2)
ALP SERPL-CCNC: 112 U/L (ref 55–135)
ALT SERPL W/O P-5'-P-CCNC: 29 U/L (ref 10–44)
ANION GAP SERPL CALC-SCNC: 11 MMOL/L (ref 8–16)
AST SERPL-CCNC: 27 U/L (ref 10–40)
BILIRUB SERPL-MCNC: 0.4 MG/DL (ref 0.1–1)
BUN SERPL-MCNC: 9 MG/DL (ref 8–23)
CALCIUM SERPL-MCNC: 9.9 MG/DL (ref 8.7–10.5)
CHLORIDE SERPL-SCNC: 103 MMOL/L (ref 95–110)
CHOLEST SERPL-MCNC: 132 MG/DL (ref 120–199)
CHOLEST/HDLC SERPL: 2.9 {RATIO} (ref 2–5)
CO2 SERPL-SCNC: 26 MMOL/L (ref 23–29)
CREAT SERPL-MCNC: 0.6 MG/DL (ref 0.5–1.4)
ERYTHROCYTE [DISTWIDTH] IN BLOOD BY AUTOMATED COUNT: 14.3 % (ref 11.5–14.5)
EST. GFR  (NO RACE VARIABLE): >60 ML/MIN/1.73 M^2
ESTIMATED AVG GLUCOSE: 111 MG/DL (ref 68–131)
GLUCOSE SERPL-MCNC: 65 MG/DL (ref 70–110)
HBA1C MFR BLD: 5.5 % (ref 4–5.6)
HCT VFR BLD AUTO: 37.8 % (ref 37–48.5)
HDLC SERPL-MCNC: 45 MG/DL (ref 40–75)
HDLC SERPL: 34.1 % (ref 20–50)
HGB BLD-MCNC: 12.4 G/DL (ref 12–16)
LDLC SERPL CALC-MCNC: 71.6 MG/DL (ref 63–159)
MCH RBC QN AUTO: 26.8 PG (ref 27–31)
MCHC RBC AUTO-ENTMCNC: 32.8 G/DL (ref 32–36)
MCV RBC AUTO: 82 FL (ref 82–98)
NONHDLC SERPL-MCNC: 87 MG/DL
PLATELET # BLD AUTO: 289 K/UL (ref 150–450)
PMV BLD AUTO: 10.7 FL (ref 9.2–12.9)
POTASSIUM SERPL-SCNC: 4.1 MMOL/L (ref 3.5–5.1)
PROT SERPL-MCNC: 7.6 G/DL (ref 6–8.4)
RBC # BLD AUTO: 4.62 M/UL (ref 4–5.4)
SODIUM SERPL-SCNC: 140 MMOL/L (ref 136–145)
TRIGL SERPL-MCNC: 77 MG/DL (ref 30–150)
TSH SERPL DL<=0.005 MIU/L-ACNC: 1.95 UIU/ML (ref 0.4–4)
WBC # BLD AUTO: 13.13 K/UL (ref 3.9–12.7)

## 2024-08-16 PROCEDURE — 85027 COMPLETE CBC AUTOMATED: CPT | Performed by: OPHTHALMOLOGY

## 2024-08-16 PROCEDURE — 84443 ASSAY THYROID STIM HORMONE: CPT | Performed by: OPHTHALMOLOGY

## 2024-08-16 PROCEDURE — 83036 HEMOGLOBIN GLYCOSYLATED A1C: CPT | Mod: GA | Performed by: OPHTHALMOLOGY

## 2024-08-16 PROCEDURE — 80061 LIPID PANEL: CPT | Performed by: OPHTHALMOLOGY

## 2024-08-16 PROCEDURE — 80053 COMPREHEN METABOLIC PANEL: CPT | Performed by: OPHTHALMOLOGY

## 2024-09-02 RX ORDER — TELMISARTAN 40 MG/1
40 TABLET ORAL
Qty: 90 TABLET | Refills: 1 | Status: SHIPPED | OUTPATIENT
Start: 2024-09-02

## 2024-09-06 ENCOUNTER — DOCUMENT SCAN (OUTPATIENT)
Dept: HOME HEALTH SERVICES | Facility: HOSPITAL | Age: 74
End: 2024-09-06
Payer: MEDICARE

## 2024-09-13 ENCOUNTER — DOCUMENT SCAN (OUTPATIENT)
Dept: HOME HEALTH SERVICES | Facility: HOSPITAL | Age: 74
End: 2024-09-13
Payer: MEDICARE

## 2024-09-16 ENCOUNTER — LAB VISIT (OUTPATIENT)
Dept: LAB | Facility: HOSPITAL | Age: 74
End: 2024-09-16
Attending: INTERNAL MEDICINE
Payer: MEDICARE

## 2024-09-16 DIAGNOSIS — R30.0 DYSURIA: Primary | ICD-10-CM

## 2024-09-16 LAB
BACTERIA #/AREA URNS AUTO: ABNORMAL /HPF
BILIRUB UR QL STRIP: NEGATIVE
CLARITY UR REFRACT.AUTO: ABNORMAL
COLOR UR AUTO: YELLOW
GLUCOSE UR QL STRIP: NEGATIVE
HGB UR QL STRIP: ABNORMAL
HYALINE CASTS UR QL AUTO: 0 /LPF
KETONES UR QL STRIP: NEGATIVE
LEUKOCYTE ESTERASE UR QL STRIP: ABNORMAL
MICROSCOPIC COMMENT: ABNORMAL
NITRITE UR QL STRIP: NEGATIVE
PH UR STRIP: 6 [PH] (ref 5–8)
PROT UR QL STRIP: ABNORMAL
RBC #/AREA URNS AUTO: 13 /HPF (ref 0–4)
SP GR UR STRIP: 1.01 (ref 1–1.03)
SQUAMOUS #/AREA URNS AUTO: 74 /HPF
URN SPEC COLLECT METH UR: ABNORMAL
WBC #/AREA URNS AUTO: 35 /HPF (ref 0–5)

## 2024-09-16 PROCEDURE — 87186 SC STD MICRODIL/AGAR DIL: CPT | Performed by: INTERNAL MEDICINE

## 2024-09-16 PROCEDURE — 87088 URINE BACTERIA CULTURE: CPT | Performed by: INTERNAL MEDICINE

## 2024-09-16 PROCEDURE — 81001 URINALYSIS AUTO W/SCOPE: CPT | Performed by: INTERNAL MEDICINE

## 2024-09-16 PROCEDURE — 87086 URINE CULTURE/COLONY COUNT: CPT | Performed by: INTERNAL MEDICINE

## 2024-09-20 ENCOUNTER — DOCUMENTATION ONLY (OUTPATIENT)
Dept: INTERNAL MEDICINE | Facility: CLINIC | Age: 74
End: 2024-09-20
Payer: MEDICARE

## 2024-09-20 LAB — BACTERIA UR CULT: ABNORMAL

## 2024-09-30 ENCOUNTER — EXTERNAL HOME HEALTH (OUTPATIENT)
Dept: HOME HEALTH SERVICES | Facility: HOSPITAL | Age: 74
End: 2024-09-30
Payer: MEDICARE

## 2024-10-02 DIAGNOSIS — Z12.11 SCREENING FOR COLON CANCER: Primary | ICD-10-CM

## 2024-10-08 ENCOUNTER — PATIENT OUTREACH (OUTPATIENT)
Dept: ADMINISTRATIVE | Facility: HOSPITAL | Age: 74
End: 2024-10-08
Payer: MEDICARE

## 2024-10-08 PROBLEM — R54 FRAIL ELDERLY: Status: ACTIVE | Noted: 2024-10-08

## 2024-10-11 ENCOUNTER — LAB VISIT (OUTPATIENT)
Dept: LAB | Facility: HOSPITAL | Age: 74
End: 2024-10-11
Attending: INTERNAL MEDICINE
Payer: MEDICARE

## 2024-10-11 DIAGNOSIS — N39.0 URINARY TRACT INFECTION, SITE NOT SPECIFIED: Primary | ICD-10-CM

## 2024-10-11 DIAGNOSIS — N39.0 URINARY TRACT INFECTION, SITE NOT SPECIFIED: ICD-10-CM

## 2024-10-11 PROCEDURE — 87086 URINE CULTURE/COLONY COUNT: CPT | Performed by: INTERNAL MEDICINE

## 2024-10-11 PROCEDURE — 81001 URINALYSIS AUTO W/SCOPE: CPT | Performed by: INTERNAL MEDICINE

## 2024-10-12 LAB
BACTERIA #/AREA URNS AUTO: ABNORMAL /HPF
BACTERIA UR CULT: NORMAL
BACTERIA UR CULT: NORMAL
BILIRUB UR QL STRIP: NEGATIVE
CLARITY UR REFRACT.AUTO: ABNORMAL
COLOR UR AUTO: ABNORMAL
GLUCOSE UR QL STRIP: NEGATIVE
HGB UR QL STRIP: ABNORMAL
HYALINE CASTS UR QL AUTO: 0 /LPF
KETONES UR QL STRIP: NEGATIVE
LEUKOCYTE ESTERASE UR QL STRIP: ABNORMAL
MICROSCOPIC COMMENT: ABNORMAL
NITRITE UR QL STRIP: POSITIVE
PH UR STRIP: 6 [PH] (ref 5–8)
PROT UR QL STRIP: ABNORMAL
RBC #/AREA URNS AUTO: >100 /HPF (ref 0–4)
SP GR UR STRIP: 1.01 (ref 1–1.03)
SQUAMOUS #/AREA URNS AUTO: 4 /HPF
URN SPEC COLLECT METH UR: ABNORMAL
WBC #/AREA URNS AUTO: 72 /HPF (ref 0–5)

## 2024-10-19 ENCOUNTER — DOCUMENT SCAN (OUTPATIENT)
Dept: HOME HEALTH SERVICES | Facility: HOSPITAL | Age: 74
End: 2024-10-19
Payer: MEDICARE

## 2024-11-01 DIAGNOSIS — M54.50 CHRONIC LOW BACK PAIN, UNSPECIFIED BACK PAIN LATERALITY, UNSPECIFIED WHETHER SCIATICA PRESENT: ICD-10-CM

## 2024-11-01 DIAGNOSIS — L89.154 PRESSURE INJURY OF SACRAL REGION, STAGE 4: ICD-10-CM

## 2024-11-01 DIAGNOSIS — G89.29 CHRONIC LOW BACK PAIN, UNSPECIFIED BACK PAIN LATERALITY, UNSPECIFIED WHETHER SCIATICA PRESENT: ICD-10-CM

## 2024-11-01 RX ORDER — TRAMADOL HYDROCHLORIDE 50 MG/1
100 TABLET ORAL EVERY 8 HOURS PRN
Qty: 180 TABLET | Refills: 2 | Status: SHIPPED | OUTPATIENT
Start: 2024-11-01

## 2024-11-05 ENCOUNTER — OFFICE VISIT (OUTPATIENT)
Dept: INTERNAL MEDICINE | Facility: CLINIC | Age: 74
End: 2024-11-05
Attending: INTERNAL MEDICINE
Payer: MEDICARE

## 2024-11-05 DIAGNOSIS — L89.154 PRESSURE INJURY OF SACRAL REGION, STAGE 4: ICD-10-CM

## 2024-11-05 DIAGNOSIS — Z91.199 PROBLEM WITH HIGHLY COMPLEX TREATMENT REGIME: ICD-10-CM

## 2024-11-05 DIAGNOSIS — R54 FRAIL ELDERLY: ICD-10-CM

## 2024-11-05 DIAGNOSIS — Z96.0 URINARY CATHETER IN PLACE: ICD-10-CM

## 2024-11-05 DIAGNOSIS — R53.2 FUNCTIONAL QUADRIPLEGIA: ICD-10-CM

## 2024-11-05 DIAGNOSIS — I10 ESSENTIAL HYPERTENSION: Primary | ICD-10-CM

## 2024-11-05 DIAGNOSIS — M85.859 OSTEOPENIA OF THIGH, UNSPECIFIED LATERALITY: ICD-10-CM

## 2024-11-05 DIAGNOSIS — M48.062 SPINAL STENOSIS OF LUMBAR REGION WITH NEUROGENIC CLAUDICATION: ICD-10-CM

## 2024-11-05 DIAGNOSIS — M48.02 NEURAL FORAMINAL STENOSIS OF CERVICAL SPINE: ICD-10-CM

## 2024-11-05 DIAGNOSIS — E66.01 MORBID (SEVERE) OBESITY DUE TO EXCESS CALORIES: ICD-10-CM

## 2024-11-05 DIAGNOSIS — Z74.01 CONFINED TO BED: ICD-10-CM

## 2024-11-05 DIAGNOSIS — R29.898 WEAKNESS OF BOTH LOWER EXTREMITIES: ICD-10-CM

## 2024-11-05 PROBLEM — R35.0 URINARY FREQUENCY: Status: RESOLVED | Noted: 2018-01-08 | Resolved: 2024-11-05

## 2024-11-05 PROCEDURE — 3044F HG A1C LEVEL LT 7.0%: CPT | Mod: CPTII,95,S$GLB, | Performed by: INTERNAL MEDICINE

## 2024-11-05 PROCEDURE — 4010F ACE/ARB THERAPY RXD/TAKEN: CPT | Mod: CPTII,95,S$GLB, | Performed by: INTERNAL MEDICINE

## 2024-11-05 PROCEDURE — G2211 COMPLEX E/M VISIT ADD ON: HCPCS | Mod: 95,S$GLB,, | Performed by: INTERNAL MEDICINE

## 2024-11-05 PROCEDURE — 99214 OFFICE O/P EST MOD 30 MIN: CPT | Mod: 95,S$GLB,, | Performed by: INTERNAL MEDICINE

## 2024-11-05 PROCEDURE — 1160F RVW MEDS BY RX/DR IN RCRD: CPT | Mod: CPTII,95,S$GLB, | Performed by: INTERNAL MEDICINE

## 2024-11-05 PROCEDURE — 1159F MED LIST DOCD IN RCRD: CPT | Mod: CPTII,95,S$GLB, | Performed by: INTERNAL MEDICINE

## 2024-11-05 NOTE — PROGRESS NOTES
Telemedicine Visit     The patient verbally consented to proceed with a telemedicine visit, following discussion of the options of face-to-face or telemedicine visit. The telemedicine visit was completed with real-time interactive audio and video using Zoom without complication.     The visit was conducted with a limited physical exam (visual exam), and was medically appropriate to meet the patient's needs.     The patient gives permission for us to bill for this telemedicine visit.  The patient understands that there are limitations including diagnostic, technical, and physical exam.  The patient understands that they have a right to stop the telemedicine visit at any time.     The patient is at their home.  The patient appeared to be in the usual physical state, with normal responses and affect.  I did not notice any abnormal skin tone or abnormal breathing patterns.     I requested that the patient contact my office at [phone] to make an appointment for routine follow-up care, unless more specific instructions were given under A/P.     Chief Complaint:  Home visit for chronic bed-bound patient     HPI:  She has been chronically bed-bound since 2021 due to severe spinal stenosis of the cervical and lumbar spine.  She takes tramadol which significantly alleviates her pain.  She gets wound care from [facility] for her chronic stage IV sacral decubitus ulcer.  An NP comes to the house twice weekly.  She states that the wound is improving.    She has a home health nurse who comes out every Friday from [place].  She has her urinary catheter changed monthly.  Her last urine done on October 11, 2024 immediately after a catheter change revealed blood and leukocytes, but the culture revealed multiple organisms none in predominance.     Her hypertension has been controlled.           A/P:  Continue current medications for hypertension, which is stable.  Continue home health care with PT for chronic severe spinal stenosis  of the cervical and lumbar spine.  Continue home wound care for her chronic stage IV sacral decubitus ulcer.  Continue monthly urinary catheter changes for history of recurrent UTIs.  We will re-culture her urine if she has worsening symptoms or fever.  I will send an order to [facility] to see if they can administer a high dose flu shot and Prevnar 20 vaccine.    Between 30 and 39 min of total time for evaluation and management services were spent on the patient today.  The medical problems and treatment options were discussed, and all questions were answered.    This visit is complex due to the longitudinal nature of the patient doctor relationship related to the patient's series or complex medical condition.  Extra time was required and spent on this patient's visit due to this high level of complexity.     We will plan on doing a follow-up telemedicine appointment in about 3 months.

## 2024-11-08 RX ORDER — PNEUMOCOCCAL 20-VALENT CONJUGATE VACCINE 2.2; 2.2; 2.2; 2.2; 2.2; 2.2; 2.2; 2.2; 2.2; 2.2; 2.2; 2.2; 2.2; 2.2; 2.2; 2.2; 4.4; 2.2; 2.2; 2.2 UG/.5ML; UG/.5ML; UG/.5ML; UG/.5ML; UG/.5ML; UG/.5ML; UG/.5ML; UG/.5ML; UG/.5ML; UG/.5ML; UG/.5ML; UG/.5ML; UG/.5ML; UG/.5ML; UG/.5ML; UG/.5ML; UG/.5ML; UG/.5ML; UG/.5ML; UG/.5ML
0.5 INJECTION, SUSPENSION INTRAMUSCULAR ONCE
Qty: 0.5 ML | Refills: 0 | Status: SHIPPED | OUTPATIENT
Start: 2024-11-08 | End: 2024-11-08

## 2024-11-14 ENCOUNTER — DOCUMENT SCAN (OUTPATIENT)
Dept: HOME HEALTH SERVICES | Facility: HOSPITAL | Age: 74
End: 2024-11-14
Payer: MEDICARE

## 2024-12-02 ENCOUNTER — EXTERNAL HOME HEALTH (OUTPATIENT)
Dept: HOME HEALTH SERVICES | Facility: HOSPITAL | Age: 74
End: 2024-12-02
Payer: MEDICARE

## 2025-02-05 DIAGNOSIS — L89.154 PRESSURE INJURY OF SACRAL REGION, STAGE 4: ICD-10-CM

## 2025-02-05 DIAGNOSIS — G89.29 CHRONIC LOW BACK PAIN, UNSPECIFIED BACK PAIN LATERALITY, UNSPECIFIED WHETHER SCIATICA PRESENT: ICD-10-CM

## 2025-02-05 DIAGNOSIS — M54.50 CHRONIC LOW BACK PAIN, UNSPECIFIED BACK PAIN LATERALITY, UNSPECIFIED WHETHER SCIATICA PRESENT: ICD-10-CM

## 2025-02-05 RX ORDER — TRAMADOL HYDROCHLORIDE 50 MG/1
100 TABLET ORAL EVERY 8 HOURS PRN
Qty: 180 TABLET | Refills: 1 | Status: SHIPPED | OUTPATIENT
Start: 2025-02-05

## 2025-02-19 ENCOUNTER — DOCUMENT SCAN (OUTPATIENT)
Dept: HOME HEALTH SERVICES | Facility: HOSPITAL | Age: 75
End: 2025-02-19
Payer: MEDICARE

## 2025-02-26 ENCOUNTER — EXTERNAL HOME HEALTH (OUTPATIENT)
Dept: HOME HEALTH SERVICES | Facility: HOSPITAL | Age: 75
End: 2025-02-26
Payer: MEDICARE

## 2025-03-13 ENCOUNTER — DOCUMENT SCAN (OUTPATIENT)
Dept: HOME HEALTH SERVICES | Facility: HOSPITAL | Age: 75
End: 2025-03-13
Payer: MEDICARE

## 2025-03-18 ENCOUNTER — DOCUMENT SCAN (OUTPATIENT)
Dept: HOME HEALTH SERVICES | Facility: HOSPITAL | Age: 75
End: 2025-03-18
Payer: MEDICARE

## 2025-03-25 ENCOUNTER — OFFICE VISIT (OUTPATIENT)
Dept: INTERNAL MEDICINE | Facility: CLINIC | Age: 75
End: 2025-03-25
Attending: INTERNAL MEDICINE
Payer: MEDICARE

## 2025-03-25 VITALS — DIASTOLIC BLOOD PRESSURE: 70 MMHG | HEART RATE: 68 BPM | SYSTOLIC BLOOD PRESSURE: 134 MMHG | OXYGEN SATURATION: 98 %

## 2025-03-25 DIAGNOSIS — R29.898 WEAKNESS OF BOTH LOWER EXTREMITIES: ICD-10-CM

## 2025-03-25 DIAGNOSIS — Z13.31 SCREENING FOR DEPRESSION: ICD-10-CM

## 2025-03-25 DIAGNOSIS — M48.062 SPINAL STENOSIS OF LUMBAR REGION WITH NEUROGENIC CLAUDICATION: ICD-10-CM

## 2025-03-25 DIAGNOSIS — I10 ESSENTIAL HYPERTENSION: Primary | ICD-10-CM

## 2025-03-25 DIAGNOSIS — M48.02 NEURAL FORAMINAL STENOSIS OF CERVICAL SPINE: ICD-10-CM

## 2025-03-25 DIAGNOSIS — Z13.39 SCREENING FOR ALCOHOLISM: ICD-10-CM

## 2025-03-25 DIAGNOSIS — K21.9 GASTROESOPHAGEAL REFLUX DISEASE, UNSPECIFIED WHETHER ESOPHAGITIS PRESENT: ICD-10-CM

## 2025-03-25 DIAGNOSIS — Z96.0 URINARY CATHETER IN PLACE: ICD-10-CM

## 2025-03-25 DIAGNOSIS — L89.154 PRESSURE INJURY OF SACRAL REGION, STAGE 4: ICD-10-CM

## 2025-03-25 PROBLEM — E66.01 MORBID (SEVERE) OBESITY DUE TO EXCESS CALORIES: Status: RESOLVED | Noted: 2024-11-05 | Resolved: 2025-03-25

## 2025-03-25 PROCEDURE — 1160F RVW MEDS BY RX/DR IN RCRD: CPT | Mod: CPTII,95,, | Performed by: INTERNAL MEDICINE

## 2025-03-25 PROCEDURE — G0442 ANNUAL ALCOHOL SCREEN 15 MIN: HCPCS | Mod: 59,95,, | Performed by: INTERNAL MEDICINE

## 2025-03-25 PROCEDURE — 4010F ACE/ARB THERAPY RXD/TAKEN: CPT | Mod: CPTII,95,, | Performed by: INTERNAL MEDICINE

## 2025-03-25 PROCEDURE — 99214 OFFICE O/P EST MOD 30 MIN: CPT | Mod: 25,95,, | Performed by: INTERNAL MEDICINE

## 2025-03-25 PROCEDURE — G0444 DEPRESSION SCREEN ANNUAL: HCPCS | Mod: 59,95,, | Performed by: INTERNAL MEDICINE

## 2025-03-25 PROCEDURE — 3078F DIAST BP <80 MM HG: CPT | Mod: CPTII,95,, | Performed by: INTERNAL MEDICINE

## 2025-03-25 PROCEDURE — 3075F SYST BP GE 130 - 139MM HG: CPT | Mod: CPTII,95,, | Performed by: INTERNAL MEDICINE

## 2025-03-25 PROCEDURE — 1159F MED LIST DOCD IN RCRD: CPT | Mod: CPTII,95,, | Performed by: INTERNAL MEDICINE
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
## 2025-04-08 DIAGNOSIS — L89.154 PRESSURE INJURY OF SACRAL REGION, STAGE 4: ICD-10-CM

## 2025-04-08 DIAGNOSIS — G89.29 CHRONIC LOW BACK PAIN, UNSPECIFIED BACK PAIN LATERALITY, UNSPECIFIED WHETHER SCIATICA PRESENT: ICD-10-CM

## 2025-04-08 DIAGNOSIS — M54.50 CHRONIC LOW BACK PAIN, UNSPECIFIED BACK PAIN LATERALITY, UNSPECIFIED WHETHER SCIATICA PRESENT: ICD-10-CM

## 2025-04-08 RX ORDER — TRAMADOL HYDROCHLORIDE 50 MG/1
100 TABLET ORAL EVERY 8 HOURS PRN
Qty: 180 TABLET | Refills: 2 | Status: SHIPPED | OUTPATIENT
Start: 2025-04-08

## 2025-04-10 RX ORDER — TELMISARTAN 40 MG/1
40 TABLET ORAL
Qty: 90 TABLET | Refills: 1 | Status: SHIPPED | OUTPATIENT
Start: 2025-04-10

## 2025-04-29 ENCOUNTER — EXTERNAL HOME HEALTH (OUTPATIENT)
Dept: HOME HEALTH SERVICES | Facility: HOSPITAL | Age: 75
End: 2025-04-29
Payer: MEDICARE

## 2025-05-20 ENCOUNTER — OFFICE VISIT (OUTPATIENT)
Dept: INTERNAL MEDICINE | Facility: CLINIC | Age: 75
End: 2025-05-20
Attending: INTERNAL MEDICINE
Payer: MEDICARE

## 2025-05-20 DIAGNOSIS — R53.2 FUNCTIONAL QUADRIPLEGIA: ICD-10-CM

## 2025-05-20 DIAGNOSIS — L89.154 PRESSURE INJURY OF SACRAL REGION, STAGE 4: ICD-10-CM

## 2025-05-20 DIAGNOSIS — I10 ESSENTIAL HYPERTENSION: Primary | ICD-10-CM

## 2025-05-20 DIAGNOSIS — Z96.0 URINARY CATHETER IN PLACE: ICD-10-CM

## 2025-05-20 NOTE — PROGRESS NOTES
Telemedicine Visit    The patient verbally consented to proceed with a telemedicine visit, following discussion of the options of face-to-face or telemedicine visit. The telemedicine visit was completed with real-time interactive audio and video using Zoom without complication.    The visit was conducted with a limited physical exam (visual exam), and was medically appropriate to meet the patient's needs.    The patient gives permission for us to bill for this telemedicine visit.  The patient understands that there are limitations including diagnostic, technical, and physical exam.  The patient understands that they have a right to stop the telemedicine visit at any time.    The patient is at their home.  The patient appeared to be in the usual physical state, with normal responses and affect.  I did not notice any abnormal skin tone or abnormal breathing patterns.    I requested that the patient contact my office at [phone] to make an appointment for routine follow-up care, unless more specific instructions were given under A/P.    Chief Complaint:  Hospital follow-up    HPI:  She was admitted to [facility] on April 29th for for infection of her stage IV chronic decubitus ulcer.  She was given antibiotics, brought to the operating room for debridement, and wound VAC was placed.  She has a new home health agency and needs in order to have her urinary catheter changed monthly.        A/P:  Continue home health care for bed-bound, functionally quadriplegic, and chronic indwelling Santana catheter.  Order sent to [facility] to change catheter monthly.  Continue [employer] for wound VAC for chronic stage IV sacral decubitus ulcer.    Between 30 and 39 min of total time for evaluation and management services were spent on the patient today.  The medical problems and treatment options were discussed, and all questions were answered.

## 2025-05-21 DIAGNOSIS — R52 PAIN: Primary | ICD-10-CM

## 2025-05-21 RX ORDER — OXYBUTYNIN CHLORIDE 5 MG/1
5 TABLET, EXTENDED RELEASE ORAL 3 TIMES DAILY PRN
Qty: 90 TABLET | Refills: 11 | Status: SHIPPED | OUTPATIENT
Start: 2025-05-21 | End: 2025-05-22 | Stop reason: DRUGHIGH

## 2025-05-21 RX ORDER — OXYCODONE HYDROCHLORIDE 5 MG/1
TABLET ORAL
Qty: 7 TABLET | Refills: 0 | Status: SHIPPED | OUTPATIENT
Start: 2025-05-21

## 2025-05-22 DIAGNOSIS — N32.81 OAB (OVERACTIVE BLADDER): Primary | ICD-10-CM

## 2025-05-22 RX ORDER — OXYBUTYNIN CHLORIDE 5 MG/1
5 TABLET ORAL 3 TIMES DAILY
Qty: 90 TABLET | Refills: 11 | Status: SHIPPED | OUTPATIENT
Start: 2025-05-22 | End: 2026-05-22

## 2025-05-26 ENCOUNTER — DOCUMENT SCAN (OUTPATIENT)
Dept: HOME HEALTH SERVICES | Facility: HOSPITAL | Age: 75
End: 2025-05-26
Payer: MEDICARE

## 2025-05-28 RX ORDER — TELMISARTAN 40 MG/1
40 TABLET ORAL
Qty: 90 TABLET | Refills: 1 | Status: SHIPPED | OUTPATIENT
Start: 2025-05-28

## 2025-06-03 ENCOUNTER — DOCUMENT SCAN (OUTPATIENT)
Dept: HOME HEALTH SERVICES | Facility: HOSPITAL | Age: 75
End: 2025-06-03
Payer: MEDICARE

## 2025-06-04 ENCOUNTER — DOCUMENT SCAN (OUTPATIENT)
Dept: HOME HEALTH SERVICES | Facility: HOSPITAL | Age: 75
End: 2025-06-04
Payer: MEDICARE

## 2025-07-14 DIAGNOSIS — G89.29 CHRONIC LOW BACK PAIN, UNSPECIFIED BACK PAIN LATERALITY, UNSPECIFIED WHETHER SCIATICA PRESENT: ICD-10-CM

## 2025-07-14 DIAGNOSIS — M54.50 CHRONIC LOW BACK PAIN, UNSPECIFIED BACK PAIN LATERALITY, UNSPECIFIED WHETHER SCIATICA PRESENT: ICD-10-CM

## 2025-07-14 DIAGNOSIS — L89.154 PRESSURE INJURY OF SACRAL REGION, STAGE 4: ICD-10-CM

## 2025-07-15 RX ORDER — TRAMADOL HYDROCHLORIDE 50 MG/1
50 TABLET, FILM COATED ORAL EVERY 8 HOURS PRN
Qty: 180 TABLET | Refills: 2 | Status: SHIPPED | OUTPATIENT
Start: 2025-07-15

## 2025-07-24 ENCOUNTER — LAB VISIT (OUTPATIENT)
Dept: LAB | Facility: HOSPITAL | Age: 75
End: 2025-07-24
Attending: INTERNAL MEDICINE
Payer: MEDICARE

## 2025-07-24 DIAGNOSIS — R31.0 GROSS HEMATURIA: Primary | ICD-10-CM

## 2025-07-24 LAB
BACTERIA #/AREA URNS AUTO: ABNORMAL /HPF
BILIRUB UR QL STRIP.AUTO: NEGATIVE
CLARITY UR: ABNORMAL
COLOR UR AUTO: YELLOW
GLUCOSE UR QL STRIP: NEGATIVE
HGB UR QL STRIP: ABNORMAL
HYALINE CASTS UR QL AUTO: 0 /LPF (ref 0–1)
KETONES UR QL STRIP: NEGATIVE
LEUKOCYTE ESTERASE UR QL STRIP: ABNORMAL
MICROSCOPIC COMMENT: ABNORMAL
NITRITE UR QL STRIP: NEGATIVE
PH UR STRIP: 7 [PH]
PROT UR QL STRIP: ABNORMAL
RBC #/AREA URNS AUTO: 15 /HPF (ref 0–4)
SP GR UR STRIP: 1.01
SQUAMOUS #/AREA URNS AUTO: 2 /HPF
UROBILINOGEN UR STRIP-ACNC: NEGATIVE EU/DL
WBC #/AREA URNS AUTO: 47 /HPF (ref 0–5)
WBC CLUMPS UR QL AUTO: ABNORMAL

## 2025-07-24 PROCEDURE — 87186 SC STD MICRODIL/AGAR DIL: CPT

## 2025-07-24 PROCEDURE — 81003 URINALYSIS AUTO W/O SCOPE: CPT

## 2025-07-25 ENCOUNTER — RESULTS FOLLOW-UP (OUTPATIENT)
Dept: INTERNAL MEDICINE | Facility: CLINIC | Age: 75
End: 2025-07-25
Payer: MEDICARE

## 2025-07-25 RX ORDER — CEPHALEXIN 500 MG/1
500 CAPSULE ORAL EVERY 8 HOURS
Qty: 15 CAPSULE | Refills: 0 | Status: SHIPPED | OUTPATIENT
Start: 2025-07-25

## 2025-07-26 LAB
BACTERIA UR CULT: ABNORMAL
BACTERIA UR CULT: ABNORMAL

## (undated) DEVICE — GLOVE BIOGEL SKINSENSE PI 6.0

## (undated) DEVICE — TOWEL OR DISP STRL BLUE 4/PK

## (undated) DEVICE — Device

## (undated) DEVICE — SYR B-D DISP CONTROL 10CC100/C

## (undated) DEVICE — ELECTRODE REM PLYHSV RETURN 9

## (undated) DEVICE — SOL BETADINE 5%

## (undated) DEVICE — NDL HYPO REG 25G X 1 1/2

## (undated) DEVICE — BLADE SURGICAL CARBON #22

## (undated) DEVICE — GLOVE BIOGEL SKINSENSE PI 8.0

## (undated) DEVICE — COLLECTOR SPECIMEN ANAEROBIC

## (undated) DEVICE — DRAPE THREE-QTR REINF 53X77IN

## (undated) DEVICE — SOL POVIDONE SCRUB IODINE 4 OZ

## (undated) DEVICE — SWAB CULTURETTE II DUAL

## (undated) DEVICE — GLOVE BIOGEL SKINSENSE PI 6.5

## (undated) DEVICE — SPONGE LAP 18X18 PREWASHED

## (undated) DEVICE — GOWN POLY REINF BRTH SLV 2XL